# Patient Record
Sex: FEMALE | Race: WHITE | NOT HISPANIC OR LATINO | Employment: OTHER | URBAN - NONMETROPOLITAN AREA
[De-identification: names, ages, dates, MRNs, and addresses within clinical notes are randomized per-mention and may not be internally consistent; named-entity substitution may affect disease eponyms.]

---

## 2017-02-21 ENCOUNTER — OFFICE VISIT (OUTPATIENT)
Dept: RETAIL CLINIC | Facility: CLINIC | Age: 57
End: 2017-02-21

## 2017-02-21 VITALS
SYSTOLIC BLOOD PRESSURE: 142 MMHG | TEMPERATURE: 98.7 F | DIASTOLIC BLOOD PRESSURE: 68 MMHG | HEART RATE: 88 BPM | OXYGEN SATURATION: 96 % | HEIGHT: 65 IN | WEIGHT: 225 LBS | BODY MASS INDEX: 37.49 KG/M2

## 2017-02-21 DIAGNOSIS — S67.10XA CRUSH INJURY TO FINGER, INITIAL ENCOUNTER: ICD-10-CM

## 2017-02-21 DIAGNOSIS — N30.00 ACUTE CYSTITIS WITHOUT HEMATURIA: Primary | ICD-10-CM

## 2017-02-21 LAB
BILIRUB BLD-MCNC: NEGATIVE MG/DL
CLARITY, POC: CLEAR
COLOR UR: ABNORMAL
GLUCOSE UR STRIP-MCNC: NEGATIVE MG/DL
KETONES UR QL: NEGATIVE
LEUKOCYTE EST, POC: ABNORMAL
NITRITE UR-MCNC: NEGATIVE MG/ML
PH UR: 6 [PH] (ref 5–8)
PROT UR STRIP-MCNC: ABNORMAL MG/DL
RBC # UR STRIP: NEGATIVE /UL
SP GR UR: 1.02 (ref 1–1.03)
UROBILINOGEN UR QL: ABNORMAL

## 2017-02-21 PROCEDURE — 99214 OFFICE O/P EST MOD 30 MIN: CPT | Performed by: NURSE PRACTITIONER

## 2017-02-21 PROCEDURE — 81003 URINALYSIS AUTO W/O SCOPE: CPT | Performed by: NURSE PRACTITIONER

## 2017-02-21 RX ORDER — PHENAZOPYRIDINE HYDROCHLORIDE 100 MG/1
100 TABLET, FILM COATED ORAL 3 TIMES DAILY PRN
Qty: 6 TABLET | Refills: 0 | Status: SHIPPED | OUTPATIENT
Start: 2017-02-21 | End: 2017-02-23

## 2017-02-21 RX ORDER — SULFAMETHOXAZOLE AND TRIMETHOPRIM 800; 160 MG/1; MG/1
1 TABLET ORAL 2 TIMES DAILY
Qty: 20 TABLET | Refills: 0 | Status: SHIPPED | OUTPATIENT
Start: 2017-02-21 | End: 2017-03-03

## 2017-02-22 NOTE — PATIENT INSTRUCTIONS
Urinary Tract Infection  Urinary tract infections (UTIs) can develop anywhere along your urinary tract. Your urinary tract is your body's drainage system for removing wastes and extra water. Your urinary tract includes two kidneys, two ureters, a bladder, and a urethra. Your kidneys are a pair of bean-shaped organs. Each kidney is about the size of your fist. They are located below your ribs, one on each side of your spine.  CAUSES  Infections are caused by microbes, which are microscopic organisms, including fungi, viruses, and bacteria. These organisms are so small that they can only be seen through a microscope. Bacteria are the microbes that most commonly cause UTIs.  SYMPTOMS   Symptoms of UTIs may vary by age and gender of the patient and by the location of the infection. Symptoms in young women typically include a frequent and intense urge to urinate and a painful, burning feeling in the bladder or urethra during urination. Older women and men are more likely to be tired, shaky, and weak and have muscle aches and abdominal pain. A fever may mean the infection is in your kidneys. Other symptoms of a kidney infection include pain in your back or sides below the ribs, nausea, and vomiting.  DIAGNOSIS  To diagnose a UTI, your caregiver will ask you about your symptoms. Your caregiver will also ask you to provide a urine sample. The urine sample will be tested for bacteria and white blood cells. White blood cells are made by your body to help fight infection.  TREATMENT   Typically, UTIs can be treated with medication. Because most UTIs are caused by a bacterial infection, they usually can be treated with the use of antibiotics. The choice of antibiotic and length of treatment depend on your symptoms and the type of bacteria causing your infection.  HOME CARE INSTRUCTIONS  · If you were prescribed antibiotics, take them exactly as your caregiver instructs you. Finish the medication even if you feel better after  you have only taken some of the medication.  · Drink enough water and fluids to keep your urine clear or pale yellow.  · Avoid caffeine, tea, and carbonated beverages. They tend to irritate your bladder.  · Empty your bladder often. Avoid holding urine for long periods of time.  · Empty your bladder before and after sexual intercourse.  · After a bowel movement, women should cleanse from front to back. Use each tissue only once.  SEEK MEDICAL CARE IF:   · You have back pain.  · You develop a fever.  · Your symptoms do not begin to resolve within 3 days.  SEEK IMMEDIATE MEDICAL CARE IF:   · You have severe back pain or lower abdominal pain.  · You develop chills.  · You have nausea or vomiting.  · You have continued burning or discomfort with urination.  MAKE SURE YOU:   · Understand these instructions.  · Will watch your condition.  · Will get help right away if you are not doing well or get worse.     This information is not intended to replace advice given to you by your health care provider. Make sure you discuss any questions you have with your health care provider.     Document Released: 09/27/2006 Document Revised: 09/07/2016 Document Reviewed: 11/07/2016  [employer] Interactive Patient Education ©2016 [employer]    Crush Injury, Fingers or Toes  A crush injury to the fingers or toes means the tissues have been damaged by being squeezed (compressed). There will be bleeding into the tissues and swelling. Often, blood will collect under the skin. When this happens, the skin on the finger often dies and may slough off (shed) 1 week to 10 days later. Usually, new skin is growing underneath. If the injury has been too severe and the tissue does not survive, the damaged tissue may begin to turn black over several days.   Wounds which occur because of the crushing may be stitched (sutured) shut. However, crush injuries are more likely to become infected than other injuries. These wounds may not be closed as tightly  as other types of cuts to prevent infection. Nails involved are often lost. These usually grow back over several weeks.   DIAGNOSIS  X-rays may be taken to see if there is any injury to the bones.  TREATMENT  Broken bones (fractures) may be treated with splinting, depending on the fracture. Often, no treatment is required for fractures of the last bone in the fingers or toes.  HOME CARE INSTRUCTIONS   · The crushed part should be raised (elevated) above the heart or center of the chest as much as possible for the first several days or as directed. This helps with pain and lessens swelling. Less swelling increases the chances that the crushed part will survive.  · Put ice on the injured area.    Put ice in a plastic bag.    Place a towel between your skin and the bag.    Leave the ice on for 15-20 minutes, 03-04 times a day for the first 2 days.  · Only take over-the-counter or prescription medicines for pain, discomfort, or fever as directed by your caregiver.  · Use your injured part only as directed.  · Change your bandages (dressings) as directed.  · Keep all follow-up appointments as directed by your caregiver. Not keeping your appointment could result in a chronic or permanent injury, pain, and disability. If there is any problem keeping the appointment, you must call to reschedule.  SEEK IMMEDIATE MEDICAL CARE IF:   · There is redness, swelling, or increasing pain in the wound area.  · Pus is coming from the wound.  · You have a fever.  · You notice a bad smell coming from the wound or dressing.  · The edges of the wound do not stay together after the sutures have been removed.  · You are unable to move the injured finger or toe.  MAKE SURE YOU:   · Understand these instructions.  · Will watch your condition.  · Will get help right away if you are not doing well or get worse.     This information is not intended to replace advice given to you by your health care provider. Make sure you discuss any questions  you have with your health care provider.     Document Released: 12/18/2006 Document Revised: 03/11/2013 Document Reviewed: 05/04/2012  [employer] Interactive Patient Education ©2016 [employer]    Sulfamethoxazole; Trimethoprim, SMX-TMP tablets  What is this medicine?  SULFAMETHOXAZOLE; TRIMETHOPRIM or SMX-TMP (suhl fuh meth OK saji zohl; trye METH oh prim) is a combination of a sulfonamide antibiotic and a second antibiotic, trimethoprim. It is used to treat or prevent certain kinds of bacterial infections. It will not work for colds, flu, or other viral infections.  This medicine may be used for other purposes; ask your health care provider or pharmacist if you have questions.  COMMON BRAND NAME(S): Bacter-Aid DS, Bactrim, Bactrim DS, Septra, Septra DS  What should I tell my health care provider before I take this medicine?  They need to know if you have any of these conditions:  -anemia  -asthma  -being treated with anticonvulsants  -if you frequently drink alcohol containing drinks  -kidney disease  -liver disease  -low level of folic acid or glucose-6-phosphate dehydrogenase  -poor nutrition or malabsorption  -porphyria  -severe allergies  -thyroid disorder  -an unusual or allergic reaction to sulfamethoxazole, trimethoprim, sulfa drugs, other medicines, foods, dyes, or preservatives  -pregnant or trying to get pregnant  -breast-feeding  How should I use this medicine?  Take this medicine by mouth with a full glass of water. Follow the directions on the prescription label. Take your medicine at regular intervals. Do not take it more often than directed. Do not skip doses or stop your medicine early.  Talk to your pediatrician regarding the use of this medicine in children. Special care may be needed. This medicine has been used in children as young as 2 months of age.  Overdosage: If you think you have taken too much of this medicine contact a poison control center or emergency room at once.  NOTE: This medicine  is only for you. Do not share this medicine with others.  What if I miss a dose?  If you miss a dose, take it as soon as you can. If it is almost time for your next dose, take only that dose. Do not take double or extra doses.  What may interact with this medicine?  Do not take this medicine with any of the following medications:  -aminobenzoate potassium  -dofetilide  -metronidazole  This medicine may also interact with the following medications:  -ACE inhibitors like benazepril, enalapril, lisinopril, and ramipril  -birth control pills  -cyclosporine  -digoxin  -diuretics  -indomethacin  -medicines for diabetes  -methenamine  -methotrexate  -phenytoin  -potassium supplements  -pyrimethamine  -sulfinpyrazone  -tricyclic antidepressants  -warfarin  This list may not describe all possible interactions. Give your health care provider a list of all the medicines, herbs, non-prescription drugs, or dietary supplements you use. Also tell them if you smoke, drink alcohol, or use illegal drugs. Some items may interact with your medicine.  What should I watch for while using this medicine?  Tell your doctor or health care professional if your symptoms do not improve. Drink several glasses of water a day to reduce the risk of kidney problems.  Do not treat diarrhea with over the counter products. Contact your doctor if you have diarrhea that lasts more than 2 days or if it is severe and watery.  This medicine can make you more sensitive to the sun. Keep out of the sun. If you cannot avoid being in the sun, wear protective clothing and use a sunscreen. Do not use sun lamps or tanning beds/booths.  What side effects may I notice from receiving this medicine?  Side effects that you should report to your doctor or health care professional as soon as possible:  -allergic reactions like skin rash or hives, swelling of the face, lips, or tongue  -breathing problems  -fever or chills, sore throat  -irregular heartbeat, chest  pain  -joint or muscle pain  -pain or difficulty passing urine  -red pinpoint spots on skin  -redness, blistering, peeling or loosening of the skin, including inside the mouth  -unusual bleeding or bruising  -unusually weak or tired  -yellowing of the eyes or skin  Side effects that usually do not require medical attention (report to your doctor or health care professional if they continue or are bothersome):  -diarrhea  -dizziness  -headache  -loss of appetite  -nausea, vomiting  -nervousness  This list may not describe all possible side effects. Call your doctor for medical advice about side effects. You may report side effects to FDA at 3-352-LPY-5908.  Where should I keep my medicine?  Keep out of the reach of children.  Store at room temperature between 20 to 25 degrees C (68 to 77 degrees F). Protect from light. Throw away any unused medicine after the expiration date.  NOTE: This sheet is a summary. It may not cover all possible information. If you have questions about this medicine, talk to your doctor, pharmacist, or health care provider.     © 2016, [employer]. (2014-07-25 14:38:26)    An antibiotic has been prescribed for you that needs to be taken as directed, for full length of treatment. It is recommended that you take a probiotic when taking an antibiotic and for 2-4 weeks after completion of antibiotic to prevent antibiotic induced diarrhea. Probiotics are found over the counter in pill form, mixable powders, and in regular yogurt or buttermilk, both are recommended.  Try to take these daily around the same time. Do not take within 2 hours of the antibiotic.    I recommendbetadine soaks to finger BID for 5-7 days . If pain persists, xray would be needed. Continue with ice. Use finger as regularly as possible. Tylenol for pain control.

## 2017-02-22 NOTE — PROGRESS NOTES
Chief Complaint   Patient presents with   • Finger Injury   • Urinary Frequency     Subjective   Radha Wade is a 56 y.o. female who presents to the clinic today with complaints of acute onset of pain, swelling, with decreased ability, pain 10/10 after shutting right distal middle finger in the door yesterday. Pt noted a open abrasion that was bleeding slightly. Pt used ice, peroxide and iodine and noted some symptom improvement. Pt states area is more red today and concerned about infection because of history mitral valve prolapse.  Pt also states that she noted that she was having some urinary frequency with mild burning pain during end of voiding stream. Pt states she has had UTI's last one over a year ago. Pt has been drinking cranberry juice without improvement.        The following portions of the patient's history were reviewed and updated as appropriate: allergies, past family history, past medical history, past social history, past surgical history and problem list.    No current outpatient prescriptions on file.  Allergies:  Review of patient's allergies indicates no known allergies.  Past Medical History   Diagnosis Date   • Asthma    • Heart murmur    • Mitral valve prolapse      Past Surgical History   Procedure Laterality Date   •  section       Family History   Problem Relation Age of Onset   • Heart disease Father    • Diabetes Father    • Diabetes Brother    • Heart disease Brother    • Cancer Maternal Grandmother    • Cancer Maternal Grandfather    • Heart disease Paternal Grandfather      Social History   Substance Use Topics   • Smoking status: Not on file   • Smokeless tobacco: Not on file   • Alcohol use Not on file       Review of Systems  Review of Systems   Constitutional: Negative for chills and fever.   HENT: Negative for congestion, postnasal drip and sinus pressure.    Respiratory: Negative for cough and wheezing.    Gastrointestinal: Negative for abdominal pain and blood  "in stool.   Genitourinary: Positive for dysuria, frequency and pelvic pain.   Musculoskeletal: Positive for arthralgias and joint swelling.   Skin: Positive for wound.   Neurological: Positive for headaches.       Objective   Visit Vitals   • /68   • Pulse 88   • Temp 98.7 °F (37.1 °C)   • Ht 65\" (165.1 cm)   • Wt 225 lb (102 kg)   • LMP Comment: menopausal   • SpO2 96%   • BMI 37.44 kg/m2     Last 2 weights    02/21/17  1836   Weight: 225 lb (102 kg)       Physical Exam   Constitutional: She appears well-developed and well-nourished.   HENT:   Head: Normocephalic and atraumatic.   Eyes: EOM are normal. Pupils are equal, round, and reactive to light.   Neck: Normal range of motion. Neck supple.   Cardiovascular: Normal rate and regular rhythm.    Pulmonary/Chest: Effort normal and breath sounds normal.   Abdominal: Soft. Normal appearance and bowel sounds are normal. There is tenderness in the suprapubic area and left lower quadrant.   Musculoskeletal:   Exam of the right middle finger reveals intact skin with bruising and swelling noted over distal phalyanx. Full ROM noted at DIPJ and PIPJ without limitation. No open wound noted, some redness noted to nail fold with small subungal hematoma noted nail base. Pulses intact, capillary refill intact. Gross sensation intact.   Skin: Skin is warm and dry.       Assessment/Plan     Radha was seen today for finger injury and urinary frequency.    Diagnoses and all orders for this visit:    Acute cystitis without hematuria  -     POCT urinalysis dipstick, automated    Crush injury to finger, initial encounter   -- Diff, DX, proximal phalanx FX, right middle finger    Other orders  -     sulfamethoxazole-trimethoprim (BACTRIM DS) 800-160 MG per tablet; Take 1 tablet by mouth 2 (Two) Times a Day for 10 days.  -     phenazopyridine (PYRIDIUM) 100 MG tablet; Take 1 tablet by mouth 3 (Three) Times a Day As Needed for bladder spasms for up to 2 days.                 " -betadine soaks to finger BID for 5-7 days

## 2017-09-08 ENCOUNTER — OFFICE VISIT (OUTPATIENT)
Dept: RETAIL CLINIC | Facility: CLINIC | Age: 57
End: 2017-09-08

## 2017-09-08 VITALS
TEMPERATURE: 98.7 F | HEART RATE: 88 BPM | SYSTOLIC BLOOD PRESSURE: 148 MMHG | OXYGEN SATURATION: 98 % | DIASTOLIC BLOOD PRESSURE: 78 MMHG | RESPIRATION RATE: 18 BRPM

## 2017-09-08 DIAGNOSIS — T14.8XXA PUNCTURE WOUND: Primary | ICD-10-CM

## 2017-09-08 PROCEDURE — 99213 OFFICE O/P EST LOW 20 MIN: CPT | Performed by: NURSE PRACTITIONER

## 2017-09-08 RX ORDER — SULFAMETHOXAZOLE AND TRIMETHOPRIM 800; 160 MG/1; MG/1
1 TABLET ORAL 2 TIMES DAILY
Qty: 14 TABLET | Refills: 0 | Status: SHIPPED | OUTPATIENT
Start: 2017-09-08 | End: 2017-09-15

## 2017-09-08 NOTE — PROGRESS NOTES
Chief Complaint   Patient presents with   • Puncture Wound     Subjective   Radha Wade is a 57 y.o. female who presents to the clinic today with complaints puncture wound in the heel of her right foot. She reports happened a couple of hours ago. She is concerned that she has mitral valve prolapse. I discussed recent recommendations do not include prophylactic antibiotics. She does however have a deep puncture wound which she reports the nail went in about one inch. She is unsure about weather the nail was clean or dirty and is not sure how long it has been since her last Tetanus shot. She knows it has been at least 5 years in not 10 years. She has no primary care provider so I think likely a very long while since she has had vaccines. She cannot afford to have this done here due to no insurance coverage. [facility] is covered to give this to her and they are willing to do this today. She did clean site with peroxide and applied neosporin ointment and band aide.   Puncture Wound   This is a new problem. The current episode started today. Pertinent negatives include no abdominal pain, anorexia, change in bowel habit, chest pain, chills, congestion, coughing, diaphoresis, fatigue, fever, headaches, joint swelling, myalgias, nausea, neck pain, numbness, rash, sore throat, swollen glands, urinary symptoms, vertigo, visual change, vomiting or weakness. The symptoms are aggravated by walking.         Current Outpatient Prescriptions:   •  sulfamethoxazole-trimethoprim (BACTRIM DS) 800-160 MG per tablet, Take 1 tablet by mouth 2 (Two) Times a Day for 7 days., Disp: 14 tablet, Rfl: 0    Allergies:  Review of patient's allergies indicates no known allergies.    Past Medical History:   Diagnosis Date   • Asthma    • Heart murmur    • Mitral valve prolapse      Past Surgical History:   Procedure Laterality Date   •  SECTION       Family History   Problem Relation Age of Onset   • Heart disease Father     • Diabetes Father    • Diabetes Brother    • Heart disease Brother    • Cancer Maternal Grandmother    • Cancer Maternal Grandfather    • Heart disease Paternal Grandfather      Social History   Substance Use Topics   • Smoking status: Passive Smoke Exposure - Never Smoker   • Smokeless tobacco: Never Used   • Alcohol use None       Review of Systems  Review of Systems   Constitutional: Negative for chills, diaphoresis, fatigue and fever.   HENT: Negative for congestion and sore throat.    Respiratory: Negative for cough.    Cardiovascular: Negative for chest pain.   Gastrointestinal: Negative for abdominal pain, anorexia, change in bowel habit, nausea and vomiting.   Musculoskeletal: Negative for joint swelling, myalgias and neck pain.   Skin: Negative for rash.   Neurological: Negative for vertigo, weakness, numbness and headaches.       Objective   /78 (BP Location: Left arm, Patient Position: Sitting, Cuff Size: Adult)  Pulse 88  Temp 98.7 °F (37.1 °C) (Temporal Artery )   Resp 18  SpO2 98%      Physical Exam   Constitutional: She is oriented to person, place, and time. She appears well-developed and well-nourished.   HENT:   Head: Normocephalic and atraumatic.   Eyes: Pupils are equal, round, and reactive to light.   Cardiovascular: Normal rate, regular rhythm and normal heart sounds.    Pulmonary/Chest: Effort normal and breath sounds normal. No respiratory distress.   Neurological: She is alert and oriented to person, place, and time.   Skin: Skin is warm and dry.   Puncture wound noted on right heel. No bleeding or erythemia. She does have tenderness and mild swelling.    Psychiatric: She has a normal mood and affect. Her behavior is normal.   Vitals reviewed.      Assessment/Plan     Radha was seen today for puncture wound.    Diagnoses and all orders for this visit:    Puncture wound    Other orders  -     sulfamethoxazole-trimethoprim (BACTRIM DS) 800-160 MG per tablet; Take 1 tablet by mouth  2 (Two) Times a Day for 7 days.    Wound cleaned with wound  and band aide  applied.   Go now to [facility] and get Tetanus vaccine.     I have given her a list of primary care providers that are listed on her card (she was assigned [facility]). I have advised her to make an appointment with one of the to establish care. She has agreed.     If signs or symptoms of infection develop go to higher level of care, either urgent care of emergency room. Wound care instructions given.

## 2017-09-08 NOTE — PATIENT INSTRUCTIONS
Puncture Wound  A puncture wound is an injury that is caused by a sharp, thin object that goes through (penetrates) your skin. Usually, a puncture wound does not leave a large opening in your skin, so it may not bleed a lot. However, when you get a puncture wound, dirt or other materials (foreign bodies) can be forced into your wound and break off inside. This increases the chance of infection, such as tetanus.  CAUSES  Puncture wounds are caused by any sharp, thin object that goes through your skin, such as:  · Animal teeth, as with an animal bite.  · Sharp, pointed objects, such as nails, splinters of glass, fishhooks, and needles.  SYMPTOMS  Symptoms of a puncture wound include:  · Pain.  · Bleeding.  · Swelling.  · Bruising.  · Fluid leaking from the wound.  · Numbness, tingling, or loss of function.  DIAGNOSIS  This condition is diagnosed with a medical history and physical exam. Your wound will be checked to see if it contains any foreign bodies. You may also have X-rays or other imaging tests.  TREATMENT  Treatment for a puncture wound depends on how serious the wound is. It also depends on whether the wound contains any foreign bodies. Treatment for all types of puncture wounds usually starts with:  · Controlling the bleeding.  · Washing out the wound with a germ-free (sterile) salt-water solution.  · Checking the wound for foreign bodies.  Treatment may also include:  · Having the wound opened surgically to remove a foreign object.  · Closing the wound with stitches (sutures) if it continues to bleed.  · Covering the wound with antibiotic ointments and a bandage (dressing).  · Receiving a tetanus shot.  · Receiving a rabies vaccine.  HOME CARE INSTRUCTIONS  Medicines  · Take or apply over-the-counter and prescription medicines only as told by your health care provider.  · If you were prescribed an antibiotic, take or apply it as told by your health care provider. Do not stop using the antibiotic even if  your condition improves.  Wound Care  · There are many ways to close and cover a wound. For example, a wound can be covered with sutures, skin glue, or adhesive strips.  Follow instructions from your health care provider about:    How to take care of your wound.    When and how you should change your dressing.    When you should remove your dressing.    Removing whatever was used to close your wound.  · Keep the dressing dry as told by your health care provider. Do not take baths, swim, use a hot tub, or do anything that would put your wound underwater until your health care provider approves.  · Clean the wound as told by your health care provider.  · Do not scratch or pick at the wound.  · Check your wound every day for signs of infection. Watch for:    Redness, swelling, or pain.    Fluid, blood, or pus.  General Instructions  · Raise (elevate) the injured area above the level of your heart while you are sitting or lying down.  · If your puncture wound is in your foot, ask your health care provider if you need to avoid putting weight on your foot and for how long.  · Keep all follow-up visits as told by your health care provider. This is important.  SEEK MEDICAL CARE IF:  · You received a tetanus shot and you have swelling, severe pain, redness, or bleeding at the injection site.  · You have a fever.  · Your sutures come out.  · You notice a bad smell coming from your wound or your dressing.  · You notice something coming out of your wound, such as wood or glass.  · Your pain is not controlled with medicine.  · You have increased redness, swelling, or pain at the site of your wound.  · You have fluid, blood, or pus coming from your wound.  · You notice a change in the color of your skin near your wound.  · You need to change the dressing frequently due to fluid, blood, or pus draining from your wound.  · You develop a new rash.  · You develop numbness around your wound.  SEEK IMMEDIATE MEDICAL CARE IF:  · You  develop severe swelling around your wound.  · Your pain suddenly increases and is severe.  · You develop painful skin lumps.  · You have a red streak going away from your wound.  · The wound is on your hand or foot and you cannot properly move a finger or toe.  · The wound is on your hand or foot and you notice that your fingers or toes look pale or bluish.     This information is not intended to replace advice given to you by your health care provider. Make sure you discuss any questions you have with your health care provider.     Document Released: 09/27/2006 Document Revised: 09/07/2016 Document Reviewed: 02/10/2016  [employer] Interactive Patient Education ©2017 [employer]

## 2017-12-15 ENCOUNTER — OFFICE VISIT (OUTPATIENT)
Dept: RETAIL CLINIC | Facility: CLINIC | Age: 57
End: 2017-12-15

## 2017-12-15 VITALS
WEIGHT: 212.4 LBS | HEART RATE: 88 BPM | BODY MASS INDEX: 34.13 KG/M2 | OXYGEN SATURATION: 97 % | DIASTOLIC BLOOD PRESSURE: 72 MMHG | SYSTOLIC BLOOD PRESSURE: 128 MMHG | RESPIRATION RATE: 18 BRPM | TEMPERATURE: 98.4 F | HEIGHT: 66 IN

## 2017-12-15 DIAGNOSIS — H10.33 ACUTE BACTERIAL CONJUNCTIVITIS OF BOTH EYES: ICD-10-CM

## 2017-12-15 DIAGNOSIS — J01.40 ACUTE PANSINUSITIS, RECURRENCE NOT SPECIFIED: ICD-10-CM

## 2017-12-15 DIAGNOSIS — J20.9 ACUTE BRONCHITIS, UNSPECIFIED ORGANISM: Primary | ICD-10-CM

## 2017-12-15 PROCEDURE — 99213 OFFICE O/P EST LOW 20 MIN: CPT | Performed by: NURSE PRACTITIONER

## 2017-12-15 RX ORDER — POLYMYXIN B SULFATE AND TRIMETHOPRIM 1; 10000 MG/ML; [USP'U]/ML
1 SOLUTION OPHTHALMIC EVERY 4 HOURS
Qty: 10 ML | Refills: 0 | Status: SHIPPED | OUTPATIENT
Start: 2017-12-15 | End: 2019-02-18

## 2017-12-15 RX ORDER — SULFAMETHOXAZOLE AND TRIMETHOPRIM 800; 160 MG/1; MG/1
1 TABLET ORAL 2 TIMES DAILY
Qty: 20 TABLET | Refills: 0 | Status: SHIPPED | OUTPATIENT
Start: 2017-12-15 | End: 2017-12-25

## 2017-12-15 RX ORDER — BENZONATATE 200 MG/1
200 CAPSULE ORAL 3 TIMES DAILY PRN
Qty: 20 CAPSULE | Refills: 0 | Status: SHIPPED | OUTPATIENT
Start: 2017-12-15 | End: 2019-02-18

## 2017-12-15 RX ORDER — METHYLPREDNISOLONE 4 MG/1
TABLET ORAL
Qty: 21 TABLET | Refills: 0 | Status: SHIPPED | OUTPATIENT
Start: 2017-12-15 | End: 2019-02-18

## 2017-12-15 NOTE — PATIENT INSTRUCTIONS
Bacterial Conjunctivitis  Bacterial conjunctivitis is an infection of the clear membrane that covers the white part of your eye and the inner surface of your eyelid (conjunctiva). When the blood vessels in your conjunctiva become inflamed, your eye becomes red or pink, and it will probably feel itchy. Bacterial conjunctivitis spreads very easily from person to person (is contagious). It also spreads easily from one eye to the other eye.  CAUSES  This condition is caused by several common bacteria. You may get the infection if you come into close contact with another person who is infected. You may also come into contact with items that are contaminated with the bacteria, such as a face towel, contact lens solution, or eye makeup.  RISK FACTORS  This condition is more likely to develop in people who:  · Are exposed to other people who have the infection.  · Wear contact lenses.  · Have a sinus infection.  · Have had a recent eye injury or surgery.  · Have a weak body defense system (immune system).  · Have a medical condition that causes dry eyes.  SYMPTOMS  Symptoms of this condition include:  · Eye redness.  · Tearing or watery eyes.  · Itchy eyes.  · Burning feeling in your eyes.  · Thick, yellowish discharge from an eye. This may turn into a crust on the eyelid overnight and cause your eyelids to stick together.  · Swollen eyelids.  · Blurred vision.  DIAGNOSIS  Your health care provider can diagnose this condition based on your symptoms and medical history. Your health care provider may also take a sample of discharge from your eye to find the cause of your infection. This is rarely done.  TREATMENT  Treatment for this condition includes:  · Antibiotic eye drops or ointment to clear the infection more quickly and prevent the spread of infection to others.  · Oral antibiotic medicines to treat infections that do not respond to drops or ointments, or last longer than 10 days.  · Cool, wet cloths (cool compresses)  placed on the eyes.  · Artificial tears applied 2-6 times a day.  HOME CARE INSTRUCTIONS  Medicines  · Take or apply your antibiotic medicine as told by your health care provider. Do not stop taking or applying the antibiotic even if you start to feel better.  · Take or apply over-the-counter and prescription medicines only as told by your health care provider.  · Be very careful to avoid touching the edge of your eyelid with the eye drop bottle or the ointment tube when you apply medicines to the affected eye. This will keep you from spreading the infection to your other eye or to other people.  Managing Discomfort  · Gently wipe away any drainage from your eye with a warm, wet washcloth or a cotton ball.  · Apply a cool, clean washcloth to your eye for 10-20 minutes, 3-4 times a day.  General Instructions  · Do not wear contact lenses until the inflammation is gone and your health care provider says it is safe to wear them again. Ask your health care provider how to sterilize or replace your contact lenses before you use them again. Wear glasses until you can resume wearing contacts.  · Avoid wearing eye makeup until the inflammation is gone. Throw away any old eye cosmetics that may be contaminated.  · Change or wash your pillowcase every day.  · Do not share towels or washcloths. This may spread the infection.  · Wash your hands often with soap and water. Use paper towels to dry your hands.  · Avoid touching or rubbing your eyes.  · Do not drive or use heavy machinery if your vision is blurred.  SEEK MEDICAL CARE IF:  · You have a fever.  · Your symptoms do not get better after 10 days.  SEEK IMMEDIATE MEDICAL CARE IF:  · You have a fever and your symptoms suddenly get worse.  · You have severe pain when you move your eye.  · You have facial pain, redness, or swelling.  · You have sudden loss of vision.     This information is not intended to replace advice given to you by your health care provider. Make sure  you discuss any questions you have with your health care provider.     Document Released: 12/18/2006 Document Revised: 04/10/2017 Document Reviewed: 09/29/2016  [employer] Interactive Patient Education ©2017 [employer]  Sinusitis, Adult  Sinusitis is soreness and inflammation of your sinuses. Sinuses are hollow spaces in the bones around your face. Your sinuses are located:  · Around your eyes.  · In the middle of your forehead.  · Behind your nose.  · In your cheekbones.  Your sinuses and nasal passages are lined with a stringy fluid (mucus). Mucus normally drains out of your sinuses. When your nasal tissues become inflamed or swollen, the mucus can become trapped or blocked so air cannot flow through your sinuses. This allows bacteria, viruses, and funguses to grow, which leads to infection.  Sinusitis can develop quickly and last for 7-10 days (acute) or for more than 12 weeks (chronic). Sinusitis often develops after a cold.  CAUSES  This condition is caused by anything that creates swelling in the sinuses or stops mucus from draining, including:  · Allergies.  · Asthma.  · Bacterial or viral infection.  · Abnormally shaped bones between the nasal passages.  · Nasal growths that contain mucus (nasal polyps).  · Narrow sinus openings.  · Pollutants, such as chemicals or irritants in the air.  · A foreign object stuck in the nose.  · A fungal infection. This is rare.  RISK FACTORS  The following factors may make you more likely to develop this condition:  · Having allergies or asthma.  · Having had a recent cold or respiratory tract infection.  · Having structural deformities or blockages in your nose or sinuses.  · Having a weak immune system.  · Doing a lot of swimming or diving.  · Overusing nasal sprays.  · Smoking.  SYMPTOMS  The main symptoms of this condition are pain and a feeling of pressure around the affected sinuses. Other symptoms include:  · Upper toothache.  · Earache.  · Headache.  · Bad  breath.  · Decreased sense of smell and taste.  · A cough that may get worse at night.  · Fatigue.  · Fever.  · Thick drainage from your nose. The drainage is often green and it may contain pus (purulent).  · Stuffy nose or congestion.  · Postnasal drip. This is when extra mucus collects in the throat or back of the nose.  · Swelling and warmth over the affected sinuses.  · Sore throat.  · Sensitivity to light.  DIAGNOSIS  This condition is diagnosed based on symptoms, a medical history, and a physical exam. To find out if your condition is acute or chronic, your health care provider may:  · Look in your nose for signs of nasal polyps.  · Tap over the affected sinus to check for signs of infection.  · View the inside of your sinuses using an imaging device that has a light attached (endoscope).  If your health care provider suspects that you have chronic sinusitis, you may also:  · Be tested for allergies.  · Have a sample of mucus taken from your nose (nasal culture) and checked for bacteria.  · Have a mucus sample examined to see if your sinusitis is related to an allergy.  If your sinusitis does not respond to treatment and it lasts longer than 8 weeks, you may have an MRI or CT scan to check your sinuses. These scans also help to determine how severe your infection is.  In rare cases, a bone biopsy may be done to rule out more serious types of fungal sinus disease.  TREATMENT  Treatment for sinusitis depends on the cause and whether your condition is chronic or acute. If a virus is causing your sinusitis, your symptoms will go away on their own within 10 days. You may be given medicines to relieve your symptoms, including:  · Topical nasal decongestants. They shrink swollen nasal passages and let mucus drain from your sinuses.  · Antihistamines. These drugs block inflammation that is triggered by allergies. This can help to ease swelling in your nose and sinuses.  · Topical nasal corticosteroids. These are nasal  sprays that ease inflammation and swelling in your nose and sinuses.  · Nasal saline washes. These rinses can help to get rid of thick mucus in your nose.  If your condition is caused by bacteria, you will be given an antibiotic medicine. If your condition is caused by a fungus, you will be given an antifungal medicine.  Surgery may be needed to correct underlying conditions, such as narrow nasal passages. Surgery may also be needed to remove polyps.  HOME CARE INSTRUCTIONS  Medicines  · Take, use, or apply over-the-counter and prescription medicines only as told by your health care provider. These may include nasal sprays.  · If you were prescribed an antibiotic medicine, take it as told by your health care provider. Do not stop taking the antibiotic even if you start to feel better.  Hydrate and Humidify  · Drink enough water to keep your urine clear or pale yellow. Staying hydrated will help to thin your mucus.  · Use a cool mist humidifier to keep the humidity level in your home above 50%.  · Inhale steam for 10-15 minutes, 3-4 times a day or as told by your health care provider. You can do this in the bathroom while a hot shower is running.  · Limit your exposure to cool or dry air.  Rest  · Rest as much as possible.  · Sleep with your head raised (elevated).  · Make sure to get enough sleep each night.  General Instructions  · Apply a warm, moist washcloth to your face 3-4 times a day or as told by your health care provider. This will help with discomfort.  · Wash your hands often with soap and water to reduce your exposure to viruses and other germs. If soap and water are not available, use hand .  · Do not smoke. Avoid being around people who are smoking (secondhand smoke).  · Keep all follow-up visits as told by your health care provider. This is important.  SEEK MEDICAL CARE IF:  · You have a fever.  · Your symptoms get worse.  · Your symptoms do not improve within 10 days.  SEEK IMMEDIATE MEDICAL  CARE IF:  · You have a severe headache.  · You have persistent vomiting.  · You have pain or swelling around your face or eyes.  · You have vision problems.  · You develop confusion.  · Your neck is stiff.  · You have trouble breathing.     This information is not intended to replace advice given to you by your health care provider. Make sure you discuss any questions you have with your health care provider.     Document Released: 12/18/2006 Document Revised: 04/10/2017 Document Reviewed: 10/12/2016  [employer] Interactive Patient Education ©2017 [employer]  Acute Bronchitis  Bronchitis is inflammation of the airways that extend from the windpipe into the lungs (bronchi). The inflammation often causes mucus to develop. This leads to a cough, which is the most common symptom of bronchitis.   In acute bronchitis, the condition usually develops suddenly and goes away over time, usually in a couple weeks. Smoking, allergies, and asthma can make bronchitis worse. Repeated episodes of bronchitis may cause further lung problems.   CAUSES  Acute bronchitis is most often caused by the same virus that causes a cold. The virus can spread from person to person (contagious) through coughing, sneezing, and touching contaminated objects.  SIGNS AND SYMPTOMS   · Cough.    · Fever.    · Coughing up mucus.    · Body aches.    · Chest congestion.    · Chills.    · Shortness of breath.    · Sore throat.    DIAGNOSIS   Acute bronchitis is usually diagnosed through a physical exam. Your health care provider will also ask you questions about your medical history. Tests, such as chest X-rays, are sometimes done to rule out other conditions.   TREATMENT   Acute bronchitis usually goes away in a couple weeks. Oftentimes, no medical treatment is necessary. Medicines are sometimes given for relief of fever or cough. Antibiotic medicines are usually not needed but may be prescribed in certain situations. In some cases, an inhaler may be  recommended to help reduce shortness of breath and control the cough. A cool mist vaporizer may also be used to help thin bronchial secretions and make it easier to clear the chest.   HOME CARE INSTRUCTIONS  · Get plenty of rest.    · Drink enough fluids to keep your urine clear or pale yellow (unless you have a medical condition that requires fluid restriction). Increasing fluids may help thin your respiratory secretions (sputum) and reduce chest congestion, and it will prevent dehydration.    · Take medicines only as directed by your health care provider.  · If you were prescribed an antibiotic medicine, finish it all even if you start to feel better.  · Avoid smoking and secondhand smoke. Exposure to cigarette smoke or irritating chemicals will make bronchitis worse. If you are a smoker, consider using nicotine gum or skin patches to help control withdrawal symptoms. Quitting smoking will help your lungs heal faster.    · Reduce the chances of another bout of acute bronchitis by washing your hands frequently, avoiding people with cold symptoms, and trying not to touch your hands to your mouth, nose, or eyes.    · Keep all follow-up visits as directed by your health care provider.    SEEK MEDICAL CARE IF:  Your symptoms do not improve after 1 week of treatment.   SEEK IMMEDIATE MEDICAL CARE IF:  · You develop an increased fever or chills.    · You have chest pain.    · You have severe shortness of breath.  · You have bloody sputum.    · You develop dehydration.  · You faint or repeatedly feel like you are going to pass out.  · You develop repeated vomiting.  · You develop a severe headache.  MAKE SURE YOU:   · Understand these instructions.  · Will watch your condition.  · Will get help right away if you are not doing well or get worse.     This information is not intended to replace advice given to you by your health care provider. Make sure you discuss any questions you have with your health care provider.      Document Released: 01/25/2006 Document Revised: 01/08/2016 Document Reviewed: 06/10/2014  [employer] Interactive Patient Education ©2017 [employer]    Follow up if symptoms worsen or persist

## 2017-12-15 NOTE — PROGRESS NOTES
Chief Complaint   Patient presents with   • Cough   • Conjunctivitis     Subjective   Radha ILDEFONSO Wade is a 57 y.o. female who presents to the clinic today with complaints cough and conjunctivitis. She was around a child during a dance class. She started in left eye and now right eye. She has had green drainage.   She has had cold symptoms x 2 weeks with worsening cough and now some sinus pressure or drainage. She reports she has to take Bactrim as this is the only thing that helps her sinus and respiratory infections.   Cough   This is a new problem. The current episode started 1 to 4 weeks ago. The problem has been gradually worsening. The problem occurs every few minutes. The cough is non-productive. Associated symptoms include chills, eye redness, headaches, postnasal drip and shortness of breath. Pertinent negatives include no chest pain, ear congestion, ear pain, fever, heartburn, hemoptysis, myalgias, nasal congestion, rash, rhinorrhea, sore throat, sweats, weight loss or wheezing. The symptoms are aggravated by lying down and exercise. She has tried nothing for the symptoms. Her past medical history is significant for asthma and bronchitis. There is no history of bronchiectasis, COPD, emphysema, environmental allergies or pneumonia.   Conjunctivitis    The current episode started yesterday. The problem occurs continuously. The problem has been gradually worsening. The problem is moderate. Nothing relieves the symptoms. Associated symptoms include eye itching, headaches, cough, URI, eye discharge, eye pain and eye redness. Pertinent negatives include no orthopnea, no fever, no decreased vision, no double vision, no photophobia, no abdominal pain, no constipation, no diarrhea, no nausea, no vomiting, no congestion, no ear discharge, no ear pain, no hearing loss, no mouth sores, no rhinorrhea, no sore throat, no stridor, no swollen glands, no muscle aches, no neck pain, no neck stiffness, no wheezing, no  rash and no diaper rash. The eye pain is mild. Both eyes are affected.The eye pain is not associated with movement. The eyelid exhibits redness.         Current Outpatient Prescriptions:   •  benzonatate (TESSALON) 200 MG capsule, Take 1 capsule by mouth 3 (Three) Times a Day As Needed for Cough., Disp: 20 capsule, Rfl: 0  •  MethylPREDNISolone (MEDROL, SARAHI,) 4 MG tablet, Take as directed on package instructions., Disp: 21 tablet, Rfl: 0  •  sulfamethoxazole-trimethoprim (BACTRIM DS) 800-160 MG per tablet, Take 1 tablet by mouth 2 (Two) Times a Day for 10 days., Disp: 20 tablet, Rfl: 0  •  trimethoprim-polymyxin b (POLYTRIM) 32388-8.1 UNIT/ML-% ophthalmic solution, Administer 1 drop to both eyes Every 4 (Four) Hours., Disp: 10 mL, Rfl: 0    Allergies:  Review of patient's allergies indicates no known allergies.    Past Medical History:   Diagnosis Date   • Asthma    • Heart murmur    • Mitral valve prolapse      Past Surgical History:   Procedure Laterality Date   •  SECTION       Family History   Problem Relation Age of Onset   • Heart disease Father    • Diabetes Father    • Diabetes Brother    • Heart disease Brother    • Cancer Maternal Grandmother    • Cancer Maternal Grandfather    • Heart disease Paternal Grandfather      Social History   Substance Use Topics   • Smoking status: Passive Smoke Exposure - Never Smoker   • Smokeless tobacco: Never Used   • Alcohol use None       Review of Systems  Review of Systems   Constitutional: Positive for chills. Negative for fever and weight loss.   HENT: Positive for postnasal drip. Negative for congestion, ear discharge, ear pain, hearing loss, mouth sores, rhinorrhea and sore throat.    Eyes: Positive for pain, discharge, redness and itching. Negative for double vision and photophobia.   Respiratory: Positive for cough and shortness of breath. Negative for hemoptysis, wheezing and stridor.    Cardiovascular: Negative for chest pain and orthopnea.  "  Gastrointestinal: Negative for abdominal pain, constipation, diarrhea, heartburn, nausea and vomiting.   Musculoskeletal: Negative for myalgias and neck pain.   Skin: Negative for rash.   Allergic/Immunologic: Negative for environmental allergies.   Neurological: Positive for headaches.       Objective   /72 (BP Location: Left arm, Patient Position: Sitting, Cuff Size: Adult)  Pulse 88  Temp 98.4 °F (36.9 °C) (Tympanic)   Resp 18  Ht 167.6 cm (66\")  Wt 96.3 kg (212 lb 6.4 oz)  SpO2 97%  BMI 34.28 kg/m2      Physical Exam   Constitutional: She is oriented to person, place, and time. She appears well-developed and well-nourished.   HENT:   Head: Normocephalic and atraumatic.   Right Ear: Hearing, external ear and ear canal normal. Tympanic membrane is bulging.   Left Ear: Hearing, external ear and ear canal normal. Tympanic membrane is bulging.   Nose: Mucosal edema present. Right sinus exhibits maxillary sinus tenderness and frontal sinus tenderness. Left sinus exhibits maxillary sinus tenderness and frontal sinus tenderness.   Mouth/Throat: Uvula is midline and mucous membranes are normal. Posterior oropharyngeal erythema present. No oropharyngeal exudate or posterior oropharyngeal edema. Tonsils are 1+ on the right. Tonsils are 1+ on the left. No tonsillar exudate.   Eyes: EOM are normal. Pupils are equal, round, and reactive to light. Right eye exhibits no chemosis, no discharge, no exudate and no hordeolum. No foreign body present in the right eye. Left eye exhibits discharge and exudate. Left eye exhibits no chemosis and no hordeolum. No foreign body present in the left eye. Right conjunctiva is injected. Right conjunctiva has no hemorrhage. Left conjunctiva is injected. Left conjunctiva has no hemorrhage. No scleral icterus.   Neck: Normal range of motion. Neck supple.   Cardiovascular: Normal rate, regular rhythm and normal heart sounds.    Pulmonary/Chest: Effort normal and breath sounds " normal. No stridor. No respiratory distress. She has no wheezes. She has no rales. She exhibits no tenderness.   Lymphadenopathy:     She has no cervical adenopathy.   Neurological: She is alert and oriented to person, place, and time.   Skin: Skin is warm and dry.   Psychiatric: She has a normal mood and affect.   Vitals reviewed.      Assessment/Plan     Radha was seen today for cough and conjunctivitis.    Diagnoses and all orders for this visit:    Acute bronchitis, unspecified organism    Acute pansinusitis, recurrence not specified    Acute bacterial conjunctivitis of both eyes    Other orders  -     trimethoprim-polymyxin b (POLYTRIM) 87827-4.1 UNIT/ML-% ophthalmic solution; Administer 1 drop to both eyes Every 4 (Four) Hours.  -     sulfamethoxazole-trimethoprim (BACTRIM DS) 800-160 MG per tablet; Take 1 tablet by mouth 2 (Two) Times a Day for 10 days.  -     MethylPREDNISolone (MEDROL, SARAHI,) 4 MG tablet; Take as directed on package instructions.  -     benzonatate (TESSALON) 200 MG capsule; Take 1 capsule by mouth 3 (Three) Times a Day As Needed for Cough.      Follow up if symptoms worsen or persist

## 2019-02-18 ENCOUNTER — OFFICE VISIT (OUTPATIENT)
Dept: RETAIL CLINIC | Facility: CLINIC | Age: 59
End: 2019-02-18

## 2019-02-18 VITALS
HEART RATE: 82 BPM | WEIGHT: 209 LBS | TEMPERATURE: 97.9 F | OXYGEN SATURATION: 98 % | SYSTOLIC BLOOD PRESSURE: 138 MMHG | DIASTOLIC BLOOD PRESSURE: 80 MMHG | RESPIRATION RATE: 18 BRPM | BODY MASS INDEX: 34.82 KG/M2 | HEIGHT: 65 IN

## 2019-02-18 DIAGNOSIS — J10.1 INFLUENZA A: Primary | ICD-10-CM

## 2019-02-18 LAB
EXPIRATION DATE: ABNORMAL
FLUAV AG NPH QL: POSITIVE
FLUBV AG NPH QL: NEGATIVE
INTERNAL CONTROL: ABNORMAL
Lab: ABNORMAL

## 2019-02-18 PROCEDURE — 99213 OFFICE O/P EST LOW 20 MIN: CPT | Performed by: NURSE PRACTITIONER

## 2019-02-18 PROCEDURE — 87804 INFLUENZA ASSAY W/OPTIC: CPT | Performed by: NURSE PRACTITIONER

## 2019-02-18 RX ORDER — AMOXICILLIN AND CLAVULANATE POTASSIUM 500; 125 MG/1; MG/1
1 TABLET, FILM COATED ORAL 2 TIMES DAILY
Qty: 14 TABLET | Refills: 0 | Status: SHIPPED | OUTPATIENT
Start: 2019-02-18 | End: 2019-02-25

## 2019-02-18 RX ORDER — ALBUTEROL SULFATE 90 UG/1
2 AEROSOL, METERED RESPIRATORY (INHALATION) EVERY 4 HOURS PRN
Qty: 1 INHALER | Refills: 0 | Status: SHIPPED | OUTPATIENT
Start: 2019-02-18 | End: 2020-01-26 | Stop reason: SDUPTHER

## 2019-02-18 RX ORDER — OSELTAMIVIR PHOSPHATE 75 MG/1
75 CAPSULE ORAL 2 TIMES DAILY
Qty: 10 CAPSULE | Refills: 0 | Status: SHIPPED | OUTPATIENT
Start: 2019-02-18 | End: 2019-02-23

## 2019-02-19 NOTE — PATIENT INSTRUCTIONS
"Influenza, Adult  Influenza (“the flu\") is an infection in the lungs, nose, and throat (respiratory tract). It is caused by a virus. The flu causes many common cold symptoms, as well as a high fever and body aches. It can make you feel very sick.  The flu spreads easily from person to person (is contagious). Getting a flu shot (influenza vaccination) every year is the best way to prevent the flu.  Follow these instructions at home:  · Take over-the-counter and prescription medicines only as told by your doctor.  · Use a cool mist humidifier to add moisture (humidity) to the air in your home. This can make it easier to breathe.  · Rest as needed.  · Drink enough fluid to keep your pee (urine) clear or pale yellow.  · Cover your mouth and nose when you cough or sneeze.  · Wash your hands with soap and water often, especially after you cough or sneeze. If you cannot use soap and water, use hand .  · Stay home from work or school as told by your doctor. Unless you are visiting your doctor, try to avoid leaving home until your fever has been gone for 24 hours without the use of medicine.  · Keep all follow-up visits as told by your doctor. This is important.  How is this prevented?  · Getting a yearly (annual) flu shot is the best way to avoid getting the flu. You may get the flu shot in late summer, fall, or winter. Ask your doctor when you should get your flu shot.  · Wash your hands often or use hand  often.  · Avoid contact with people who are sick during cold and flu season.  · Eat healthy foods.  · Drink plenty of fluids.  · Get enough sleep.  · Exercise regularly.  Contact a doctor if:  · You get new symptoms.  · You have:  ? Chest pain.  ? Watery poop (diarrhea).  ? A fever.  · Your cough gets worse.  · You start to have more mucus.  · You feel sick to your stomach (nauseous).  · You throw up (vomit).  Get help right away if:  · You start to be short of breath or have trouble breathing.  · Your " "skin or nails turn a bluish color.  · You have very bad pain or stiffness in your neck.  · You get a sudden headache.  · You get sudden pain in your face or ear.  · You cannot stop throwing up.  This information is not intended to replace advice given to you by your health care provider. Make sure you discuss any questions you have with your health care provider.  Document Released: 09/26/2009 Document Revised: 05/25/2017 Document Reviewed: 10/11/2016  "[employer] Interactive Patient Education © 2017 [employer]    You have tested positive today for influenza or \"the flu.\" Because your symptoms began less than 48 hours ago, you are being treated with Tamiflu. This medication will not cure the flu, but it may help with reducing the severity and duration of the symptoms. The flu is a viral illness, and symptoms can last as long as 14 days before it resolves. Severe symptoms should begin to subside in 2-3 days.  Symptomatic treatment is recommended - antibiotics will not help. Take over the counter fever reducers as needed for fever/aches per bottle instructions. Over the counter cough and cold medications may help with cough, congestion and runny nose symptoms. Increase your intake of decaffeinated fluids and get plenty of rest. For fever that persists beyond 5 days or worsening symptoms, follow up with your primary care provider or nearest Urgent Care/ER.         "

## 2019-02-19 NOTE — PROGRESS NOTES
Subjective   Flu Symptoms    Radha Wade is a 58 y.o. female with a history of asthma and frequent bronchitis, who presents with flu symptoms.     Flu Symptoms   This is a new problem. The current episode started yesterday. The problem occurs constantly. The problem has been unchanged. Associated symptoms include chest pain (with cough), chills, congestion, coughing, diaphoresis, fatigue, a fever, headaches, myalgias, nausea, a sore throat and weakness (generalized). Pertinent negatives include no abdominal pain, joint swelling, neck pain, numbness, rash, swollen glands, vertigo or vomiting. Nothing aggravates the symptoms. She has tried acetaminophen, drinking and rest for the symptoms.        History Obtained from: Patient    Past Medical History:   Diagnosis Date   • Asthma    • Bronchitis    • Heart murmur    • Mitral valve prolapse      Past Surgical History:   Procedure Laterality Date   •  SECTION       Social History     Tobacco Use   • Smoking status: Never Smoker   • Smokeless tobacco: Never Used   Substance Use Topics   • Alcohol use: No     Frequency: Never   • Drug use: No     Family History   Problem Relation Age of Onset   • Heart disease Father    • Diabetes Father    • Diabetes Brother    • Heart disease Brother    • Cancer Maternal Grandmother    • Cancer Maternal Grandfather    • Heart disease Paternal Grandfather      No Known Allergies  Current Outpatient Medications   Medication Sig Dispense Refill   • albuterol sulfate  (90 Base) MCG/ACT inhaler Inhale 2 puffs Every 4 (Four) Hours As Needed for Wheezing. 1 inhaler 0   • amoxicillin-clavulanate (AUGMENTIN) 500-125 MG per tablet Take 1 tablet by mouth 2 (Two) Times a Day for 7 days. 14 tablet 0   • oseltamivir (TAMIFLU) 75 MG capsule Take 1 capsule by mouth 2 (Two) Times a Day for 5 days. 10 capsule 0     No current facility-administered medications for this visit.         The following portions of the patient's history were  "reviewed and updated as appropriate: allergies, current medications, past family history, past medical history, past social history and past surgical history.    Review of Systems   Constitutional: Positive for appetite change, chills, diaphoresis, fatigue and fever.   HENT: Positive for congestion, postnasal drip, rhinorrhea, sinus pressure and sore throat. Negative for ear discharge, ear pain, trouble swallowing and voice change.    Eyes: Negative.    Respiratory: Positive for cough, chest tightness and wheezing. Negative for shortness of breath.    Cardiovascular: Positive for chest pain (with cough). Negative for palpitations.   Gastrointestinal: Positive for nausea. Negative for abdominal distention, abdominal pain, diarrhea and vomiting.   Musculoskeletal: Positive for myalgias. Negative for joint swelling, neck pain and neck stiffness.   Skin: Negative for rash.   Allergic/Immunologic: Negative for immunocompromised state.   Neurological: Positive for weakness (generalized), light-headedness and headaches. Negative for dizziness, vertigo and numbness.   Hematological: Negative for adenopathy. Does not bruise/bleed easily.   Psychiatric/Behavioral: Positive for sleep disturbance. Negative for agitation, confusion and hallucinations. The patient is nervous/anxious.        Objective     VITAL SIGNS:   Vitals:    02/18/19 1802   BP: 138/80   BP Location: Left arm   Pulse: 82   Resp: 18   Temp: 97.9 °F (36.6 °C)   SpO2: 98%   Weight: 94.8 kg (209 lb)   Height: 165.1 cm (65\")   PainSc:   5   PainLoc: Head   Body mass index is 34.78 kg/m².    Physical Exam   Constitutional: She is cooperative.  Non-toxic appearance. She appears ill. No distress.   HENT:   Head: Atraumatic.   Right Ear: External ear and ear canal normal. Tympanic membrane is erythematous. Tympanic membrane is not perforated.   Left Ear: External ear and ear canal normal. Tympanic membrane is erythematous. Tympanic membrane is not perforated.   Nose: " Mucosal edema and rhinorrhea present. No nasal deformity. Right sinus exhibits no maxillary sinus tenderness and no frontal sinus tenderness. Left sinus exhibits no maxillary sinus tenderness and no frontal sinus tenderness.   Mouth/Throat: Uvula is midline and mucous membranes are normal. Uvula swelling present. Posterior oropharyngeal erythema present.   Eyes: EOM and lids are normal. Pupils are equal, round, and reactive to light. Right conjunctiva is not injected. Left conjunctiva is not injected. No scleral icterus.   Neck: Normal range of motion and phonation normal. Neck supple. No tracheal deviation present.   Cardiovascular: Normal rate and regular rhythm.   No murmur heard.  Pulmonary/Chest: Breath sounds normal. No accessory muscle usage. No tachypnea. No respiratory distress.   Dry coughing   Lymphadenopathy:     She has no cervical adenopathy.        Right: No supraclavicular adenopathy present.        Left: No supraclavicular adenopathy present.   Neurological: She is alert. Coordination and gait normal.   Skin: Skin is warm and dry. Capillary refill takes less than 2 seconds. No cyanosis.   Psychiatric: Her speech is normal. Her mood appears anxious (mildly). She is attentive.       LABS:   Results for orders placed or performed in visit on 02/18/19   POCT Influenza A/B   Result Value Ref Range    Rapid Influenza A Ag Positive (A) Negative    Rapid Influenza B Ag Negative Negative    Internal Control Passed Passed    Lot Number [id number]     Expiration Date 12/31/20            Assessment/Plan     Radha was seen today for flu symptoms.    Diagnoses and all orders for this visit:    Influenza A  -     POCT Influenza A/B  -     oseltamivir (TAMIFLU) 75 MG capsule; Take 1 capsule by mouth 2 (Two) Times a Day for 5 days.  -     albuterol sulfate  (90 Base) MCG/ACT inhaler; Inhale 2 puffs Every 4 (Four) Hours As Needed for Wheezing.    Other orders  -     amoxicillin-clavulanate (AUGMENTIN) 500-125 MG  per tablet; Take 1 tablet by mouth 2 (Two) Times a Day for 7 days.        PLAN: Watch and wait antibiotics with rationale and instructions given d/t patient history of asthma and strong concerns. Patient should follow up with primary care provider if symptoms fail to improve, worsen or for the development of new symptoms that need attention.    The patient voiced understanding and agreement to the patient treatment plan and instructions       GREYSON Arroyo

## 2019-07-21 ENCOUNTER — NURSE TRIAGE (OUTPATIENT)
Dept: CALL CENTER | Facility: HOSPITAL | Age: 59
End: 2019-07-21

## 2019-07-21 ENCOUNTER — HOSPITAL ENCOUNTER (EMERGENCY)
Age: 59
Discharge: HOME | End: 2019-07-21
Payer: COMMERCIAL

## 2019-07-21 VITALS
DIASTOLIC BLOOD PRESSURE: 94 MMHG | RESPIRATION RATE: 18 BRPM | HEART RATE: 99 BPM | OXYGEN SATURATION: 93 % | SYSTOLIC BLOOD PRESSURE: 159 MMHG | WEIGHT: 210 LBS | TEMPERATURE: 98.1 F | BODY MASS INDEX: 34.99 KG/M2 | HEIGHT: 65 IN

## 2019-07-21 DIAGNOSIS — T63.481A INSECT STINGS, ACCIDENTAL OR UNINTENTIONAL, INITIAL ENCOUNTER: Primary | ICD-10-CM

## 2019-07-21 DIAGNOSIS — R30.0 DYSURIA: ICD-10-CM

## 2019-07-21 DIAGNOSIS — N39.0 FREQUENT UTI: ICD-10-CM

## 2019-07-21 PROCEDURE — 6360000002 HC RX W HCPCS: Performed by: NURSE PRACTITIONER

## 2019-07-21 PROCEDURE — 99283 EMERGENCY DEPT VISIT LOW MDM: CPT | Performed by: NURSE PRACTITIONER

## 2019-07-21 PROCEDURE — 96372 THER/PROPH/DIAG INJ SC/IM: CPT

## 2019-07-21 PROCEDURE — 99282 EMERGENCY DEPT VISIT SF MDM: CPT

## 2019-07-21 RX ORDER — SULFAMETHOXAZOLE AND TRIMETHOPRIM 800; 160 MG/1; MG/1
1 TABLET ORAL 2 TIMES DAILY
Qty: 20 TABLET | Refills: 0 | Status: SHIPPED | OUTPATIENT
Start: 2019-07-21 | End: 2019-07-31

## 2019-07-21 RX ORDER — METHYLPREDNISOLONE 4 MG/1
TABLET ORAL
Qty: 1 KIT | Refills: 0 | Status: SHIPPED | OUTPATIENT
Start: 2019-07-21

## 2019-07-21 RX ORDER — DEXAMETHASONE SODIUM PHOSPHATE 10 MG/ML
10 INJECTION, SOLUTION INTRAMUSCULAR; INTRAVENOUS ONCE
Status: COMPLETED | OUTPATIENT
Start: 2019-07-21 | End: 2019-07-21

## 2019-07-21 RX ORDER — DIPHENHYDRAMINE HCL 25 MG
25 CAPSULE ORAL EVERY 6 HOURS PRN
Qty: 30 CAPSULE | Refills: 0 | Status: SHIPPED | OUTPATIENT
Start: 2019-07-21 | End: 2019-07-31

## 2019-07-21 RX ADMIN — DEXAMETHASONE SODIUM PHOSPHATE 10 MG: 10 INJECTION, SOLUTION INTRAMUSCULAR; INTRAVENOUS at 20:42

## 2019-07-21 SDOH — HEALTH STABILITY: MENTAL HEALTH: HOW OFTEN DO YOU HAVE A DRINK CONTAINING ALCOHOL?: NEVER

## 2019-07-21 ASSESSMENT — PAIN SCALES - GENERAL: PAINLEVEL_OUTOF10: 6

## 2019-12-03 ENCOUNTER — TRANSCRIBE ORDERS (OUTPATIENT)
Dept: ADMINISTRATIVE | Facility: HOSPITAL | Age: 59
End: 2019-12-03

## 2019-12-03 ENCOUNTER — LAB (OUTPATIENT)
Dept: LAB | Facility: HOSPITAL | Age: 59
End: 2019-12-03

## 2019-12-03 DIAGNOSIS — Z78.0 MENOPAUSE: ICD-10-CM

## 2019-12-03 DIAGNOSIS — R53.83 FATIGUE, UNSPECIFIED TYPE: Primary | ICD-10-CM

## 2019-12-03 DIAGNOSIS — D64.9 ANEMIA, UNSPECIFIED TYPE: ICD-10-CM

## 2019-12-03 DIAGNOSIS — E03.9 HYPOTHYROIDISM, UNSPECIFIED TYPE: ICD-10-CM

## 2019-12-03 DIAGNOSIS — E13.9 DIABETES 1.5, MANAGED AS TYPE 1 (HCC): ICD-10-CM

## 2019-12-03 LAB
ALBUMIN SERPL-MCNC: 4.3 G/DL (ref 3.5–5.2)
ALBUMIN/GLOB SERPL: 1.2 G/DL
ALP SERPL-CCNC: 92 U/L (ref 39–117)
ALT SERPL W P-5'-P-CCNC: 25 U/L (ref 1–33)
ANION GAP SERPL CALCULATED.3IONS-SCNC: 12 MMOL/L (ref 5–15)
AST SERPL-CCNC: 28 U/L (ref 1–32)
BASOPHILS # BLD AUTO: 0.03 10*3/MM3 (ref 0–0.2)
BASOPHILS NFR BLD AUTO: 0.4 % (ref 0–1.5)
BILIRUB SERPL-MCNC: 0.5 MG/DL (ref 0.2–1.2)
BUN BLD-MCNC: 15 MG/DL (ref 6–20)
BUN/CREAT SERPL: 20.3 (ref 7–25)
CALCIUM SPEC-SCNC: 9.2 MG/DL (ref 8.6–10.5)
CHLORIDE SERPL-SCNC: 101 MMOL/L (ref 98–107)
CO2 SERPL-SCNC: 27 MMOL/L (ref 22–29)
CREAT BLD-MCNC: 0.74 MG/DL (ref 0.57–1)
DEPRECATED RDW RBC AUTO: 39.7 FL (ref 37–54)
EOSINOPHIL # BLD AUTO: 0.26 10*3/MM3 (ref 0–0.4)
EOSINOPHIL NFR BLD AUTO: 3.6 % (ref 0.3–6.2)
ERYTHROCYTE [DISTWIDTH] IN BLOOD BY AUTOMATED COUNT: 12.8 % (ref 12.3–15.4)
GFR SERPL CREATININE-BSD FRML MDRD: 80 ML/MIN/1.73
GLOBULIN UR ELPH-MCNC: 3.6 GM/DL
GLUCOSE BLD-MCNC: 106 MG/DL (ref 65–99)
HBA1C MFR BLD: 6.5 % (ref 4.8–5.6)
HCT VFR BLD AUTO: 47.6 % (ref 34–46.6)
HGB BLD-MCNC: 15.9 G/DL (ref 12–15.9)
IMM GRANULOCYTES # BLD AUTO: 0.03 10*3/MM3 (ref 0–0.05)
IMM GRANULOCYTES NFR BLD AUTO: 0.4 % (ref 0–0.5)
LYMPHOCYTES # BLD AUTO: 2.06 10*3/MM3 (ref 0.7–3.1)
LYMPHOCYTES NFR BLD AUTO: 28.6 % (ref 19.6–45.3)
MCH RBC QN AUTO: 28.5 PG (ref 26.6–33)
MCHC RBC AUTO-ENTMCNC: 33.4 G/DL (ref 31.5–35.7)
MCV RBC AUTO: 85.5 FL (ref 79–97)
MONOCYTES # BLD AUTO: 0.39 10*3/MM3 (ref 0.1–0.9)
MONOCYTES NFR BLD AUTO: 5.4 % (ref 5–12)
NEUTROPHILS # BLD AUTO: 4.44 10*3/MM3 (ref 1.7–7)
NEUTROPHILS NFR BLD AUTO: 61.6 % (ref 42.7–76)
NRBC BLD AUTO-RTO: 0 /100 WBC (ref 0–0.2)
PLATELET # BLD AUTO: 301 10*3/MM3 (ref 140–450)
PMV BLD AUTO: 9.8 FL (ref 6–12)
POTASSIUM BLD-SCNC: 4.2 MMOL/L (ref 3.5–5.2)
PROT SERPL-MCNC: 7.9 G/DL (ref 6–8.5)
RBC # BLD AUTO: 5.57 10*6/MM3 (ref 3.77–5.28)
SODIUM BLD-SCNC: 140 MMOL/L (ref 136–145)
T4 FREE SERPL-MCNC: 1.12 NG/DL (ref 0.93–1.7)
TSH SERPL DL<=0.05 MIU/L-ACNC: 1.93 UIU/ML (ref 0.27–4.2)
WBC NRBC COR # BLD: 7.21 10*3/MM3 (ref 3.4–10.8)

## 2019-12-03 PROCEDURE — 84443 ASSAY THYROID STIM HORMONE: CPT | Performed by: OBSTETRICS & GYNECOLOGY

## 2019-12-03 PROCEDURE — 85025 COMPLETE CBC W/AUTO DIFF WBC: CPT | Performed by: OBSTETRICS & GYNECOLOGY

## 2019-12-03 PROCEDURE — 83002 ASSAY OF GONADOTROPIN (LH): CPT | Performed by: OBSTETRICS & GYNECOLOGY

## 2019-12-03 PROCEDURE — 87624 HPV HI-RISK TYP POOLED RSLT: CPT | Performed by: OBSTETRICS & GYNECOLOGY

## 2019-12-03 PROCEDURE — 80053 COMPREHEN METABOLIC PANEL: CPT | Performed by: OBSTETRICS & GYNECOLOGY

## 2019-12-03 PROCEDURE — 83036 HEMOGLOBIN GLYCOSYLATED A1C: CPT | Performed by: OBSTETRICS & GYNECOLOGY

## 2019-12-03 PROCEDURE — 83001 ASSAY OF GONADOTROPIN (FSH): CPT | Performed by: OBSTETRICS & GYNECOLOGY

## 2019-12-03 PROCEDURE — 84439 ASSAY OF FREE THYROXINE: CPT | Performed by: OBSTETRICS & GYNECOLOGY

## 2019-12-03 PROCEDURE — G0123 SCREEN CERV/VAG THIN LAYER: HCPCS | Performed by: OBSTETRICS & GYNECOLOGY

## 2019-12-03 PROCEDURE — 82670 ASSAY OF TOTAL ESTRADIOL: CPT | Performed by: OBSTETRICS & GYNECOLOGY

## 2019-12-03 PROCEDURE — 36415 COLL VENOUS BLD VENIPUNCTURE: CPT

## 2019-12-04 LAB
ESTRADIOL SERPL HS-MCNC: 31.2 PG/ML
FSH SERPL-ACNC: 40 MIU/ML
LH SERPL-ACNC: 19.7 MIU/ML

## 2019-12-05 ENCOUNTER — LAB REQUISITION (OUTPATIENT)
Dept: LAB | Facility: HOSPITAL | Age: 59
End: 2019-12-05

## 2019-12-05 DIAGNOSIS — Z12.72 ENCOUNTER FOR SCREENING FOR MALIGNANT NEOPLASM OF VAGINA: ICD-10-CM

## 2019-12-05 LAB
GEN CATEG CVX/VAG CYTO-IMP: ABNORMAL
LAB AP CASE REPORT: ABNORMAL
LAB AP GYN ADDITIONAL INFORMATION: ABNORMAL
PATH INTERP SPEC-IMP: ABNORMAL
STAT OF ADQ CVX/VAG CYTO-IMP: ABNORMAL

## 2019-12-09 LAB — HPV I/H RISK 4 DNA CVX QL PROBE+SIG AMP: NOT DETECTED

## 2020-01-26 ENCOUNTER — OFFICE VISIT (OUTPATIENT)
Dept: RETAIL CLINIC | Facility: CLINIC | Age: 60
End: 2020-01-26

## 2020-01-26 VITALS
OXYGEN SATURATION: 98 % | RESPIRATION RATE: 18 BRPM | HEART RATE: 87 BPM | TEMPERATURE: 98.7 F | DIASTOLIC BLOOD PRESSURE: 79 MMHG | SYSTOLIC BLOOD PRESSURE: 134 MMHG

## 2020-01-26 DIAGNOSIS — J20.9 ACUTE BRONCHITIS, UNSPECIFIED ORGANISM: ICD-10-CM

## 2020-01-26 DIAGNOSIS — J01.00 ACUTE MAXILLARY SINUSITIS, RECURRENCE NOT SPECIFIED: Primary | ICD-10-CM

## 2020-01-26 DIAGNOSIS — S39.012A STRAIN OF LUMBAR REGION, INITIAL ENCOUNTER: ICD-10-CM

## 2020-01-26 PROCEDURE — 99214 OFFICE O/P EST MOD 30 MIN: CPT | Performed by: NURSE PRACTITIONER

## 2020-01-26 RX ORDER — ALBUTEROL SULFATE 90 UG/1
2 AEROSOL, METERED RESPIRATORY (INHALATION) EVERY 4 HOURS PRN
Qty: 1 INHALER | Refills: 0 | Status: SHIPPED | OUTPATIENT
Start: 2020-01-26

## 2020-01-26 RX ORDER — AMOXICILLIN AND CLAVULANATE POTASSIUM 875; 125 MG/1; MG/1
1 TABLET, FILM COATED ORAL 2 TIMES DAILY
Qty: 20 TABLET | Refills: 0 | Status: SHIPPED | OUTPATIENT
Start: 2020-01-26 | End: 2020-02-05

## 2020-01-26 RX ORDER — METHYLPREDNISOLONE 4 MG/1
TABLET ORAL
Qty: 21 TABLET | Refills: 0 | Status: SHIPPED | OUTPATIENT
Start: 2020-01-26 | End: 2023-03-28

## 2020-01-26 NOTE — PROGRESS NOTES
"  Chief Complaint   Patient presents with   • Sinusitis   • Back Pain     Subjective   Radha Wade is a 59 y.o. female who presents to the clinic today with complaints of:sinus pressure and pain and cough. She is not sure if she has bronchitis too or it is her asthma. She was sick with a cold a couple of weeks ago and now she is having \"my usual\" cough and sinus symptoms. She is also having low back pain and is not sure if she injured it bending and lifting or if she hurt it coughing Hurt her low back 2-3 days ago.   She is very busy taking care of her mother, children and grandchildren. She has recently seen her gynecologist for vaginal bleeding. She has been spotting, but hen started having monthly periods. He had done some lab on her and she is \"borderline diabetic.\"  Back Pain   This is a new problem. The current episode started in the past 7 days (2-3 days ago). The problem occurs intermittently. The problem is unchanged. The pain is present in the lumbar spine. The pain is moderate. The pain is worse during the day. The symptoms are aggravated by bending, standing, twisting and coughing. Stiffness is present in the morning (when she sits for very long and then tries to get back up. ). Associated symptoms include headaches. Pertinent negatives include no abdominal pain, bladder incontinence, bowel incontinence, chest pain, dysuria, fever, leg pain, numbness, paresis, pelvic pain, perianal numbness, tingling, weakness or weight loss. Risk factors include lack of exercise, poor posture, sedentary lifestyle and obesity. She has tried NSAIDs for the symptoms. The treatment provided mild relief.   Sinusitis   This is a new problem. The current episode started 1 to 4 weeks ago. The problem has been gradually worsening since onset. There has been no fever. The pain is moderate. Associated symptoms include congestion, coughing, headaches and sinus pressure. Pertinent negatives include no chills, diaphoresis, ear " pain, hoarse voice, neck pain, shortness of breath, sneezing, sore throat or swollen glands. Treatments tried: NSAIDS  The treatment provided no relief.         Current Outpatient Medications:   •  albuterol sulfate  (90 Base) MCG/ACT inhaler, Inhale 2 puffs Every 4 (Four) Hours As Needed for Wheezing or Shortness of Air., Disp: 1 inhaler, Rfl: 0  •  amoxicillin-clavulanate (AUGMENTIN) 875-125 MG per tablet, Take 1 tablet by mouth 2 (Two) Times a Day for 10 days., Disp: 20 tablet, Rfl: 0  •  methylPREDNISolone (MEDROL, SARAHI,) 4 MG tablet, Take as directed on package instructions., Disp: 21 tablet, Rfl: 0    Allergies:  Patient has no known allergies.    Past Medical History:   Diagnosis Date   • Asthma    • Bronchitis    • Heart murmur    • Mitral valve prolapse      Past Surgical History:   Procedure Laterality Date   •  SECTION       Family History   Problem Relation Age of Onset   • Heart disease Father    • Diabetes Father    • Diabetes Brother    • Heart disease Brother    • Cancer Maternal Grandmother    • Cancer Maternal Grandfather    • Heart disease Paternal Grandfather      Social History     Tobacco Use   • Smoking status: Never Smoker   • Smokeless tobacco: Never Used   Substance Use Topics   • Alcohol use: No     Frequency: Never   • Drug use: No       Review of Systems  Review of Systems   Constitutional: Positive for fatigue. Negative for chills, diaphoresis, fever and weight loss.   HENT: Positive for congestion, postnasal drip, sinus pressure and sinus pain. Negative for ear pain, hoarse voice, rhinorrhea, sneezing and sore throat.    Respiratory: Positive for cough. Negative for shortness of breath.    Cardiovascular: Negative for chest pain.   Gastrointestinal: Negative for abdominal pain and bowel incontinence.   Genitourinary: Positive for menstrual problem. Negative for bladder incontinence, difficulty urinating, dysuria, enuresis, flank pain, frequency, pelvic pain and urgency.    Musculoskeletal: Positive for back pain. Negative for gait problem, joint swelling, myalgias, neck pain and neck stiffness.   Neurological: Positive for headaches. Negative for tingling, weakness and numbness.   Hematological: Negative for adenopathy.       Objective   /79 (BP Location: Right arm, Patient Position: Sitting, Cuff Size: Adult)   Pulse 87   Temp 98.7 °F (37.1 °C) (Tympanic)   Resp 18   LMP 12/15/2019   SpO2 98%       Physical Exam   Constitutional: She is oriented to person, place, and time. She appears well-developed and well-nourished.   HENT:   Head: Normocephalic and atraumatic.   Right Ear: Hearing, tympanic membrane, external ear and ear canal normal.   Left Ear: Hearing, tympanic membrane, external ear and ear canal normal.   Nose: No mucosal edema or rhinorrhea. Right sinus exhibits maxillary sinus tenderness. Right sinus exhibits no frontal sinus tenderness. Left sinus exhibits maxillary sinus tenderness. Left sinus exhibits no frontal sinus tenderness.   Mouth/Throat: Uvula is midline and mucous membranes are normal. Posterior oropharyngeal erythema present. No oropharyngeal exudate, posterior oropharyngeal edema or tonsillar abscesses. Tonsils are 1+ on the right. Tonsils are 1+ on the left. No tonsillar exudate.   Neck: Normal range of motion. Neck supple.   Cardiovascular: Normal rate, regular rhythm and normal heart sounds.   Pulmonary/Chest: Effort normal and breath sounds normal. No stridor. No respiratory distress. She has no wheezes. She has no rales. She exhibits no tenderness.   Musculoskeletal: Normal range of motion.        Lumbar back: She exhibits tenderness. She exhibits normal range of motion, no bony tenderness, no swelling, no edema, no deformity, no laceration, no pain, no spasm and normal pulse.   Lymphadenopathy:     She has no cervical adenopathy.   Neurological: She is alert and oriented to person, place, and time. She has normal strength. No sensory  deficit. She displays a negative Romberg sign.   Reflex Scores:       Patellar reflexes are 2+ on the right side and 2+ on the left side.       Achilles reflexes are 2+ on the right side and 2+ on the left side.  Skin: Skin is warm and dry.   Psychiatric: She has a normal mood and affect.   Vitals reviewed.      Assessment/Plan     Radha was seen today for sinusitis and back pain.    Diagnoses and all orders for this visit:    Acute maxillary sinusitis, recurrence not specified    Acute bronchitis, unspecified organism    Strain of lumbar region, initial encounter    Other orders  -     amoxicillin-clavulanate (AUGMENTIN) 875-125 MG per tablet; Take 1 tablet by mouth 2 (Two) Times a Day for 10 days.  -     methylPREDNISolone (MEDROL, SARAHI,) 4 MG tablet; Take as directed on package instructions.  -     albuterol sulfate  (90 Base) MCG/ACT inhaler; Inhale 2 puffs Every 4 (Four) Hours As Needed for Wheezing or Shortness of Air.      Drink plenty of fluids   Acetaminophen (Tylenol) or ibuprofen for pain   Recommend mammogram and colonoscopy   If symptoms do not improve follow up  Recommend getting a primary care provider

## 2020-01-26 NOTE — PATIENT INSTRUCTIONS
Drink plenty of fluids   Acetaminophen (Tylenol) or ibuprofen for pain   Recommend mammogram and colonoscopy   If symptoms do not improve follow up  Recommend getting a primary care provider    Sinusitis, Adult  Sinusitis is inflammation of your sinuses. Sinuses are hollow spaces in the bones around your face. Your sinuses are located:  · Around your eyes.  · In the middle of your forehead.  · Behind your nose.  · In your cheekbones.  Mucus normally drains out of your sinuses. When your nasal tissues become inflamed or swollen, mucus can become trapped or blocked. This allows bacteria, viruses, and fungi to grow, which leads to infection. Most infections of the sinuses are caused by a virus.  Sinusitis can develop quickly. It can last for up to 4 weeks (acute) or for more than 12 weeks (chronic). Sinusitis often develops after a cold.  What are the causes?  This condition is caused by anything that creates swelling in the sinuses or stops mucus from draining. This includes:  · Allergies.  · Asthma.  · Infection from bacteria or viruses.  · Deformities or blockages in your nose or sinuses.  · Abnormal growths in the nose (nasal polyps).  · Pollutants, such as chemicals or irritants in the air.  · Infection from fungi (rare).  What increases the risk?  You are more likely to develop this condition if you:  · Have a weak body defense system (immune system).  · Do a lot of swimming or diving.  · Overuse nasal sprays.  · Smoke.  What are the signs or symptoms?  The main symptoms of this condition are pain and a feeling of pressure around the affected sinuses. Other symptoms include:  · Stuffy nose or congestion.  · Thick drainage from your nose.  · Swelling and warmth over the affected sinuses.  · Headache.  · Upper toothache.  · A cough that may get worse at night.  · Extra mucus that collects in the throat or the back of the nose (postnasal drip).  · Decreased sense of smell and taste.  · Fatigue.  · A fever.  · Sore  throat.  · Bad breath.  How is this diagnosed?  This condition is diagnosed based on:  · Your symptoms.  · Your medical history.  · A physical exam.  · Tests to find out if your condition is acute or chronic. This may include:  ? Checking your nose for nasal polyps.  ? Viewing your sinuses using a device that has a light (endoscope).  ? Testing for allergies or bacteria.  ? Imaging tests, such as an MRI or CT scan.  In rare cases, a bone biopsy may be done to rule out more serious types of fungal sinus disease.  How is this treated?  Treatment for sinusitis depends on the cause and whether your condition is chronic or acute.  · If caused by a virus, your symptoms should go away on their own within 10 days. You may be given medicines to relieve symptoms. They include:  ? Medicines that shrink swollen nasal passages (topical intranasal decongestants).  ? Medicines that treat allergies (antihistamines).  ? A spray that eases inflammation of the nostrils (topical intranasal corticosteroids).  ? Rinses that help get rid of thick mucus in your nose (nasal saline washes).  · If caused by bacteria, your health care provider may recommend waiting to see if your symptoms improve. Most bacterial infections will get better without antibiotic medicine. You may be given antibiotics if you have:  ? A severe infection.  ? A weak immune system.  · If caused by narrow nasal passages or nasal polyps, you may need to have surgery.  Follow these instructions at home:  Medicines  · Take, use, or apply over-the-counter and prescription medicines only as told by your health care provider. These may include nasal sprays.  · If you were prescribed an antibiotic medicine, take it as told by your health care provider. Do not stop taking the antibiotic even if you start to feel better.  Hydrate and humidify    · Drink enough fluid to keep your urine pale yellow. Staying hydrated will help to thin your mucus.  · Use a cool mist humidifier to  keep the humidity level in your home above 50%.  · Inhale steam for 10-15 minutes, 3-4 times a day, or as told by your health care provider. You can do this in the bathroom while a hot shower is running.  · Limit your exposure to cool or dry air.  Rest  · Rest as much as possible.  · Sleep with your head raised (elevated).  · Make sure you get enough sleep each night.  General instructions    · Apply a warm, moist washcloth to your face 3-4 times a day or as told by your health care provider. This will help with discomfort.  · Wash your hands often with soap and water to reduce your exposure to germs. If soap and water are not available, use hand .  · Do not smoke. Avoid being around people who are smoking (secondhand smoke).  · Keep all follow-up visits as told by your health care provider. This is important.  Contact a health care provider if:  · You have a fever.  · Your symptoms get worse.  · Your symptoms do not improve within 10 days.  Get help right away if:  · You have a severe headache.  · You have persistent vomiting.  · You have severe pain or swelling around your face or eyes.  · You have vision problems.  · You develop confusion.  · Your neck is stiff.  · You have trouble breathing.  Summary  · Sinusitis is soreness and inflammation of your sinuses. Sinuses are hollow spaces in the bones around your face.  · This condition is caused by nasal tissues that become inflamed or swollen. The swelling traps or blocks the flow of mucus. This allows bacteria, viruses, and fungi to grow, which leads to infection.  · If you were prescribed an antibiotic medicine, take it as told by your health care provider. Do not stop taking the antibiotic even if you start to feel better.  · Keep all follow-up visits as told by your health care provider. This is important.  This information is not intended to replace advice given to you by your health care provider. Make sure you discuss any questions you have with  your health care provider.  Document Released: 12/18/2006 Document Revised: 05/20/2019 Document Reviewed: 05/20/2019  [employer] Interactive Patient Education © 2019 [employer]  Acute Bronchitis, Adult  Acute bronchitis is when air tubes (bronchi) in the lungs suddenly get swollen. The condition can make it hard to breathe. It can also cause these symptoms:  · A cough.  · Coughing up clear, yellow, or green mucus.  · Wheezing.  · Chest congestion.  · Shortness of breath.  · A fever.  · Body aches.  · Chills.  · A sore throat.  Follow these instructions at home:    Medicines  · Take over-the-counter and prescription medicines only as told by your doctor.  · If you were prescribed an antibiotic medicine, take it as told by your doctor. Do not stop taking the antibiotic even if you start to feel better.  General instructions  · Rest.  · Drink enough fluids to keep your pee (urine) pale yellow.  · Avoid smoking and secondhand smoke. If you smoke and you need help quitting, ask your doctor. Quitting will help your lungs heal faster.  · Use an inhaler, cool mist vaporizer, or humidifier as told by your doctor.  · Keep all follow-up visits as told by your doctor. This is important.  How is this prevented?  To lower your risk of getting this condition again:  · Wash your hands often with soap and water. If you cannot use soap and water, use hand .  · Avoid contact with people who have cold symptoms.  · Try not to touch your hands to your mouth, nose, or eyes.  · Make sure to get the flu shot every year.  Contact a doctor if:  · Your symptoms do not get better in 2 weeks.  Get help right away if:  · You cough up blood.  · You have chest pain.  · You have very bad shortness of breath.  · You become dehydrated.  · You faint (pass out) or keep feeling like you are going to pass out.  · You keep throwing up (vomiting).  · You have a very bad headache.  · Your fever or chills gets worse.  This information is not  intended to replace advice given to you by your health care provider. Make sure you discuss any questions you have with your health care provider.  Document Released: 06/05/2009 Document Revised: 08/01/2018 Document Reviewed: 06/07/2017  [employer] Interactive Patient Education © 2019 [employer]    Low Back Strain    A strain is a stretch or tear in a muscle or the strong cords of tissue that attach muscle to bone (tendons). Strains of the lower back (lumbar spine) are a common cause of low back pain. A strain occurs when muscles or tendons are torn or are stretched beyond their limits. The muscles may become inflamed, resulting in pain and sudden muscle tightening (spasms). A strain can happen suddenly due to an injury (trauma), or it can develop gradually due to overuse.  There are three types of strains:  · Grade 1 is a mild strain involving a minor tear of the muscle fibers or tendons. This may cause some pain but no loss of muscle strength.  · Grade 2 is a moderate strain involving a partial tear of the muscle fibers or tendons. This causes more severe pain and some loss of muscle strength.  · Grade 3 is a severe strain involving a complete tear of the muscle or tendon. This causes severe pain and complete or nearly complete loss of muscle strength.  What are the causes?  This condition may be caused by:  · Trauma, such as a fall or a hit to the body.  · Twisting or overstretching the back. This may result from doing activities that require a lot of energy, such as lifting heavy objects.  What increases the risk?  The following factors may increase your risk of getting this condition:  · Playing contact sports.  · Participating in sports or activities that put excessive stress on the back and require a lot of bending and twisting, including:  ? Lifting weights or heavy objects.  ? Gymnastics.  ? Soccer.  ? Figure skating.  ? Snowboarding.  · Being overweight or obese.  · Having poor strength and  flexibility.  What are the signs or symptoms?  Symptoms of this condition may include:  · Sharp or dull pain in the lower back that does not go away. Pain may extend to the buttocks.  · Stiffness.  · Limited range of motion.  · Inability to stand up straight due to stiffness or pain.  · Muscle spasms.  How is this diagnosed?  This condition may be diagnosed based on:  · Your symptoms.  · Your medical history.  · A physical exam.  ? Your health care provider may push on certain areas of your back to determine the source of your pain.  ? You may be asked to bend forward, backward, and side to side to assess the severity of your pain and your range of motion.  · Imaging tests, such as:  ? X-rays.  ? MRI.  How is this treated?  Treatment for this condition may include:  · Applying heat and cold to the affected area.  · Medicines to help relieve pain and to relax your muscles (muscle relaxants).  · NSAIDs to help reduce swelling and discomfort.  · Physical therapy.  When your symptoms improve, it is important to gradually return to your normal routine as soon as possible to reduce pain, avoid stiffness, and avoid loss of muscle strength. Generally, symptoms should improve within 6 weeks of treatment. However, recovery time varies.  Follow these instructions at home:  Managing pain, stiffness, and swelling  · If directed, apply ice to the injured area during the first 24 hours after your injury.  ? Put ice in a plastic bag.  ? Place a towel between your skin and the bag.  ? Leave the ice on for 20 minutes, 2-3 times a day.  · If directed, apply heat to the affected area as often as told by your health care provider. Use the heat source that your health care provider recommends, such as a moist heat pack or a heating pad.  ? Place a towel between your skin and the heat source.  ? Leave the heat on for 20-30 minutes.  ? Remove the heat if your skin turns bright red. This is especially important if you are unable to feel  pain, heat, or cold. You may have a greater risk of getting burned.  Activity  · Rest and return to your normal activities as told by your health care provider. Ask your health care provider what activities are safe for you.  · Avoid activities that take a lot of effort (are strenuous) for as long as told by your health care provider.  · Do exercises as told by your health care provider.  General instructions    · Take over-the-counter and prescription medicines only as told by your health care provider.  · If you have questions or concerns about safety while taking pain medicine, talk with your health care provider.  · Do not drive or operate heavy machinery until you know how your pain medicine affects you.  · Do not use any tobacco products, such as cigarettes, chewing tobacco, and e-cigarettes. Tobacco can delay bone healing. If you need help quitting, ask your health care provider.  · Keep all follow-up visits as told by your health care provider. This is important.  How is this prevented?                        · Warm up and stretch before being active.  · Cool down and stretch after being active.  · Give your body time to rest between periods of activity.  · Avoid:  ? Being physically inactive for long periods at a time.  ? Exercising or playing sports when you are tired or in pain.  · Use correct form when playing sports and lifting heavy objects.  · Use good posture when sitting and standing.  · Maintain a healthy weight.  · Sleep on a mattress with medium firmness to support your back.  · Make sure to use equipment that fits you, including shoes that fit well.  · Be safe and responsible while being active to avoid falls.  · Do at least 150 minutes of moderate-intensity exercise each week, such as brisk walking or water aerobics. Try a form of exercise that takes stress off your back, such as swimming or stationary cycling.  · Maintain physical fitness,  including:  ? Strength.  ? Flexibility.  ? Cardiovascular fitness.  ? Endurance.  Contact a health care provider if:  · Your back pain does not improve after 6 weeks of treatment.  · Your symptoms get worse.  Get help right away if:  · Your back pain is severe.  · You are unable to stand or walk.  · You develop pain in your legs.  · You develop weakness in your buttocks or legs.  · You have difficulty controlling when you urinate or when you have a bowel movement.  This information is not intended to replace advice given to you by your health care provider. Make sure you discuss any questions you have with your health care provider.  Document Released: 12/18/2006 Document Revised: 02/12/2018 Document Reviewed: 09/28/2016  [employer] Interactive Patient Education © 2019 [employer]

## 2023-03-28 PROCEDURE — 86666 EHRLICHIA ANTIBODY: CPT | Performed by: FAMILY MEDICINE

## 2023-03-28 PROCEDURE — 86618 LYME DISEASE ANTIBODY: CPT | Performed by: FAMILY MEDICINE

## 2023-09-14 ENCOUNTER — APPOINTMENT (OUTPATIENT)
Dept: CT IMAGING | Facility: HOSPITAL | Age: 63
End: 2023-09-14

## 2023-09-14 ENCOUNTER — APPOINTMENT (OUTPATIENT)
Dept: GENERAL RADIOLOGY | Facility: HOSPITAL | Age: 63
End: 2023-09-14

## 2023-09-14 ENCOUNTER — HOSPITAL ENCOUNTER (OUTPATIENT)
Facility: HOSPITAL | Age: 63
Setting detail: OBSERVATION
Discharge: HOME | End: 2023-09-16
Attending: INTERNAL MEDICINE | Admitting: FAMILY MEDICINE

## 2023-09-14 ENCOUNTER — APPOINTMENT (OUTPATIENT)
Dept: ULTRASOUND IMAGING | Facility: HOSPITAL | Age: 63
End: 2023-09-14

## 2023-09-14 DIAGNOSIS — R93.5 ABNORMAL CT OF THE ABDOMEN: ICD-10-CM

## 2023-09-14 DIAGNOSIS — I82.4Y3 DEEP VEIN THROMBOSIS (DVT) OF PROXIMAL VEIN OF BOTH LOWER EXTREMITIES, UNSPECIFIED CHRONICITY: Primary | ICD-10-CM

## 2023-09-14 PROBLEM — I82.409 DVT (DEEP VENOUS THROMBOSIS): Status: ACTIVE | Noted: 2023-09-14

## 2023-09-14 LAB
ALBUMIN SERPL-MCNC: 3.6 G/DL (ref 3.5–5.2)
ALBUMIN/GLOB SERPL: 0.8 G/DL
ALP SERPL-CCNC: 142 U/L (ref 39–117)
ALT SERPL W P-5'-P-CCNC: 15 U/L (ref 1–33)
ANION GAP SERPL CALCULATED.3IONS-SCNC: 13 MMOL/L (ref 5–15)
AST SERPL-CCNC: 20 U/L (ref 1–32)
BACTERIA UR QL AUTO: ABNORMAL /HPF
BASOPHILS # BLD AUTO: 0.04 10*3/MM3 (ref 0–0.2)
BASOPHILS NFR BLD AUTO: 0.4 % (ref 0–1.5)
BILIRUB SERPL-MCNC: 0.5 MG/DL (ref 0–1.2)
BILIRUB UR QL STRIP: NEGATIVE
BUN SERPL-MCNC: 13 MG/DL (ref 8–23)
BUN/CREAT SERPL: 21.7 (ref 7–25)
CALCIUM SPEC-SCNC: 9.4 MG/DL (ref 8.6–10.5)
CHLORIDE SERPL-SCNC: 96 MMOL/L (ref 98–107)
CLARITY UR: CLEAR
CO2 SERPL-SCNC: 26 MMOL/L (ref 22–29)
COLOR UR: ABNORMAL
CREAT SERPL-MCNC: 0.6 MG/DL (ref 0.57–1)
D-LACTATE SERPL-SCNC: 1.4 MMOL/L (ref 0.5–2)
DEPRECATED RDW RBC AUTO: 39.9 FL (ref 37–54)
EGFRCR SERPLBLD CKD-EPI 2021: 101 ML/MIN/1.73
EOSINOPHIL # BLD AUTO: 0.1 10*3/MM3 (ref 0–0.4)
EOSINOPHIL NFR BLD AUTO: 0.9 % (ref 0.3–6.2)
ERYTHROCYTE [DISTWIDTH] IN BLOOD BY AUTOMATED COUNT: 13.5 % (ref 12.3–15.4)
GLOBULIN UR ELPH-MCNC: 4.7 GM/DL
GLUCOSE SERPL-MCNC: 152 MG/DL (ref 65–99)
GLUCOSE UR STRIP-MCNC: NEGATIVE MG/DL
HCT VFR BLD AUTO: 42.7 % (ref 34–46.6)
HGB BLD-MCNC: 12.7 G/DL (ref 12–15.9)
HGB UR QL STRIP.AUTO: NEGATIVE
HYALINE CASTS UR QL AUTO: ABNORMAL /LPF
IMM GRANULOCYTES # BLD AUTO: 0.08 10*3/MM3 (ref 0–0.05)
IMM GRANULOCYTES NFR BLD AUTO: 0.7 % (ref 0–0.5)
KETONES UR QL STRIP: ABNORMAL
LEUKOCYTE ESTERASE UR QL STRIP.AUTO: ABNORMAL
LIPASE SERPL-CCNC: 31 U/L (ref 13–60)
LYMPHOCYTES # BLD AUTO: 1.47 10*3/MM3 (ref 0.7–3.1)
LYMPHOCYTES NFR BLD AUTO: 13.6 % (ref 19.6–45.3)
MAGNESIUM SERPL-MCNC: 2.3 MG/DL (ref 1.6–2.4)
MCH RBC QN AUTO: 24.2 PG (ref 26.6–33)
MCHC RBC AUTO-ENTMCNC: 29.7 G/DL (ref 31.5–35.7)
MCV RBC AUTO: 81.5 FL (ref 79–97)
MONOCYTES # BLD AUTO: 0.61 10*3/MM3 (ref 0.1–0.9)
MONOCYTES NFR BLD AUTO: 5.6 % (ref 5–12)
MUCOUS THREADS URNS QL MICRO: ABNORMAL /HPF
NEUTROPHILS NFR BLD AUTO: 78.8 % (ref 42.7–76)
NEUTROPHILS NFR BLD AUTO: 8.53 10*3/MM3 (ref 1.7–7)
NITRITE UR QL STRIP: NEGATIVE
NRBC BLD AUTO-RTO: 0 /100 WBC (ref 0–0.2)
PH UR STRIP.AUTO: 5.5 [PH] (ref 5–8)
PLATELET # BLD AUTO: 541 10*3/MM3 (ref 140–450)
PMV BLD AUTO: 9 FL (ref 6–12)
POTASSIUM SERPL-SCNC: 3.9 MMOL/L (ref 3.5–5.2)
PROT SERPL-MCNC: 8.3 G/DL (ref 6–8.5)
PROT UR QL STRIP: ABNORMAL
RBC # BLD AUTO: 5.24 10*6/MM3 (ref 3.77–5.28)
RBC # UR STRIP: ABNORMAL /HPF
REF LAB TEST METHOD: ABNORMAL
SODIUM SERPL-SCNC: 135 MMOL/L (ref 136–145)
SP GR UR STRIP: 1.03 (ref 1–1.03)
SQUAMOUS #/AREA URNS HPF: ABNORMAL /HPF
TROPONIN T SERPL HS-MCNC: 10 NG/L
TROPONIN T SERPL HS-MCNC: 12 NG/L
UROBILINOGEN UR QL STRIP: ABNORMAL
WBC # UR STRIP: ABNORMAL /HPF
WBC NRBC COR # BLD: 10.83 10*3/MM3 (ref 3.4–10.8)

## 2023-09-14 PROCEDURE — 83690 ASSAY OF LIPASE: CPT

## 2023-09-14 PROCEDURE — 99284 EMERGENCY DEPT VISIT MOD MDM: CPT

## 2023-09-14 PROCEDURE — 36415 COLL VENOUS BLD VENIPUNCTURE: CPT

## 2023-09-14 PROCEDURE — 81001 URINALYSIS AUTO W/SCOPE: CPT

## 2023-09-14 PROCEDURE — 93005 ELECTROCARDIOGRAM TRACING: CPT

## 2023-09-14 PROCEDURE — 74176 CT ABD & PELVIS W/O CONTRAST: CPT

## 2023-09-14 PROCEDURE — 83880 ASSAY OF NATRIURETIC PEPTIDE: CPT | Performed by: INTERNAL MEDICINE

## 2023-09-14 PROCEDURE — 83735 ASSAY OF MAGNESIUM: CPT

## 2023-09-14 PROCEDURE — 93970 EXTREMITY STUDY: CPT | Performed by: SURGERY

## 2023-09-14 PROCEDURE — 84484 ASSAY OF TROPONIN QUANT: CPT

## 2023-09-14 PROCEDURE — 93970 EXTREMITY STUDY: CPT

## 2023-09-14 PROCEDURE — 85025 COMPLETE CBC W/AUTO DIFF WBC: CPT

## 2023-09-14 PROCEDURE — 80053 COMPREHEN METABOLIC PANEL: CPT

## 2023-09-14 PROCEDURE — 83036 HEMOGLOBIN GLYCOSYLATED A1C: CPT | Performed by: INTERNAL MEDICINE

## 2023-09-14 PROCEDURE — 96372 THER/PROPH/DIAG INJ SC/IM: CPT

## 2023-09-14 PROCEDURE — 71045 X-RAY EXAM CHEST 1 VIEW: CPT

## 2023-09-14 PROCEDURE — 87086 URINE CULTURE/COLONY COUNT: CPT

## 2023-09-14 PROCEDURE — 25010000002 ENOXAPARIN PER 10 MG: Performed by: STUDENT IN AN ORGANIZED HEALTH CARE EDUCATION/TRAINING PROGRAM

## 2023-09-14 PROCEDURE — 83605 ASSAY OF LACTIC ACID: CPT

## 2023-09-14 PROCEDURE — 93010 ELECTROCARDIOGRAM REPORT: CPT | Performed by: INTERNAL MEDICINE

## 2023-09-14 RX ORDER — ENOXAPARIN SODIUM 100 MG/ML
1 INJECTION SUBCUTANEOUS ONCE
Status: COMPLETED | OUTPATIENT
Start: 2023-09-14 | End: 2023-09-14

## 2023-09-14 RX ORDER — KETOROLAC TROMETHAMINE 15 MG/ML
15 INJECTION, SOLUTION INTRAMUSCULAR; INTRAVENOUS ONCE
Status: DISCONTINUED | OUTPATIENT
Start: 2023-09-14 | End: 2023-09-14

## 2023-09-14 RX ORDER — DIPHENHYDRAMINE HYDROCHLORIDE 50 MG/ML
12.5 INJECTION INTRAMUSCULAR; INTRAVENOUS ONCE
Status: DISCONTINUED | OUTPATIENT
Start: 2023-09-14 | End: 2023-09-14

## 2023-09-14 RX ORDER — SODIUM CHLORIDE 0.9 % (FLUSH) 0.9 %
10 SYRINGE (ML) INJECTION AS NEEDED
Status: DISCONTINUED | OUTPATIENT
Start: 2023-09-14 | End: 2023-09-16 | Stop reason: HOSPADM

## 2023-09-14 RX ORDER — ACETAMINOPHEN 500 MG
1000 TABLET ORAL ONCE
Status: COMPLETED | OUTPATIENT
Start: 2023-09-14 | End: 2023-09-14

## 2023-09-14 RX ORDER — ONDANSETRON 2 MG/ML
4 INJECTION INTRAMUSCULAR; INTRAVENOUS ONCE
Status: DISCONTINUED | OUTPATIENT
Start: 2023-09-14 | End: 2023-09-14

## 2023-09-14 RX ORDER — PROCHLORPERAZINE EDISYLATE 5 MG/ML
2.5 INJECTION INTRAMUSCULAR; INTRAVENOUS ONCE
Status: DISCONTINUED | OUTPATIENT
Start: 2023-09-14 | End: 2023-09-14

## 2023-09-14 RX ADMIN — SODIUM CHLORIDE, POTASSIUM CHLORIDE, SODIUM LACTATE AND CALCIUM CHLORIDE 1000 ML: 600; 310; 30; 20 INJECTION, SOLUTION INTRAVENOUS at 19:11

## 2023-09-14 RX ADMIN — ACETAMINOPHEN 1000 MG: 500 TABLET, FILM COATED ORAL at 22:31

## 2023-09-14 RX ADMIN — ENOXAPARIN SODIUM 90 MG: 100 INJECTION SUBCUTANEOUS at 23:33

## 2023-09-14 NOTE — ED PROVIDER NOTES
Subjective   History of Present Illness  Patient is a 63-year-old female who presents emergency department with complaints of abdominal pain and leg swelling.  States that her left leg has been more swollen than her right for about a week.  She describes it as a tight sensation.  She is also complaining of right flank pain and generalized abdominal pain.  She is also stating that she has some pressure when she is urinating.  States that she has been following Dr. De Los Santos for vaginal bleeding.  She reports that she fell coming out of the shower 2 months ago and then noticed clots.  These have subsided since.  She also states that she went on a boat ride 2 weeks ago and felt lightheaded and felt like things went dark.  Denied a syncopal episode.  She follows with Dr. De Los Santos today and had a pelvic ultrasound and Pap smear.  She was told that she had an enlarged uterus and has plans for D&C with Dr. De Los Santos.  She is also complaining of some diarrhea as well.  Denies nausea or vomiting.  Denies any chest pain or shortness of breath.  Denies any further symptoms.      Review of Systems   Cardiovascular:  Positive for leg swelling.   Gastrointestinal:  Positive for abdominal pain and diarrhea.   Genitourinary:  Positive for dysuria and flank pain.   Neurological:  Positive for light-headedness.   All other systems reviewed and are negative.    Past Medical History:   Diagnosis Date    Asthma     Bronchitis     Heart murmur     Mitral valve prolapse        No Known Allergies    Past Surgical History:   Procedure Laterality Date     SECTION         Family History   Problem Relation Age of Onset    Heart disease Father     Diabetes Father     Diabetes Brother     Heart disease Brother     Cancer Maternal Grandmother     Cancer Maternal Grandfather     Heart disease Paternal Grandfather        Social History     Socioeconomic History    Marital status:    Tobacco Use    Smoking status: Never    Smokeless  "tobacco: Never   Vaping Use    Vaping Use: Never used   Substance and Sexual Activity    Alcohol use: No    Drug use: No    Sexual activity: Defer     Partners: Male           Objective   Physical Exam  Vitals and nursing note reviewed.   Constitutional:       General: She is not in acute distress.     Appearance: Normal appearance. She is normal weight. She is not ill-appearing or toxic-appearing.   HENT:      Head: Normocephalic.   Cardiovascular:      Rate and Rhythm: Normal rate and regular rhythm.      Pulses: Normal pulses.      Heart sounds: Normal heart sounds.   Pulmonary:      Effort: Pulmonary effort is normal.      Breath sounds: Normal breath sounds.   Abdominal:      General: Abdomen is flat. Bowel sounds are normal. There is no distension.      Palpations: Abdomen is soft.      Tenderness: There is abdominal tenderness (Generalized). There is right CVA tenderness. There is no left CVA tenderness.   Musculoskeletal:         General: Swelling (L>R leg) present. No tenderness. Normal range of motion.      Cervical back: Normal range of motion and neck supple.   Skin:     General: Skin is warm and dry.      Findings: No bruising or erythema.   Neurological:      General: No focal deficit present.      Mental Status: She is alert and oriented to person, place, and time. Mental status is at baseline.   Psychiatric:         Mood and Affect: Mood normal.         Behavior: Behavior normal.         Thought Content: Thought content normal.         Judgment: Judgment normal.       Procedures           ED Course  ED Course as of 09/15/23 0051   u Sep 14, 2023   2978 Patient is refusing CT angiogram chest.  Patient states that she had had a reaction in the past to die and does not want to be premedicated.  I did inform her that we were trying to rule out blood clots in her lungs. She informed me that she did notice she felt somewhat \"short winded\" today, but did not initially tell me this on initial exam. She " understands this and still does not wish to proceed with CTA. Dr. Cosby spoke with GREYSON Leigh with vascular surgery. Plan to admit to hospitalist and consult them for DVTs. [KR]   6926 Spoke with Dr. De Los Santos who stated he will see patient in the hospital as a consult. [KR]      ED Course User Index  [KR] Cora Taylor APRN                                           Medical Decision Making  Patient is a 63-year-old female who presents emergency department with complaints of abdominal pain and leg swelling.  States that her left leg has been more swollen than her right for about a week.  She describes it as a tight sensation.  She is also complaining of right flank pain and generalized abdominal pain.  She is also stating that she has some pressure when she is urinating.  States that she has been following Dr. De Los Santos for vaginal bleeding.  She reports that she fell coming out of the shower 2 months ago and then noticed clots.  These have subsided since.  She also states that she went on a boat ride 2 weeks ago and felt lightheaded and felt like things went dark.  Denied a syncopal episode.  She follows with Dr. De Los Santos today and had a pelvic ultrasound and Pap smear.  She was told that she had an enlarged uterus and has plans for D&C with Dr. De Los Santos.  She is also complaining of some diarrhea as well.  Denies nausea or vomiting.  Denies any chest pain or shortness of breath.  Denies any further symptoms.    Patient was non-toxic and not-ill appearing on arrival. Vital signs stable, mildly tachycardic.     Patient's presentation raises suspicion for differentials including, but not limited to, DVT, urinary tract infection, pyelonephritis, kidney stone, infectious process, mass.     External (non-ED) record review: None    Given this, Radha was placed on the monitor. Laboratory studies and imaging studies were ordered including CBC, CMP, lipase, lactic acid, urinalysis, troponin, magnesium, EKG, chest x-ray,  ultrasound venous Doppler bilateral lower extremities, CT abdomen pelvis without contrast.     Radha was given IV fluids and Tylenol for symptomatic relief.    Imaging was reviewed by radiologist.  Chest x-ray revealed no acute findings.  CT abdomen pelvis without contrast revealed Somewhat bulky retroperitoneal lymphadenopathy and pelvic lymphadenopathy worrisome for metastatic lymphadenopathy or lymphoma. There is also some stranding and nodularity in the greater omental region anteriorly suggesting possible peritoneal metastatic disease. The uterus is prominent in size but a focal mass is not seen. Follow-up nonemergent ultrasound is recommended to evaluate the uterus for a potential mass. There is no other obvious area of primary neoplasm on the noncontrast CT images. A vague 1.6 cm sclerotic bone lesion in the S1 vertebral body is indeterminate. Other nonacute findings, as discussed.  Preliminary results of ultrasound venous Doppler revealed RLE appears to have partial thrombus in the popliteal vein and occlusive thrombus in the PTV; LLE appears to have occlusive thrombus in the entire SFV, popliteal, PTV, and peroneal veins.     Labs were reviewed. On re-evaluation, patient remained hemodynamically stable and appeared to be comfortable and in no acute distress.    Case was discussed with GREYSON Leigh with vascular surgery.  Plan to admit the patient to hospitalist service with a consult to them.  Case was also discussed with Dr. De Los Santos who stated that he would also see the patient in consult.  Case discussed with Dr. Frankel who has agreed to accept this patient for further management.  Patient is appreciative and agreeable to this plan.    Signed by:   GREYSON Jeter 9/15/2023 00:44 CDT     Dragon disclaimer:  Part of this note may be an electronic transcription/translation of spoken language to printed text using the Dragon Dictation System.    Problems Addressed:  Deep vein thrombosis (DVT) of proximal  vein of both lower extremities, unspecified chronicity: complicated acute illness or injury    Amount and/or Complexity of Data Reviewed  Labs: ordered.  Radiology: ordered.  ECG/medicine tests: ordered.    Risk  OTC drugs.  Prescription drug management.  Decision regarding hospitalization.        Final diagnoses:   Deep vein thrombosis (DVT) of proximal vein of both lower extremities, unspecified chronicity       ED Disposition  ED Disposition       ED Disposition   Decision to Admit    Condition   --    Comment   Level of Care: Remote Telemetry [26]   Diagnosis: DVT (deep venous thrombosis) [819861]   Admitting Physician: LIZBETH LOPEZ [[id number]]                 No follow-up provider specified.       Medication List      No changes were made to your prescriptions during this visit.            Cora Taylor, GREYSON  09/15/23 0051

## 2023-09-15 ENCOUNTER — APPOINTMENT (OUTPATIENT)
Dept: CARDIOLOGY | Facility: HOSPITAL | Age: 63
End: 2023-09-15

## 2023-09-15 PROBLEM — N93.9 VAGINAL BLEEDING: Status: ACTIVE | Noted: 2023-09-15

## 2023-09-15 PROBLEM — R73.9 HYPERGLYCEMIA: Status: ACTIVE | Noted: 2023-09-15

## 2023-09-15 PROBLEM — R93.5 ABNORMAL CT OF THE ABDOMEN: Status: ACTIVE | Noted: 2023-09-15

## 2023-09-15 PROBLEM — Z87.09 HISTORY OF ASTHMA: Status: ACTIVE | Noted: 2023-09-15

## 2023-09-15 LAB
ANION GAP SERPL CALCULATED.3IONS-SCNC: 10 MMOL/L (ref 5–15)
BH CV ECHO MEAS - AO MAX PG: 19.2 MMHG
BH CV ECHO MEAS - AO MEAN PG: 7 MMHG
BH CV ECHO MEAS - AO ROOT DIAM: 2.4 CM
BH CV ECHO MEAS - AO V2 MAX: 219 CM/SEC
BH CV ECHO MEAS - AO V2 VTI: 31.6 CM
BH CV ECHO MEAS - AVA(I,D): 2.7 CM2
BH CV ECHO MEAS - EDV(CUBED): 97.3 ML
BH CV ECHO MEAS - EDV(MOD-SP2): 77.6 ML
BH CV ECHO MEAS - EDV(MOD-SP4): 47.9 ML
BH CV ECHO MEAS - EF(MOD-SP2): 51.3 %
BH CV ECHO MEAS - EF(MOD-SP4): 56.8 %
BH CV ECHO MEAS - ESV(CUBED): 27 ML
BH CV ECHO MEAS - ESV(MOD-SP2): 37.8 ML
BH CV ECHO MEAS - ESV(MOD-SP4): 20.7 ML
BH CV ECHO MEAS - FS: 34.8 %
BH CV ECHO MEAS - IVS/LVPW: 0.95 CM
BH CV ECHO MEAS - IVSD: 0.95 CM
BH CV ECHO MEAS - LA DIMENSION: 3.2 CM
BH CV ECHO MEAS - LAT PEAK E' VEL: 12.4 CM/SEC
BH CV ECHO MEAS - LV DIASTOLIC VOL/BSA (35-75): 28 CM2
BH CV ECHO MEAS - LV MASS(C)D: 153.4 GRAMS
BH CV ECHO MEAS - LV MAX PG: 7 MMHG
BH CV ECHO MEAS - LV MEAN PG: 5 MMHG
BH CV ECHO MEAS - LV SYSTOLIC VOL/BSA (12-30): 12.1 CM2
BH CV ECHO MEAS - LV V1 MAX: 131 CM/SEC
BH CV ECHO MEAS - LV V1 VTI: 28.6 CM
BH CV ECHO MEAS - LVIDD: 4.6 CM
BH CV ECHO MEAS - LVIDS: 3 CM
BH CV ECHO MEAS - LVOT AREA: 3 CM2
BH CV ECHO MEAS - LVOT DIAM: 1.95 CM
BH CV ECHO MEAS - LVPWD: 1 CM
BH CV ECHO MEAS - MED PEAK E' VEL: 9.5 CM/SEC
BH CV ECHO MEAS - MR MAX PG: 60.5 MMHG
BH CV ECHO MEAS - MR MAX VEL: 389 CM/SEC
BH CV ECHO MEAS - MV A MAX VEL: 118 CM/SEC
BH CV ECHO MEAS - MV DEC SLOPE: 598 CM/SEC2
BH CV ECHO MEAS - MV DEC TIME: 0.16 MSEC
BH CV ECHO MEAS - MV E MAX VEL: 95.2 CM/SEC
BH CV ECHO MEAS - MV E/A: 0.81
BH CV ECHO MEAS - MV P1/2T: 62.7 MSEC
BH CV ECHO MEAS - MVA(P1/2T): 3.5 CM2
BH CV ECHO MEAS - PA V2 MAX: 71.9 CM/SEC
BH CV ECHO MEAS - PI END-D VEL: 157 CM/SEC
BH CV ECHO MEAS - RV MAX PG: 1.59 MMHG
BH CV ECHO MEAS - RV V1 MAX: 63 CM/SEC
BH CV ECHO MEAS - RVDD: 2.5 CM
BH CV ECHO MEAS - SI(MOD-SP2): 23.3 ML/M2
BH CV ECHO MEAS - SI(MOD-SP4): 15.9 ML/M2
BH CV ECHO MEAS - SV(LVOT): 85.4 ML
BH CV ECHO MEAS - SV(MOD-SP2): 39.8 ML
BH CV ECHO MEAS - SV(MOD-SP4): 27.2 ML
BH CV ECHO MEAS - TAPSE (>1.6): 2.04 CM
BH CV ECHO MEAS - TR MAX PG: 23.8 MMHG
BH CV ECHO MEAS - TR MAX VEL: 244 CM/SEC
BH CV ECHO MEASUREMENTS AVERAGE E/E' RATIO: 8.69
BH CV XLRA - RV BASE: 2.7 CM
BUN SERPL-MCNC: 11 MG/DL (ref 8–23)
BUN/CREAT SERPL: 22 (ref 7–25)
CALCIUM SPEC-SCNC: 8.5 MG/DL (ref 8.6–10.5)
CHLORIDE SERPL-SCNC: 101 MMOL/L (ref 98–107)
CO2 SERPL-SCNC: 26 MMOL/L (ref 22–29)
CREAT SERPL-MCNC: 0.5 MG/DL (ref 0.57–1)
DEPRECATED RDW RBC AUTO: 40.8 FL (ref 37–54)
EGFRCR SERPLBLD CKD-EPI 2021: 105.5 ML/MIN/1.73
ERYTHROCYTE [DISTWIDTH] IN BLOOD BY AUTOMATED COUNT: 13.7 % (ref 12.3–15.4)
GEN 5 2HR TROPONIN T REFLEX: 8 NG/L
GLUCOSE BLDC GLUCOMTR-MCNC: 125 MG/DL (ref 70–130)
GLUCOSE SERPL-MCNC: 135 MG/DL (ref 65–99)
HBA1C MFR BLD: 8.1 % (ref 4.8–5.6)
HCT VFR BLD AUTO: 37.2 % (ref 34–46.6)
HGB BLD-MCNC: 11.1 G/DL (ref 12–15.9)
LEFT ATRIUM VOLUME INDEX: 20.6 ML/M2
LEFT ATRIUM VOLUME: 33.6 ML
MCH RBC QN AUTO: 24.5 PG (ref 26.6–33)
MCHC RBC AUTO-ENTMCNC: 29.8 G/DL (ref 31.5–35.7)
MCV RBC AUTO: 82.1 FL (ref 79–97)
NT-PROBNP SERPL-MCNC: 409.6 PG/ML (ref 0–900)
PLATELET # BLD AUTO: 449 10*3/MM3 (ref 140–450)
PMV BLD AUTO: 9.1 FL (ref 6–12)
POTASSIUM SERPL-SCNC: 4.3 MMOL/L (ref 3.5–5.2)
RBC # BLD AUTO: 4.53 10*6/MM3 (ref 3.77–5.28)
SODIUM SERPL-SCNC: 137 MMOL/L (ref 136–145)
TROPONIN T DELTA: -1 NG/L
TROPONIN T SERPL HS-MCNC: 9 NG/L
WBC NRBC COR # BLD: 8.18 10*3/MM3 (ref 3.4–10.8)

## 2023-09-15 PROCEDURE — 25010000002 ENOXAPARIN PER 10 MG: Performed by: INTERNAL MEDICINE

## 2023-09-15 PROCEDURE — 80048 BASIC METABOLIC PNL TOTAL CA: CPT | Performed by: INTERNAL MEDICINE

## 2023-09-15 PROCEDURE — 96372 THER/PROPH/DIAG INJ SC/IM: CPT

## 2023-09-15 PROCEDURE — 96360 HYDRATION IV INFUSION INIT: CPT

## 2023-09-15 PROCEDURE — 93005 ELECTROCARDIOGRAM TRACING: CPT | Performed by: INTERNAL MEDICINE

## 2023-09-15 PROCEDURE — 93306 TTE W/DOPPLER COMPLETE: CPT | Performed by: INTERNAL MEDICINE

## 2023-09-15 PROCEDURE — 93306 TTE W/DOPPLER COMPLETE: CPT

## 2023-09-15 PROCEDURE — G0378 HOSPITAL OBSERVATION PER HR: HCPCS

## 2023-09-15 PROCEDURE — 85027 COMPLETE CBC AUTOMATED: CPT | Performed by: INTERNAL MEDICINE

## 2023-09-15 PROCEDURE — 82948 REAGENT STRIP/BLOOD GLUCOSE: CPT

## 2023-09-15 PROCEDURE — 84484 ASSAY OF TROPONIN QUANT: CPT | Performed by: INTERNAL MEDICINE

## 2023-09-15 PROCEDURE — 93010 ELECTROCARDIOGRAM REPORT: CPT | Performed by: INTERNAL MEDICINE

## 2023-09-15 PROCEDURE — 96361 HYDRATE IV INFUSION ADD-ON: CPT

## 2023-09-15 RX ORDER — ENOXAPARIN SODIUM 100 MG/ML
1 INJECTION SUBCUTANEOUS EVERY 12 HOURS
Status: DISCONTINUED | OUTPATIENT
Start: 2023-09-15 | End: 2023-09-16 | Stop reason: HOSPADM

## 2023-09-15 RX ORDER — AMOXICILLIN 250 MG
2 CAPSULE ORAL 2 TIMES DAILY
Status: DISCONTINUED | OUTPATIENT
Start: 2023-09-15 | End: 2023-09-16 | Stop reason: HOSPADM

## 2023-09-15 RX ORDER — ONDANSETRON 2 MG/ML
4 INJECTION INTRAMUSCULAR; INTRAVENOUS EVERY 6 HOURS PRN
Status: DISCONTINUED | OUTPATIENT
Start: 2023-09-15 | End: 2023-09-16 | Stop reason: HOSPADM

## 2023-09-15 RX ORDER — SODIUM CHLORIDE 9 MG/ML
40 INJECTION, SOLUTION INTRAVENOUS AS NEEDED
Status: DISCONTINUED | OUTPATIENT
Start: 2023-09-15 | End: 2023-09-16 | Stop reason: HOSPADM

## 2023-09-15 RX ORDER — SODIUM CHLORIDE 0.9 % (FLUSH) 0.9 %
10 SYRINGE (ML) INJECTION AS NEEDED
Status: DISCONTINUED | OUTPATIENT
Start: 2023-09-15 | End: 2023-09-16 | Stop reason: HOSPADM

## 2023-09-15 RX ORDER — SODIUM CHLORIDE 9 MG/ML
75 INJECTION, SOLUTION INTRAVENOUS CONTINUOUS
Status: DISCONTINUED | OUTPATIENT
Start: 2023-09-15 | End: 2023-09-16 | Stop reason: HOSPADM

## 2023-09-15 RX ORDER — ENOXAPARIN SODIUM 100 MG/ML
1 INJECTION SUBCUTANEOUS EVERY 12 HOURS
Status: DISCONTINUED | OUTPATIENT
Start: 2023-09-15 | End: 2023-09-15

## 2023-09-15 RX ORDER — BISACODYL 10 MG
10 SUPPOSITORY, RECTAL RECTAL DAILY PRN
Status: DISCONTINUED | OUTPATIENT
Start: 2023-09-15 | End: 2023-09-16 | Stop reason: HOSPADM

## 2023-09-15 RX ORDER — POLYETHYLENE GLYCOL 3350 17 G/17G
17 POWDER, FOR SOLUTION ORAL DAILY PRN
Status: DISCONTINUED | OUTPATIENT
Start: 2023-09-15 | End: 2023-09-16 | Stop reason: HOSPADM

## 2023-09-15 RX ORDER — BISACODYL 5 MG/1
5 TABLET, DELAYED RELEASE ORAL DAILY PRN
Status: DISCONTINUED | OUTPATIENT
Start: 2023-09-15 | End: 2023-09-16 | Stop reason: HOSPADM

## 2023-09-15 RX ORDER — ALBUTEROL SULFATE 2.5 MG/3ML
2.5 SOLUTION RESPIRATORY (INHALATION) EVERY 6 HOURS PRN
Status: DISCONTINUED | OUTPATIENT
Start: 2023-09-15 | End: 2023-09-16 | Stop reason: HOSPADM

## 2023-09-15 RX ORDER — ACETAMINOPHEN 325 MG/1
650 TABLET ORAL EVERY 6 HOURS PRN
Status: DISCONTINUED | OUTPATIENT
Start: 2023-09-15 | End: 2023-09-16 | Stop reason: HOSPADM

## 2023-09-15 RX ORDER — SODIUM CHLORIDE 0.9 % (FLUSH) 0.9 %
10 SYRINGE (ML) INJECTION EVERY 12 HOURS SCHEDULED
Status: DISCONTINUED | OUTPATIENT
Start: 2023-09-15 | End: 2023-09-16 | Stop reason: HOSPADM

## 2023-09-15 RX ADMIN — ACETAMINOPHEN 650 MG: 325 TABLET, FILM COATED ORAL at 12:19

## 2023-09-15 RX ADMIN — Medication 10 ML: at 01:05

## 2023-09-15 RX ADMIN — ENOXAPARIN SODIUM 90 MG: 100 INJECTION SUBCUTANEOUS at 21:10

## 2023-09-15 RX ADMIN — ENOXAPARIN SODIUM 90 MG: 100 INJECTION SUBCUTANEOUS at 10:33

## 2023-09-15 RX ADMIN — SODIUM CHLORIDE 75 ML/HR: 9 INJECTION, SOLUTION INTRAVENOUS at 12:19

## 2023-09-15 RX ADMIN — SODIUM CHLORIDE 75 ML/HR: 9 INJECTION, SOLUTION INTRAVENOUS at 01:05

## 2023-09-15 RX ADMIN — ACETAMINOPHEN 650 MG: 325 TABLET, FILM COATED ORAL at 20:02

## 2023-09-15 NOTE — H&P
[facility] Medicine Services  HISTORY AND PHYSICAL    Date of Admission: 9/14/2023  Primary Care Physician: Provider, No Known    Subjective   Primary Historian: patient    Chief Complaint: Left leg swelling/pain, abd pain    History of Present Illness  Patient is a 63-year-old female with a history of asthma and mitral valve prolapse.  She does not follow regularly with any physician.  She states has been some years since she has had any kind of formal check up or blood work.  States she has not required chronic medications for any illnesses prior outside of her asthma.  She does have a familial history of breast cancer and ovarian cancer.  She presented to the ER today due to swelling in her left leg.  She has also been having some abdominal discomfort and pain.  In the ER she was found by ultrasound to have bilateral lower extremity DVTs.  CT of abdomen pelvis was concerning for enlarged uterus, retroperitoneal lymphadenopathy, possible omental nodularity and a vague S1 sclerotic bone lesion.  Patient states she initially had vaginal bleeding which slowed and stopped around the age of 55 but then restarted and by the time she was 60 she was having regular bleeding again.  She apparently was seen by gynecology at time and work-up did not show anything concerning for malignancy.  This was back in 2020.  She continued to have bleeding at that time.  She stated she had some more heavy bleeding recently.  She was seen by Dr. Filiberto laird in the office today per report who did an ultrasound and stated she would eventually need a D&C for further work-up.  Ultrasound report not directly available.  Both gynecology and vascular surgery were called by ER provider prior to admission.  Both stated they were okay with admission and will see the patient in morning.  She is seen in treatment room 20.  She looks very comfortable.  She denies any recent fevers, night sweats, weight loss.  She  "is not having any chest pain or shortness of breath.  No focal numbness or weakness.  Heart rate was around 90 when I saw her with a sat of 98% on room air.  Blood pressure in the 130s.      Review of Systems   Otherwise complete ROS reviewed and negative except as mentioned in the HPI.    Past Medical History:   Past Medical History:   Diagnosis Date    Asthma     Bronchitis     Heart murmur     Mitral valve prolapse      Past Surgical History:  Past Surgical History:   Procedure Laterality Date     SECTION       Social History:  reports that she has never smoked. She has never used smokeless tobacco. She reports that she does not drink alcohol and does not use drugs.    Family History: family history includes Cancer in her maternal grandfather and maternal grandmother; Diabetes in her brother and father; Heart disease in her brother, father, and paternal grandfather.       Allergies:  No Known Allergies    Medications:  Prior to Admission medications    Medication Sig Start Date End Date Taking? Authorizing Provider   albuterol sulfate  (90 Base) MCG/ACT inhaler Inhale 2 puffs Every 4 (Four) Hours As Needed for Wheezing or Shortness of Air. 20   Shandra Dietrich APRN     I have utilized all available immediate resources to obtain, update, or review the patient's current medications (including all prescriptions, over-the-counter products, herbals, cannabis/cannabidiol products, and vitamin/mineral/dietary (nutritional) supplements).    Objective     Vital Signs: /58   Pulse 96   Temp 98.4 °F (36.9 °C)   Resp 18   Ht 165.1 cm (65\")   Wt 86.2 kg (190 lb)   SpO2 97%   BMI 31.62 kg/m²   Physical Exam   GEN: Awake, alert, interactive, in NAD  HEENT: PERRLA, EOMI, Anicteric, Trachea midline  Lungs: no wheezing/rales/rhonchi  Heart: RRR, +S1/s2, no rub  ABD: obese, soft, nt, +BS, no guarding/rebound  Extremities: atraumatic, no cyanosis, L calf >R calf in size  Skin: no rashes or " petechiae  Neuro: AAOx3, no focal deficits  Psych: normal mood & affect        Results Reviewed:  Lab Results (last 24 hours)       Procedure Component Value Units Date/Time    Single High Sensitivity Troponin T [[id number]]  (Abnormal) Collected: 09/14/23 2234    Specimen: Blood Updated: 09/14/23 2310     HS Troponin T 12 ng/L     Narrative:      High Sensitive Troponin T Reference Range:  <10.0 ng/L- Negative Female for AMI  <15.0 ng/L- Negative Male for AMI  >=10 - Abnormal Female indicating possible myocardial injury.  >=15 - Abnormal Male indicating possible myocardial injury.   Clinicians would have to utilize clinical acumen, EKG, Troponin, and serial changes to determine if it is an Acute Myocardial Infarction or myocardial injury due to an underlying chronic condition.         Comprehensive Metabolic Panel [[id number]]  (Abnormal) Collected: 09/14/23 1909    Specimen: Blood Updated: 09/14/23 1947     Glucose 152 mg/dL      BUN 13 mg/dL      Creatinine 0.60 mg/dL      Sodium 135 mmol/L      Potassium 3.9 mmol/L      Chloride 96 mmol/L      CO2 26.0 mmol/L      Calcium 9.4 mg/dL      Total Protein 8.3 g/dL      Albumin 3.6 g/dL      ALT (SGPT) 15 U/L      AST (SGOT) 20 U/L      Alkaline Phosphatase 142 U/L      Total Bilirubin 0.5 mg/dL      Globulin 4.7 gm/dL      A/G Ratio 0.8 g/dL      BUN/Creatinine Ratio 21.7     Anion Gap 13.0 mmol/L      eGFR 101.0 mL/min/1.73     Narrative:      GFR Normal >60  Chronic Kidney Disease <60  Kidney Failure <15      Lactic Acid, Plasma [[id number]]  (Normal) Collected: 09/14/23 1909    Specimen: Blood Updated: 09/14/23 1947     Lactate 1.4 mmol/L     Single High Sensitivity Troponin T [[id number]]  (Abnormal) Collected: 09/14/23 1909    Specimen: Blood Updated: 09/14/23 1945     HS Troponin T 10 ng/L     Narrative:      High Sensitive Troponin T Reference Range:  <10.0 ng/L- Negative Female for AMI  <15.0 ng/L- Negative Male for AMI  >=10 - Abnormal Female indicating possible  myocardial injury.  >=15 - Abnormal Male indicating possible myocardial injury.   Clinicians would have to utilize clinical acumen, EKG, Troponin, and serial changes to determine if it is an Acute Myocardial Infarction or myocardial injury due to an underlying chronic condition.         Urinalysis With Culture If Indicated - Urine, Clean Catch [[id number]]  (Abnormal) Collected: 09/14/23 1903    Specimen: Urine, Clean Catch Updated: 09/14/23 1944     Color, UA Dark Yellow     Appearance, UA Clear     pH, UA 5.5     Specific Gravity, UA 1.029     Glucose, UA Negative     Ketones, UA 40 mg/dL (2+)     Bilirubin, UA Negative     Blood, UA Negative     Protein, UA 30 mg/dL (1+)     Leuk Esterase, UA Trace     Nitrite, UA Negative     Urobilinogen, UA 0.2 E.U./dL    Narrative:      In absence of clinical symptoms, the presence of pyuria, bacteria, and/or nitrites on the urinalysis result does not correlate with infection.    Urinalysis, Microscopic Only - Urine, Clean Catch [[id number]]  (Abnormal) Collected: 09/14/23 1903    Specimen: Urine, Clean Catch Updated: 09/14/23 1944     RBC, UA 3-5 /HPF      WBC, UA 6-12 /HPF      Bacteria, UA Trace /HPF      Squamous Epithelial Cells, UA 3-6 /HPF      Hyaline Casts, UA 3-6 /LPF      Mucus, UA Small/1+ /HPF      Methodology Manual Light Microscopy    Urine Culture - Urine, Urine, Clean Catch [[id number]] Collected: 09/14/23 1903    Specimen: Urine, Clean Catch Updated: 09/14/23 1944    Lipase [[id number]]  (Normal) Collected: 09/14/23 1909    Specimen: Blood Updated: 09/14/23 1942     Lipase 31 U/L     Magnesium [[id number]]  (Normal) Collected: 09/14/23 1909    Specimen: Blood Updated: 09/14/23 1942     Magnesium 2.3 mg/dL     CBC & Differential [[id number]]  (Abnormal) Collected: 09/14/23 1909    Specimen: Blood Updated: 09/14/23 1922    Narrative:      The following orders were created for panel order CBC & Differential.  Procedure                               Abnormality          Status                     ---------                               -----------         ------                     CBC Auto Differential[[id number]]        Abnormal            Final result                 Please view results for these tests on the individual orders.    CBC Auto Differential [[id number]]  (Abnormal) Collected: 09/14/23 1909    Specimen: Blood Updated: 09/14/23 1922     WBC 10.83 10*3/mm3      RBC 5.24 10*6/mm3      Hemoglobin 12.7 g/dL      Hematocrit 42.7 %      MCV 81.5 fL      MCH 24.2 pg      MCHC 29.7 g/dL      RDW 13.5 %      RDW-SD 39.9 fl      MPV 9.0 fL      Platelets 541 10*3/mm3      Neutrophil % 78.8 %      Lymphocyte % 13.6 %      Monocyte % 5.6 %      Eosinophil % 0.9 %      Basophil % 0.4 %      Immature Grans % 0.7 %      Neutrophils, Absolute 8.53 10*3/mm3      Lymphocytes, Absolute 1.47 10*3/mm3      Monocytes, Absolute 0.61 10*3/mm3      Eosinophils, Absolute 0.10 10*3/mm3      Basophils, Absolute 0.04 10*3/mm3      Immature Grans, Absolute 0.08 10*3/mm3      nRBC 0.0 /100 WBC           Imaging Results (Last 24 Hours)       Procedure Component Value Units Date/Time    CT Angiogram Chest [[id number]] Resulted: 09/14/23 2139     Updated: 09/14/23 2252    CT Abdomen Pelvis Without Contrast [[id number]] Collected: 09/14/23 2037     Updated: 09/14/23 2049    Narrative:      EXAMINATION:  CT ABDOMEN PELVIS WO CONTRAST-  9/14/2023 8:07 PM CDT     HISTORY: Right flank pain.     TECHNIQUE: Spiral CT was performed of the abdomen and pelvis without  contrast. Multiplanar images were reconstructed.     DLP: 612 mGy-cm. Automated dosage reduction technique was utilized to  reduce patient dosage.     COMPARISON: No comparison study.     LUNG BASES: Lung bases are clear.     LIVER AND SPLEEN: There is a 1.3 cm probable cyst in the dome of the  liver. No other liver lesions. There are no dense gallstones. The spleen  is unremarkable.     PANCREAS: Normal unenhanced appearance of the pancreas.      KIDNEYS AND ADRENALS: The adrenal glands are normal. The kidneys  demonstrate a normal unenhanced appearance. The ureters are somewhat  difficult to follow but appear to be normal in caliber. The urinary  bladder is unremarkable.     BOWEL: Gastric wall thickening probably due to underdistention. Small  bowel loops are nondilated. The appendix is normal. Diverticulosis of  the colon. Underdistention of the colon.     OTHER: There are multiple enlarged retroperitoneal lymph nodes starting  at about the level of the renal vessels and extending inferiorly. There  are enlarged iliac chain lymph nodes as well. There are no enlarged  groin lymph nodes. The uterus is prominent in size measuring 11.7 x 6.9  x 8.7 cm. A focal uterine mass is not discretely visualized on this  noncontrast CT. There is some soft tissue nodularity in the greater  omentum anteriorly. There are mild degenerative changes of the  visualized spine. There is a vague 1.6 cm sclerotic bone lesion in the  central S1 vertebral body.          Impression:      1. Somewhat bulky retroperitoneal lymphadenopathy and pelvic  lymphadenopathy worrisome for metastatic lymphadenopathy or lymphoma.  2. There is also some stranding and nodularity in the greater omental  region anteriorly suggesting possible peritoneal metastatic disease.  3. The uterus is prominent in size but a focal mass is not seen.  Follow-up nonemergent ultrasound is recommended to evaluate the uterus  for a potential mass. There is no other obvious area of primary neoplasm  on the noncontrast CT images.  4. A vague 1.6 cm sclerotic bone lesion in the S1 vertebral body is  indeterminate.  5. Other nonacute findings, as discussed.        The full report of this exam was immediately signed and available to the  emergency room. The patient is currently in the emergency room.  This report was finalized on 09/14/2023 20:46 by Dr. Keegan Sousa MD.    US Venous Doppler Lower Extremity Bilateral  (duplex) [[id number]] Resulted: 09/14/23 1803     Updated: 09/14/23 1830    XR Chest 1 View [[id number]] Collected: 09/14/23 1734     Updated: 09/14/23 1738    Narrative:      EXAMINATION:  XR CHEST 1 VW-  9/14/2023 5:12 PM CDT     HISTORY: Lightheaded. Heart murmur. Asthma. Bronchitis.     COMPARISON: No comparison study.     TECHNIQUE: Single view AP image.     FINDINGS:  There is no infiltrate or effusion. Heart size is upper  limits of normal. No acute bony abnormality is seen.          Impression:      No active disease is seen.        This report was finalized on 09/14/2023 17:35 by Dr. Keegan Sousa MD.          I have personally reviewed and interpreted the radiology studies and ECG obtained at time of admission.     Assessment / Plan   Assessment:   Active Hospital Problems    Diagnosis     **DVT (deep venous thrombosis)     Vaginal bleeding     History of asthma     Hyperglycemia     Abnormal CT of the abdomen        Treatment Plan  The patient will be admitted to my service here at [facility].     #1 bilateral lower extremity dvts -with concern for malignancy based on history of vaginal bleeding and CT findings.  She is not having active vaginal bleeding in ER currently.  Hemoglobin was 12.7 on arrival.  She was given Lovenox in the ER.  Monitor for any vaginal bleeding.  Could potentially require IVC filter if unable to anticoagulate.  Vascular surgery was called by ER provider prior to admit.  We will keep n.p.o. tonight until evaluation in the a.m. incase any interventions needed.  Of note ER provider had ordered a CTA but patient declined stating she did not want contrast.  Patient will be on Lovenox either way at this time to treat for blood clots.  Given her current hemodynamic stability with lack of hypoxia or chest pain feel we can honor patient's wishes and proceed at this time.  We will add a BNP onto arrival labs since troponin was borderline.  Check a 2D echo.    #2 elevated  troponin -Trope was 10 on arrival and then 12 on a repeat.  She is not having any active chest pain.  Her EKG does have fairly diffuse T wave inversions in almost all leads except 1 and aVL.  We will check a another troponin and get a follow-up EKG with troponin draw.  2D echo as above.    #3 vaginal bleeding -in a 63-year-old female who should be postmenopausal with concerning imaging for malignancy.  Have been following with gynecology in the outpatient setting.  Gynecology was called prior to admit as we typically do not do Gyn onc at this facility and they were okay with admission and will see in the AM    #4 abnormal CT abdomen -as above patient has concerns for bulky retroperitoneal lymphadenopathy, enlarged uterus, possible mental lesions and possible S1 bone lesion.  We will follow-up on [place] recommendations in the morning.    #5 hyperglycemia -possibly reactive.  Check an A1c.    #6 history of asthma -not currently in exacerbation.  No respiratory issues.  As needed albuterol neb.    Medical Decision Making  Number and Complexity of problems: 2-3 acute problems with risk for bodily harm/death, one chronic stable problem  Differential Diagnosis: as above    Conditions and Status        New to me, appears clinically stable     MDM Data  External documents reviewed: none  Cardiac tracing (EKG, telemetry) interpretation: diffuse t wave inversions  Radiology interpretation: reports reviewed  Labs reviewed: as above  Any tests that were considered but not ordered: none     Decision rules/scores evaluated (example [id number], [place], etc): none     Discussed with: patient, ER provider     Care Planning  Shared decision making: Patient apprised of current labs, vitals, imaging and treatment plan.  They are agreeable with proceeding with plans as discussed.      Code status and discussions: full code    Disposition  Social Determinants of Health that impact treatment or disposition: none known  Estimated length of  stay is 2+ days.     I confirmed that the patient's advanced care plan is present, code status is documented, and a surrogate decision maker is listed in the patient's medical record.     The patient's surrogate decision maker is her  and daughter.     The patient was seen and examined by me on 9/14/23 at 11:45 PM.    Electronically signed by Frank Frankel DO, 09/15/23, 01:16 CDT.

## 2023-09-15 NOTE — PLAN OF CARE
Problem: Adult Inpatient Plan of Care  Goal: Plan of Care Review  Outcome: Ongoing, Progressing  Flowsheets (Taken 9/15/2023 0446)  Progress: no change  Plan of Care Reviewed With: patient  Outcome Evaluation: Pt A&Ox4, VSS. Pt c/o lower back pain as well as abdominal cramping. Pt continues to have some spotting with small clots, will continue to monitor for heavy bleeding. Up with stand by assist to bathroom, pt voiding. Pt had episode of nausea this shift, denied PRN meds. Room air, telemetry. IVF infusing. NPO per order. Safety maintained, will continue to monitor.

## 2023-09-15 NOTE — PROGRESS NOTES
[facility] Medicine Services  INPATIENT PROGRESS NOTE    Patient Name: Radha Wade  Date of Admission: 9/14/2023  Today's Date: 09/15/23  Length of Stay: 0  Primary Care Physician: Provider, No Known    Subjective   Chief Complaint: Leg swelling and abdominal pain  HPI  Ms. Wade is a 63-year-old female that presented to [facility] on 9/14 for left leg swelling and abdominal pain.  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  She has a family history of breast cancer and ovarian cancer.  Patient states she initially had vaginal bleeding which slowed down and stopped around the age of 55 but then restarted by the time she was 60 she was having regular bleeding again.  States she had GYN work-up back in 2020 that did not reveal anything concerning for malignancy.  Patient indicates that she has had more heavy bleeding recently.  She was seen by Dr. De Los Santos on 9/14, he did a transvaginal ultrasound and Pap smear.  He told her that she would eventually need a D&C for further work-up. She has had approximate 25 pound weight loss over the past few months due to intermittent nausea and abdominal discomfort/pain.  In the ED, imaging confirmed bilateral lower extremity DVTs. CT of abdomen pelvis interpreted by radiology concerning for enlarged uterus, retroperitoneal lymphadenopathy, possible omental nodularity and a vague S1 sclerotic bone lesion.  ED provider ordered a CTA but patient declined stating she did not want contrast.  Given her current hemodynamic stability with lack of hypoxia or chest pain feel we can honor patient's wishes and proceed at this time. Both vascular surgery and gynecology were called by the ER provider.     Sitting up in bed.  On room air.  Vital signs stable.  No chest pain or pressure.  Patient states she does feel short of breath at times.  She  "continues on therapeutic Lovenox.  Hemoglobin 11.1 today.  Patient states she does have light vaginal bleeding at this time \" just enough for a pad\".    Review of Systems   All pertinent negatives and positives are as above. All other systems have been reviewed and are negative unless otherwise stated.     Objective    Temp:  [98.1 °F (36.7 °C)-98.5 °F (36.9 °C)] 98.5 °F (36.9 °C)  Heart Rate:  [] 88  Resp:  [18-20] 18  BP: (129-145)/(50-86) 141/61  Physical Exam  Vitals reviewed.   Constitutional:       General: She is not in acute distress.     Appearance: She is obese. She is not toxic-appearing.      Comments: Sitting up in bed.  No acute distress.  On room air.  No family at bedside.  Discussed with her nurse akua.   HENT:      Head: Normocephalic and atraumatic.      Mouth/Throat:      Mouth: Mucous membranes are moist.      Pharynx: Oropharynx is clear.   Eyes:      Extraocular Movements: Extraocular movements intact.      Conjunctiva/sclera: Conjunctivae normal.      Pupils: Pupils are equal, round, and reactive to light.   Cardiovascular:      Rate and Rhythm: Normal rate and regular rhythm.      Pulses: Normal pulses.   Pulmonary:      Effort: Pulmonary effort is normal. No respiratory distress.      Breath sounds: Normal breath sounds.   Abdominal:      General: Bowel sounds are normal. There is no distension.      Palpations: Abdomen is soft.      Tenderness: There is no abdominal tenderness.   Musculoskeletal:         General: No swelling or tenderness. Normal range of motion.      Cervical back: Normal range of motion and neck supple. No muscular tenderness.      Left lower leg: Edema present.   Skin:     General: Skin is warm and dry.      Findings: No erythema or rash.   Neurological:      General: No focal deficit present.      Mental Status: She is alert and oriented to person, place, and time.      Cranial Nerves: No cranial nerve deficit.      Motor: No weakness.   Psychiatric:         " Mood and Affect: Mood normal.         Behavior: Behavior normal.     Results Review:  I have reviewed the labs, radiology results, and diagnostic studies.    Laboratory Data:   Results from last 7 days   Lab Units 09/15/23  0634 09/14/23  1909   WBC 10*3/mm3 8.18 10.83*   HEMOGLOBIN g/dL 11.1* 12.7   HEMATOCRIT % 37.2 42.7   PLATELETS 10*3/mm3 449 541*        Results from last 7 days   Lab Units 09/15/23  0634 09/14/23  1909   SODIUM mmol/L 137 135*   POTASSIUM mmol/L 4.3 3.9   CHLORIDE mmol/L 101 96*   CO2 mmol/L 26.0 26.0   BUN mg/dL 11 13   CREATININE mg/dL 0.50* 0.60   CALCIUM mg/dL 8.5* 9.4   BILIRUBIN mg/dL  --  0.5   ALK PHOS U/L  --  142*   ALT (SGPT) U/L  --  15   AST (SGOT) U/L  --  20   GLUCOSE mg/dL 135* 152*       Culture Data:   No results found for: ACANTHNAEG, AFBCX, BPERTUSSISCX, BLOODCX  No results found for: BCIDPCR, CXREFLEX, CSFCX, CULTURETIS  No results found for: CULTURES, HSVCX, URCX  No results found for: EYECULTURE, GCCX, HSVCULTURE, LABHSV  No results found for: LEGIONELLA, MRSACX, MUMPSCX, MYCOPLASCX  No results found for: NOCARDIACX, STOOLCX  No results found for: THROATCX, UNSTIMCULT, URINECX, CULTURE, VZVCULTUR  No results found for: VIRALCULTU, WOUNDCX    Radiology Data:   Imaging Results (Last 24 Hours)       Procedure Component Value Units Date/Time    CT Abdomen Pelvis Without Contrast [[id number]] Collected: 09/14/23 2037     Updated: 09/14/23 2049    Narrative:      EXAMINATION:  CT ABDOMEN PELVIS WO CONTRAST-  9/14/2023 8:07 PM CDT     HISTORY: Right flank pain.     TECHNIQUE: Spiral CT was performed of the abdomen and pelvis without  contrast. Multiplanar images were reconstructed.     DLP: 612 mGy-cm. Automated dosage reduction technique was utilized to  reduce patient dosage.     COMPARISON: No comparison study.     LUNG BASES: Lung bases are clear.     LIVER AND SPLEEN: There is a 1.3 cm probable cyst in the dome of the  liver. No other liver lesions. There are no dense  gallstones. The spleen  is unremarkable.     PANCREAS: Normal unenhanced appearance of the pancreas.     KIDNEYS AND ADRENALS: The adrenal glands are normal. The kidneys  demonstrate a normal unenhanced appearance. The ureters are somewhat  difficult to follow but appear to be normal in caliber. The urinary  bladder is unremarkable.     BOWEL: Gastric wall thickening probably due to underdistention. Small  bowel loops are nondilated. The appendix is normal. Diverticulosis of  the colon. Underdistention of the colon.     OTHER: There are multiple enlarged retroperitoneal lymph nodes starting  at about the level of the renal vessels and extending inferiorly. There  are enlarged iliac chain lymph nodes as well. There are no enlarged  groin lymph nodes. The uterus is prominent in size measuring 11.7 x 6.9  x 8.7 cm. A focal uterine mass is not discretely visualized on this  noncontrast CT. There is some soft tissue nodularity in the greater  omentum anteriorly. There are mild degenerative changes of the  visualized spine. There is a vague 1.6 cm sclerotic bone lesion in the  central S1 vertebral body.          Impression:      1. Somewhat bulky retroperitoneal lymphadenopathy and pelvic  lymphadenopathy worrisome for metastatic lymphadenopathy or lymphoma.  2. There is also some stranding and nodularity in the greater omental  region anteriorly suggesting possible peritoneal metastatic disease.  3. The uterus is prominent in size but a focal mass is not seen.  Follow-up nonemergent ultrasound is recommended to evaluate the uterus  for a potential mass. There is no other obvious area of primary neoplasm  on the noncontrast CT images.  4. A vague 1.6 cm sclerotic bone lesion in the S1 vertebral body is  indeterminate.  5. Other nonacute findings, as discussed.        The full report of this exam was immediately signed and available to the  emergency room. The patient is currently in the emergency room.  This report was  finalized on 09/14/2023 20:46 by Dr. Keegan Sousa MD.    US Venous Doppler Lower Extremity Bilateral (duplex) [[id number]] Resulted: 09/14/23 1803     Updated: 09/14/23 1830    XR Chest 1 View [[id number]] Collected: 09/14/23 1734     Updated: 09/14/23 1738    Narrative:      EXAMINATION:  XR CHEST 1 VW-  9/14/2023 5:12 PM CDT     HISTORY: Lightheaded. Heart murmur. Asthma. Bronchitis.     COMPARISON: No comparison study.     TECHNIQUE: Single view AP image.     FINDINGS:  There is no infiltrate or effusion. Heart size is upper  limits of normal. No acute bony abnormality is seen.          Impression:      No active disease is seen.        This report was finalized on 09/14/2023 17:35 by Dr. Keegan Sousa MD.            I have reviewed the patient's current medications.     Assessment/Plan   Assessment  Active Hospital Problems    Diagnosis     **DVT (deep venous thrombosis)     Vaginal bleeding     History of asthma     Hyperglycemia     Abnormal CT of the abdomen        Treatment Plan  Bilateral lower extremity DVTs with concern for malignancy based on history of vaginal bleeding and CT abdomen pelvis findings.  Continue Lovenox.  Vascular surgery consulted.    Patient refused CTA. Given her current hemodynamic stability with lack of hypoxia or chest pain feel we can honor patient's wishes. Transthoracic echocardiogram.    Elevated troponin -Troponin was 10 on arrival and then 12 on a repeat.  Repeat 9, 8, troponin T delta -1.  BNP normal.  No active chest pain.  2D echo as above.    CT of abdomen pelvis interpreted by radiology concerning for enlarged uterus, retroperitoneal lymphadenopathy, possible omental nodularity and a vague S1 sclerotic bone lesion.  She has also had vaginal bleeding.  Hemoglobin stable.  GYN consulted.    Newly diagnosed diabetic with A1c 8.1.  She has not seen a primary care provider in several years.  Blood glucose 152 on admission.  Diabetes educator.    Medical Decision  Making  Number and Complexity of problems: 3 acute problems in the form of bilateral lower extremity DVTs with concern for malignancy based on history of vaginal bleeding and abnormal CT abdomen pelvis, new diagnosis of diabetes  Differential Diagnosis: None considered at present    Conditions and Status        Condition is unchanged.     [facility] Data  External documents reviewed: Prior [facility] records  Cardiac tracing (EKG, telemetry) interpretation: Sinus rhythm  Radiology interpretation: Interpreted by radiology  Labs reviewed: As above  Any tests that were considered but not ordered: None considered at present     Decision rules/scores evaluated (example [id number], Wells, etc): None considered at present     Discussed with: Patient and Dr. Galvan     Care Planning  Shared decision making: Patient is agreeable to ongoing work-up and treatment  Code status and discussions: Full code with full interventions    Disposition  Social Determinants of Health that impact treatment or disposition: None identified at present  I expect the patient to be discharged to home in 2-3 days.     Electronically signed by GREYSON Alves, 09/15/23, 12:28 CDT.

## 2023-09-15 NOTE — CONSULTS
Adult Nutrition  Assessment/PES    Patient Name:  Radha Wade  YOB: 1960  MRN: [id number]  Admit Date:  9/14/2023    Assessment Date:  9/15/2023    NTN consult. Screen unintentional weight loss. Visited pt at beside with  present during visit. Pt stated she has lost ~25 lbs. (-11.5% weight change) the past few months due to intermittent nausea and abd discomfort/pain. Weight changes significant. Indicators of malnutrition present. View details below. Pt has severe malnutrition in the context of chronic illness. Pt stated has only been able to take a few bites of food and has decreased feelings of hunger. Pt is currently NPO, awaiting treatment plan. When diet advanced, adding Boost Glucose Control BID and Magic Cup daily to increase energy intake and prevent significant weight loss. HbA1C 8.1%. Provided brief HH/DM education during session due to pt feeling unwell. Will continue education when appropriate. Will continue to follow per protocol.     Reason for Assessment       Row Name 09/15/23 0865          Reason for Assessment    Reason For Assessment identified at risk by screening criteria;physician consult     Diagnosis cardiac disease;pulmonary disease;diabetes diagnosis/complications     Identified At Risk by Screening Criteria unintentional loss of 10 lbs or more in the past 2 mos                    Nutrition/Diet History       Row Name 09/15/23 0836          Nutrition/Diet History    Typical Intake (Food/Fluid/EN/PN) ER due to left leg swelling, abd discomfort and pain. ER found bilateral lower extremity DVT via ultrasound. PMHx asthma, bronchitis, heart murmur, mitral valve prolaspe. Dx DVT, vaginal bleeding, hx of asthma, hyperglycemia. Screen unintentional weight loss. Visited pt at beside with  present during visit. Pt stated she has lost ~25 lbs. (-11.5% weight change) the past few months due to intermittent nausea and abd discomfort/pain. Weight changes significant.  Indicators of malnutrition present. View details below. Pt has moderate malnutrition in the context of chronic illness. Pt stated has only been able to take a few bites of food and has decreased feelings of hunger. Pt is currently NPO, awaiting treatment plan. When diet advanced, adding Boost Glucose Control BID and Magic Cup daily to increase energy intake and prevent significant weight loss. HbA1C 8.1%. Provided brief HH/DM education during session due to pt feeling unwell. Will continue education when appropriate.Pt stated daughter does dancing and tries to help pt to follow healthier diet. Pt and  have family hx of heart disease and T2DM.  currently has T2DM and takes insulin and metformin, per pt.     Factors Affecting Nutritional Intake appetite;pain;nausea                    Labs/Tests/Procedures/Meds       Row Name 09/15/23 0837          Labs/Procedures/Meds    Lab Results Reviewed reviewed     Lab Results Comments Cr 0.5, Ca 8.5, A1C 8.1%        Diagnostic Tests/Procedures    Diagnostic Test/Procedure Reviewed reviewed        Medications    Pertinent Medications Reviewed reviewed     Pertinent Medications Comments pericolace                    Physical Findings       Row Name 09/15/23 0839          Physical Findings    Overall Physical Appearance Teeth missing. Room air. Edema. Last BM 9/15. BR 19. PIV.                      Nutrition Prescription Ordered       Row Name 09/15/23 0840          Nutrition Prescription PO    Current PO Diet NPO                      Malnutrition Severity Assessment       Row Name 09/15/23 0922          Malnutrition Severity Assessment    Malnutrition Type Chronic Disease - Related Malnutrition        Insufficient Energy Intake     Insufficient Energy Intake Findings Severe     Insufficient Energy Intake  <50% of est. energy requirement for >or equal to 1 month        Unintentional Weight Loss     Unintentional Weight Loss Findings Severe     Unintentional Weight  Loss  Weight loss greater than 7.5% in three months                     Problem/Interventions:   Problem 1       Row Name 09/15/23 0924          Nutrition Diagnoses Problem 1    Problem 1 Malnutrition     Etiology (related to) Factors Affecting Nutrition     Appetite Poor;Early Satiety     Condition Pain     Reported GI Symptoms Abdominal Pain;Abdominal Distention     Signs/Symptoms (evidenced by) Report of Mnimal PO Intake;Unintended Weight Change;Other (comment);Report/Observation     Unintended Weight Change Loss     Number of Pounds Lost ~25 lbs. (-11.5% weight change)     Weight loss time period few months     Other Comment < or equal to 50% of estimated energy requirement for > or equal to 1 month                          Intervention Goal       Row Name 09/15/23 0926          Intervention Goal    General Disease management/therapy;Meet nutritional needs for age/condition;Reduce/improve symptoms;Provide information regarding MNT for treatment/condition     PO Advance diet;Tolerate PO;Establish PO     Weight No significant weight loss                    Nutrition Intervention       Row Name 09/15/23 0926          Nutrition Intervention    RD/Tech Action Care plan reviewd;Encourage intake;Recommend/ordered     Recommended/Ordered Supplement                    Nutrition Prescription       Row Name 09/15/23 0926          Nutrition Prescription PO    PO Prescription Begin/change supplement     Supplement Boost Glucose Control;Magic Cup     Supplement Frequency 2 times a day;Daily     New PO Prescription Ordered? No, recommended                    Education/Evaluation       Row Name 09/15/23 0926          Education    Education Advised regarding habits/behavior;Provided education regarding;Education topics;Will Instruct as appropriate     Provided education regarding Avoidance of associated complications;Avoidance/improvement of symptoms     Education Topics Cardiac diabetic     Advised Regarding Habits/Behavior Use  supplement        Monitor/Evaluation    Monitor Per protocol;Symptoms;PO intake;Supplement intake                     Electronically signed by:  Moo Simpson RD  09/15/23 09:27 CDT

## 2023-09-15 NOTE — PLAN OF CARE
Goal Outcome Evaluation:   Pt alert and oriented x4. VSS.  c/o of abd pain. DALE. PPP. SCDS for VTE. . Tolerating regular diet. Skin intact. Voiding v. Last BM 9/15/23. D/c plans. To home when cleared. Plan for a d & c in the future. Unknown at this time. . Echo pending.  Call light within reach. Safety maintained.

## 2023-09-16 ENCOUNTER — NURSE TRIAGE (OUTPATIENT)
Dept: CALL CENTER | Facility: HOSPITAL | Age: 63
End: 2023-09-16
Payer: COMMERCIAL

## 2023-09-16 VITALS
HEIGHT: 65 IN | RESPIRATION RATE: 18 BRPM | BODY MASS INDEX: 32.21 KG/M2 | TEMPERATURE: 97 F | DIASTOLIC BLOOD PRESSURE: 53 MMHG | WEIGHT: 193.3 LBS | SYSTOLIC BLOOD PRESSURE: 126 MMHG | HEART RATE: 78 BPM | OXYGEN SATURATION: 97 %

## 2023-09-16 LAB
BACTERIA SPEC AEROBE CULT: NORMAL
QT INTERVAL: 402 MS
QT INTERVAL: 406 MS
QTC INTERVAL: 475 MS
QTC INTERVAL: 518 MS

## 2023-09-16 PROCEDURE — G0378 HOSPITAL OBSERVATION PER HR: HCPCS

## 2023-09-16 PROCEDURE — 96372 THER/PROPH/DIAG INJ SC/IM: CPT

## 2023-09-16 PROCEDURE — 25010000002 ENOXAPARIN PER 10 MG: Performed by: INTERNAL MEDICINE

## 2023-09-16 RX ORDER — ACETAMINOPHEN 325 MG/1
650 TABLET ORAL EVERY 6 HOURS PRN
Start: 2023-09-16 | End: 2023-09-16 | Stop reason: HOSPADM

## 2023-09-16 RX ORDER — APIXABAN 5 MG (74)
5 KIT ORAL TAKE AS DIRECTED
Qty: 74 TABLET | Refills: 0 | Status: SHIPPED | OUTPATIENT
Start: 2023-09-16

## 2023-09-16 RX ORDER — NITROFURANTOIN 25; 75 MG/1; MG/1
100 CAPSULE ORAL 2 TIMES DAILY
Qty: 10 CAPSULE | Refills: 0 | Status: SHIPPED | OUTPATIENT
Start: 2023-09-16 | End: 2023-09-21

## 2023-09-16 RX ORDER — BLOOD-GLUCOSE METER
1 KIT MISCELLANEOUS 2 TIMES DAILY
Qty: 60 EACH | Refills: 0 | Status: SHIPPED | OUTPATIENT
Start: 2023-09-16 | End: 2023-10-16

## 2023-09-16 RX ORDER — TRAMADOL HYDROCHLORIDE 50 MG/1
25 TABLET ORAL EVERY 8 HOURS PRN
Qty: 9 TABLET | Refills: 0 | Status: SHIPPED | OUTPATIENT
Start: 2023-09-16

## 2023-09-16 RX ORDER — LANCETS 28 GAUGE
1 EACH MISCELLANEOUS 2 TIMES DAILY
Qty: 100 EACH | Refills: 0 | Status: SHIPPED | OUTPATIENT
Start: 2023-09-16

## 2023-09-16 RX ORDER — BLOOD-GLUCOSE METER
1 KIT MISCELLANEOUS ONCE
Qty: 1 EACH | Refills: 0 | Status: SHIPPED | OUTPATIENT
Start: 2023-09-16 | End: 2023-09-16

## 2023-09-16 RX ORDER — POLYETHYLENE GLYCOL 3350 17 G/17G
17 POWDER, FOR SOLUTION ORAL DAILY PRN
Start: 2023-09-16

## 2023-09-16 RX ADMIN — ACETAMINOPHEN 650 MG: 325 TABLET, FILM COATED ORAL at 02:07

## 2023-09-16 RX ADMIN — SODIUM CHLORIDE 75 ML/HR: 9 INJECTION, SOLUTION INTRAVENOUS at 05:21

## 2023-09-16 RX ADMIN — ACETAMINOPHEN 650 MG: 325 TABLET, FILM COATED ORAL at 11:02

## 2023-09-16 RX ADMIN — ENOXAPARIN SODIUM 90 MG: 100 INJECTION SUBCUTANEOUS at 08:33

## 2023-09-16 RX ADMIN — Medication 10 ML: at 08:33

## 2023-09-16 NOTE — CONSULTS
[facility]   Radha Wade  : 1960  MRN: [id number]  [facility]: [id number]    History and Physical/ ob/gyn consultation    Subjective   Radha Wade is a 63 y.o. No obstetric history on file. who presented to my office in the morning of  for aub, and noted to have swelling left leg; she was sent to er after visit. Pt is aware of need for d&c.   Pmh noted  Psh noted  Allergies noted  Sonogram done uterus 12 week size with abnormal endometrium.    Past Medical History:   Diagnosis Date    Asthma     Bronchitis     Heart murmur     Mitral valve prolapse      Past Surgical History:   Procedure Laterality Date     SECTION       Social History    Tobacco Use      Smoking status: Never      Smokeless tobacco: Never      Current Facility-Administered Medications:     acetaminophen (TYLENOL) tablet 650 mg, 650 mg, Oral, Q6H PRN, Frank Frankel DO, 650 mg at 23 0207    albuterol (PROVENTIL) nebulizer solution 0.083% 2.5 mg/3mL, 2.5 mg, Nebulization, Q6H PRN, Frank Frankel DO    sennosides-docusate (PERICOLACE) 8.6-50 MG per tablet 2 tablet, 2 tablet, Oral, BID **AND** polyethylene glycol (MIRALAX) packet 17 g, 17 g, Oral, Daily PRN **AND** bisacodyl (DULCOLAX) EC tablet 5 mg, 5 mg, Oral, Daily PRN **AND** bisacodyl (DULCOLAX) suppository 10 mg, 10 mg, Rectal, Daily PRN, Frank Frankel DO    Enoxaparin Sodium (LOVENOX) syringe 90 mg, 1 mg/kg, Subcutaneous, Q12H, Frank Frankel DO, 90 mg at 09/15/23 2110    ondansetron (ZOFRAN) injection 4 mg, 4 mg, Intravenous, Q6H PRN, Frank Frankel DO    [COMPLETED] Insert Peripheral IV, , , Once **AND** sodium chloride 0.9 % flush 10 mL, 10 mL, Intravenous, PRN, Cora Taylor, APRN    sodium chloride 0.9 % flush 10 mL, 10 mL, Intravenous, Q12H, Frank Frankel DO, 10 mL at 09/15/23 0105    sodium chloride 0.9 % flush 10 mL, 10 mL, Intravenous, PRN, Frank Frankel,     sodium chloride 0.9 % infusion 40 mL, 40 mL, Intravenous, PRN,  "Frank Frankel DO    sodium chloride 0.9 % infusion, 75 mL/hr, Intravenous, Continuous, Frank Frankel DO, Last Rate: 75 mL/hr at 09/16/23 0521, 75 mL/hr at 09/16/23 0521    No Known Allergies    Review of Systems      Objective   /53 (BP Location: Left arm, Patient Position: Lying)   Pulse 85   Temp 97.9 °F (36.6 °C) (Oral)   Resp 18   Ht 165.1 cm (65\")   Wt 87.7 kg (193 lb 4.8 oz)   SpO2 97%   BMI 32.17 kg/m²   General: well developed; well nourished  no acute distress   Heart: regular rate and rhythm, S1, S2 normal, no murmur, click, rub or gallop   Lungs: breathing is unlabored   Abdomen: soft, non-tender; no masses  no umbilical or inguinal hernias are present  no hepato-splenomegaly   Pelvis:: Clinical staff was present for exam  From office visit, external genitalia normal, vaginal canal normal, cervix appears nl, pap done, bimanual reveals uterus enlarged and some tenderness.  Sonogram, shows enlarged uterus and abnormal endometrial cavity   Labs  Lab Results   Component Value Date     09/15/2023    HGB 11.1 (L) 09/15/2023    HCT 37.2 09/15/2023    WBC 8.18 09/15/2023     09/15/2023    K 4.3 09/15/2023     09/15/2023    CO2 26.0 09/15/2023    BUN 11 09/15/2023    CREATININE 0.50 (L) 09/15/2023    GLUCOSE 135 (H) 09/15/2023    ALBUMIN 3.6 09/14/2023    CALCIUM 8.5 (L) 09/15/2023    AST 20 09/14/2023    ALT 15 09/14/2023    BILITOT 0.5 09/14/2023        Assessment   AUB  dvt    The risks, benefits, and alternatives of the procedure; along with the risks of anesthesia was discussed in full with the patient and all questions were answered.       Plan   Pt is going to be treated  as outpt for dvt, no surgical intervention planned; case discussed with dr Tess HARDEN, plan to discharge pt and she will follow up in my office next week to plan d&C and ablation. Questions answered, instructions given    Radu De Los Santos, MD  9/16/2023  07:52 CDT          "

## 2023-09-16 NOTE — DISCHARGE SUMMARY
[facility]  DISCHARGE SUMMARY       Date of Admission: 9/14/2023  Date of Discharge:  9/16/2023  Primary Care Physician: Provider, No Known    Presenting Problem/History of Present Illness:  Left leg swelling and abdominal pain    Final Discharge Diagnoses:  Active Hospital Problems    Diagnosis     **DVT (deep venous thrombosis)     Vaginal bleeding     History of asthma     Hyperglycemia     Abnormal CT of the abdomen        Consults: Dr. De Los Santos with GYN.    Procedures Performed: none.    Pertinent Test Results:   Results for orders placed during the hospital encounter of 09/14/23    Adult Transthoracic Echo Complete W/ Cont if Necessary Per Protocol    Interpretation Summary    Left ventricular systolic function is normal. Left ventricular ejection fraction appears to be 56 - 60%.    Left ventricular diastolic function was normal.    Estimated right ventricular systolic pressure from tricuspid regurgitation is normal (<35 mmHg).    Normal size and function of the right ventricle.    No significant valvular pathology.    No prior exams available for comparison.      Imaging Results (All)       Procedure Component Value Units Date/Time    US Venous Doppler Lower Extremity Bilateral (duplex) [[id number]] Collected: 09/15/23 1229     Updated: 09/15/23 1233    Narrative:      History: Swelling       Impression:      Impression: There is evidence of deep venous thrombosis in both lower  extremities.     Comments: Bilateral lower extremity venous duplex exam was performed  using color Doppler flow, Doppler waveform analysis, and grayscale  imaging, with and without compression. There is evidence of deep venous  thrombosis in the popliteal and posterior tibial veins in the right  lower extremity. There is also evidence of deep venous thrombosis in the  superficial femoral, popliteal, posterior tibial, and peroneal veins of  the left lower extremity. No thrombus is  identified in the  saphenofemoral junctions and greater saphenous veins bilaterally.         This report was finalized on 09/15/2023 12:29 by Dr. Joseph Mari MD.    CT Abdomen Pelvis Without Contrast [[id number]] Collected: 09/14/23 2037     Updated: 09/14/23 2049    Narrative:      EXAMINATION:  CT ABDOMEN PELVIS WO CONTRAST-  9/14/2023 8:07 PM CDT     HISTORY: Right flank pain.     TECHNIQUE: Spiral CT was performed of the abdomen and pelvis without  contrast. Multiplanar images were reconstructed.     DLP: 612 mGy-cm. Automated dosage reduction technique was utilized to  reduce patient dosage.     COMPARISON: No comparison study.     LUNG BASES: Lung bases are clear.     LIVER AND SPLEEN: There is a 1.3 cm probable cyst in the dome of the  liver. No other liver lesions. There are no dense gallstones. The spleen  is unremarkable.     PANCREAS: Normal unenhanced appearance of the pancreas.     KIDNEYS AND ADRENALS: The adrenal glands are normal. The kidneys  demonstrate a normal unenhanced appearance. The ureters are somewhat  difficult to follow but appear to be normal in caliber. The urinary  bladder is unremarkable.     BOWEL: Gastric wall thickening probably due to underdistention. Small  bowel loops are nondilated. The appendix is normal. Diverticulosis of  the colon. Underdistention of the colon.     OTHER: There are multiple enlarged retroperitoneal lymph nodes starting  at about the level of the renal vessels and extending inferiorly. There  are enlarged iliac chain lymph nodes as well. There are no enlarged  groin lymph nodes. The uterus is prominent in size measuring 11.7 x 6.9  x 8.7 cm. A focal uterine mass is not discretely visualized on this  noncontrast CT. There is some soft tissue nodularity in the greater  omentum anteriorly. There are mild degenerative changes of the  visualized spine. There is a vague 1.6 cm sclerotic bone lesion in the  central S1 vertebral body.          Impression:       1. Somewhat bulky retroperitoneal lymphadenopathy and pelvic  lymphadenopathy worrisome for metastatic lymphadenopathy or lymphoma.  2. There is also some stranding and nodularity in the greater omental  region anteriorly suggesting possible peritoneal metastatic disease.  3. The uterus is prominent in size but a focal mass is not seen.  Follow-up nonemergent ultrasound is recommended to evaluate the uterus  for a potential mass. There is no other obvious area of primary neoplasm  on the noncontrast CT images.  4. A vague 1.6 cm sclerotic bone lesion in the S1 vertebral body is  indeterminate.  5. Other nonacute findings, as discussed.        The full report of this exam was immediately signed and available to the  emergency room. The patient is currently in the emergency room.  This report was finalized on 09/14/2023 20:46 by Dr. Keegan Sousa MD.    XR Chest 1 View [[id number]] Collected: 09/14/23 1734     Updated: 09/14/23 1738    Narrative:      EXAMINATION:  XR CHEST 1 VW-  9/14/2023 5:12 PM CDT     HISTORY: Lightheaded. Heart murmur. Asthma. Bronchitis.     COMPARISON: No comparison study.     TECHNIQUE: Single view AP image.     FINDINGS:  There is no infiltrate or effusion. Heart size is upper  limits of normal. No acute bony abnormality is seen.          Impression:      No active disease is seen.        This report was finalized on 09/14/2023 17:35 by Dr. Keegan Sousa MD.          LAB RESULTS:      Lab 09/15/23  0634 09/14/23 1909   WBC 8.18 10.83*   HEMOGLOBIN 11.1* 12.7   HEMATOCRIT 37.2 42.7   PLATELETS 449 541*   NEUTROS ABS  --  8.53*   IMMATURE GRANS (ABS)  --  0.08*   LYMPHS ABS  --  1.47   MONOS ABS  --  0.61   EOS ABS  --  0.10   MCV 82.1 81.5   LACTATE  --  1.4         Lab 09/15/23  0634 09/14/23 1909   SODIUM 137 135*   POTASSIUM 4.3 3.9   CHLORIDE 101 96*   CO2 26.0 26.0   ANION GAP 10.0 13.0   BUN 11 13   CREATININE 0.50* 0.60   EGFR 105.5 101.0   GLUCOSE 135* 152*   CALCIUM 8.5* 9.4    MAGNESIUM  --  2.3   HEMOGLOBIN A1C  --  8.10*         Lab 09/14/23  1909   TOTAL PROTEIN 8.3   ALBUMIN 3.6   GLOBULIN 4.7   ALT (SGPT) 15   AST (SGOT) 20   BILIRUBIN 0.5   ALK PHOS 142*   LIPASE 31         Lab 09/15/23  0851 09/15/23  0634 09/14/23  2234 09/14/23  1909   PROBNP  --   --  409.6  --    HSTROP T 8 9 12* 10*                 Brief Urine Lab Results  (Last result in the past 365 days)        Color   Clarity   Blood   Leuk Est   Nitrite   Protein   CREAT   Urine HCG        09/14/23 1903 Dark Yellow   Clear   Negative   Trace   Negative   30 mg/dL (1+)                 Microbiology Results (last 10 days)       ** No results found for the last 240 hours. **            Hospital Course:   Ms. Wade is a 63-year-old female that presented to [facility] on 9/14 for left leg swelling and abdominal pain.  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  She has a family history of breast cancer and ovarian cancer.  Patient states she initially had vaginal bleeding which slowed down and stopped around the age of 55 but then restarted by the time she was 60 she was having regular bleeding again.  States she had GYN work-up back in 2020 that did not reveal anything concerning for malignancy.  Patient indicates that she has had more heavy bleeding recently.  She was seen by Dr. De Los Santos on 9/14, he did a transvaginal ultrasound and Pap smear.  Sonogram shows enlarged uterus and abnormal endometrial cavity.  He told her that she would eventually need a D&C for further work-up. She has had approximate 25 pound weight loss over the past few months due to intermittent nausea and abdominal discomfort/pain.  In the ED, imaging confirmed bilateral lower extremity DVTs. CT of abdomen pelvis interpreted by radiology concerning for enlarged uterus, retroperitoneal lymphadenopathy, possible omental nodularity and a vague S1  sclerotic bone lesion.  ED provider ordered a CTA but patient declined stating she did not want contrast.  Given her current hemodynamic stability with lack of hypoxia or chest pain feel we can honor patient's wishes and proceed at this time. Both vascular surgery and gynecology were called by the ER provider.     Bilateral lower extremity DVTs. She was started on Lovenox.  Will transition to Eliquis starter pack. Patient refused CTA. Given her current hemodynamic stability with lack of hypoxia or chest pain feel we can honor patient's wishes.  On room air.  Vital signs stable.  BNP normal.  Results for orders placed during the hospital encounter of 09/14/23    Adult Transthoracic Echo Complete W/ Cont if Necessary Per Protocol    Interpretation Summary    Left ventricular systolic function is normal. Left ventricular ejection fraction appears to be 56 - 60%.    Left ventricular diastolic function was normal.    Estimated right ventricular systolic pressure from tricuspid regurgitation is normal (<35 mmHg).    Normal size and function of the right ventricle.    No significant valvular pathology.    No prior exams available for comparison.     Elevated troponin -Troponin was 10 on arrival and then 12, 9, 8, troponin T delta -1.  No active chest pain.      CT of abdomen pelvis interpreted by radiology concerning for enlarged uterus, retroperitoneal lymphadenopathy, possible omental nodularity and a vague S1 sclerotic bone lesion.  Patient states she initially had vaginal bleeding which slowed down and stopped around the age of 55 but then restarted by the time she was 60. She now has regular bleeding again.  Hemoglobin stable. Dr. De Los Santos with GYN consulted.  No surgical intervention planned.  She will need follow-up in office next week to plan D&C and ablation.     Newly diagnosed diabetic with A1c 8.1.  She has not seen a primary care provider in several years.  Patient states in the past she was told that she was  "\"prediabetic.\"  Blood glucose 152 on admission.  Diabetes educator.  Patient states she would like to focus on diet and exercise and discuss further with primary care provider.  Will order blood glucose monitoring device, glucose test strips, lancets.    She has reached maximum benefit of hospitalization.  She is medically stable and appropriate for discharge today.    Physical Exam on Discharge:  /53 (BP Location: Left arm, Patient Position: Sitting)   Pulse 78   Temp 97 °F (36.1 °C) (Temporal)   Resp 18   Ht 165.1 cm (65\")   Wt 87.7 kg (193 lb 4.8 oz)   SpO2 97%   BMI 32.17 kg/m²   Physical Exam  Vitals reviewed.   Constitutional:       General: She is not in acute distress.     Appearance: She is obese. She is not toxic-appearing.      Comments: Sitting up in bed.  No acute distress.  On room air.  No family at bedside.  HENT:      Head: Normocephalic and atraumatic.      Mouth/Throat:      Mouth: Mucous membranes are moist.      Pharynx: Oropharynx is clear.   Eyes:      Extraocular Movements: Extraocular movements intact.      Conjunctiva/sclera: Conjunctivae normal.      Pupils: Pupils are equal, round, and reactive to light.   Cardiovascular:      Rate and Rhythm: Normal rate and regular rhythm.      Pulses: Normal pulses.   Pulmonary:      Effort: Pulmonary effort is normal. No respiratory distress.      Breath sounds: Normal breath sounds.   Abdominal:      General: Bowel sounds are normal. There is no distension.      Palpations: Abdomen is soft.      Tenderness: There is no abdominal tenderness.   Musculoskeletal:         General: No swelling or tenderness. Normal range of motion.      Cervical back: Normal range of motion and neck supple. No muscular tenderness.      Left lower leg: Edema present.   Skin:     General: Skin is warm and dry.      Findings: No erythema or rash.   Neurological:      General: No focal deficit present.      Mental Status: She is alert and oriented to person, " place, and time.      Cranial Nerves: No cranial nerve deficit.      Motor: No weakness.   Psychiatric:         Mood and Affect: Mood normal.         Behavior: Behavior normal.    Condition on Discharge: medically stable.    Discharge Disposition:  Home or Self Care    Discharge Medications:     Discharge Medications        New Medications        Instructions Start Date   Eliquis DVT/PE Starter Pack tablet therapy pack  Generic drug: Apixaban Starter Pack   5 mg, Oral, Take As Directed      freestyle lancets   1 each, Other, 2 Times Daily, Use as instructed      FreeStyle Lite device   1 Device, Does not apply, Once, Please order meter covered by insurance      FREESTYLE LITE test strip  Generic drug: glucose blood   1 each, Other, 2 Times Daily, Use as instructed      nitrofurantoin (macrocrystal-monohydrate) 100 MG capsule  Commonly known as: Macrobid   100 mg, Oral, 2 Times Daily      polyethylene glycol 17 g packet  Commonly known as: MIRALAX   17 g, Oral, Daily PRN, Obtain OTC      traMADol 50 MG tablet  Commonly known as: Ultram   25 mg, Oral, Every 8 Hours PRN             Continue These Medications        Instructions Start Date   albuterol sulfate  (90 Base) MCG/ACT inhaler  Commonly known as: PROVENTIL HFA;VENTOLIN HFA;PROAIR HFA   2 puffs, Inhalation, Every 4 Hours PRN             Discharge Diet:   Diet Instructions       Diet: Regular/House Diet; Regular Texture (IDDSI 7); Thin (IDDSI 0)      Discharge Diet: Regular/House Diet    Texture: Regular Texture (IDDSI 7)    Fluid Consistency: Thin (IDDSI 0)            Activity at Discharge:   Activity Instructions       Activity as Tolerated              Follow-up Appointments:   1.  Establish care with a primary care provider in 1 week.  2.  Follow-up with Dr. De Los Santos in 1 week.    Test Results Pending at Discharge: none.    Electronically signed by GREYSON Alves, 09/16/23, 10:46 CDT.    Time: 35 minutes.

## 2023-09-16 NOTE — PLAN OF CARE
Goal Outcome Evaluation:      Pt Aox4. C/o pain to lower back managed with positioning and prn meds. IV flushed and IID, removed for DC. Pt up standby to brm. States there is a small amt of vaginal bleeding. DC education provided. Savings card sent with pt for eloquis at discharge, insulin pump provided. DC education completed. Pt verbalized understanding. DC home via wheelchair with .

## 2023-09-16 NOTE — PLAN OF CARE
Goal Outcome Evaluation:              Outcome Evaluation: Pt is ao x 4, c/o to lower back, tylenol administered, warm compress (warm towel) provided. Verbalized pain relief, continues with IV infusion, No new c/o verbalized at this time.

## 2023-09-17 NOTE — TELEPHONE ENCOUNTER
"The patient is concerned about how to take her starter pack of Eliquis.   I opened up her AVS and instructed her to turn to page 4, where it explains how to taker her medications. We reviewed it and made sure she understood. She repeated back the instructions to me. Then she asked if she have something stronger for pain. I instructed her to turn back to that page again and towards the bottom there is a medication call tramadol that is for pain. I advised her to pick that medication up at the pharmacy tomorrow and take it as instructed. She said she would follow this advice.   Reason for Disposition   General information question, no triage required and triager able to answer question    Additional Information   Negative: [1] Caller is not with the adult (patient) AND [2] reporting urgent symptoms   Negative: Lab result questions   Negative: Medication questions   Negative: Caller can't be reached by phone   Negative: Caller has already spoken to PCP or another triager   Negative: RN needs further essential information from caller in order to complete triage   Negative: Requesting regular office appointment   Negative: [1] Caller requesting NON-URGENT health information AND [2] PCP's office is the best resource   Negative: Health Information question, no triage required and triager able to answer question    Answer Assessment - Initial Assessment Questions  1. REASON FOR CALL or QUESTION: \"What is your reason for calling today?\" or \"How can I best help you?\" or \"What question do you have that I can help answer?\"      The patient is concerned about how to take her starter pack of Eliquis.    Protocols used: Information Only Call - No Triage-ADULT-    "

## 2023-10-25 ENCOUNTER — OFFICE VISIT (OUTPATIENT)
Dept: PRIMARY CARE CLINIC | Age: 63
End: 2023-10-25
Payer: COMMERCIAL

## 2023-10-25 VITALS
RESPIRATION RATE: 18 BRPM | HEIGHT: 65 IN | HEART RATE: 121 BPM | BODY MASS INDEX: 29.19 KG/M2 | OXYGEN SATURATION: 98 % | TEMPERATURE: 97.4 F | SYSTOLIC BLOOD PRESSURE: 122 MMHG | WEIGHT: 175.2 LBS | DIASTOLIC BLOOD PRESSURE: 78 MMHG

## 2023-10-25 DIAGNOSIS — R68.89 SUSPECTED MALIGNANT NEOPLASM: ICD-10-CM

## 2023-10-25 DIAGNOSIS — K62.5 RECTAL BLEED: ICD-10-CM

## 2023-10-25 DIAGNOSIS — I82.4Y2 DEEP VEIN THROMBOSIS (DVT) OF PROXIMAL VEIN OF LEFT LOWER EXTREMITY, UNSPECIFIED CHRONICITY (HCC): ICD-10-CM

## 2023-10-25 DIAGNOSIS — R21 RASH: ICD-10-CM

## 2023-10-25 DIAGNOSIS — Z13.9 SCREENING DUE: ICD-10-CM

## 2023-10-25 DIAGNOSIS — R20.2 NUMBNESS AND TINGLING: ICD-10-CM

## 2023-10-25 DIAGNOSIS — E11.40 TYPE 2 DIABETES MELLITUS WITH DIABETIC NEUROPATHY, WITHOUT LONG-TERM CURRENT USE OF INSULIN (HCC): ICD-10-CM

## 2023-10-25 DIAGNOSIS — R93.89 THICKENED ENDOMETRIUM: ICD-10-CM

## 2023-10-25 DIAGNOSIS — R20.0 NUMBNESS AND TINGLING: ICD-10-CM

## 2023-10-25 DIAGNOSIS — E11.40 TYPE 2 DIABETES MELLITUS WITH DIABETIC NEUROPATHY, WITHOUT LONG-TERM CURRENT USE OF INSULIN (HCC): Primary | ICD-10-CM

## 2023-10-25 LAB
ALBUMIN SERPL-MCNC: 3.2 G/DL (ref 3.5–5.2)
ALP SERPL-CCNC: 210 U/L (ref 35–104)
ALT SERPL-CCNC: 7 U/L (ref 5–33)
ANION GAP SERPL CALCULATED.3IONS-SCNC: 10 MMOL/L (ref 7–19)
AST SERPL-CCNC: 13 U/L (ref 5–32)
BASOPHILS # BLD: 0 K/UL (ref 0–0.2)
BASOPHILS NFR BLD: 0.5 % (ref 0–1)
BILIRUB SERPL-MCNC: 0.4 MG/DL (ref 0.2–1.2)
BUN SERPL-MCNC: 12 MG/DL (ref 8–23)
CALCIUM SERPL-MCNC: 9.2 MG/DL (ref 8.8–10.2)
CHLORIDE SERPL-SCNC: 97 MMOL/L (ref 98–111)
CO2 SERPL-SCNC: 31 MMOL/L (ref 22–29)
CREAT SERPL-MCNC: 0.5 MG/DL (ref 0.5–0.9)
CREAT UR-MCNC: 530.3 MG/DL (ref 28–217)
EOSINOPHIL # BLD: 0.1 K/UL (ref 0–0.6)
EOSINOPHIL NFR BLD: 1.3 % (ref 0–5)
ERYTHROCYTE [DISTWIDTH] IN BLOOD BY AUTOMATED COUNT: 15.6 % (ref 11.5–14.5)
GLUCOSE SERPL-MCNC: 178 MG/DL (ref 74–109)
HCT VFR BLD AUTO: 36.3 % (ref 37–47)
HGB BLD-MCNC: 10.6 G/DL (ref 12–16)
IMM GRANULOCYTES # BLD: 0.1 K/UL
LYMPHOCYTES # BLD: 2 K/UL (ref 1.1–4.5)
LYMPHOCYTES NFR BLD: 26.7 % (ref 20–40)
MCH RBC QN AUTO: 23.2 PG (ref 27–31)
MCHC RBC AUTO-ENTMCNC: 29.2 G/DL (ref 33–37)
MCV RBC AUTO: 79.4 FL (ref 81–99)
MICROALBUMIN UR-MCNC: 17.4 MG/DL (ref 0–19)
MICROALBUMIN/CREAT UR-RTO: 32.8 MG/G
MONOCYTES # BLD: 0.6 K/UL (ref 0–0.9)
MONOCYTES NFR BLD: 8.5 % (ref 0–10)
NEUTROPHILS # BLD: 4.6 K/UL (ref 1.5–7.5)
NEUTS SEG NFR BLD: 61.9 % (ref 50–65)
PLATELET # BLD AUTO: 630 K/UL (ref 130–400)
PMV BLD AUTO: 9.6 FL (ref 9.4–12.3)
POTASSIUM SERPL-SCNC: 4.2 MMOL/L (ref 3.5–5)
PROT SERPL-MCNC: 7.4 G/DL (ref 6.6–8.7)
RBC # BLD AUTO: 4.57 M/UL (ref 4.2–5.4)
SODIUM SERPL-SCNC: 138 MMOL/L (ref 136–145)
WBC # BLD AUTO: 7.5 K/UL (ref 4.8–10.8)

## 2023-10-25 PROCEDURE — 99205 OFFICE O/P NEW HI 60 MIN: CPT | Performed by: FAMILY MEDICINE

## 2023-10-25 RX ORDER — TRAMADOL HYDROCHLORIDE 50 MG/1
50 TABLET ORAL EVERY 8 HOURS PRN
Qty: 42 TABLET | Refills: 0 | Status: SHIPPED | OUTPATIENT
Start: 2023-10-25 | End: 2023-11-08

## 2023-10-25 ASSESSMENT — ENCOUNTER SYMPTOMS
BLOOD IN STOOL: 0
WHEEZING: 0
ANAL BLEEDING: 1
CHEST TIGHTNESS: 0
SHORTNESS OF BREATH: 0
CONSTIPATION: 1
NAUSEA: 1
DIARRHEA: 0
ABDOMINAL PAIN: 1

## 2023-10-25 NOTE — PROGRESS NOTES
pain Max Daily Amount: 150 mg, Disp-42 tablet, R-0Normal  -     Alison - Stephanie Arreguin MD, Hematology/Oncology, [facility]  7. Numbness and tingling  -     Vitamin B12 & Folate; Future      Prior CT results as noted below  1. Somewhat bulky retroperitoneal lymphadenopathy and pelvic   lymphadenopathy worrisome for metastatic lymphadenopathy or lymphoma. 2. There is also some stranding and nodularity in the greater omental   region anteriorly suggesting possible peritoneal metastatic disease. 3. The uterus is prominent in size but a focal mass is not seen. Follow-up nonemergent ultrasound is recommended to evaluate the uterus   for a potential mass. There is no other obvious area of primary neoplasm   on the noncontrast CT images. 4. A vague 1.6 cm sclerotic bone lesion in the S1 vertebral body is   indeterminate. 5. Other nonacute findings, as discussed  Given patient's overall history this does strongly raise my suspicions for potential malignant process. This is especially true in her ongoing weight loss and bleeding from both the rectum and the vagina. I discussed with patient that her ongoing fatigue and nonspecific abdominal pain are likely resulting from this and we need further testing to better isolate what might be the underlying cause. I discussed with her that the primary could be within the uterus such as endometrial cancer but may also represent a gastrointestinal cancer given she has had bleeding from her rectum and has never had a screening colonoscopy. I would say that 1 of these sites is the likely culprit especially given her lymphadenopathy and nodularity within the omentum. Given her ongoing pain I did send in a supply of tramadol for her. Normally I do not prescribe this for new patients however given her high likelihood and having a malignant process I do think this is reasonable at this time.   Given she has never had a colonoscopy or any sort of evaluation of the bowel for

## 2023-10-26 ENCOUNTER — TELEPHONE (OUTPATIENT)
Dept: HEMATOLOGY | Age: 63
End: 2023-10-26

## 2023-10-26 NOTE — TELEPHONE ENCOUNTER
Called and LVM with the following new patient appt date/time with Dr. Michael Ram:    November 9, 2023 @ 11:15am.    If pt calls back please inform her of this appt. Thanks.

## 2023-10-27 ENCOUNTER — NURSE ONLY (OUTPATIENT)
Dept: PRIMARY CARE CLINIC | Age: 63
End: 2023-10-27
Payer: COMMERCIAL

## 2023-10-27 ENCOUNTER — TELEPHONE (OUTPATIENT)
Dept: PRIMARY CARE CLINIC | Age: 63
End: 2023-10-27

## 2023-10-27 DIAGNOSIS — R30.0 DYSURIA: Primary | ICD-10-CM

## 2023-10-27 LAB
APPEARANCE FLUID: ABNORMAL
BILIRUBIN, POC: ABNORMAL
BLOOD URINE, POC: ABNORMAL
CLARITY, POC: ABNORMAL
COLOR, POC: ABNORMAL
GLUCOSE URINE, POC: ABNORMAL
KETONES, POC: ABNORMAL
LEUKOCYTE EST, POC: ABNORMAL
NITRITE, POC: ABNORMAL
PH, POC: 5.5
PROTEIN, POC: ABNORMAL
SPECIFIC GRAVITY, POC: >=1.03
UROBILINOGEN, POC: ABNORMAL

## 2023-10-27 PROCEDURE — 81002 URINALYSIS NONAUTO W/O SCOPE: CPT | Performed by: FAMILY MEDICINE

## 2023-10-27 RX ORDER — NITROFURANTOIN 25; 75 MG/1; MG/1
100 CAPSULE ORAL 2 TIMES DAILY
Qty: 10 CAPSULE | Refills: 0 | Status: CANCELLED | OUTPATIENT
Start: 2023-10-27 | End: 2023-11-01

## 2023-10-27 RX ORDER — SULFAMETHOXAZOLE AND TRIMETHOPRIM 800; 160 MG/1; MG/1
1 TABLET ORAL 2 TIMES DAILY
Qty: 20 TABLET | Refills: 0 | Status: SHIPPED | OUTPATIENT
Start: 2023-10-27 | End: 2023-11-03

## 2023-10-27 NOTE — TELEPHONE ENCOUNTER
Patient calls and states she thinks she has a UTI. She was here in office this week and was unable to urinate that day, she has since had urinary retention and dysuria when she is able to urinate very little.

## 2023-10-27 NOTE — TELEPHONE ENCOUNTER
I advised that the patient go to the ER, but she refused to do so. She says she is better now that she has drank fluids she is able to urinate a little more than before. After trying to persuade the patient to go to the ER, she continued to argue why she doesn't need to go. I mentioned she could come to the office and give a urine sample if she can make it by the time we close, or else she would need to go to the ER.

## 2023-10-29 LAB
B BURGDOR IGG SER QL IB: POSITIVE
B BURGDOR IGM SER QL IB: NEGATIVE
BACTERIA UR CULT: NORMAL

## 2023-10-30 RX ORDER — DOXYCYCLINE HYCLATE 100 MG
100 TABLET ORAL 2 TIMES DAILY
Qty: 56 TABLET | Refills: 0 | Status: SHIPPED | OUTPATIENT
Start: 2023-10-30 | End: 2023-11-27

## 2023-10-30 NOTE — TELEPHONE ENCOUNTER
This 44-year-old female called through the answering service to request her antibiotic to be sent to the pharmacy. States she is not sure if there was an issue with transmission. States that Dr. Edgar Jimenez was sending this in on Friday for urinary tract infection. Bactrim sent to pharmacy. Patient instructed that if she did not have significant symptom improvement after 2 or 3 doses that she is to go to the ER due to concern of possible occlusion. Verbalized understanding and stated that she did not feel significantly improved that she would go to the ER over the weekend.

## 2023-11-02 NOTE — PROGRESS NOTES
Encounter   Procedures    NM BONE SCAN WHOLE BODY     Standing Status:   Future     Standing Expiration Date:   11/9/2024     Order Specific Question:   Reason for exam:     Answer:   bone lesions identified on recent imaging; metastatic workup    CT CHEST WO CONTRAST     Standing Status:   Future     Standing Expiration Date:   11/9/2024     Scheduling Instructions:      At [facility]     Order Specific Question:   Reason for exam:     Answer:   enlarged lymph  nodes, metastatic workup for suspsected malignancy    CT ABDOMEN PELVIS WO CONTRAST Additional Contrast? Oral     Standing Status:   Future     Standing Expiration Date:   11/9/2024     Scheduling Instructions:      At [facility]     Order Specific Question:   Additional Contrast?     Answer:   Oral     Order Specific Question:   Reason for exam:     Answer:   uterine mass, enlarged lymph  nodes, metastatic workup for suspsected malignancy    CT SOFT TISSUE NECK WO CONTRAST     Standing Status:   Future     Standing Expiration Date:   11/9/2024     Scheduling Instructions:      At [facility]     Order Specific Question:   Reason for exam:     Answer:   , enlarged lymph  nodes, metastatic workup for suspsected malignancy    CBC with Auto Differential     Standing Status:   Future     Number of Occurrences:   1     Standing Expiration Date:   11/2/2024    Comprehensive Metabolic Panel     Standing Status:   Future     Number of Occurrences:   1     Standing Expiration Date:   11/2/2024    CEA     Standing Status:   Future     Number of Occurrences:   1     Standing Expiration Date:   11/2/2024    Lactate Dehydrogenase     Standing Status:   Future     Number of Occurrences:   1     Standing Expiration Date:   11/9/2024    Homocysteine, Plasma     Standing Status:   Future     Number of Occurrences:   1     Standing Expiration Date:   11/9/2024    Factor 5 Leiden     Standing Status:   Future     Number of Occurrences:   1     Standing Expiration Date:   11/9/2024     Order

## 2023-11-03 ENCOUNTER — OFFICE VISIT (OUTPATIENT)
Dept: PRIMARY CARE CLINIC | Age: 63
End: 2023-11-03
Payer: COMMERCIAL

## 2023-11-03 VITALS
WEIGHT: 174 LBS | BODY MASS INDEX: 28.99 KG/M2 | HEART RATE: 103 BPM | SYSTOLIC BLOOD PRESSURE: 118 MMHG | DIASTOLIC BLOOD PRESSURE: 80 MMHG | HEIGHT: 65 IN | RESPIRATION RATE: 16 BRPM | OXYGEN SATURATION: 100 % | TEMPERATURE: 97.4 F

## 2023-11-03 DIAGNOSIS — R21 RASH: ICD-10-CM

## 2023-11-03 DIAGNOSIS — R68.89 SUSPECTED MALIGNANT NEOPLASM: ICD-10-CM

## 2023-11-03 DIAGNOSIS — R30.0 DYSURIA: ICD-10-CM

## 2023-11-03 DIAGNOSIS — R14.3 FLATULENCE: ICD-10-CM

## 2023-11-03 DIAGNOSIS — I82.4Y2 DEEP VEIN THROMBOSIS (DVT) OF PROXIMAL VEIN OF LEFT LOWER EXTREMITY, UNSPECIFIED CHRONICITY (HCC): Primary | ICD-10-CM

## 2023-11-03 DIAGNOSIS — R11.0 NAUSEA: ICD-10-CM

## 2023-11-03 PROCEDURE — 99214 OFFICE O/P EST MOD 30 MIN: CPT | Performed by: FAMILY MEDICINE

## 2023-11-03 RX ORDER — SIMETHICONE 80 MG
80 TABLET,CHEWABLE ORAL 4 TIMES DAILY PRN
Qty: 180 TABLET | Refills: 3 | Status: SHIPPED | OUTPATIENT
Start: 2023-11-03

## 2023-11-03 RX ORDER — ONDANSETRON 4 MG/1
4 TABLET, ORALLY DISINTEGRATING ORAL 3 TIMES DAILY PRN
Qty: 21 TABLET | Refills: 0 | Status: SHIPPED | OUTPATIENT
Start: 2023-11-03

## 2023-11-03 ASSESSMENT — ENCOUNTER SYMPTOMS
ANAL BLEEDING: 1
CHEST TIGHTNESS: 0
VOMITING: 1
WHEEZING: 0
CONSTIPATION: 1
SHORTNESS OF BREATH: 0
NAUSEA: 1
DIARRHEA: 0
BLOOD IN STOOL: 0
ABDOMINAL PAIN: 1

## 2023-11-03 ASSESSMENT — PATIENT HEALTH QUESTIONNAIRE - PHQ9
SUM OF ALL RESPONSES TO PHQ QUESTIONS 1-9: 0
1. LITTLE INTEREST OR PLEASURE IN DOING THINGS: 0
SUM OF ALL RESPONSES TO PHQ QUESTIONS 1-9: 0
SUM OF ALL RESPONSES TO PHQ9 QUESTIONS 1 & 2: 0
SUM OF ALL RESPONSES TO PHQ QUESTIONS 1-9: 0
2. FEELING DOWN, DEPRESSED OR HOPELESS: 0
SUM OF ALL RESPONSES TO PHQ QUESTIONS 1-9: 0

## 2023-11-03 NOTE — PROGRESS NOTES
Blanca Kamara (:  1960) is a 61 y.o. female,Established patient, here for evaluation of the following chief complaint(s):  Follow-up (Having a lot of nausea and that causes her to not want to eat.)         ASSESSMENT/PLAN:  1. Deep vein thrombosis (DVT) of proximal vein of left lower extremity, unspecified chronicity ([address])  2. Rash  3. Suspected malignant neoplasm  4. Dysuria  5. Flatulence  -     simethicone (MYLICON) 80 MG chewable tablet; Take 1 tablet by mouth 4 times daily as needed for Flatulence, Disp-180 tablet, R-3Normal  6. Nausea  -     ondansetron (ZOFRAN-ODT) 4 MG disintegrating tablet; Take 1 tablet by mouth 3 times daily as needed for Nausea or Vomiting, Disp-21 tablet, R-0Normal      Given patient's ongoing nausea with her current course I have sent in some Zofran for her to use as needed. Patient also reported some gas pain and I have sent in some simethicone for this. Encourage patient to maintain her follow-up with oncology as scheduled. I had plan to proceed with a  today however it appears that oncology has already ordered this. I did encourage patient to schedule with GI as soon as possible as I do believe that this is very important with or without the ultrasound as ordered  Unfortunately due to her titers I do not believe it safe to stop the doxycycline until the course is complete  I did encourage patient to potentially try some nutritional supplements such as Glucerna as she was asking about these. I would like for patient to get as many calories as she can as she has been losing weight secondary to her underlying likely malignant process. Return in about 4 weeks (around 2023). Subjective   SUBJECTIVE/OBJECTIVE:  Blanca Kamara is a 61 y.o. female who presents for follow-up. Patient says she continues to feel very \"crappy\" and has very low energy. Patient has been trying to eat more in order to maintain her weight.   She notes that she has been

## 2023-11-07 ENCOUNTER — HOSPITAL ENCOUNTER (OUTPATIENT)
Dept: NON INVASIVE DIAGNOSTICS | Age: 63
Discharge: HOME | End: 2023-11-07
Payer: COMMERCIAL

## 2023-11-07 ENCOUNTER — TELEPHONE (OUTPATIENT)
Dept: HEMATOLOGY | Age: 63
End: 2023-11-07

## 2023-11-07 DIAGNOSIS — I82.4Y2 DEEP VEIN THROMBOSIS (DVT) OF PROXIMAL VEIN OF LEFT LOWER EXTREMITY, UNSPECIFIED CHRONICITY (HCC): ICD-10-CM

## 2023-11-07 PROCEDURE — 93970 EXTREMITY STUDY: CPT

## 2023-11-09 ENCOUNTER — HOSPITAL ENCOUNTER (OUTPATIENT)
Dept: INFUSION THERAPY | Age: 63
Discharge: HOME | End: 2023-11-09
Payer: COMMERCIAL

## 2023-11-09 ENCOUNTER — TRANSCRIBE ORDERS (OUTPATIENT)
Dept: ADMINISTRATIVE | Facility: HOSPITAL | Age: 63
End: 2023-11-09
Payer: COMMERCIAL

## 2023-11-09 ENCOUNTER — OFFICE VISIT (OUTPATIENT)
Dept: HEMATOLOGY | Age: 63
End: 2023-11-09
Payer: COMMERCIAL

## 2023-11-09 VITALS
HEIGHT: 65 IN | TEMPERATURE: 97.7 F | OXYGEN SATURATION: 99 % | BODY MASS INDEX: 28.86 KG/M2 | SYSTOLIC BLOOD PRESSURE: 102 MMHG | WEIGHT: 173.2 LBS | HEART RATE: 92 BPM | DIASTOLIC BLOOD PRESSURE: 60 MMHG

## 2023-11-09 DIAGNOSIS — R63.4 WEIGHT LOSS: ICD-10-CM

## 2023-11-09 DIAGNOSIS — K62.5 RECTAL BLEEDING: ICD-10-CM

## 2023-11-09 DIAGNOSIS — I82.403 ACUTE DEEP VEIN THROMBOSIS (DVT) OF BOTH LOWER EXTREMITIES, UNSPECIFIED VEIN (HCC): ICD-10-CM

## 2023-11-09 DIAGNOSIS — R59.0 RETROPERITONEAL LYMPHADENOPATHY: Primary | ICD-10-CM

## 2023-11-09 DIAGNOSIS — D64.9 ANEMIA, UNSPECIFIED TYPE: ICD-10-CM

## 2023-11-09 DIAGNOSIS — R93.5 ABNORMAL ABDOMINAL CT SCAN: ICD-10-CM

## 2023-11-09 DIAGNOSIS — D75.839 THROMBOCYTOSIS: ICD-10-CM

## 2023-11-09 DIAGNOSIS — R59.0 RETROPERITONEAL LYMPHADENOPATHY: ICD-10-CM

## 2023-11-09 DIAGNOSIS — R59.9 ENLARGED LYMPH NODES: ICD-10-CM

## 2023-11-09 LAB
ALBUMIN SERPL-MCNC: 3.5 G/DL (ref 3.5–5.2)
ALP SERPL-CCNC: 149 U/L (ref 35–104)
ALT SERPL-CCNC: 11 U/L (ref 9–52)
ANION GAP SERPL CALCULATED.3IONS-SCNC: 6 MMOL/L (ref 7–19)
AST SERPL-CCNC: 21 U/L (ref 14–36)
BASOPHILS # BLD: 0.06 K/UL (ref 0.01–0.08)
BASOPHILS NFR BLD: 0.7 % (ref 0.1–1.2)
BILIRUB SERPL-MCNC: 0.4 MG/DL (ref 0.2–1.3)
BUN SERPL-MCNC: 18 MG/DL (ref 7–17)
CALCIUM SERPL-MCNC: 9.5 MG/DL (ref 8.4–10.2)
CANCER AG125 SERPL-ACNC: 419 U/ML (ref 0–38)
CANCER AG125 SERPL-ACNC: 455 U/ML (ref 0–38)
CANCER AG19-9 SERPL-ACNC: 355 U/ML (ref 0–35)
CEA SERPL-MCNC: 1.7 NG/ML (ref 0–4.7)
CHLORIDE SERPL-SCNC: 98 MMOL/L (ref 98–111)
CO2 SERPL-SCNC: 32 MMOL/L (ref 22–29)
CREAT SERPL-MCNC: 0.5 MG/DL (ref 0.5–1)
EOSINOPHIL # BLD: 0.15 K/UL (ref 0.04–0.54)
EOSINOPHIL NFR BLD: 1.8 % (ref 0.7–7)
ERYTHROCYTE [DISTWIDTH] IN BLOOD BY AUTOMATED COUNT: 15.9 % (ref 11.7–14.4)
FERRITIN SERPL-MCNC: 474.6 NG/ML (ref 13–150)
FOLATE SERPL-MCNC: 2.6 NG/ML (ref 4.8–37.3)
GLOBULIN: 4.2 G/DL
GLUCOSE SERPL-MCNC: 132 MG/DL (ref 74–106)
HCT VFR BLD AUTO: 32.3 % (ref 34.1–44.9)
HGB BLD-MCNC: 9.7 G/DL (ref 11.2–15.7)
IRON SATN MFR SERPL: 11 % (ref 14–50)
IRON SERPL-MCNC: 25 UG/DL (ref 37–145)
LDH SERPL-CCNC: 223 U/L (ref 120–246)
LYMPHOCYTES # BLD: 2.19 K/UL (ref 1.18–3.74)
LYMPHOCYTES NFR BLD: 26.3 % (ref 19.3–53.1)
MCH RBC QN AUTO: 22.5 PG (ref 25.6–32.2)
MCHC RBC AUTO-ENTMCNC: 30 G/DL (ref 32.3–35.5)
MCV RBC AUTO: 74.9 FL (ref 79.4–94.8)
MONOCYTES # BLD: 0.73 K/UL (ref 0.24–0.82)
MONOCYTES NFR BLD: 8.8 % (ref 4.7–12.5)
NEUTROPHILS # BLD: 5.09 K/UL (ref 1.56–6.13)
NEUTS SEG NFR BLD: 61 % (ref 34–71.1)
PLATELET # BLD AUTO: 639 K/UL (ref 182–369)
PMV BLD AUTO: 8.8 FL (ref 7.4–10.4)
POTASSIUM SERPL-SCNC: 4.8 MMOL/L (ref 3.5–5.1)
PROT SERPL-MCNC: 7.7 G/DL (ref 6.3–8.2)
RBC # BLD AUTO: 4.31 M/UL (ref 3.93–5.22)
SODIUM SERPL-SCNC: 136 MMOL/L (ref 137–145)
TIBC SERPL-MCNC: 231 UG/DL (ref 250–400)
TSH SERPL DL<=0.005 MIU/L-ACNC: 3.41 UIU/ML (ref 0.35–5.5)
VIT B12 SERPL-MCNC: 660 PG/ML (ref 211–946)
WBC # BLD AUTO: 8.34 K/UL (ref 3.98–10.04)

## 2023-11-09 PROCEDURE — 80053 COMPREHEN METABOLIC PANEL: CPT

## 2023-11-09 PROCEDURE — 85025 COMPLETE CBC W/AUTO DIFF WBC: CPT

## 2023-11-09 PROCEDURE — 36415 COLL VENOUS BLD VENIPUNCTURE: CPT

## 2023-11-09 PROCEDURE — 99213 OFFICE O/P EST LOW 20 MIN: CPT

## 2023-11-09 PROCEDURE — 99205 OFFICE O/P NEW HI 60 MIN: CPT | Performed by: INTERNAL MEDICINE

## 2023-11-09 PROCEDURE — 83615 LACTATE (LD) (LDH) ENZYME: CPT

## 2023-11-09 RX ORDER — ACETAMINOPHEN 500 MG
500 TABLET ORAL EVERY 6 HOURS PRN
COMMUNITY

## 2023-11-11 LAB
PROT C ACT/NOR PPP: 138 % (ref 83–168)
PROT S ACT/NOR PPP: 85 % (ref 57–131)

## 2023-11-12 LAB
ALBUMIN SERPL-MCNC: 2.89 G/DL (ref 3.75–5.01)
ALPHA1 GLOB SERPL ELPH-MCNC: 0.71 G/DL (ref 0.19–0.46)
ALPHA2 GLOB SERPL ELPH-MCNC: 1.39 G/DL (ref 0.48–1.05)
B-GLOBULIN SERPL ELPH-MCNC: 1.24 G/DL (ref 0.48–1.1)
B2 MICROGLOB SERPL-MCNC: 3.8 MG/L
DEPRECATED KAPPA LC FREE/LAMBDA SER: 1.12 {RATIO} (ref 0.26–1.65)
EER MONOCLONAL PROTEIN AND FLC, SERUM: ABNORMAL
GAMMA GLOB SERPL ELPH-MCNC: 1.37 G/DL (ref 0.62–1.51)
HCYS SERPL-SCNC: 11 UMOL/L (ref 0–15)
IGA SERPL-MCNC: 572 MG/DL (ref 68–408)
IGG SERPL-MCNC: 1400 MG/DL (ref 768–1632)
IGM SERPL-MCNC: 73 MG/DL (ref 35–263)
INTERPRETATION SERPL IFE-IMP: ABNORMAL
INTERPRETATION SERPL IFE-IMP: ABNORMAL
KAPPA LC FREE SER-MCNC: 86.08 MG/L (ref 3.3–19.4)
LAMBDA LC FREE SERPL-MCNC: 77.12 MG/L (ref 5.71–26.3)
MONOCLONAL PROTEIN, SERUM: ABNORMAL G/DL
PROT SERPL-MCNC: 7.6 G/DL (ref 6.3–8.2)

## 2023-11-13 LAB
APTT IMM NP PPP: NORMAL SEC (ref 32–48)
APTT P HEP NEUT PPP: NORMAL SEC (ref 32–48)
B2 GLYCOPROT1 IGG SERPL IA-ACNC: <10 SGU
B2 GLYCOPROT1 IGM SERPL IA-ACNC: <10 SMU
CARDIOLIPIN IGG SER IA-ACNC: <10 GPL
CARDIOLIPIN IGM SER IA-ACNC: <10 MPL
CONFIRM APTT STACLOT: NORMAL
DRVVT SCREEN TO CONFIRM RATIO: NORMAL RATIO
LA 3 SCREEN W REFLEX-IMP: NORMAL
LA NT DPL PPP QL: NORMAL
MIXING DRVVT: NORMAL SEC (ref 33–44)
PROTHROMBIN TIME: 15.1 SEC (ref 12–15.5)
REPTILASE TIME: NORMAL SEC
SCREEN APTT: 46 SEC (ref 32–48)
SCREEN DRVVT: 34 SEC (ref 33–44)
THROMBIN TIME: NORMAL SEC (ref 14.7–19.5)

## 2023-11-14 LAB
F5 P.R506Q BLD/T QL: NEGATIVE
SPECIMEN SOURCE: NORMAL

## 2023-11-15 DIAGNOSIS — D50.9 IRON DEFICIENCY ANEMIA, UNSPECIFIED IRON DEFICIENCY ANEMIA TYPE: Primary | ICD-10-CM

## 2023-11-15 DIAGNOSIS — D50.0 IRON DEFICIENCY ANEMIA DUE TO CHRONIC BLOOD LOSS: ICD-10-CM

## 2023-11-15 RX ORDER — FERROUS SULFATE 325(65) MG
325 TABLET ORAL 2 TIMES DAILY
Qty: 60 TABLET | Refills: 5 | Status: SHIPPED | OUTPATIENT
Start: 2023-11-15

## 2023-11-21 RX ORDER — HEPARIN 100 UNIT/ML
500 SYRINGE INTRAVENOUS PRN
OUTPATIENT
Start: 2023-11-21

## 2023-11-21 RX ORDER — ONDANSETRON 2 MG/ML
8 INJECTION INTRAMUSCULAR; INTRAVENOUS
OUTPATIENT
Start: 2023-11-21

## 2023-11-21 RX ORDER — EPINEPHRINE 1 MG/ML
0.3 INJECTION, SOLUTION, CONCENTRATE INTRAVENOUS PRN
OUTPATIENT
Start: 2023-11-21

## 2023-11-21 RX ORDER — SODIUM CHLORIDE 9 MG/ML
5-250 INJECTION, SOLUTION INTRAVENOUS PRN
OUTPATIENT
Start: 2023-11-21

## 2023-11-21 RX ORDER — ACETAMINOPHEN 325 MG/1
650 TABLET ORAL
OUTPATIENT
Start: 2023-11-21

## 2023-11-21 RX ORDER — SODIUM CHLORIDE 0.9 % (FLUSH) 0.9 %
5-40 SYRINGE (ML) INJECTION PRN
OUTPATIENT
Start: 2023-11-21

## 2023-11-21 RX ORDER — SODIUM CHLORIDE 9 MG/ML
INJECTION, SOLUTION INTRAVENOUS CONTINUOUS
OUTPATIENT
Start: 2023-11-21

## 2023-11-21 RX ORDER — DIPHENHYDRAMINE HYDROCHLORIDE 50 MG/ML
50 INJECTION INTRAMUSCULAR; INTRAVENOUS
OUTPATIENT
Start: 2023-11-21

## 2023-11-21 RX ORDER — ALBUTEROL SULFATE 90 UG/1
4 AEROSOL, METERED RESPIRATORY (INHALATION) PRN
OUTPATIENT
Start: 2023-11-21

## 2023-11-21 RX ORDER — FAMOTIDINE 10 MG/ML
20 INJECTION, SOLUTION INTRAVENOUS
OUTPATIENT
Start: 2023-11-21

## 2023-12-04 ENCOUNTER — TELEPHONE (OUTPATIENT)
Dept: PRIMARY CARE CLINIC | Age: 63
End: 2023-12-04

## 2023-12-04 NOTE — TELEPHONE ENCOUNTER
Pt states she has severe swelling and pain that has had DVT's. Pt advised to go to the ER.  Pt agrees

## 2023-12-05 ENCOUNTER — APPOINTMENT (OUTPATIENT)
Dept: NUCLEAR MEDICINE | Facility: HOSPITAL | Age: 63
End: 2023-12-05
Payer: COMMERCIAL

## 2023-12-05 ENCOUNTER — HOSPITAL ENCOUNTER (OUTPATIENT)
Dept: CT IMAGING | Facility: HOSPITAL | Age: 63
Discharge: HOME | End: 2023-12-05
Payer: COMMERCIAL

## 2023-12-05 DIAGNOSIS — R59.0 RETROPERITONEAL LYMPHADENOPATHY: ICD-10-CM

## 2023-12-05 PROCEDURE — 74176 CT ABD & PELVIS W/O CONTRAST: CPT

## 2023-12-05 PROCEDURE — 71250 CT THORAX DX C-: CPT

## 2023-12-05 PROCEDURE — 70490 CT SOFT TISSUE NECK W/O DYE: CPT

## 2023-12-08 ENCOUNTER — OFFICE VISIT (OUTPATIENT)
Dept: PRIMARY CARE CLINIC | Age: 63
End: 2023-12-08
Payer: COMMERCIAL

## 2023-12-08 VITALS
DIASTOLIC BLOOD PRESSURE: 70 MMHG | TEMPERATURE: 97.6 F | SYSTOLIC BLOOD PRESSURE: 116 MMHG | HEART RATE: 103 BPM | OXYGEN SATURATION: 99 % | RESPIRATION RATE: 16 BRPM

## 2023-12-08 DIAGNOSIS — G89.3 CANCER RELATED PAIN: Primary | ICD-10-CM

## 2023-12-08 DIAGNOSIS — R11.0 NAUSEA: ICD-10-CM

## 2023-12-08 DIAGNOSIS — R68.89 SUSPECTED MALIGNANT NEOPLASM: ICD-10-CM

## 2023-12-08 DIAGNOSIS — Z79.899 MEDICATION MANAGEMENT: ICD-10-CM

## 2023-12-08 DIAGNOSIS — I89.0 LYMPHEDEMA: ICD-10-CM

## 2023-12-08 PROCEDURE — 99215 OFFICE O/P EST HI 40 MIN: CPT | Performed by: FAMILY MEDICINE

## 2023-12-08 RX ORDER — PROMETHAZINE HYDROCHLORIDE 25 MG/1
25 TABLET ORAL 4 TIMES DAILY PRN
Qty: 20 TABLET | Refills: 0 | Status: SHIPPED | OUTPATIENT
Start: 2023-12-08 | End: 2023-12-15

## 2023-12-08 RX ORDER — FUROSEMIDE 20 MG/1
20 TABLET ORAL DAILY
Qty: 60 TABLET | Refills: 3 | Status: SHIPPED | OUTPATIENT
Start: 2023-12-08

## 2023-12-08 RX ORDER — ACETAMINOPHEN AND CODEINE PHOSPHATE 300; 30 MG/1; MG/1
1 TABLET ORAL EVERY 8 HOURS PRN
Qty: 90 TABLET | Refills: 0 | Status: SHIPPED | OUTPATIENT
Start: 2023-12-08 | End: 2024-01-07

## 2023-12-08 ASSESSMENT — ENCOUNTER SYMPTOMS
CHEST TIGHTNESS: 0
BLOOD IN STOOL: 0
WHEEZING: 0
SHORTNESS OF BREATH: 0
ABDOMINAL PAIN: 0

## 2023-12-08 NOTE — PROGRESS NOTES
Bettye Carmona (:  1960) is a 61 y.o. female,Established patient, here for evaluation of the following chief complaint(s):  Follow-up (Not doing well. Left leg is giving her trouble again. It is swollen, tight, and causing her pain. Tried to go to the ER yesterday, but left due to the wait.) and Rash (Took all of her antibiotic for Lyme, but her rash has come back.)         ASSESSMENT/PLAN:  1. Cancer related pain  -     acetaminophen-codeine (TYLENOL/CODEINE #3) 300-30 MG per tablet; Take 1 tablet by mouth every 8 hours as needed for Pain for up to 30 days. Intended supply: 5 days. Take lowest dose possible to manage pain Max Daily Amount: 3 tablets, Disp-90 tablet, R-0Normal  2. Medication management  -     acetaminophen-codeine (TYLENOL/CODEINE #3) 300-30 MG per tablet; Take 1 tablet by mouth every 8 hours as needed for Pain for up to 30 days. Intended supply: 5 days. Take lowest dose possible to manage pain Max Daily Amount: 3 tablets, Disp-90 tablet, R-0Normal  3. Lymphedema  -     furosemide (LASIX) 20 MG tablet; Take 1 tablet by mouth daily, Disp-60 tablet, R-3Normal  4. Nausea  -     promethazine (PHENERGAN) 25 MG tablet; Take 1 tablet by mouth 4 times daily as needed for Nausea, Disp-20 tablet, R-0Normal  5. Suspected malignant neoplasm      I did discuss some of patient's results with her including her . I discussed with her that it is very likely that she has a malignancy of uterine or ovarian tissue though could represent breast tissue as well. Patient is scheduled for follow-up with oncology later this month. Patient has already completed some CTs however the results of these have not returned and I believe that this will be very enlightening. Patient's lower extremity is quite swollen and is more so on the left side I do believe that this may be secondary to lymphatic obstruction given her other findings.   We will do a trial with Lasix 20 mg which patient can take daily though I

## 2023-12-11 ENCOUNTER — CLINICAL DOCUMENTATION (OUTPATIENT)
Facility: HOSPITAL | Age: 63
End: 2023-12-11

## 2023-12-11 ENCOUNTER — TELEPHONE (OUTPATIENT)
Dept: PRIMARY CARE CLINIC | Age: 63
End: 2023-12-11

## 2023-12-11 RX ORDER — AZITHROMYCIN 250 MG/1
250 TABLET, FILM COATED ORAL SEE ADMIN INSTRUCTIONS
Qty: 6 TABLET | Refills: 0 | Status: SHIPPED | OUTPATIENT
Start: 2023-12-11 | End: 2023-12-16

## 2023-12-11 NOTE — TELEPHONE ENCOUNTER
Pt states she was in recently but for a different reason. She states she has what her her Daughter has. Cough, scratchy throat, sneezing, low grade fever, chills. Pt states she has Asthma. X 2 days. Asking for a Z-pac and decongestant.

## 2023-12-11 NOTE — PROGRESS NOTES
Reviewed pt's therapy and she is getting blood infusion. No PAP needed as of right now. Will send out health benefits with contact information.

## 2023-12-18 ENCOUNTER — APPOINTMENT (OUTPATIENT)
Dept: ULTRASOUND IMAGING | Facility: HOSPITAL | Age: 63
End: 2023-12-18
Payer: COMMERCIAL

## 2023-12-18 ENCOUNTER — HOSPITAL ENCOUNTER (EMERGENCY)
Facility: HOSPITAL | Age: 63
Discharge: HOME | End: 2023-12-18
Attending: FAMILY MEDICINE | Admitting: FAMILY MEDICINE
Payer: COMMERCIAL

## 2023-12-18 VITALS
RESPIRATION RATE: 18 BRPM | WEIGHT: 170 LBS | SYSTOLIC BLOOD PRESSURE: 148 MMHG | HEART RATE: 104 BPM | BODY MASS INDEX: 28.32 KG/M2 | TEMPERATURE: 98.5 F | DIASTOLIC BLOOD PRESSURE: 68 MMHG | OXYGEN SATURATION: 100 % | HEIGHT: 65 IN

## 2023-12-18 DIAGNOSIS — N39.0 URINARY TRACT INFECTION WITHOUT HEMATURIA, SITE UNSPECIFIED: ICD-10-CM

## 2023-12-18 DIAGNOSIS — I82.402 DEEP VEIN THROMBOSIS (DVT) OF LEFT LOWER EXTREMITY, UNSPECIFIED CHRONICITY, UNSPECIFIED VEIN: Primary | ICD-10-CM

## 2023-12-18 LAB
ALBUMIN SERPL-MCNC: 3.3 G/DL (ref 3.5–5.2)
ALBUMIN/GLOB SERPL: 0.7 G/DL
ALP SERPL-CCNC: 170 U/L (ref 39–117)
ALT SERPL W P-5'-P-CCNC: 7 U/L (ref 1–33)
ANION GAP SERPL CALCULATED.3IONS-SCNC: 13 MMOL/L (ref 5–15)
APTT PPP: 41.4 SECONDS (ref 24.5–36)
AST SERPL-CCNC: 10 U/L (ref 1–32)
BACTERIA UR QL AUTO: ABNORMAL /HPF
BASOPHILS # BLD AUTO: 0.05 10*3/MM3 (ref 0–0.2)
BASOPHILS NFR BLD AUTO: 0.4 % (ref 0–1.5)
BILIRUB SERPL-MCNC: 0.2 MG/DL (ref 0–1.2)
BILIRUB UR QL STRIP: NEGATIVE
BUN SERPL-MCNC: 10 MG/DL (ref 8–23)
BUN/CREAT SERPL: 17.2 (ref 7–25)
CALCIUM SPEC-SCNC: 9 MG/DL (ref 8.6–10.5)
CHLORIDE SERPL-SCNC: 98 MMOL/L (ref 98–107)
CLARITY UR: CLEAR
CO2 SERPL-SCNC: 24 MMOL/L (ref 22–29)
COLOR UR: YELLOW
CREAT SERPL-MCNC: 0.58 MG/DL (ref 0.57–1)
DEPRECATED RDW RBC AUTO: 44.6 FL (ref 37–54)
EGFRCR SERPLBLD CKD-EPI 2021: 101.8 ML/MIN/1.73
EOSINOPHIL # BLD AUTO: 0.16 10*3/MM3 (ref 0–0.4)
EOSINOPHIL NFR BLD AUTO: 1.4 % (ref 0.3–6.2)
ERYTHROCYTE [DISTWIDTH] IN BLOOD BY AUTOMATED COUNT: 16.3 % (ref 12.3–15.4)
GLOBULIN UR ELPH-MCNC: 4.7 GM/DL
GLUCOSE SERPL-MCNC: 140 MG/DL (ref 65–99)
GLUCOSE UR STRIP-MCNC: NEGATIVE MG/DL
HCT VFR BLD AUTO: 32.5 % (ref 34–46.6)
HGB BLD-MCNC: 9 G/DL (ref 12–15.9)
HGB UR QL STRIP.AUTO: ABNORMAL
HOLD SPECIMEN: NORMAL
HOLD SPECIMEN: NORMAL
HYALINE CASTS UR QL AUTO: ABNORMAL /LPF
IMM GRANULOCYTES # BLD AUTO: 0.12 10*3/MM3 (ref 0–0.05)
IMM GRANULOCYTES NFR BLD AUTO: 1.1 % (ref 0–0.5)
INR PPP: 1.17 (ref 0.91–1.09)
KETONES UR QL STRIP: NEGATIVE
LEUKOCYTE ESTERASE UR QL STRIP.AUTO: ABNORMAL
LYMPHOCYTES # BLD AUTO: 2.86 10*3/MM3 (ref 0.7–3.1)
LYMPHOCYTES NFR BLD AUTO: 25.6 % (ref 19.6–45.3)
MCH RBC QN AUTO: 20.9 PG (ref 26.6–33)
MCHC RBC AUTO-ENTMCNC: 27.7 G/DL (ref 31.5–35.7)
MCV RBC AUTO: 75.4 FL (ref 79–97)
MONOCYTES # BLD AUTO: 0.67 10*3/MM3 (ref 0.1–0.9)
MONOCYTES NFR BLD AUTO: 6 % (ref 5–12)
NEUTROPHILS NFR BLD AUTO: 65.5 % (ref 42.7–76)
NEUTROPHILS NFR BLD AUTO: 7.32 10*3/MM3 (ref 1.7–7)
NITRITE UR QL STRIP: NEGATIVE
NRBC BLD AUTO-RTO: 0 /100 WBC (ref 0–0.2)
PH UR STRIP.AUTO: 8 [PH] (ref 5–8)
PLATELET # BLD AUTO: 804 10*3/MM3 (ref 140–450)
PMV BLD AUTO: 8.8 FL (ref 6–12)
POTASSIUM SERPL-SCNC: 3.7 MMOL/L (ref 3.5–5.2)
PROT SERPL-MCNC: 8 G/DL (ref 6–8.5)
PROT UR QL STRIP: ABNORMAL
PROTHROMBIN TIME: 15.1 SECONDS (ref 11.8–14.8)
RBC # BLD AUTO: 4.31 10*6/MM3 (ref 3.77–5.28)
RBC # UR STRIP: ABNORMAL /HPF
REF LAB TEST METHOD: ABNORMAL
SODIUM SERPL-SCNC: 135 MMOL/L (ref 136–145)
SP GR UR STRIP: 1.02 (ref 1–1.03)
SQUAMOUS #/AREA URNS HPF: ABNORMAL /HPF
UROBILINOGEN UR QL STRIP: ABNORMAL
WBC # UR STRIP: ABNORMAL /HPF
WBC NRBC COR # BLD AUTO: 11.18 10*3/MM3 (ref 3.4–10.8)
WHOLE BLOOD HOLD COAG: NORMAL
WHOLE BLOOD HOLD SPECIMEN: NORMAL

## 2023-12-18 PROCEDURE — 85025 COMPLETE CBC W/AUTO DIFF WBC: CPT | Performed by: FAMILY MEDICINE

## 2023-12-18 PROCEDURE — 25010000002 MORPHINE PER 10 MG: Performed by: FAMILY MEDICINE

## 2023-12-18 PROCEDURE — 96365 THER/PROPH/DIAG IV INF INIT: CPT

## 2023-12-18 PROCEDURE — 80053 COMPREHEN METABOLIC PANEL: CPT | Performed by: FAMILY MEDICINE

## 2023-12-18 PROCEDURE — 96376 TX/PRO/DX INJ SAME DRUG ADON: CPT

## 2023-12-18 PROCEDURE — 0 HYDROMORPHONE 1 MG/ML SOLUTION: Performed by: FAMILY MEDICINE

## 2023-12-18 PROCEDURE — 93970 EXTREMITY STUDY: CPT

## 2023-12-18 PROCEDURE — 99284 EMERGENCY DEPT VISIT MOD MDM: CPT

## 2023-12-18 PROCEDURE — 93925 LOWER EXTREMITY STUDY: CPT | Performed by: SURGERY

## 2023-12-18 PROCEDURE — 25010000002 ONDANSETRON PER 1 MG: Performed by: FAMILY MEDICINE

## 2023-12-18 PROCEDURE — 85730 THROMBOPLASTIN TIME PARTIAL: CPT | Performed by: FAMILY MEDICINE

## 2023-12-18 PROCEDURE — 96375 TX/PRO/DX INJ NEW DRUG ADDON: CPT

## 2023-12-18 PROCEDURE — 93970 EXTREMITY STUDY: CPT | Performed by: SURGERY

## 2023-12-18 PROCEDURE — 93925 LOWER EXTREMITY STUDY: CPT

## 2023-12-18 PROCEDURE — 25010000002 CEFTRIAXONE PER 250 MG: Performed by: FAMILY MEDICINE

## 2023-12-18 PROCEDURE — 25010000002 HYDROMORPHONE PER 4 MG: Performed by: FAMILY MEDICINE

## 2023-12-18 PROCEDURE — 85610 PROTHROMBIN TIME: CPT | Performed by: FAMILY MEDICINE

## 2023-12-18 PROCEDURE — 81001 URINALYSIS AUTO W/SCOPE: CPT | Performed by: FAMILY MEDICINE

## 2023-12-18 RX ORDER — PHENAZOPYRIDINE HYDROCHLORIDE 200 MG/1
200 TABLET, FILM COATED ORAL 3 TIMES DAILY PRN
Qty: 6 TABLET | Refills: 0 | Status: SHIPPED | OUTPATIENT
Start: 2023-12-18 | End: 2023-12-20

## 2023-12-18 RX ORDER — ONDANSETRON 2 MG/ML
4 INJECTION INTRAMUSCULAR; INTRAVENOUS ONCE
Status: COMPLETED | OUTPATIENT
Start: 2023-12-18 | End: 2023-12-18

## 2023-12-18 RX ORDER — MORPHINE SULFATE 2 MG/ML
2 INJECTION, SOLUTION INTRAMUSCULAR; INTRAVENOUS ONCE
Status: COMPLETED | OUTPATIENT
Start: 2023-12-18 | End: 2023-12-18

## 2023-12-18 RX ORDER — HYDROMORPHONE HYDROCHLORIDE 1 MG/ML
0.5 INJECTION, SOLUTION INTRAMUSCULAR; INTRAVENOUS; SUBCUTANEOUS ONCE
Status: COMPLETED | OUTPATIENT
Start: 2023-12-18 | End: 2023-12-18

## 2023-12-18 RX ORDER — CEFDINIR 300 MG/1
300 CAPSULE ORAL 2 TIMES DAILY
Qty: 14 CAPSULE | Refills: 0 | Status: SHIPPED | OUTPATIENT
Start: 2023-12-18 | End: 2023-12-25

## 2023-12-18 RX ADMIN — HYDROMORPHONE HYDROCHLORIDE 0.5 MG: 1 INJECTION, SOLUTION INTRAMUSCULAR; INTRAVENOUS; SUBCUTANEOUS at 04:04

## 2023-12-18 RX ADMIN — MORPHINE SULFATE 2 MG: 2 INJECTION, SOLUTION INTRAMUSCULAR; INTRAVENOUS at 01:37

## 2023-12-18 RX ADMIN — ONDANSETRON 4 MG: 2 INJECTION INTRAMUSCULAR; INTRAVENOUS at 01:37

## 2023-12-18 RX ADMIN — HYDROMORPHONE HYDROCHLORIDE 0.5 MG: 1 INJECTION, SOLUTION INTRAMUSCULAR; INTRAVENOUS; SUBCUTANEOUS at 03:11

## 2023-12-18 RX ADMIN — CEFTRIAXONE 1000 MG: 1 INJECTION, POWDER, FOR SOLUTION INTRAMUSCULAR; INTRAVENOUS at 03:24

## 2023-12-18 NOTE — ED PROVIDER NOTES
HPI:    Patient is a 63-year-old white female with 9 out of 10 pain in both lower legs with swelling.  Patient states she is currently seeing Dr. Grullon and Dr. Vines to determine if she is has cancer because of the swelling in her legs.  Patient states that the pain has gotten substantially worse in both her legs tonight especially the left 1 which is now more swollen.  Patient also states that she has had discoloration of her feet to a purplish color over the last 2 to 3 days which is worsening.  Patient states she just could not take the pain anymore and came to the emergency room    REVIEW OF SYSTEMS  CONSTITUTIONAL:  No complaints of fever, chills,or weakness  EYES:  No complaints of discharge   ENT: No complaints of sore throat or ear pain  CARDIOVASCULAR:  No complaints of chest pain, palpitations, or swelling  RESPIRATORY:  No complaints of cough or shortness of breath  GI:  No complaints of abdominal pain, nausea, vomiting, or diarrhea  MUSCULOSKELETAL: Severe bilateral lower leg swelling and pain left greater than right SKIN:  No complaints of rash  NEUROLOGIC:  No complaints of headache, focal weakness, or sensory changes  ENDOCRINE:  No complaints of polyuria or polydipsia  LYMPHATIC:  No complaints of swollen glands  GENITOURINARY: No complaints of urinary frequency or hematuria    Derm: Positive for cyanotic appearing bilateral feet and toes    PAST MEDICAL HISTORY  Past Medical History:   Diagnosis Date    Asthma     Bronchitis     Heart murmur     Mitral valve prolapse        FAMILY HISTORY  Family History   Problem Relation Age of Onset    Heart disease Father     Diabetes Father     Diabetes Brother     Heart disease Brother     Cancer Maternal Grandmother     Cancer Maternal Grandfather     Heart disease Paternal Grandfather        SOCIAL HISTORY  Social History     Socioeconomic History    Marital status:    Tobacco Use    Smoking status: Never    Smokeless tobacco: Never   Vaping Use     "Vaping Use: Never used   Substance and Sexual Activity    Alcohol use: No    Drug use: No    Sexual activity: Defer     Partners: Male       IMMUNIZATION HISTORY  Deferred to primary care physician.    SURGICAL HISTORY  Past Surgical History:   Procedure Laterality Date     SECTION         CURRENT MEDICATIONS  No current facility-administered medications for this encounter.    Current Outpatient Medications:     albuterol sulfate  (90 Base) MCG/ACT inhaler, Inhale 2 puffs Every 4 (Four) Hours As Needed for Wheezing or Shortness of Air., Disp: 1 inhaler, Rfl: 0    Apixaban Starter Pack (Eliquis DVT/PE Starter Pack) tablet therapy pack, Take two 5 mg tablets by mouth every 12 hours for 7 days. Followed by one 5 mg tablet every 12 hours. (Dispense starter pack if available), Disp: 74 tablet, Rfl: 0    cefdinir (OMNICEF) 300 MG capsule, Take 1 capsule by mouth 2 (Two) Times a Day for 7 days., Disp: 14 capsule, Rfl: 0    Lancets (freestyle) lancets, 1 each by Other route 2 (Two) Times a Day. Use as instructed, Disp: 100 each, Rfl: 0    phenazopyridine (PYRIDIUM) 200 MG tablet, Take 1 tablet by mouth 3 (Three) Times a Day As Needed for Bladder Spasms for up to 2 days., Disp: 6 tablet, Rfl: 0    polyethylene glycol (MIRALAX) 17 g packet, Take 17 g by mouth Daily As Needed (Use if senna-docusate is ineffective). Obtain OTC, Disp: , Rfl:     traMADol (Ultram) 50 MG tablet, Take 0.5 tablets by mouth Every 8 (Eight) Hours As Needed for Moderate Pain., Disp: 9 tablet, Rfl: 0    ALLERGIES  No Known Allergies    Musculoskeletal exam    VITAL SIGNS:   /54   Pulse 101   Temp 98 °F (36.7 °C) (Temporal)   Resp 22   Ht 165.1 cm (65\")   Wt 77.1 kg (170 lb)   LMP  (LMP Unknown)   SpO2 100%   BMI 28.29 kg/m²     Constitutional: Patient is alert and in no distress.  Patient with severe bilateral lower extremity left greater than right leg pain with swelling and discomfort.    ENT: There is a normal pharynx " with no acute erythema or exudate and oral mucosa is moist.  Nose is clear with no drainage.  Tympanic membranes intact and non-erythemic    Cardiovascular: S1-S2 regular rate and rhythm no murmurs rubs or gallops is noted.  Left greater than right    Respiratory: Patient is clear to auscultation bilaterally with no wheezing or rhonchi.  Chest wall is nontender.  There is no external lesions on the chest.  There is no crepitance    Abdomen: Soft nontender bowel sounds are normal in all 4 quadrants there is no rebound or guarding noted.  There is no abdominal distention or hepatosplenomegaly.  Pitting edema is noted in bilateral legs    Musculoskeletal: Bilateral legs: There is edema more significant in the left lower leg.  There is discoloration of the toes on both feet with a cyanotic appearance.    Integumentary: Dusky appearing feet bilaterally with cyanotic toes    Genitourinary: Patient is voiding appropriately.    Psychiatric: Normal affect and mood      RADIOLOGY/PROCEDURES        US Arterial Doppler Lower Extremity Complete    (Results Pending)   US Venous Doppler Lower Extremity Bilateral (duplex)    (Results Pending)          FUTURE APPOINTMENTS     Future Appointments   Date Time Provider Department Center   1/3/2024  1:45 PM Bertha Parrish APRN [facility]          COURSE & MEDICAL DECISION MAKING    Patient's partial differential diagnosis can include: Arterial occlusion bilateral legs, venous obstruction bilateral leg, Raynaud's, vascular insufficiency, lymphedema, cancer, lymphoma, and other       Patient was given morphine IV as well as Zofran IV for her pain.  Patient states that this did not help her pain at all.  And that she is still having a lot of leg pain.  Sat down and discussed with her the results of her laboratory as well as radiological ultrasounds.  The initial preliminary ultrasound of the arterial flow of the bilateral lower extremities does not seem to be compromised.  However  now there appears to be worsening thrombus in the left lower extremity in the iliac vein, femoral vein, femoral vein and a GSV.  Distally it was only seen in the FV distally.  This shows that it is a worsening.  There was no thrombus noted in the right lower extremity.    As for the ultrasounds of the bilateral lower extremity arterial flow there was no acute arterial abnormality noted.    As for the patient's pain we will give her 1 mg of Dilaudid and will also give Rocephin 1 g IV for UTI.  Will discharge her with a prescription for urinary tract infection.  Patient will need to follow-up with Dr. Vines for determination of other pain control or even consideration of changing Eliquis to another blood thinner.    Patient would not take the 1 mg of Dilaudid only will take half the Dilaudid and then few minutes later wants to the half of the Dilaudid.  Patient is very anxious and indecisive on her care.  Will discharge the patient home now after the second half dose of Dilaudid is given which is to complete 1 mg dose that was initially ordered.  Patient also wanting medication for bladder spasms.    Patient's level of risk: High      CRITICAL CARE    CRITICAL CARE: No    CRITICAL CARE TIME: None      Recent Results (from the past 24 hour(s))   Green Top (Gel)    Collection Time: 12/18/23  1:21 AM   Result Value Ref Range    Extra Tube Hold for add-ons.    Lavender Top    Collection Time: 12/18/23  1:21 AM   Result Value Ref Range    Extra Tube hold for add-on    Red Top    Collection Time: 12/18/23  1:21 AM   Result Value Ref Range    Extra Tube Hold for add-ons.    Light Blue Top    Collection Time: 12/18/23  1:21 AM   Result Value Ref Range    Extra Tube Hold for add-ons.    Comprehensive Metabolic Panel    Collection Time: 12/18/23  1:21 AM    Specimen: Blood   Result Value Ref Range    Glucose 140 (H) 65 - 99 mg/dL    BUN 10 8 - 23 mg/dL    Creatinine 0.58 0.57 - 1.00 mg/dL    Sodium 135 (L) 136 - 145 mmol/L     Potassium 3.7 3.5 - 5.2 mmol/L    Chloride 98 98 - 107 mmol/L    CO2 24.0 22.0 - 29.0 mmol/L    Calcium 9.0 8.6 - 10.5 mg/dL    Total Protein 8.0 6.0 - 8.5 g/dL    Albumin 3.3 (L) 3.5 - 5.2 g/dL    ALT (SGPT) 7 1 - 33 U/L    AST (SGOT) 10 1 - 32 U/L    Alkaline Phosphatase 170 (H) 39 - 117 U/L    Total Bilirubin 0.2 0.0 - 1.2 mg/dL    Globulin 4.7 gm/dL    A/G Ratio 0.7 g/dL    BUN/Creatinine Ratio 17.2 7.0 - 25.0    Anion Gap 13.0 5.0 - 15.0 mmol/L    eGFR 101.8 >60.0 mL/min/1.73   Protime-INR    Collection Time: 12/18/23  1:21 AM    Specimen: Blood   Result Value Ref Range    Protime 15.1 (H) 11.8 - 14.8 Seconds    INR 1.17 (H) 0.91 - 1.09   aPTT    Collection Time: 12/18/23  1:21 AM    Specimen: Blood   Result Value Ref Range    PTT 41.4 (H) 24.5 - 36.0 seconds   CBC Auto Differential    Collection Time: 12/18/23  1:21 AM    Specimen: Blood   Result Value Ref Range    WBC 11.18 (H) 3.40 - 10.80 10*3/mm3    RBC 4.31 3.77 - 5.28 10*6/mm3    Hemoglobin 9.0 (L) 12.0 - 15.9 g/dL    Hematocrit 32.5 (L) 34.0 - 46.6 %    MCV 75.4 (L) 79.0 - 97.0 fL    MCH 20.9 (L) 26.6 - 33.0 pg    MCHC 27.7 (L) 31.5 - 35.7 g/dL    RDW 16.3 (H) 12.3 - 15.4 %    RDW-SD 44.6 37.0 - 54.0 fl    MPV 8.8 6.0 - 12.0 fL    Platelets 804 (H) 140 - 450 10*3/mm3    Neutrophil % 65.5 42.7 - 76.0 %    Lymphocyte % 25.6 19.6 - 45.3 %    Monocyte % 6.0 5.0 - 12.0 %    Eosinophil % 1.4 0.3 - 6.2 %    Basophil % 0.4 0.0 - 1.5 %    Immature Grans % 1.1 (H) 0.0 - 0.5 %    Neutrophils, Absolute 7.32 (H) 1.70 - 7.00 10*3/mm3    Lymphocytes, Absolute 2.86 0.70 - 3.10 10*3/mm3    Monocytes, Absolute 0.67 0.10 - 0.90 10*3/mm3    Eosinophils, Absolute 0.16 0.00 - 0.40 10*3/mm3    Basophils, Absolute 0.05 0.00 - 0.20 10*3/mm3    Immature Grans, Absolute 0.12 (H) 0.00 - 0.05 10*3/mm3    nRBC 0.0 0.0 - 0.2 /100 WBC   Urinalysis With Microscopic If Indicated (No Culture) - Urine, Clean Catch    Collection Time: 12/18/23  2:12 AM    Specimen: Urine, Clean Catch    Result Value Ref Range    Color, UA Yellow Yellow, Straw    Appearance, UA Clear Clear    pH, UA 8.0 5.0 - 8.0    Specific Gravity, UA 1.019 1.005 - 1.030    Glucose, UA Negative Negative    Ketones, UA Negative Negative    Bilirubin, UA Negative Negative    Blood, UA Trace (A) Negative    Protein, UA 30 mg/dL (1+) (A) Negative    Leuk Esterase, UA Moderate (2+) (A) Negative    Nitrite, UA Negative Negative    Urobilinogen, UA 0.2 E.U./dL 0.2 - 1.0 E.U./dL   Urinalysis, Microscopic Only - Urine, Clean Catch    Collection Time: 12/18/23  2:12 AM    Specimen: Urine, Clean Catch   Result Value Ref Range    RBC, UA 3-5 (A) None Seen, 0-2 /HPF    WBC, UA 21-50 (A) None Seen, 0-2 /HPF    Bacteria, UA 2+ (A) None Seen /HPF    Squamous Epithelial Cells, UA 0-2 None Seen, 0-2 /HPF    Hyaline Casts, UA 3-6 None Seen /LPF    Methodology Manual Light Microscopy            the last primary care visit was reviewed by me in the EPIC electronic medical record.      Also  Old charts were reviewed per [facility] EMR.  Pertinent details are summarized above.  All laboratory, radiologic, and EKG studies that were performed in the Emergency Department were a necessary part of the evaluation needed to exclude unstable or  emergent medical conditions.     Patient was hemodynamically and neurologically stable in the ED.   Pertinent studies were reviewed as above.     The patient received:  Medications   Morphine sulfate (PF) injection 2 mg (2 mg Intravenous Given 12/18/23 0137)   ondansetron (ZOFRAN) injection 4 mg (4 mg Intravenous Given 12/18/23 0137)   HYDROmorphone (DILAUDID) injection 1 mg (0.5 mg Intravenous Given 12/18/23 0311)   cefTRIAXone (ROCEPHIN) 1,000 mg in sodium chloride 0.9 % 100 mL IVPB (0 mg Intravenous Stopped 12/18/23 0404)   HYDROmorphone (DILAUDID) injection 0.5 mg (0.5 mg Intravenous Given 12/18/23 0404)            ED Disposition       ED Disposition   Discharge    Condition   Stable    Comment   --                  Dragon disclaimer:  Part of this note may be an electronic transcription/translation of spoken language to printed text using the Dragon Dictation System.     I have reviewed the patient’s prescription history via a prescription monitoring program.  This information is consistent with my knowledge of the patient’s controlled substance use history.    Patient evaluate during Coronavirus Pandemic. Isolation practices followed according to [facility] policy.    FINAL IMPRESSION   Diagnosis Plan   1. Deep vein thrombosis (DVT) of left lower extremity, unspecified chronicity, unspecified vein        2. Urinary tract infection without hematuria, site unspecified              MD Curt Maloney Jr, Thomas Mark Jr., MD  12/18/23 9318

## 2023-12-21 ENCOUNTER — HOSPITAL ENCOUNTER (OUTPATIENT)
Dept: INFUSION THERAPY | Age: 63
Discharge: HOME | End: 2023-12-21
Payer: COMMERCIAL

## 2023-12-21 ENCOUNTER — TRANSCRIBE ORDERS (OUTPATIENT)
Dept: ADMINISTRATIVE | Facility: HOSPITAL | Age: 63
End: 2023-12-21
Payer: COMMERCIAL

## 2023-12-21 DIAGNOSIS — N85.8 UTERINE MASS: ICD-10-CM

## 2023-12-21 DIAGNOSIS — R59.0 RETROPERITONEAL LYMPHADENOPATHY: Primary | ICD-10-CM

## 2023-12-21 DIAGNOSIS — D50.9 IRON DEFICIENCY ANEMIA, UNSPECIFIED IRON DEFICIENCY ANEMIA TYPE: ICD-10-CM

## 2023-12-21 DIAGNOSIS — R59.0 RETROPERITONEAL LYMPHADENOPATHY: ICD-10-CM

## 2023-12-21 DIAGNOSIS — R97.8 ABNORMAL TUMOR MARKERS: ICD-10-CM

## 2023-12-21 DIAGNOSIS — D75.839 THROMBOCYTOSIS: ICD-10-CM

## 2023-12-21 DIAGNOSIS — M89.9 BONE LESION: ICD-10-CM

## 2023-12-21 LAB
ALBUMIN SERPL-MCNC: 3.6 G/DL (ref 3.5–5.2)
ALP SERPL-CCNC: 149 U/L (ref 35–104)
ALT SERPL-CCNC: 11 U/L (ref 9–52)
ANION GAP SERPL CALCULATED.3IONS-SCNC: 11 MMOL/L (ref 7–19)
AST SERPL-CCNC: 19 U/L (ref 14–36)
BASOPHILS # BLD: 0.04 K/UL (ref 0.01–0.08)
BASOPHILS NFR BLD: 0.5 % (ref 0.1–1.2)
BILIRUB SERPL-MCNC: <0.2 MG/DL (ref 0.2–1.3)
BUN SERPL-MCNC: 14 MG/DL (ref 7–17)
CALCIUM SERPL-MCNC: 8.8 MG/DL (ref 8.4–10.2)
CANCER AG125 SERPL-ACNC: 343 U/ML (ref 0–38)
CANCER AG19-9 SERPL-ACNC: 240 U/ML (ref 0–35)
CEA SERPL-MCNC: 1.7 NG/ML (ref 0–4.7)
CHLORIDE SERPL-SCNC: 103 MMOL/L (ref 98–111)
CO2 SERPL-SCNC: 26 MMOL/L (ref 22–29)
CREAT SERPL-MCNC: 0.6 MG/DL (ref 0.5–1)
EOSINOPHIL # BLD: 0.22 K/UL (ref 0.04–0.54)
EOSINOPHIL NFR BLD: 2.8 % (ref 0.7–7)
ERYTHROCYTE [DISTWIDTH] IN BLOOD BY AUTOMATED COUNT: 16.2 % (ref 11.7–14.4)
FERRITIN SERPL-MCNC: 443.6 NG/ML (ref 13–150)
GLOBULIN: 4.4 G/DL
GLUCOSE SERPL-MCNC: 177 MG/DL (ref 74–106)
HCT VFR BLD AUTO: 30.3 % (ref 34.1–44.9)
HGB BLD-MCNC: 8.8 G/DL (ref 11.2–15.7)
IRON SATN MFR SERPL: 9 % (ref 14–50)
IRON SERPL-MCNC: 19 UG/DL (ref 37–145)
LYMPHOCYTES # BLD: 1.59 K/UL (ref 1.18–3.74)
LYMPHOCYTES NFR BLD: 20 % (ref 19.3–53.1)
MCH RBC QN AUTO: 21.6 PG (ref 25.6–32.2)
MCHC RBC AUTO-ENTMCNC: 29 G/DL (ref 32.3–35.5)
MCV RBC AUTO: 74.3 FL (ref 79.4–94.8)
MONOCYTES # BLD: 0.46 K/UL (ref 0.24–0.82)
MONOCYTES NFR BLD: 5.8 % (ref 4.7–12.5)
NEUTROPHILS # BLD: 5.57 K/UL (ref 1.56–6.13)
NEUTS SEG NFR BLD: 69.9 % (ref 34–71.1)
PLATELET # BLD AUTO: 659 K/UL (ref 182–369)
PMV BLD AUTO: 8.4 FL (ref 7.4–10.4)
POTASSIUM SERPL-SCNC: 3.9 MMOL/L (ref 3.5–5.1)
PROT SERPL-MCNC: 8 G/DL (ref 6.3–8.2)
RBC # BLD AUTO: 4.08 M/UL (ref 3.93–5.22)
SODIUM SERPL-SCNC: 140 MMOL/L (ref 137–145)
TIBC SERPL-MCNC: 206 UG/DL (ref 250–400)
WBC # BLD AUTO: 7.96 K/UL (ref 3.98–10.04)

## 2023-12-21 PROCEDURE — 99213 OFFICE O/P EST LOW 20 MIN: CPT

## 2023-12-21 PROCEDURE — 80053 COMPREHEN METABOLIC PANEL: CPT

## 2023-12-21 PROCEDURE — 36415 COLL VENOUS BLD VENIPUNCTURE: CPT

## 2023-12-21 PROCEDURE — 85025 COMPLETE CBC W/AUTO DIFF WBC: CPT

## 2023-12-23 LAB — B2 MICROGLOB SERPL-MCNC: 3.9 MG/L

## 2023-12-24 LAB
ALBUMIN SERPL-MCNC: 2.59 G/DL (ref 3.75–5.01)
ALPHA1 GLOB SERPL ELPH-MCNC: 0.63 G/DL (ref 0.19–0.46)
ALPHA2 GLOB SERPL ELPH-MCNC: 1.17 G/DL (ref 0.48–1.05)
B-GLOBULIN SERPL ELPH-MCNC: 1.07 G/DL (ref 0.48–1.1)
DEPRECATED KAPPA LC FREE/LAMBDA SER: 1.24 {RATIO} (ref 0.26–1.65)
EER MONOCLONAL PROTEIN AND FLC, SERUM: ABNORMAL
GAMMA GLOB SERPL ELPH-MCNC: 1.34 G/DL (ref 0.62–1.51)
IGA SERPL-MCNC: 542 MG/DL (ref 68–408)
IGG SERPL-MCNC: 1398 MG/DL (ref 768–1632)
IGM SERPL-MCNC: 62 MG/DL (ref 35–263)
INTERPRETATION SERPL IFE-IMP: ABNORMAL
INTERPRETATION SERPL IFE-IMP: ABNORMAL
KAPPA LC FREE SER-MCNC: 92.43 MG/L (ref 3.3–19.4)
LAMBDA LC FREE SERPL-MCNC: 74.51 MG/L (ref 5.71–26.3)
MONOCLONAL PROTEIN, SERUM: ABNORMAL G/DL
PROT SERPL-MCNC: 6.8 G/DL (ref 6.3–8.2)

## 2023-12-25 LAB
APTT IMM NP PPP: ABNORMAL SEC (ref 32–48)
APTT P HEP NEUT PPP: ABNORMAL SEC (ref 32–48)
B2 GLYCOPROT1 IGG SERPL IA-ACNC: <10 SGU
B2 GLYCOPROT1 IGM SERPL IA-ACNC: <10 SMU
CARDIOLIPIN IGG SER IA-ACNC: <10 GPL
CONFIRM APTT STACLOT: ABNORMAL
DRVVT SCREEN TO CONFIRM RATIO: ABNORMAL RATIO
LA 3 SCREEN W REFLEX-IMP: ABNORMAL
LA NT DPL PPP QL: ABNORMAL
MIXING DRVVT: ABNORMAL SEC (ref 33–44)
PROTHROMBIN TIME: 19.2 SEC (ref 12–15.5)
REPTILASE TIME: ABNORMAL SEC
SCREEN APTT: 46 SEC (ref 32–48)
SCREEN DRVVT: 41 SEC (ref 33–44)
THROMBIN TIME: ABNORMAL SEC (ref 14.7–19.5)

## 2023-12-26 ENCOUNTER — HOSPITAL ENCOUNTER (OUTPATIENT)
Dept: CT IMAGING | Facility: HOSPITAL | Age: 63
Discharge: HOME | End: 2023-12-26
Payer: COMMERCIAL

## 2023-12-26 ENCOUNTER — HOSPITAL ENCOUNTER (OUTPATIENT)
Dept: ULTRASOUND IMAGING | Facility: HOSPITAL | Age: 63
Discharge: HOME | End: 2023-12-26
Payer: COMMERCIAL

## 2023-12-26 DIAGNOSIS — R59.0 RETROPERITONEAL LYMPHADENOPATHY: ICD-10-CM

## 2023-12-26 LAB
MTHFR C.1298A>C GENO BLD/T: NORMAL
MTHFR C.677C>T GENO BLD/T: NEGATIVE
MTHFR GENE MUT ANL BLD/T: NORMAL
SPECIMEN SOURCE: NORMAL

## 2023-12-26 PROCEDURE — 78815 PET IMAGE W/CT SKULL-THIGH: CPT

## 2023-12-26 PROCEDURE — 76856 US EXAM PELVIC COMPLETE: CPT

## 2023-12-26 PROCEDURE — 0 FLUDEOXYGLUCOSE F18 SOLUTION: Performed by: INTERNAL MEDICINE

## 2023-12-26 PROCEDURE — A9552 F18 FDG: HCPCS | Performed by: INTERNAL MEDICINE

## 2023-12-26 RX ADMIN — FLUDEOXYGLUCOSE F 18 1 DOSE: 200 INJECTION, SOLUTION INTRAVENOUS at 09:10

## 2023-12-27 LAB
F2 C.20210G>A GENO BLD/T: NEGATIVE
SPECIMEN SOURCE: NORMAL

## 2023-12-29 ENCOUNTER — CLINICAL DOCUMENTATION (OUTPATIENT)
Dept: HEMATOLOGY | Age: 63
End: 2023-12-29

## 2023-12-29 DIAGNOSIS — G89.3 CANCER RELATED PAIN: ICD-10-CM

## 2023-12-29 DIAGNOSIS — Z79.899 MEDICATION MANAGEMENT: ICD-10-CM

## 2023-12-29 RX ORDER — ACETAMINOPHEN AND CODEINE PHOSPHATE 300; 30 MG/1; MG/1
1 TABLET ORAL EVERY 8 HOURS PRN
Qty: 90 TABLET | Refills: 0 | Status: SHIPPED | OUTPATIENT
Start: 2023-12-29 | End: 2024-01-28

## 2023-12-29 NOTE — PROGRESS NOTES
Spoke with Jamie Garvin who reports that she received a message from the office of Dr Heidi Rodas in [place], but has not returned call yet ot make appointment. Gave instructions to promptly return call to get appointment for evaluation as soon as possible. Explained results of imaging and Dr Rudolph Cross recommendation to follow through with ordered referral (order placed 12/21/23) for GYN ONC evaluation. Jamie Garvin states that she will call office Tuesday morning and will communicate with this clinic with date of appointment with Dr Saritha Calvo so that f/u with Dr Raffi Reyes can be scheduled accordingly (following Dr Lopez's evaluation). Jamie Garvin verbalized understanding. Request to \[facility]\"" radiology Dept for images to be forwarded to Dr Saritha Calvo at [facility] (CT Neck,Chest, Abd& Pelvis on 12/5/23 and PET, US pelvis on 12/26/23)          NM PET/CT SKULL BASE TO MID THIGH on 12/26/23 at \[facility]\"", compared to 12/5/23  13 mm hypermetabolic left supraclavicular lymph node, max SUV 3.3 .  9 mm hypermetabolic right upper lobe pulmonary nodule with max SUV 2.8.   6 mm right lower lobe pulmonary nodule with mild hypermetabolic activity   Few other smaller pulmonary nodules are also present with have increased metabolic activity for small nodule size   No hypermetabolic axillary, mediastinal, or hilar lymph nodes   7 mm hypermetabolic lymph node in the epicardial fat. Multiple hypermetabolic liver lesions identified. For reference, 11 mm hypermetabolic right posterior liver lesion with max SUV 5.2   2.7 x 1.9 cm omental/peritoneal hypermetabolic mass in the anterior abdomen with max SUV 8.3   Diffuse omental and peritoneal nodularity and fat stranding which is likely related to carcinomatosis. Multiple enlarged hypermetabolic retroperitoneal lymph nodes throughout the abdomen and pelvis surrounding the abdominal aorta and pelvic arterial vasculature.  For reference, 1.6 cm left para-aortic lymph node with max SUV 7.5   3 cm

## 2023-12-29 NOTE — TELEPHONE ENCOUNTER
Patient's  states patient has been out of her pain medication for about 2-weeks. States [facility] did not have the 12/8/23 Rx as medication is back ordered with them.  is changing pharmacy to [facility]    Provider needs to review PDMP    PDMP Monitoring:    Last PDMP Brijesh Olsen as Reviewed [facility]):  Review User Review Instant Review Result          Urine Drug Screenings (1 yr)    No resulted procedures found.        Medication Contract and Consent for Opioid Use Documents Filed       Patient Documents       Type of Document Status Date Received Received By Description    Medication Contract Received 12/8/2023  4:07 PM Blas Johnson Medication Contract

## 2024-01-01 ENCOUNTER — HOSPITAL ENCOUNTER (EMERGENCY)
Facility: HOSPITAL | Age: 64
End: 2024-03-16
Attending: STUDENT IN AN ORGANIZED HEALTH CARE EDUCATION/TRAINING PROGRAM
Payer: COMMERCIAL

## 2024-01-01 ENCOUNTER — READMISSION MANAGEMENT (OUTPATIENT)
Dept: CALL CENTER | Facility: HOSPITAL | Age: 64
End: 2024-01-01
Payer: COMMERCIAL

## 2024-01-01 DIAGNOSIS — I46.9 CARDIAC ARREST: Primary | ICD-10-CM

## 2024-01-01 PROCEDURE — 25010000002 EPINEPHRINE 1 MG/10ML SOLUTION PREFILLED SYRINGE: Performed by: STUDENT IN AN ORGANIZED HEALTH CARE EDUCATION/TRAINING PROGRAM

## 2024-01-01 PROCEDURE — 92950 HEART/LUNG RESUSCITATION CPR: CPT

## 2024-01-01 PROCEDURE — 25010000002 AMIODARONE PER 30 MG: Performed by: STUDENT IN AN ORGANIZED HEALTH CARE EDUCATION/TRAINING PROGRAM

## 2024-01-01 PROCEDURE — 99285 EMERGENCY DEPT VISIT HI MDM: CPT

## 2024-01-01 PROCEDURE — 31500 INSERT EMERGENCY AIRWAY: CPT

## 2024-01-01 PROCEDURE — 94799 UNLISTED PULMONARY SVC/PX: CPT

## 2024-01-01 RX ORDER — APIXABAN 5 MG/1
5 TABLET, FILM COATED ORAL 2 TIMES DAILY
Qty: 60 TABLET | Refills: 0 | Status: CANCELLED | OUTPATIENT
Start: 2024-01-01

## 2024-01-01 RX ORDER — ATORVASTATIN CALCIUM 80 MG/1
80 TABLET, FILM COATED ORAL
Qty: 30 TABLET | Refills: 0 | Status: CANCELLED | OUTPATIENT
Start: 2024-01-01

## 2024-01-01 RX ORDER — PANTOPRAZOLE SODIUM 40 MG/1
40 TABLET, DELAYED RELEASE ORAL
Qty: 30 TABLET | Refills: 0 | Status: CANCELLED | OUTPATIENT
Start: 2024-01-01

## 2024-01-01 RX ORDER — AMIODARONE HYDROCHLORIDE 50 MG/ML
INJECTION, SOLUTION INTRAVENOUS
Status: COMPLETED | OUTPATIENT
Start: 2024-01-01 | End: 2024-01-01

## 2024-01-01 RX ADMIN — AMIODARONE HYDROCHLORIDE 300 MG: 50 INJECTION, SOLUTION INTRAVENOUS at 04:37

## 2024-01-01 RX ADMIN — EPINEPHRINE 1 MG: 0.1 INJECTION INTRAVENOUS at 04:41

## 2024-01-01 RX ADMIN — EPINEPHRINE 1 MG: 0.1 INJECTION INTRAVENOUS at 04:35

## 2024-01-01 RX ADMIN — EPINEPHRINE 1 MG: 0.1 INJECTION INTRAVENOUS at 04:31

## 2024-01-01 RX ADMIN — EPINEPHRINE 1 MG: 0.1 INJECTION INTRAVENOUS at 04:37

## 2024-01-02 ENCOUNTER — CLINICAL DOCUMENTATION (OUTPATIENT)
Dept: HEMATOLOGY | Age: 64
End: 2024-01-02

## 2024-01-02 NOTE — PROGRESS NOTES
Pat phoned to report that she has consult appt scheduled with Dr Lopez at [facility] in [place] on 1/9/24 at 12:20 PM.

## 2024-01-08 ENCOUNTER — TELEPHONE (OUTPATIENT)
Dept: PRIMARY CARE CLINIC | Age: 64
End: 2024-01-08

## 2024-01-08 RX ORDER — PROMETHAZINE HYDROCHLORIDE 25 MG/1
25 TABLET ORAL 4 TIMES DAILY PRN
Qty: 20 TABLET | Refills: 0 | Status: SHIPPED | OUTPATIENT
Start: 2024-01-08 | End: 2024-01-15

## 2024-01-12 ENCOUNTER — APPOINTMENT (OUTPATIENT)
Dept: GENERAL RADIOLOGY | Facility: HOSPITAL | Age: 64
End: 2024-01-12
Payer: COMMERCIAL

## 2024-01-12 ENCOUNTER — APPOINTMENT (OUTPATIENT)
Dept: CT IMAGING | Facility: HOSPITAL | Age: 64
End: 2024-01-12
Payer: COMMERCIAL

## 2024-01-12 ENCOUNTER — HOSPITAL ENCOUNTER (INPATIENT)
Facility: HOSPITAL | Age: 64
LOS: 5 days | Discharge: NURSING, REHAB OR LONG-TERM CARE | End: 2024-01-18
Attending: FAMILY MEDICINE | Admitting: INTERNAL MEDICINE
Payer: COMMERCIAL

## 2024-01-12 DIAGNOSIS — I82.619 CEPHALIC VEIN THROMBOSIS: Primary | ICD-10-CM

## 2024-01-12 DIAGNOSIS — N30.90 CYSTITIS: ICD-10-CM

## 2024-01-12 DIAGNOSIS — Z78.9 DECREASED ACTIVITIES OF DAILY LIVING (ADL): ICD-10-CM

## 2024-01-12 DIAGNOSIS — C78.6 MALIGNANT NEOPLASM METASTATIC TO PERITONEUM: ICD-10-CM
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
## 2024-01-12 DIAGNOSIS — R53.1 WEAKNESS: ICD-10-CM

## 2024-01-12 DIAGNOSIS — C80.1 CANCER: ICD-10-CM

## 2024-01-12 DIAGNOSIS — Z74.09 IMPAIRED MOBILITY: ICD-10-CM

## 2024-01-12 LAB
ALBUMIN SERPL-MCNC: 3.2 G/DL (ref 3.5–5.2)
ALBUMIN/GLOB SERPL: 0.7 G/DL
ALP SERPL-CCNC: 227 U/L (ref 39–117)
ALT SERPL W P-5'-P-CCNC: 13 U/L (ref 1–33)
ANION GAP SERPL CALCULATED.3IONS-SCNC: 11 MMOL/L (ref 5–15)
APTT PPP: 39.8 SECONDS (ref 24.5–36)
AST SERPL-CCNC: 22 U/L (ref 1–32)
BASOPHILS # BLD AUTO: 0.04 10*3/MM3 (ref 0–0.2)
BASOPHILS NFR BLD AUTO: 0.4 % (ref 0–1.5)
BILIRUB SERPL-MCNC: 0.2 MG/DL (ref 0–1.2)
BUN SERPL-MCNC: 12 MG/DL (ref 8–23)
BUN/CREAT SERPL: 21.8 (ref 7–25)
CALCIUM SPEC-SCNC: 9 MG/DL (ref 8.6–10.5)
CHLORIDE SERPL-SCNC: 99 MMOL/L (ref 98–107)
CLUMPED PLATELETS: PRESENT
CO2 SERPL-SCNC: 27 MMOL/L (ref 22–29)
CREAT SERPL-MCNC: 0.55 MG/DL (ref 0.57–1)
DEPRECATED RDW RBC AUTO: 41.9 FL (ref 37–54)
EGFRCR SERPLBLD CKD-EPI 2021: 103.1 ML/MIN/1.73
EOSINOPHIL # BLD AUTO: 0.21 10*3/MM3 (ref 0–0.4)
EOSINOPHIL NFR BLD AUTO: 2.3 % (ref 0.3–6.2)
ERYTHROCYTE [DISTWIDTH] IN BLOOD BY AUTOMATED COUNT: 16.1 % (ref 12.3–15.4)
ESTIMATED AVERAGE GLUCOSE: NORMAL
GLOBULIN UR ELPH-MCNC: 4.6 GM/DL
GLUCOSE SERPL-MCNC: 160 MG/DL (ref 65–99)
HBA1C MFR BLD: 6.5 %
HCT VFR BLD AUTO: 31.8 % (ref 34–46.6)
HGB BLD-MCNC: 9 G/DL (ref 12–15.9)
HYPOCHROMIA BLD QL: NORMAL
IMM GRANULOCYTES # BLD AUTO: 0.06 10*3/MM3 (ref 0–0.05)
IMM GRANULOCYTES NFR BLD AUTO: 0.7 % (ref 0–0.5)
INR PPP: 1.27 (ref 0.91–1.09)
LYMPHOCYTES # BLD AUTO: 1.55 10*3/MM3 (ref 0.7–3.1)
LYMPHOCYTES NFR BLD AUTO: 16.8 % (ref 19.6–45.3)
MAGNESIUM SERPL-MCNC: 2.2 MG/DL (ref 1.6–2.4)
MCH RBC QN AUTO: 20.5 PG (ref 26.6–33)
MCHC RBC AUTO-ENTMCNC: 28.3 G/DL (ref 31.5–35.7)
MCV RBC AUTO: 72.3 FL (ref 79–97)
MICROCYTES BLD QL: NORMAL
MONOCYTES # BLD AUTO: 0.81 10*3/MM3 (ref 0.1–0.9)
MONOCYTES NFR BLD AUTO: 8.8 % (ref 5–12)
NEUTROPHILS NFR BLD AUTO: 6.56 10*3/MM3 (ref 1.7–7)
NEUTROPHILS NFR BLD AUTO: 71 % (ref 42.7–76)
NRBC BLD AUTO-RTO: 0 /100 WBC (ref 0–0.2)
PLATELET # BLD AUTO: 592 10*3/MM3 (ref 140–450)
PMV BLD AUTO: 8.6 FL (ref 6–12)
POLYCHROMASIA BLD QL SMEAR: NORMAL
POTASSIUM SERPL-SCNC: 4.4 MMOL/L (ref 3.5–5.2)
PROT SERPL-MCNC: 7.8 G/DL (ref 6–8.5)
PROTHROMBIN TIME: 16.1 SECONDS (ref 11.8–14.8)
RBC # BLD AUTO: 4.4 10*6/MM3 (ref 3.77–5.28)
SMALL PLATELETS BLD QL SMEAR: NORMAL
SODIUM SERPL-SCNC: 137 MMOL/L (ref 136–145)
WBC MORPH BLD: NORMAL
WBC NRBC COR # BLD AUTO: 9.23 10*3/MM3 (ref 3.4–10.8)

## 2024-01-12 PROCEDURE — 99285 EMERGENCY DEPT VISIT HI MDM: CPT

## 2024-01-12 PROCEDURE — 85025 COMPLETE CBC W/AUTO DIFF WBC: CPT

## 2024-01-12 PROCEDURE — 71045 X-RAY EXAM CHEST 1 VIEW: CPT

## 2024-01-12 PROCEDURE — 85730 THROMBOPLASTIN TIME PARTIAL: CPT

## 2024-01-12 PROCEDURE — 93010 ELECTROCARDIOGRAM REPORT: CPT | Performed by: INTERNAL MEDICINE

## 2024-01-12 PROCEDURE — 83036 HEMOGLOBIN GLYCOSYLATED A1C: CPT | Performed by: FAMILY MEDICINE

## 2024-01-12 PROCEDURE — 80053 COMPREHEN METABOLIC PANEL: CPT

## 2024-01-12 PROCEDURE — 85610 PROTHROMBIN TIME: CPT

## 2024-01-12 PROCEDURE — 85007 BL SMEAR W/DIFF WBC COUNT: CPT

## 2024-01-12 PROCEDURE — 93005 ELECTROCARDIOGRAM TRACING: CPT

## 2024-01-12 PROCEDURE — 83735 ASSAY OF MAGNESIUM: CPT

## 2024-01-12 RX ORDER — SODIUM CHLORIDE 0.9 % (FLUSH) 0.9 %
10 SYRINGE (ML) INJECTION AS NEEDED
Status: DISCONTINUED | OUTPATIENT
Start: 2024-01-12 | End: 2024-01-18 | Stop reason: HOSPADM

## 2024-01-13 ENCOUNTER — APPOINTMENT (OUTPATIENT)
Dept: CT IMAGING | Facility: HOSPITAL | Age: 64
End: 2024-01-13
Payer: COMMERCIAL

## 2024-01-13 ENCOUNTER — APPOINTMENT (OUTPATIENT)
Dept: CARDIOLOGY | Facility: HOSPITAL | Age: 64
End: 2024-01-13
Payer: COMMERCIAL

## 2024-01-13 ENCOUNTER — APPOINTMENT (OUTPATIENT)
Dept: ULTRASOUND IMAGING | Facility: HOSPITAL | Age: 64
End: 2024-01-13
Payer: COMMERCIAL

## 2024-01-13 PROBLEM — R29.818 ACUTE FOCAL NEUROLOGICAL DEFICIT: Status: ACTIVE | Noted: 2024-01-13

## 2024-01-13 PROBLEM — C79.9 METASTATIC DISEASE: Status: ACTIVE | Noted: 2024-01-13

## 2024-01-13 PROBLEM — E11.9 TYPE 2 DIABETES MELLITUS, WITHOUT LONG-TERM CURRENT USE OF INSULIN: Status: ACTIVE | Noted: 2024-01-13

## 2024-01-13 PROBLEM — D62 ACUTE BLOOD LOSS ANEMIA: Status: ACTIVE | Noted: 2024-01-13

## 2024-01-13 PROBLEM — R53.1 WEAKNESS: Status: ACTIVE | Noted: 2024-01-13

## 2024-01-13 LAB
ALBUMIN SERPL-MCNC: 2.8 G/DL (ref 3.5–5.2)
ALBUMIN/GLOB SERPL: 0.6 G/DL
ALP SERPL-CCNC: 208 U/L (ref 39–117)
ALT SERPL W P-5'-P-CCNC: 11 U/L (ref 1–33)
ANION GAP SERPL CALCULATED.3IONS-SCNC: 13 MMOL/L (ref 5–15)
APTT PPP: 46.1 SECONDS (ref 24.5–36)
AST SERPL-CCNC: 17 U/L (ref 1–32)
BACTERIA UR QL AUTO: ABNORMAL /HPF
BILIRUB SERPL-MCNC: 0.2 MG/DL (ref 0–1.2)
BILIRUB UR QL STRIP: NEGATIVE
BUN SERPL-MCNC: 11 MG/DL (ref 8–23)
BUN/CREAT SERPL: 22 (ref 7–25)
CALCIUM SPEC-SCNC: 8.7 MG/DL (ref 8.6–10.5)
CHLORIDE SERPL-SCNC: 100 MMOL/L (ref 98–107)
CHOLEST SERPL-MCNC: 149 MG/DL (ref 0–200)
CLARITY UR: ABNORMAL
CO2 SERPL-SCNC: 23 MMOL/L (ref 22–29)
COLOR UR: YELLOW
CREAT SERPL-MCNC: 0.5 MG/DL (ref 0.57–1)
DEPRECATED RDW RBC AUTO: 47 FL (ref 37–54)
EGFRCR SERPLBLD CKD-EPI 2021: 105.5 ML/MIN/1.73
ERYTHROCYTE [DISTWIDTH] IN BLOOD BY AUTOMATED COUNT: 16.6 % (ref 12.3–15.4)
FERRITIN SERPL-MCNC: 440.9 NG/ML (ref 13–150)
GLOBULIN UR ELPH-MCNC: 4.5 GM/DL
GLUCOSE BLDC GLUCOMTR-MCNC: 112 MG/DL (ref 70–130)
GLUCOSE BLDC GLUCOMTR-MCNC: 133 MG/DL (ref 70–130)
GLUCOSE BLDC GLUCOMTR-MCNC: 140 MG/DL (ref 70–130)
GLUCOSE BLDC GLUCOMTR-MCNC: 155 MG/DL (ref 70–130)
GLUCOSE BLDC GLUCOMTR-MCNC: 159 MG/DL (ref 70–130)
GLUCOSE SERPL-MCNC: 126 MG/DL (ref 65–99)
GLUCOSE UR STRIP-MCNC: NEGATIVE MG/DL
HBA1C MFR BLD: 6.5 % (ref 4.8–5.6)
HCT VFR BLD AUTO: 34.4 % (ref 34–46.6)
HDLC SERPL-MCNC: 30 MG/DL (ref 40–60)
HGB BLD-MCNC: 8.9 G/DL (ref 12–15.9)
HGB UR QL STRIP.AUTO: ABNORMAL
HYALINE CASTS UR QL AUTO: ABNORMAL /LPF
INR PPP: 1.35 (ref 0.91–1.09)
IRON 24H UR-MRATE: 17 MCG/DL (ref 37–145)
IRON SATN MFR SERPL: 8 % (ref 20–50)
KETONES UR QL STRIP: NEGATIVE
LDLC SERPL CALC-MCNC: 89 MG/DL (ref 0–100)
LDLC/HDLC SERPL: 2.84 {RATIO}
LEUKOCYTE ESTERASE UR QL STRIP.AUTO: ABNORMAL
MCH RBC QN AUTO: 20.1 PG (ref 26.6–33)
MCHC RBC AUTO-ENTMCNC: 25.9 G/DL (ref 31.5–35.7)
MCV RBC AUTO: 77.8 FL (ref 79–97)
NITRITE UR QL STRIP: NEGATIVE
PH UR STRIP.AUTO: 7 [PH] (ref 5–8)
PLATELET # BLD AUTO: 524 10*3/MM3 (ref 140–450)
PMV BLD AUTO: 8.9 FL (ref 6–12)
POTASSIUM SERPL-SCNC: 4.2 MMOL/L (ref 3.5–5.2)
PROT SERPL-MCNC: 7.3 G/DL (ref 6–8.5)
PROT UR QL STRIP: ABNORMAL
PROTHROMBIN TIME: 16.8 SECONDS (ref 11.8–14.8)
QT INTERVAL: 336 MS
QTC INTERVAL: 446 MS
RBC # BLD AUTO: 4.42 10*6/MM3 (ref 3.77–5.28)
RBC # UR STRIP: ABNORMAL /HPF
REF LAB TEST METHOD: ABNORMAL
SODIUM SERPL-SCNC: 136 MMOL/L (ref 136–145)
SP GR UR STRIP: 1.02 (ref 1–1.03)
SQUAMOUS #/AREA URNS HPF: ABNORMAL /HPF
TIBC SERPL-MCNC: 216 MCG/DL (ref 298–536)
TRANSFERRIN SERPL-MCNC: 145 MG/DL (ref 200–360)
TRIGL SERPL-MCNC: 169 MG/DL (ref 0–150)
UROBILINOGEN UR QL STRIP: ABNORMAL
VLDLC SERPL-MCNC: 30 MG/DL (ref 5–40)
WBC # UR STRIP: ABNORMAL /HPF
WBC NRBC COR # BLD AUTO: 7.46 10*3/MM3 (ref 3.4–10.8)
YEAST URNS QL MICRO: ABNORMAL /HPF

## 2024-01-13 PROCEDURE — 82728 ASSAY OF FERRITIN: CPT | Performed by: FAMILY MEDICINE

## 2024-01-13 PROCEDURE — 93971 EXTREMITY STUDY: CPT

## 2024-01-13 PROCEDURE — 84466 ASSAY OF TRANSFERRIN: CPT | Performed by: FAMILY MEDICINE

## 2024-01-13 PROCEDURE — 87086 URINE CULTURE/COLONY COUNT: CPT

## 2024-01-13 PROCEDURE — G0378 HOSPITAL OBSERVATION PER HR: HCPCS

## 2024-01-13 PROCEDURE — 63710000001 INSULIN LISPRO (HUMAN) PER 5 UNITS: Performed by: FAMILY MEDICINE

## 2024-01-13 PROCEDURE — 80053 COMPREHEN METABOLIC PANEL: CPT | Performed by: FAMILY MEDICINE

## 2024-01-13 PROCEDURE — 82948 REAGENT STRIP/BLOOD GLUCOSE: CPT

## 2024-01-13 PROCEDURE — 85730 THROMBOPLASTIN TIME PARTIAL: CPT | Performed by: STUDENT IN AN ORGANIZED HEALTH CARE EDUCATION/TRAINING PROGRAM

## 2024-01-13 PROCEDURE — 70450 CT HEAD/BRAIN W/O DYE: CPT

## 2024-01-13 PROCEDURE — 25010000002 HEPARIN (PORCINE) 25000-0.45 UT/250ML-% SOLUTION: Performed by: STUDENT IN AN ORGANIZED HEALTH CARE EDUCATION/TRAINING PROGRAM

## 2024-01-13 PROCEDURE — 25010000002 CEFTRIAXONE PER 250 MG

## 2024-01-13 PROCEDURE — 81001 URINALYSIS AUTO W/SCOPE: CPT

## 2024-01-13 PROCEDURE — 85610 PROTHROMBIN TIME: CPT | Performed by: STUDENT IN AN ORGANIZED HEALTH CARE EDUCATION/TRAINING PROGRAM

## 2024-01-13 PROCEDURE — 93971 EXTREMITY STUDY: CPT | Performed by: SURGERY

## 2024-01-13 PROCEDURE — 97166 OT EVAL MOD COMPLEX 45 MIN: CPT | Performed by: OCCUPATIONAL THERAPIST

## 2024-01-13 PROCEDURE — 83540 ASSAY OF IRON: CPT | Performed by: FAMILY MEDICINE

## 2024-01-13 PROCEDURE — 99222 1ST HOSP IP/OBS MODERATE 55: CPT | Performed by: STUDENT IN AN ORGANIZED HEALTH CARE EDUCATION/TRAINING PROGRAM

## 2024-01-13 PROCEDURE — 80061 LIPID PANEL: CPT | Performed by: FAMILY MEDICINE

## 2024-01-13 PROCEDURE — 85027 COMPLETE CBC AUTOMATED: CPT | Performed by: FAMILY MEDICINE

## 2024-01-13 PROCEDURE — 97162 PT EVAL MOD COMPLEX 30 MIN: CPT

## 2024-01-13 RX ORDER — SULFAMETHOXAZOLE AND TRIMETHOPRIM 800; 160 MG/1; MG/1
1 TABLET ORAL EVERY 12 HOURS SCHEDULED
Status: ON HOLD | COMMUNITY
Start: 2023-10-27 | End: 2024-01-13

## 2024-01-13 RX ORDER — SODIUM CHLORIDE 0.9 % (FLUSH) 0.9 %
10 SYRINGE (ML) INJECTION AS NEEDED
Status: DISCONTINUED | OUTPATIENT
Start: 2024-01-13 | End: 2024-01-18 | Stop reason: HOSPADM

## 2024-01-13 RX ORDER — ACETAMINOPHEN 500 MG
1000 TABLET ORAL ONCE
Status: COMPLETED | OUTPATIENT
Start: 2024-01-13 | End: 2024-01-13

## 2024-01-13 RX ORDER — DIPHENHYDRAMINE HCL 50 MG
50 CAPSULE ORAL
Qty: 1 CAPSULE | Refills: 0 | Status: COMPLETED | OUTPATIENT
Start: 2024-01-14 | End: 2024-01-14

## 2024-01-13 RX ORDER — DEXTROSE MONOHYDRATE 25 G/50ML
25 INJECTION, SOLUTION INTRAVENOUS
Status: DISCONTINUED | OUTPATIENT
Start: 2024-01-13 | End: 2024-01-18 | Stop reason: HOSPADM

## 2024-01-13 RX ORDER — FUROSEMIDE 20 MG/1
1 TABLET ORAL DAILY
COMMUNITY
Start: 2023-12-08 | End: 2024-01-18 | Stop reason: HOSPADM

## 2024-01-13 RX ORDER — IBUPROFEN 600 MG/1
1 TABLET ORAL
Status: DISCONTINUED | OUTPATIENT
Start: 2024-01-13 | End: 2024-01-17

## 2024-01-13 RX ORDER — HEPARIN SODIUM 10000 [USP'U]/100ML
12 INJECTION, SOLUTION INTRAVENOUS
Status: DISCONTINUED | OUTPATIENT
Start: 2024-01-13 | End: 2024-01-17

## 2024-01-13 RX ORDER — ASPIRIN 325 MG
325 TABLET ORAL DAILY
Status: DISCONTINUED | OUTPATIENT
Start: 2024-01-13 | End: 2024-01-13

## 2024-01-13 RX ORDER — ASPIRIN 300 MG/1
300 SUPPOSITORY RECTAL DAILY
Status: DISCONTINUED | OUTPATIENT
Start: 2024-01-13 | End: 2024-01-13

## 2024-01-13 RX ORDER — AZITHROMYCIN 250 MG/1
TABLET, FILM COATED ORAL
Status: ON HOLD | COMMUNITY
Start: 2023-12-11 | End: 2024-01-13

## 2024-01-13 RX ORDER — LABETALOL HYDROCHLORIDE 5 MG/ML
10 INJECTION, SOLUTION INTRAVENOUS
Status: DISCONTINUED | OUTPATIENT
Start: 2024-01-13 | End: 2024-01-17

## 2024-01-13 RX ORDER — INSULIN LISPRO 100 [IU]/ML
2-7 INJECTION, SOLUTION INTRAVENOUS; SUBCUTANEOUS
Status: DISCONTINUED | OUTPATIENT
Start: 2024-01-13 | End: 2024-01-17 | Stop reason: SDUPTHER

## 2024-01-13 RX ORDER — ACETAMINOPHEN 325 MG/1
650 TABLET ORAL EVERY 4 HOURS PRN
Status: DISCONTINUED | OUTPATIENT
Start: 2024-01-13 | End: 2024-01-17 | Stop reason: SDUPTHER

## 2024-01-13 RX ORDER — DIPHENHYDRAMINE HYDROCHLORIDE 50 MG/ML
50 INJECTION INTRAMUSCULAR; INTRAVENOUS
Qty: 1 ML | Refills: 0 | Status: COMPLETED | OUTPATIENT
Start: 2024-01-14 | End: 2024-01-14

## 2024-01-13 RX ORDER — PANTOPRAZOLE SODIUM 40 MG/1
40 TABLET, DELAYED RELEASE ORAL
Status: DISCONTINUED | OUTPATIENT
Start: 2024-01-13 | End: 2024-01-17

## 2024-01-13 RX ORDER — ACETAMINOPHEN 650 MG/1
650 SUPPOSITORY RECTAL EVERY 4 HOURS PRN
Status: DISCONTINUED | OUTPATIENT
Start: 2024-01-13 | End: 2024-01-17 | Stop reason: SDUPTHER

## 2024-01-13 RX ORDER — SODIUM CHLORIDE 0.9 % (FLUSH) 0.9 %
10 SYRINGE (ML) INJECTION EVERY 12 HOURS SCHEDULED
Status: DISCONTINUED | OUTPATIENT
Start: 2024-01-13 | End: 2024-01-18 | Stop reason: HOSPADM

## 2024-01-13 RX ORDER — ACETAMINOPHEN AND CODEINE PHOSPHATE 300; 30 MG/1; MG/1
1 TABLET ORAL EVERY 8 HOURS PRN
COMMUNITY
Start: 2023-12-29 | End: 2024-01-18 | Stop reason: HOSPADM

## 2024-01-13 RX ORDER — NICOTINE POLACRILEX 4 MG
15 LOZENGE BUCCAL
Status: DISCONTINUED | OUTPATIENT
Start: 2024-01-13 | End: 2024-01-18 | Stop reason: HOSPADM

## 2024-01-13 RX ORDER — FERROUS SULFATE 325(65) MG
1 TABLET ORAL 2 TIMES DAILY
Status: ON HOLD | COMMUNITY
Start: 2023-11-15 | End: 2024-01-13

## 2024-01-13 RX ORDER — DOCUSATE SODIUM 100 MG/1
100 CAPSULE, LIQUID FILLED ORAL 2 TIMES DAILY
Status: DISCONTINUED | OUTPATIENT
Start: 2024-01-13 | End: 2024-01-17 | Stop reason: SDUPTHER

## 2024-01-13 RX ORDER — ATORVASTATIN CALCIUM 40 MG/1
80 TABLET, FILM COATED ORAL NIGHTLY
Status: DISCONTINUED | OUTPATIENT
Start: 2024-01-13 | End: 2024-01-17

## 2024-01-13 RX ORDER — ONDANSETRON 2 MG/ML
4 INJECTION INTRAMUSCULAR; INTRAVENOUS EVERY 6 HOURS PRN
Status: DISCONTINUED | OUTPATIENT
Start: 2024-01-13 | End: 2024-01-17 | Stop reason: SDUPTHER

## 2024-01-13 RX ORDER — PROMETHAZINE HYDROCHLORIDE 25 MG/1
25 TABLET ORAL EVERY 6 HOURS PRN
COMMUNITY

## 2024-01-13 RX ORDER — SODIUM CHLORIDE 9 MG/ML
40 INJECTION, SOLUTION INTRAVENOUS AS NEEDED
Status: DISCONTINUED | OUTPATIENT
Start: 2024-01-13 | End: 2024-01-18 | Stop reason: HOSPADM

## 2024-01-13 RX ORDER — HYDROCODONE BITARTRATE AND ACETAMINOPHEN 5; 325 MG/1; MG/1
1 TABLET ORAL EVERY 4 HOURS PRN
Status: DISCONTINUED | OUTPATIENT
Start: 2024-01-13 | End: 2024-01-18 | Stop reason: HOSPADM

## 2024-01-13 RX ORDER — TRAMADOL HYDROCHLORIDE 50 MG/1
25 TABLET ORAL EVERY 8 HOURS PRN
Status: DISCONTINUED | OUTPATIENT
Start: 2024-01-13 | End: 2024-01-18 | Stop reason: HOSPADM

## 2024-01-13 RX ADMIN — Medication 10 ML: at 08:28

## 2024-01-13 RX ADMIN — CEFTRIAXONE 1000 MG: 1 INJECTION, POWDER, FOR SOLUTION INTRAMUSCULAR; INTRAVENOUS at 02:13

## 2024-01-13 RX ADMIN — HEPARIN SODIUM 12 UNITS/KG/HR: 10000 INJECTION, SOLUTION INTRAVENOUS at 21:32

## 2024-01-13 RX ADMIN — ACETAMINOPHEN 650 MG: 325 TABLET, FILM COATED ORAL at 22:51

## 2024-01-13 RX ADMIN — APIXABAN 5 MG: 5 TABLET, FILM COATED ORAL at 08:28

## 2024-01-13 RX ADMIN — ACETAMINOPHEN 325 MG: 325 TABLET, FILM COATED ORAL at 18:27

## 2024-01-13 RX ADMIN — ACETAMINOPHEN 325 MG: 325 TABLET, FILM COATED ORAL at 13:22

## 2024-01-13 RX ADMIN — Medication 10 ML: at 21:13

## 2024-01-13 RX ADMIN — ATORVASTATIN CALCIUM 80 MG: 40 TABLET, FILM COATED ORAL at 21:13

## 2024-01-13 RX ADMIN — INSULIN LISPRO 2 UNITS: 100 INJECTION, SOLUTION INTRAVENOUS; SUBCUTANEOUS at 16:47

## 2024-01-13 RX ADMIN — ASPIRIN 325 MG: 325 TABLET, FILM COATED ORAL at 08:28

## 2024-01-13 RX ADMIN — INSULIN LISPRO 2 UNITS: 100 INJECTION, SOLUTION INTRAVENOUS; SUBCUTANEOUS at 12:18

## 2024-01-13 RX ADMIN — ACETAMINOPHEN 1000 MG: 500 TABLET, FILM COATED ORAL at 02:10

## 2024-01-13 NOTE — THERAPY EVALUATION
Patient Name: Radha Wade  : 1960    MRN: [id number]                              Today's Date: 2024       Admit Date: 2024    Visit Dx:     ICD-10-CM ICD-9-CM   1. 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 Cystitis  N30.90 595.9   3. Fall, initial encounter  W19.XXXA E888.9   4. Weakness  R53.1 780.79     Patient Active Problem List   Diagnosis    DVT (deep venous thrombosis)    Vaginal bleeding    History of asthma    Hyperglycemia    Abnormal CT of the abdomen    Acute focal neurological deficit    Type 2 diabetes mellitus, without long-term current use of insulin    Metastatic disease    Acute blood loss anemia     Past Medical History:   Diagnosis Date    Asthma     Bronchitis     Depression     Diabetes mellitus     DVT (deep venous thrombosis)     Heart murmur     Lyme disease     Mitral valve prolapse      Past Surgical History:   Procedure Laterality Date     SECTION        General Information       Row Name 24 1330          OT Time and Intention    Document Type evaluation  Pt presented with R sided weakness, s/p fall. Hx of L L E DVT and now has a R U DVT on anti-coagulant. Dx: CVA  -JJ     Mode of Treatment occupational therapy  -JJ       Row Name 24 1330          General Information    Patient Profile Reviewed yes  -JJ     Prior Level of Function independent:;all household mobility;transfer;bed mobility;ADL's  -JJ     Existing Precautions/Restrictions fall  -JJ     Barriers to Rehab medically complex;physical barrier  -JJ       Row Name 24 1330          Living Environment    People in Home spouse  -JJ       Row Name 24 1330          Home Main Entrance    Number of Stairs, Main Entrance ten  -JJ       Row Name 24 1330          Stairs Within Home, Primary    Number of Stairs, Within Home, Primary two  -JJ       Row Name 24 1330          Cognition    Orientation Status (Cognition) oriented x 4  -JJ       Row Name 24 1330           Safety Issues, Functional Mobility    Impairments Affecting Function (Mobility) balance;endurance/activity tolerance;strength;pain;range of motion (ROM);sensation/sensory awareness;coordination;motor control  -               User Key  (r) = Recorded By, (t) = Taken By, (c) = Cosigned By      Initials Name Provider Type    Columba Hernandez OTR/L, CSRS Occupational Therapist                     Mobility/ADL's       Row Name 01/13/24 1330          Bed Mobility    Bed Mobility supine-sit  -     Supine-Sit [place] (Bed Mobility) contact guard  -     Bed Mobility, Safety Issues decreased use of arms for pushing/pulling;decreased use of legs for bridging/pushing  -     Assistive Device (Bed Mobility) head of bed elevated;bed rails  -       Row Name 01/13/24 1330          Transfers    Transfers sit-stand transfer;stand-sit transfer  -       Row Name 01/13/24 1330          Sit-Stand Transfer    Sit-Stand [place] (Transfers) contact guard  -       Row Name 01/13/24 1330          Stand-Sit Transfer    Stand-Sit [place] (Transfers) contact guard  -       Row Name 01/13/24 1330          Functional Mobility    Functional Mobility- Ind. Level minimum assist (75% patient effort);2 person assist required  -       Row Name 01/13/24 1330          Activities of Daily Living    BADL Assessment/Intervention lower body dressing  -       Row Name 01/13/24 1330          Lower Body Dressing Assessment/Training    [place] Level (Lower Body Dressing) don;socks;maximum assist (25% patient effort)  -     Position (Lower Body Dressing) edge of bed sitting  -               User Key  (r) = Recorded By, (t) = Taken By, (c) = Cosigned By      Initials Name Provider Type    Columba Hernandez OTR/L, CSRS Occupational Therapist                   Obj/Interventions       Row Name 01/13/24 1330          Sensory Assessment (Somatosensory)    Sensory Assessment impaired sensation in R UE  -[facility]  Name 01/13/24 1330          Vision Assessment/Intervention    Visual Impairment/Limitations WFL  -J       Row Name 01/13/24 1330          Range of Motion Comprehensive    Comment, General Range of Motion L UE AROM WFL, R shoulder flexion impaired approx 50%, R elbow flex/ext impaired approx 25%.  -       Row Name 01/13/24 1330          Strength Comprehensive (MMT)    Comment, General Manual Muscle Testing (MMT) Assessment L UE strength 5/5, R UE strength 4/5  -       Row Name 01/13/24 1330          Motor Skills    Motor Skills coordination  -     Coordination right;upper extremity;moderate impairment;gross motor deficit;finger to nose  -       Row Name 01/13/24 1330          Balance    Balance Assessment sitting static balance;sitting dynamic balance;standing dynamic balance;standing static balance  -     Static Sitting Balance standby assist  -     Dynamic Sitting Balance supervision;contact guard  -J     Position, Sitting Balance unsupported;sitting edge of bed  -J     Static Standing Balance contact guard;minimal assist  -J     Dynamic Standing Balance minimal assist;2-person assist  -     Position/Device Used, Standing Balance supported  -               User Key  (r) = Recorded By, (t) = Taken By, (c) = Cosigned By      Initials Name Provider Type    Columba Hernandez, OTR/L, CSRS Occupational Therapist                   Goals/Plan       Row Name 01/13/24 1330          Dressing Goal 1 (OT)    Activity/Device (Dressing Goal 1, OT) dressing skills, all  -JJ     [place]/Cues Needed (Dressing Goal 1, OT) minimum assist (75% or more patient effort)  -JJ     Time Frame (Dressing Goal 1, OT) long term goal (LTG);by discharge  -JJ     Progress/Outcome (Dressing Goal 1, OT) new goal  -       Row Name 01/13/24 1330          Toileting Goal 1 (OT)    Activity/Device (Toileting Goal 1, OT) toileting skills, all  -JJ     [place] Level/Cues Needed (Toileting Goal 1, OT) minimum assist (75%  or more patient effort)  -JJ     Time Frame (Toileting Goal 1, OT) long term goal (LTG);by discharge  -J     Progress/Outcome (Toileting Goal 1, OT) new goal  -       Row Name 01/13/24 1330          Self-Feeding Goal 1 (OT)    Activity/Device (Self-Feeding Goal 1, OT) self-feeding skills, all  -JJ     [place] Level/Cues Needed (Self-Feeding Goal 1, OT) standby assist  -JJ     Time Frame (Self-Feeding Goal 1, OT) long term goal (LTG);by discharge  -J     Strategies/Barriers (Self-Feeding Goal 1, OT) With min spillage or less utilizing R UE to complete task.  -J     Progress/Outcomes (Self-Feeding Goal 1, OT) new goal  -       Row Name 01/13/24 1330          Problem Specific Goal 1 (OT)    Problem Specific Goal 1 (OT) Pt will independently complete [facility] HEP to improve independence with adls.  -JJ     Time Frame (Problem Specific Goal 1, OT) long term goal (LTG);by discharge  -J     Progress/Outcome (Problem Specific Goal 1, OT) new goal  -       Row Name 01/13/24 5580          Therapy Assessment/Plan (OT)    Planned Therapy Interventions (OT) activity tolerance training;adaptive equipment training;BADL retraining;functional balance retraining;transfer/mobility retraining;occupation/activity based interventions;strengthening exercise;neuromuscular control/coordination retraining;patient/caregiver education/training  -               User Key  (r) = Recorded By, (t) = Taken By, (c) = Cosigned By      Initials Name Provider Type    Columba Hernandez OTR/L, CSRS Occupational Therapist                   Clinical Impression       Row Name 01/13/24 1330          Pain Assessment    Pretreatment Pain Rating 0/10 - no pain  -JJ     Posttreatment Pain Rating 0/10 - no pain  -JJ     Pain Intervention(s) Medication (See MAR);Repositioned;Ambulation/increased activity  -       Row Name 01/13/24 1330          Plan of Care Review    Plan of Care Reviewed With patient  -     Outcome Evaluation OT eval  completed. Pt presents alert and oriented x4, sitting up in bed with spouse at bedside. She reports at baseline being independent with all adls and mobility. States she has had a decline in mobility over the last several weeks due to dx of L LE DVT. She now has a R UE DVT which is she already on medication for. Today pt demonstrates minimally decreased R UE weakness, impaired motor control/coordination, deminished sensation in R UE and LE. She was able to bring self to sitting at EOB with CGA. Required Max A to don socks due to coordination deficits. Min A for sit <> stand t/f from EOB and Min Ax2 with HHAx2 to amb in room. She has a narrow TED with decreased step length and foot clearance on the R. She required vcs to  R foot when taking steps. She is at a high risk for falls at this time due to these deficits. She was left sitting up in chair at end of session. Pt would benefit from skilled OT services to address these deficits. Recommend d/c to acute IP rehab.  -       Row Name 01/13/24 1330          Therapy Assessment/Plan (OT)    Rehab Potential (OT) good, to achieve stated therapy goals  -     Criteria for Skilled Therapeutic Interventions Met (OT) yes;skilled treatment is necessary  -     Therapy Frequency (OT) 5 times/wk  -       Row Name 01/13/24 1330          Therapy Plan Review/Discharge Plan (OT)    Anticipated Discharge Disposition (OT) inpatient rehabilitation facility  -       Row Name 01/13/24 1330          Positioning and Restraints    Pre-Treatment Position in bed  -     Post Treatment Position chair  -JJ     In Chair notified nsg;reclined;call light within reach;encouraged to call for assist;with family/caregiver  -               User Key  (r) = Recorded By, (t) = Taken By, (c) = Cosigned By      Initials Name Provider Type    Columba Hernandez, REUBEN/L, CSRS Occupational Therapist                   Outcome Measures       Row Name 01/13/24 1330          How much help from  another is currently needed...    Putting on and taking off regular lower body clothing? 2  -JJ     Bathing (including washing, rinsing, and drying) 2  -JJ     Toileting (which includes using toilet bed pan or urinal) 2  -JJ     Putting on and taking off regular upper body clothing 2  -JJ     Taking care of personal grooming (such as brushing teeth) 3  -JJ     Eating meals 3  -JJ     AM-PAC 6 Clicks Score (OT) 14  -JJ       Row Name 01/13/24 0710 01/13/24 0514       How much help from another person do you currently need...    Turning from your back to your side while in flat bed without using bedrails? 3  -TM 3  -AB    Moving from lying on back to sitting on the side of a flat bed without bedrails? 3  -TM 3  -AB    Moving to and from a bed to a chair (including a wheelchair)? 2  -TM 2  -AB    Standing up from a chair using your arms (e.g., wheelchair, bedside chair)? 2  -TM 2  -AB    Climbing 3-5 steps with a railing? 2  -TM 2  -AB    To walk in hospital room? 2  -TM 2  -AB    AM-PAC 6 Clicks Score (PT) 14  -TM 14  -AB    Highest Level of Mobility Goal 4 --> Transfer to chair/commode  -TM 4 --> Transfer to chair/commode  -AB      Row Name 01/13/24 0406          How much help from another person do you currently need...    Turning from your back to your side while in flat bed without using bedrails? 3  -AB     Moving from lying on back to sitting on the side of a flat bed without bedrails? 3  -AB     Moving to and from a bed to a chair (including a wheelchair)? 2  -AB     Standing up from a chair using your arms (e.g., wheelchair, bedside chair)? 2  -AB     Climbing 3-5 steps with a railing? 2  -AB     To walk in hospital room? 2  -AB     AM-PAC 6 Clicks Score (PT) 14  -AB     Highest Level of Mobility Goal 4 --> Transfer to chair/commode  -AB       Row Name 01/13/24 1330          Modified Rai Scale    Pre-Stroke Modified [place] Scale 0 - No Symptoms at all.  -JJ     Modified Rai Scale 4 - Moderately severe  disability.  Unable to walk without assistance, and unable to attend to own bodily needs without assistance.  -LAURIE       Row Name 01/13/24 1330          Functional Assessment    Outcome Measure Options AM-PAC 6 Clicks Daily Activity (OT);Modified [place]  -LAURIE               User Key  (r) = Recorded By, (t) = Taken By, (c) = Cosigned By      Initials Name Provider Type    Claribel Landon, RN Registered Nurse    Columba Hernandez, OTR/L, CSRS Occupational Therapist    Yuly Scanlon, RN Registered Nurse                    Occupational Therapy Education       Title: PT OT SLP Therapies (In Progress)       Topic: Occupational Therapy (In Progress)       Point: ADL training (Done)       Description:   Instruct learner(s) on proper safety adaptation and remediation techniques during self care or transfers.   Instruct in proper use of assistive devices.                  Learning Progress Summary             Patient Acceptance, E, VU by LAURIE at 1/13/2024 1529   Family Acceptance, E, VU by LAURIE at 1/13/2024 1529                         Point: Home exercise program (Not Started)       Description:   Instruct learner(s) on appropriate technique for monitoring, assisting and/or progressing therapeutic exercises/activities.                  Learner Progress:  Not documented in this visit.              Point: Precautions (Done)       Description:   Instruct learner(s) on prescribed precautions during self-care and functional transfers.                  Learning Progress Summary             Patient Acceptance, E, VU by LAURIE at 1/13/2024 1529   Family Acceptance, E, VU by LAURIE at 1/13/2024 1529                         Point: Body mechanics (Not Started)       Description:   Instruct learner(s) on proper positioning and spine alignment during self-care, functional mobility activities and/or exercises.                  Learner Progress:  Not documented in this visit.                              User Key       Initials Effective Dates  Name Provider Type Discipline     07/11/23 -  Columba Patterson, OTR/L, CSRS Occupational Therapist OT                  OT Recommendation and Plan  Planned Therapy Interventions (OT): activity tolerance training, adaptive equipment training, BADL retraining, functional balance retraining, transfer/mobility retraining, occupation/activity based interventions, strengthening exercise, neuromuscular control/coordination retraining, patient/caregiver education/training  Therapy Frequency (OT): 5 times/wk  Plan of Care Review  Plan of Care Reviewed With: patient  Outcome Evaluation: OT eval completed. Pt presents alert and oriented x4, sitting up in bed with spouse at bedside. She reports at baseline being independent with all adls and mobility. States she has had a decline in mobility over the last several weeks due to dx of L LE DVT. She now has a R UE DVT which is she already on medication for. Today pt demonstrates minimally decreased R UE weakness, impaired motor control/coordination, deminished sensation in R UE and LE. She was able to bring self to sitting at EOB with CGA. Required Max A to don socks due to coordination deficits. Min A for sit <> stand t/f from EOB and Min Ax2 with HHAx2 to amb in room. She has a narrow TED with decreased step length and foot clearance on the R. She required vcs to  R foot when taking steps. She is at a high risk for falls at this time due to these deficits. She was left sitting up in chair at end of session. Pt would benefit from skilled OT services to address these deficits. Recommend d/c to acute IP rehab.     Time Calculation:         Time Calculation- OT       Row Name 01/13/24 1330             Time Calculation- OT    OT Start Time 1330  -JJ      OT Stop Time 1411  -JJ      OT Time Calculation (min) 41 min  -JJ      OT Received On 01/13/24  -JJ      OT Goal Re-Cert Due Date 01/23/24  -JJ         Untimed Charges    OT Eval/Re-eval Minutes 41  -JJ         Total Minutes     Untimed Charges Total Minutes 41  -JJ       Total Minutes 41  -JJ                User Key  (r) = Recorded By, (t) = Taken By, (c) = Cosigned By      Initials Name Provider Type    Columba Hernandez OTR/L, EMMA Occupational Therapist                  Therapy Charges for Today       Code Description Service Date Service Provider Modifiers Qty    [id number] HC OT EVAL MOD COMPLEXITY 3 1/13/2024 Columba Patterson OTR/L, SORINS GO 1                 Columba Patterson, OTR/L, EMMA  1/13/2024

## 2024-01-13 NOTE — PLAN OF CARE
Goal Outcome Evaluation:  Patient is alert and oriented x 4, DALE, right side is weak. Up x 1 and gait belt to BSC, voiding.  and Tele in place. On room air, VSS. Heparin drip to start tonight (12 hours after last dose of Eliquis). C/o headache and was medicated with Tylenol x 1 per pt request. Fall risk protocol, bed alarm is set, call light is with in reach. MRI tomorrow around 10am after premeds for allergy to contrast. Echo also ordered.

## 2024-01-13 NOTE — CONSULTS
Neurology Consult Note    Consult Date: 2024  Referring MD: Daniel Fleming*  Reason for Consult: Concern for Stroke    Patient: Radha Wade (63 y.o. female)  MRN: [id number]  : 1960    History of Present Illness:   Radha Wade is a 63 y.o. woman with past medical history significant for type 2 diabetes, DVT in the bilateral lower extremities on Eliquis, suspected gynecological cancer with lymph node invasion who presented to the emergency department on 2024 evening with new onset of right upper and right lower extremity weakness.     Ms. Wade is interviewed today with  at bedside.  Patient reports that last night around 9:30 PM she had sudden onset of right lower and right upper extremities weakness, with inability to bear her own weight that led to a fall.  There was no head strike and no loss of consciousness after the fall.  She reports that she felt a little bit dizzy and feeling off since last night.  She also reported the right face was feeling a little bit tingly.  Of note, she reports that about 3 days prior to presentation she started to have progressive swelling of her right upper extremity with pain and erythema.    On arrival to the emergency department, she was noted to have some weakness in the right upper extremity as well in the right lower extremity without involvement of the face.  Given swelling and erythema of the right upper extremity she underwent a Doppler that showed cephalic vein thrombosis.  Patient was evaluated by this provider, for potential stroke.  We had recommended admission for further workup but she was not a candidate for thrombolysis given being outside the window at the time of evaluation, use of anticoagulation, potential metastatic malignancy.  There were no clinical signs of large vessel occlusion.  As she did not have any language issues, no facial involvement, no neglect, no visual field cuts.  And given history of  potential angioedema/anaphylaxis from iodinated IV contrast we had determined not to pursue CTA head and neck at that moment, as he was unlikely to change any of the management then.    Significantly, she has a history of left lower extremity DVT that was discovered 4 months ago and was started on Eliquis this past November.  She reports that the left lower extremity has been weak and this has been attributed to atrophy due to deconditioning secondary to severe edema from the DVT.  She reports that she has occasional missed a few doses of Eliquis, but she has been taking it every day for at least the past month.  She also reports weight loss of about 55 pounds in the last 2 months, and low iron levels.  She suspects that she has a gynecological cancer as she has been told that she has some masses and swelling in her uterus.  She reports that she has had menstrual periods regularly without any period of menopause.  The workup for a potential cancer had started back in summer 2023.  Has been followed by Dr. Grullon who is actively working up for a malignancy. She endorses that recently as of 5 months ago she started treatment for Lyme's disease she reports she did not have any of the target rashes, but as she was tested serologically she was given 1 month of treatment which helped a generalized rash that she has had in her legs trunk and upper extremities.  She has been diagnosed with diabetes this past year.    Review Of Systems: As above    Medical History:   Past Medical/Surgical Hx:  Past Medical History:   Diagnosis Date    Asthma     Bronchitis     Depression     Diabetes mellitus     DVT (deep venous thrombosis)     Heart murmur     Lyme disease     Mitral valve prolapse      Past Surgical History:   Procedure Laterality Date     SECTION         Medications On Admission:  Medications Prior to Admission   Medication Sig Dispense Refill Last Dose    acetaminophen-codeine (TYLENOL with CODEINE #3) 300-30  MG per tablet Take 1 tablet by mouth.   Past Week    Apixaban Starter Pack (Eliquis DVT/PE Starter Pack) tablet therapy pack Take two 5 mg tablets by mouth every 12 hours for 7 days. Followed by one 5 mg tablet every 12 hours. (Dispense starter pack if available) 74 tablet 0 1/12/2024    ferrous sulfate 325 (65 FE) MG tablet Take 1 tablet by mouth 2 (Two) Times a Day.   Past Week    furosemide (LASIX) 20 MG tablet Take 1 tablet by mouth Daily.   Past Week    sulfamethoxazole-trimethoprim (BACTRIM DS,SEPTRA DS) 800-160 MG per tablet Take 1 tablet by mouth Every 12 (Twelve) Hours.   1/12/2024    albuterol sulfate  (90 Base) MCG/ACT inhaler Inhale 2 puffs Every 4 (Four) Hours As Needed for Wheezing or Shortness of Air. 1 inhaler 0 More than a month    azithromycin (ZITHROMAX) 250 MG tablet TAKE 2 TABLETS BY MOUTH TODAY, THEN TAKE 1 TABLET DAILY FOR 4 DAYS AS DIRECTED (Patient not taking: Reported on 1/13/2024)   Not Taking    Lancets (freestyle) lancets 1 each by Other route 2 (Two) Times a Day. Use as instructed 100 each 0     polyethylene glycol (MIRALAX) 17 g packet Take 17 g by mouth Daily As Needed (Use if senna-docusate is ineffective). Obtain OTC       traMADol (Ultram) 50 MG tablet Take 0.5 tablets by mouth Every 8 (Eight) Hours As Needed for Moderate Pain. (Patient not taking: Reported on 1/13/2024) 9 tablet 0 Not Taking       Current Medications:    Current Facility-Administered Medications:     acetaminophen (TYLENOL) tablet 650 mg, 650 mg, Oral, Q4H PRN **OR** acetaminophen (TYLENOL) suppository 650 mg, 650 mg, Rectal, Q4H PRN, Daniel Fleming MD    apixaban (ELIQUIS) tablet 5 mg, 5 mg, Oral, Q12H, Daniel Fleming MD, 5 mg at 01/13/24 0828    aspirin tablet 325 mg, 325 mg, Oral, Daily, 325 mg at 01/13/24 0828 **OR** aspirin suppository 300 mg, 300 mg, Rectal, Daily, Daniel Fleming MD    atorvastatin (LIPITOR) tablet 80 mg, 80 mg, Oral, Nightly, Daniel Fleming,  MD    dextrose (D50W) (25 g/50 mL) IV injection 25 g, 25 g, Intravenous, Q15 Min PRN, Daniel Fleming MD    dextrose (GLUTOSE) oral gel 15 g, 15 g, Oral, Q15 Min PRN, Daniel Fleming MD    docusate sodium (COLACE) capsule 100 mg, 100 mg, Oral, BID, Daniel Fleming MD    glucagon (GLUCAGEN) injection 1 mg, 1 mg, Intramuscular, Q15 Min PRN, Daniel Fleming MD    HYDROcodone-acetaminophen (NORCO) 5-325 MG per tablet 1 tablet, 1 tablet, Oral, Q4H PRN, Daniel Fleming MD    Insulin Lispro (humaLOG) injection 2-7 Units, 2-7 Units, Subcutaneous, 4x Daily AC & at Bedtime, Daniel Fleming MD    labetalol (NORMODYNE,TRANDATE) injection 10 mg, 10 mg, Intravenous, Q10 Min PRN, Daniel Fleming MD    ondansetron (ZOFRAN) injection 4 mg, 4 mg, Intravenous, Q6H PRN, Daniel Fleming MD    pantoprazole (PROTONIX) EC tablet 40 mg, 40 mg, Oral, Q AM, Daniel Fleming MD    [COMPLETED] Insert Peripheral IV, , , Once **AND** sodium chloride 0.9 % flush 10 mL, 10 mL, Intravenous, PRN, Daniel Fleming MD    sodium chloride 0.9 % flush 10 mL, 10 mL, Intravenous, Q12H, Daniel Fleming MD, 10 mL at 01/13/24 0828    sodium chloride 0.9 % flush 10 mL, 10 mL, Intravenous, PRN, Daniel Fleming MD    sodium chloride 0.9 % infusion 40 mL, 40 mL, Intravenous, PRN, Daniel Fleming MD    traMADol (ULTRAM) tablet 25 mg, 25 mg, Oral, Q8H PRN, Daniel Fleming MD     Allergies:  No Known Allergies    Social Hx:  Social History     Socioeconomic History    Marital status:    Tobacco Use    Smoking status: Never    Smokeless tobacco: Never   Vaping Use    Vaping Use: Never used   Substance and Sexual Activity    Alcohol use: No    Drug use: No    Sexual activity: Defer     Partners: Male       Family Hx:  Family History   Problem Relation Age of Onset    Heart disease Father     Diabetes Father     Diabetes Brother     Heart  "disease Brother     Cancer Maternal Grandmother     Cancer Maternal Grandfather     Heart disease Paternal Grandfather      Physical Examination:   Vital Signs:  Vitals:    01/13/24 0159 01/13/24 0259 01/13/24 0406 01/13/24 0700   BP: 124/59 105/61 124/49 118/53   BP Location:   Left arm Left arm   Patient Position:   Lying Lying   Pulse: 106 106 105 89   Resp:   16 16   Temp:   97.8 °F (36.6 °C) 97.8 °F (36.6 °C)   TempSrc:   Oral Oral   SpO2: 100% 99% 100% 100%   Weight:   77.6 kg (171 lb)    Height:   165.1 cm (65\")        General Exam:  Head:  Normocephalic, atraumatic  HEENT:  Neck supple  CVS:  Regular rate and rhythm on monitor  Lungs: Breathing comfortably on room air  Abdomen: Distended and soft  Extremities: There is bilateral lower extremity nonpitting edema up to the knees.  There is mild redness and edema of the upper right upper extremity.  Skin: There is some macular with small papular rash mostly over the ankles and the anterior surface of the left lower extremity lesser degree the right lower extremity    Neurologic Exam:    Mental Status:    -Awake, Alert, Oriented X 3  -No word finding difficulties  -No aphasia  -No dysarthria  -Follows simple and complex commands    CN II:  Visual fields full.  Pupils equally reactive to light  CN III, IV, VI:  Extraocular Muscles full with no signs of nystagmus  CN V:  Facial sensory is symmetric with no asymmetries.  CN VII:  Facial motor symmetric  CN VIII:  Gross hearing intact bilaterally  CN IX:  Palate elevates symmetrically  CN X:  Palate elevates symmetrically  CN XI:  Shoulder shrug symmetric  CN XII:  Tongue is midline on protrusion    Motor:   Tone is normal bilaterally.  (strength out of 5:  1= minimal movement, 2 = movement in plane of gravity, 3 = movement against gravity, 4 = movement against some resistance, 5 = full strength)  Motor function on the left upper and left lower extremity are 5 out of 5, except for some limitation in knee extension " and knee flexion due to antalgic guarding.    Right upper extremity is remarkable for shoulder abduction flexion and extension along with internal and external rotation of 4 -.    Distally, finger extension and wrist extension 4, the flexors throughout the right upper extremity are 4+.    Right lower extremity is remarkable for hip flexion, knee flexion of 4+.  Rest of the muscle groups are 5 out of 5.    DTR:  There is no clonus, no Babinski, no Vera.  There is no obvious hyperreflexia    Sensory:  There is decreased sensation to light touch on the right upper and right lower extremities.    Coordination:  There is no overt ataxia in the upper or lower extremities that emerges beyond the weakness.    Gait  Not tested    Recent Diagnostics:   Laboratory Results:   - Reviewed in EMR  Lab Results   Component Value Date    GLUCOSE 126 (H) 01/13/2024    CALCIUM 8.7 01/13/2024     01/13/2024    K 4.2 01/13/2024    CO2 23.0 01/13/2024     01/13/2024    BUN 11 01/13/2024    CREATININE 0.50 (L) 01/13/2024    EGFRIFNONA 80 12/03/2019    BCR 22.0 01/13/2024    ANIONGAP 13.0 01/13/2024     Lab Results   Component Value Date    WBC 7.46 01/13/2024    HGB 8.9 (L) 01/13/2024    HCT 34.4 01/13/2024    MCV 77.8 (L) 01/13/2024     (H) 01/13/2024     Lab Results   Component Value Date    PTT 39.8 (H) 01/12/2024    INR 1.27 (H) 01/12/2024     Lab Results   Component Value Date    TRIG 169 (H) 01/13/2024    HDL 30 (L) 01/13/2024    LDL 89 01/13/2024     Lab Results   Component Value Date    HGBA1C 6.50 (H) 01/12/2024       Imaging Results:  Imaging Results (Last 24 Hours)       Procedure Component Value Units Date/Time    CT Head Without Contrast [[id number]] Collected: 01/13/24 0746     Updated: 01/13/24 0750    Narrative:      CT BRAIN without contrast dated 1/12/2024 10:49 PM     HISTORY: Weakness. Brain fog.     COMPARISON: None      DOSE LENGTH PRODUCT: 686.84 mGy.cm . All CT scans are performed using  dose  optimization techniques as appropriate to the performed exam and  including at least one of the following: Automated exposure control,  adjustment of the mA and/or kV according to size, and the use of the  iterative reconstruction technique.     In order to have a CT radiation dose as low as reasonably achievable,  Automated Exposure Control was utilized for adjustment of the mA and/or  KV according to patient size.     TECHNIQUE: Serial axial tomographic images of the brain were obtained  without the use of intravenous contrast.     FINDINGS:  The midline structures are nondisplaced. The ventricles and basilar  cisterns are normal in size and configuration. There is no evidence of  intracranial hemorrhage or mass-effect. The gray-white matter  differentiation is maintained. The sulci have a normal configuration,  and there are no abnormal extra-axial fluid collections. The structures  of the posterior fossa are unremarkable.     The included orbits and their contents are unremarkable. The visualized  paranasal sinuses, mastoid air cells and middle ear cavities are clear.  The visualized osseous structures and overlying soft tissues of the  skull and face are unremarkable.       Impression:      1. No acute intracranial process.           This report was signed and finalized on 1/13/2024 7:47 AM by Dr. Romeo Arizmendi MD.       XR Chest 1 View [[id number]] Collected: 01/13/24 0708     Updated: 01/13/24 0712    Narrative:      EXAMINATION: XR CHEST 1 VW-  1/13/2024 7:08 AM     HISTORY: Generalized weakness     FINDINGS: Upright frontal projection of the chest demonstrates a nodule  within the right upper lobe warranting follow-up with CT imaging of the  chest. Lungs are otherwise clear with no consolidative pneumonia. No  effusion or free air is present.       Impression:      1.. Right upper lobe nodule. Follow-up with CT imaging of the chest  recommended.  2. Otherwise normal exam.     This report was signed and  finalized on 1/13/2024 7:09 AM by Dr. Romeo Arizmendi MD.       US Venous Doppler Upper Extremity Right (duplex) [[id number]] Resulted: 01/12/24 2359     Updated: 01/13/24 0033             Other labs this admission:  Reviewed, remarkable for:  LDL: 89, triglycerides: 169  A1c: 6.5  Moderate pyuria, with bacteriuria, mild hematuria, urine culture pending  Hemoglobin: 8.9, MCV: 77.8, iron:17 (low), ferritin:441 (high), TIBC:216 (low), transferrin: 145 (low), TIBC: 216 (low).      Other RAD:  CT head from January 13, 2024 has been reviewed and we agree with the impression that there is no acute intracranial pathology that is obvious.      Assessment & Plan:   Patient who has a significant history of potential gynecological malignancy of unclear staging with DVTs in the left lower extremity and recently in the right upper extremity, that has occurred despite use of Eliquis.  And is presented with acute right upper extremity and right lower extremity weakness.    The presentation is concerning for an acute ischemic stroke, she was not a candidate for TNK given active use of anticoagulation plus out of the window plus potential metastatic malignancy.  Given lack of large vessel occlusion clinical signs, and her severe reaction to iodinated contrast we have postponed the CTA and will obtain instead an MRA head and neck.    Regarding anticoagulation, we think it is safer for her to stop aspirin, stop Eliquis, start her on a low-dose no bolus heparin drip.  And given her lack of response to Eliquis, potentially do to DVTs associated with malignancy, we think she will benefit from eventually transitioning to Lovenox.  Now she reports that the subcutaneous injections of Lovenox in the past have given her severe bruising and pain in her abdominal area, have talked to her about this and she has agreed that if it is needed then she will give it a try.    Of note, patient has asked if this is possible lupus or Lyme disease that  is untreated, we have mentioned that this possibility is a significantly lower in the differential and the most likely process is a potential malignancy leading to secondary hypercoagulability has led to DVTs and acute ischemic stroke.  However the latter needs to be confirmed with further imaging. We also still recommend the MRI brain with and without contrast to evaluate for potential malignancy.  She will benefit from obtaining a TTE with agitated saline to make sure she does not have a right to left shunt that could have left to a paradoxical embolism in her case.    Impression:  Clinical suspicion of acute ischemic stroke      Plan:   Stop aspirin  Stop Eliquis  Continue atorvastatin 80 mg daily  Start heparin low-dose no bolus no re-bolus.  Obtain MRI brain with and without contrast as well as MR angiogram of head and neck with and without contrast.  Per radiology she will require premedication protocol.  Obtain TTE with agitated saline  Will follow-up with results.  Evaluation by PT/OT/SLP    Naldo Telles MD  01/13/24  09:17 CST    Medical Decision Making    Number/Complexity of Problems  Moderate  1 undiagnosed new problem with uncertain prognosis -   1 acute illness with systemic symptoms -   High  1 acute or chronic illness that poses a threat to life/body function -   High     MDM Data  Moderate - 1/3 categories  Extensive - 2/3 categories    Category 1: 3 of the following  Review of external notes  Review of results  Ordering of each unique test  Independent historian  Category 2:  Independent interpretation of test (ex: imaging)  Category 3:  Discussion of management with another provider    Category 3     Treatment Plan  Moderate - Prescription Drug management  High  Drug therapy requiring intensive monitoring for toxicity  Decision regarding hospitalization or escalation of care  De-escalate care/DNR decisions  High

## 2024-01-13 NOTE — THERAPY EVALUATION
Patient Name: Radha Wade  : 1960    MRN: [id number]                              Today's Date: 2024       Admit Date: 2024    Visit Dx:     ICD-10-CM ICD-9-CM   1. 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 Cystitis  N30.90 595.9   3. Fall, initial encounter  W19.XXXA E888.9   4. Weakness  R53.1 780.79   5. Impaired mobility [Z74.09]  Z74.09 799.89     Patient Active Problem List   Diagnosis    DVT (deep venous thrombosis)    Vaginal bleeding    History of asthma    Hyperglycemia    Abnormal CT of the abdomen    Acute focal neurological deficit    Type 2 diabetes mellitus, without long-term current use of insulin    Metastatic disease    Acute blood loss anemia     Past Medical History:   Diagnosis Date    Asthma     Bronchitis     Depression     Diabetes mellitus     DVT (deep venous thrombosis)     Heart murmur     Lyme disease     Mitral valve prolapse      Past Surgical History:   Procedure Laterality Date     SECTION        General Information       Row Name 24 1320          Physical Therapy Time and Intention    Document Type evaluation  Pt presented with R sided weakness, s/p fall. Hx of L L E DVT and now has a R U DVT on anti-coagulant. Dx: CVA  -EAGLE     Mode of Treatment physical therapy  -EAGLE       Row Name 24 1320          General Information    Patient Profile Reviewed yes  -EAGLE     Prior Level of Function independent:;all household mobility;ADL's  -EAGLE     Existing Precautions/Restrictions fall  -EAGLE     Barriers to Rehab medically complex;physical barrier  -EAGLE       Row Name 24 1320          Living Environment    People in Home spouse  -EAGLE       Row Name 24 1320          Home Main Entrance    Number of Stairs, Main Entrance ten  -EAGLE       Row Name 24 1320          Stairs Within Home, Primary    Number of Stairs, Within Home, Primary two  -EAGLE       Row Name 24 1320          Cognition    Orientation Status (Cognition) oriented x 4  -EAGLE        Row Name 01/13/24 1320          Safety Issues, Functional Mobility    Impairments Affecting Function (Mobility) balance;endurance/activity tolerance;strength;pain;range of motion (ROM);sensation/sensory awareness;coordination;motor control  -EAGLE               User Key  (r) = Recorded By, (t) = Taken By, (c) = Cosigned By      Initials Name Provider Type    Darrell Reeder PT DPT Physical Therapist                   Mobility       Row Name 01/13/24 1320          Bed Mobility    Bed Mobility supine-sit  -EAGLE     Supine-Sit [facility] (Bed Mobility) contact guard  -EAGLE     Assistive Device (Bed Mobility) head of bed elevated;bed rails  -EAGLE       Row Name 01/13/24 1320          Sit-Stand Transfer    Sit-Stand [facility] (Transfers) contact guard;minimum assist (75% patient effort);2 person assist  -EAGLE       Row Name 01/13/24 1320          Gait/Stairs (Locomotion)    [facility] Level (Gait) minimum assist (75% patient effort);2 person assist  -EAGLE     Distance in Feet (Gait) 25 ft  -EAGLE     Deviations/Abnormal Patterns (Gait) gait speed decreased;pietro decreased  -EAGLE     Comment, (Gait/Stairs) R knee buckling  -EAGLE               User Key  (r) = Recorded By, (t) = Taken By, (c) = Cosigned By      Initials Name Provider Type    Darrell Reeder PT DPT Physical Therapist                   Obj/Interventions       Row Name 01/13/24 1320          Range of Motion Comprehensive    Comment, General Range of Motion L LE AROM WFL, R ankle, knee, hip AROM impaired 25% (Weakness)  -EAGLE       Row Name 01/13/24 1320          Strength Comprehensive (MMT)    Comment, General Manual Muscle Testing (MMT) Assessment L LE grossly 4+/5, R LE grossly 3+/5  -EAGLE       Row Name 01/13/24 1320          Balance    Balance Assessment sitting dynamic balance;standing dynamic balance  -EAGLE     Dynamic Sitting Balance standby assist;contact guard  -EAGLE     Position, Sitting Balance unsupported;supported;sitting edge of bed  -EAGLE      Dynamic Standing Balance minimal assist;2-person assist  -EAGLE     Position/Device Used, Standing Balance supported  -EAGLE       Row Name 01/13/24 1320          Sensory Assessment (Somatosensory)    Sensory Assessment (Somatosensory) LE sensation intact  -EAGLE               User Key  (r) = Recorded By, (t) = Taken By, (c) = Cosigned By      Initials Name Provider Type    Darrell Reeder, PT DPT Physical Therapist                   Goals/Plan       Row Name 01/13/24 1320          Bed Mobility Goal 1 (PT)    Activity/Assistive Device (Bed Mobility Goal 1, PT) sit to supine/supine to sit  -EAGLE     [place] Level/Cues Needed (Bed Mobility Goal 1, PT) standby assist  -EAGLE     Time Frame (Bed Mobility Goal 1, PT) long term goal (LTG);10 days  -EAGLE     Progress/Outcomes (Bed Mobility Goal 1, PT) new goal  -EAGLE       Row Name 01/13/24 1320          Transfer Goal 1 (PT)    Activity/Assistive Device (Transfer Goal 1, PT) sit-to-stand/stand-to-sit;bed-to-chair/chair-to-bed  -EAGLE     [place] Level/Cues Needed (Transfer Goal 1, PT) contact guard required  -EAGLE     Time Frame (Transfer Goal 1, PT) long term goal (LTG);10 days  -EAGLE     Progress/Outcome (Transfer Goal 1, PT) new goal  -EAGLE       Row Name 01/13/24 1320          Gait Training Goal 1 (PT)    Activity/Assistive Device (Gait Training Goal 1, PT) gait (walking locomotion);assistive device use;decrease fall risk;improve balance and speed;increase endurance/gait distance  -EAGLE     [place] Level (Gait Training Goal 1, PT) contact guard required  -EAGLE     Distance (Gait Training Goal 1, PT) 50 ft  -EAGLE     Time Frame (Gait Training Goal 1, PT) long term goal (LTG);10 days  -EAGLE     Progress/Outcome (Gait Training Goal 1, PT) new goal  -EAGLE       Row Name 01/13/24 1320          Therapy Assessment/Plan (PT)    Planned Therapy Interventions (PT) bed mobility training;transfer training;gait training;balance training;home exercise program;patient/family education;postural  re-education;strengthening;motor coordination training;neuromuscular re-education  -EAGLE               User Key  (r) = Recorded By, (t) = Taken By, (c) = Cosigned By      Initials Name Provider Type    Darrell Reeder PT DPT Physical Therapist                   Clinical Impression       Row Name 01/13/24 1320          Pain    Pretreatment Pain Rating 0/10 - no pain  -EAGLE       [facility] Name 01/13/24 1320          Plan of Care Review    Plan of Care Reviewed With patient;spouse  -EAGLE     Outcome Evaluation PT eval complete. She is alert and oriented x 4, some speech difficulty. She was independent at home in her mobility before this event. She is weak in her R LE and demos 3+/5 strength in R LE. Some mildly impaired coordination in R LE to testing and with gait. She is a fall risk and her R LE will buckle at times in stance and with movement. She needs min assist x 2 to ambulate ~ 25 ft within her room and to the chair. PT will cont with mobiltiy, balance, strengthening and coordination. REcommend d.c to acute rehab.  -EAGLE       Row Name 01/13/24 1320          Therapy Assessment/Plan (PT)    Patient/Family Therapy Goals Statement (PT) get stronger  -EAGLE     Rehab Potential (PT) good, to achieve stated therapy goals  -EAGLE     Criteria for Skilled Interventions Met (PT) yes;meets criteria;skilled treatment is necessary  -EAGLE     Therapy Frequency (PT) 2 times/day  -EAGLE     Predicted Duration of Therapy Intervention (PT) unti ld.c  -EAGLE       Row Name 01/13/24 1320          Positioning and Restraints    Pre-Treatment Position in bed  -EAGLE     Post Treatment Position chair  -EAGLE     In Chair reclined;call light within reach;encouraged to call for assist;with family/caregiver;notified nsg  -EAGLE               User Key  (r) = Recorded By, (t) = Taken By, (c) = Cosigned By      Initials Name Provider Type    Darrell Reeder PT DPT Physical Therapist                   Outcome Measures       Row Name 01/13/24 1320 01/13/24 6927        How much help from another person do you currently need...    Turning from your back to your side while in flat bed without using bedrails? 3  -EAGLE 3  -TM    Moving from lying on back to sitting on the side of a flat bed without bedrails? 3  -EAGLE 3  -TM    Moving to and from a bed to a chair (including a wheelchair)? 2  -EAGLE 2  -TM    Standing up from a chair using your arms (e.g., wheelchair, bedside chair)? 2  -EAGLE 2  -TM    Climbing 3-5 steps with a railing? 1  -EAGLE 2  -TM    To walk in hospital room? 2  -EAGLE 2  -TM    AM-PAC 6 Clicks Score (PT) 13  -EAGLE 14  -TM    Highest Level of Mobility Goal 4 --> Transfer to chair/commode  -EAGLE 4 --> Transfer to chair/commode  -TM      Row Name 01/13/24 0514 01/13/24 0406       How much help from another person do you currently need...    Turning from your back to your side while in flat bed without using bedrails? 3  -AB 3  -AB    Moving from lying on back to sitting on the side of a flat bed without bedrails? 3  -AB 3  -AB    Moving to and from a bed to a chair (including a wheelchair)? 2  -AB 2  -AB    Standing up from a chair using your arms (e.g., wheelchair, bedside chair)? 2  -AB 2  -AB    Climbing 3-5 steps with a railing? 2  -AB 2  -AB    To walk in hospital room? 2  -AB 2  -AB    AM-PAC 6 Clicks Score (PT) 14  -AB 14  -AB    Highest Level of Mobility Goal 4 --> Transfer to chair/commode  -AB 4 --> Transfer to chair/commode  -AB      Row Name 01/13/24 1330          Modified Rai Scale    Pre-Stroke Modified Tomball Scale 0 - No Symptoms at all.  -J     Modified Tomball Scale 4 - Moderately severe disability.  Unable to walk without assistance, and unable to attend to own bodily needs without assistance.  -       Row Name 01/13/24 1330 01/13/24 1320       Functional Assessment    Outcome Measure Options AM-PAC 6 Clicks Daily Activity (OT);Modified Rai  -J AM-PAC 6 Clicks Basic Mobility (PT)  -EAGLE              User Key  (r) = Recorded By, (t) = Taken By, (c) =  Cosigned By      Initials Name Provider Type    Darrell Reeder, PT DPT Physical Therapist    Claribel Landon, RN Registered Nurse    Columba Hernandez, OTR/L, CSRS Occupational Therapist    Yuly Scanlon, RN Registered Nurse                                 Physical Therapy Education       Title: PT OT SLP Therapies (In Progress)       Topic: Physical Therapy (In Progress)       Point: Mobility training (Done)       Learning Progress Summary             Patient Acceptance, E, VU,DU by EAGLE at 1/13/2024 1320    Comment: benefits of activity, progression of PT                         Point: Home exercise program (Not Started)       Learner Progress:  Not documented in this visit.              Point: Body mechanics (Not Started)       Learner Progress:  Not documented in this visit.              Point: Precautions (Not Started)       Learner Progress:  Not documented in this visit.                              User Key       Initials Effective Dates Name Provider Type Discipline    EAGLE 02/03/23 -  Darrell Rosario, PT DPT Physical Therapist PT                  PT Recommendation and Plan  Planned Therapy Interventions (PT): bed mobility training, transfer training, gait training, balance training, home exercise program, patient/family education, postural re-education, strengthening, motor coordination training, neuromuscular re-education  Plan of Care Reviewed With: patient, spouse  Outcome Evaluation: PT eval complete. She is alert and oriented x 4, some speech difficulty. She was independent at home in her mobility before this event. She is weak in her R LE and demos 3+/5 strength in R LE. Some mildly impaired coordination in R LE to testing and with gait. She is a fall risk and her R LE will buckle at times in stance and with movement. She needs min assist x 2 to ambulate ~ 25 ft within her room and to the chair. PT will cont with mobiltiy, balance, strengthening and coordination. REcommend d.c to acute  rehab.     Time Calculation:         PT Charges       Row Name 01/13/24 1542             Time Calculation    Start Time 1320  -EAGLE      Stop Time 1405  -EAGLE      Time Calculation (min) 45 min  -EAGLE      PT Received On 01/13/24  -EAGLE      PT Goal Re-Cert Due Date 01/23/24  -EAGLE                User Key  (r) = Recorded By, (t) = Taken By, (c) = Cosigned By      Initials Name Provider Type    Darrell Reeder, PT DPT Physical Therapist                  Therapy Charges for Today       Code Description Service Date Service Provider Modifiers Qty    [id number] HC PT EVAL MOD COMPLEXITY 3 1/13/2024 Darrell Rosario, PT DPT GP 1            PT G-Codes  Outcome Measure Options: AM-PAC 6 Clicks Daily Activity (OT), Modified Rai  AM-PAC 6 Clicks Score (PT): 13  AM-PAC 6 Clicks Score (OT): 14  Modified Emanuel Scale: 4 - Moderately severe disability.  Unable to walk without assistance, and unable to attend to own bodily needs without assistance.  PT Discharge Summary  Anticipated Discharge Disposition (PT): inpatient rehabilitation facility    Darrell Rosario, PT DPT  1/13/2024

## 2024-01-13 NOTE — PLAN OF CARE
Goal Outcome Evaluation:  Plan of Care Reviewed With: patient           Outcome Evaluation: OT eval completed. Pt presents alert and oriented x4, sitting up in bed with spouse at bedside. She reports at baseline being independent with all adls and mobility. States she has had a decline in mobility over the last several weeks due to dx of L LE DVT. She now has a R UE DVT which is she already on medication for. Today pt demonstrates minimally decreased R UE weakness, impaired motor control/coordination, deminished sensation in R UE and LE. She was able to bring self to sitting at EOB with CGA. Required Max A to don socks due to coordination deficits. Min A for sit <> stand t/f from EOB and Min Ax2 with HHAx2 to amb in room. She has a narrow TED with decreased step length and foot clearance on the R. She required vcs to  R foot when taking steps. She is at a high risk for falls at this time due to these deficits. She was left sitting up in chair at end of session. Pt would benefit from skilled OT services to address these deficits. Recommend d/c to acute IP rehab.      Anticipated Discharge Disposition (OT): inpatient rehabilitation facility

## 2024-01-13 NOTE — PLAN OF CARE
Goal Outcome Evaluation:  Plan of Care Reviewed With: patient        Progress: no change  Outcome Evaluation: Pt recieved from ER around 0400. A&Ox4. VSS. NIHSS 6. RA. Tele normal sinus. Voiding via purewick. Up x2 assist. IV L AC, flushed and saline locked. SCD for VTE. q6h accucheck. Call light in reach. Safety maintained.

## 2024-01-13 NOTE — PLAN OF CARE
Goal Outcome Evaluation:  Plan of Care Reviewed With: patient, spouse           Outcome Evaluation: PT eval complete. She is alert and oriented x 4, some speech difficulty. She was independent at home in her mobility before this event. She is weak in her R LE and demos 3+/5 strength in R LE. Some mildly impaired coordination in R LE to testing and with gait. She is a fall risk and her R LE will buckle at times in stance and with movement. She needs min assist x 2 to ambulate ~ 25 ft within her room and to the chair. PT will cont with mobiltiy, balance, strengthening and coordination. REcommend d.c to acute rehab.      Anticipated Discharge Disposition (PT): inpatient rehabilitation facility

## 2024-01-13 NOTE — ED PROVIDER NOTES
"Subjective   History of Present Illness  Patient is a 63-year-old female that presents to the emergency department with multiple complaints.  Patient reports she was diagnosed with a DVT to the left lower extremity and is supposed to be taking Eliquis twice daily due to this.  Patient reports that she has missed 2 doses and personally changed her dose to once daily because she felt as though the Eliquis was making her nauseous.  Patient states that over the last 2 to 3 days she has been experiencing some right upper arm discomfort.  She denies any known injury to the right upper extremity.  She states it just feels very tight and \"crampy and weak \".  She denies any obvious swelling or erythema to the right upper extremity.  Patient states that around 930 on this date patient felt as though she began having weakness to the right arm and right leg causing her to have a ground-level fall on the carpet.  She states the fall was related to the upper and lower extremity weakness.  Patient denies any chest pain, shortness of breath, abdominal pain, vomiting, constipation, or diarrhea.  She denies any fevers, body aches, or chills.  Patient states she feels like her mind is not all there and that she is confused but is alert and oriented and able to answer all questions appropriately.      Review of Systems   Constitutional:  Positive for activity change.   Gastrointestinal:  Positive for nausea.   Musculoskeletal:  Positive for arthralgias, gait problem and myalgias.   Neurological:  Positive for weakness.   All other systems reviewed and are negative.      Past Medical History:   Diagnosis Date    Asthma     Bronchitis     Depression     Diabetes mellitus     DVT (deep venous thrombosis)     Heart murmur     Lyme disease     Mitral valve prolapse        No Known Allergies    Past Surgical History:   Procedure Laterality Date     SECTION         Family History   Problem Relation Age of Onset    Heart disease Father "     Diabetes Father     Diabetes Brother     Heart disease Brother     Cancer Maternal Grandmother     Cancer Maternal Grandfather     Heart disease Paternal Grandfather        Social History     Socioeconomic History    Marital status:    Tobacco Use    Smoking status: Never    Smokeless tobacco: Never   Vaping Use    Vaping Use: Never used   Substance and Sexual Activity    Alcohol use: No    Drug use: No    Sexual activity: Defer     Partners: Male           Objective   Physical Exam  Vitals and nursing note reviewed.   Constitutional:       Appearance: Normal appearance.      Comments: Nontoxic appearing. In no acute distress.    HENT:      Head: Normocephalic and atraumatic.      Right Ear: External ear normal.      Left Ear: External ear normal.      Nose: Nose normal.      Mouth/Throat:      Mouth: Mucous membranes are moist.      Pharynx: Oropharynx is clear.   Eyes:      Extraocular Movements: Extraocular movements intact.      Conjunctiva/sclera: Conjunctivae normal.   Cardiovascular:      Rate and Rhythm: Tachycardia present.      Pulses: Normal pulses.      Heart sounds: Normal heart sounds.   Pulmonary:      Effort: Pulmonary effort is normal. No respiratory distress.      Breath sounds: Normal breath sounds. No wheezing.   Chest:      Chest wall: No tenderness.   Abdominal:      General: Bowel sounds are normal. There is no distension.      Palpations: Abdomen is soft.      Tenderness: There is no abdominal tenderness. There is no right CVA tenderness, left CVA tenderness, guarding or rebound.   Musculoskeletal:         General: Normal range of motion.      Cervical back: Normal range of motion and neck supple. No rigidity or tenderness.      Right lower leg: No edema.      Left lower leg: Edema present.      Comments: Patient reports chronic left lower extremity swelling due to known DVT.  Patient does report some tenderness noted upon exam to the right upper extremity.  No erythema or swelling  noted.  Radial pulses are palpable, strong and equal bilaterally.  Dorsalis pedis pulses are palpable, strong and equal bilaterally.   Skin:     General: Skin is warm and dry.      Capillary Refill: Capillary refill takes less than 2 seconds.   Neurological:      Mental Status: She is alert and oriented to person, place, and time.      Coordination: Heel to Shin Test abnormal.      Comments: NIH score: 5 points due to mild sensory changes to the right upper and lower extremities, inability to perform heel-to-shin with the right lower extremity, her right lower extremity does have some effort against gravity but is unable to raise the leg off the bed.  Patient is alert and oriented.  Her speech is clear.  No facial asymmetry or visual deficits noted.  Noted.   Psychiatric:         Mood and Affect: Mood normal.         Behavior: Behavior normal.         Thought Content: Thought content normal.         Judgment: Judgment normal.       Labs Reviewed   COMPREHENSIVE METABOLIC PANEL - Abnormal; Notable for the following components:       Result Value    Glucose 160 (*)     Creatinine 0.55 (*)     Albumin 3.2 (*)     Alkaline Phosphatase 227 (*)     All other components within normal limits    Narrative:     GFR Normal >60  Chronic Kidney Disease <60  Kidney Failure <15     PROTIME-INR - Abnormal; Notable for the following components:    Protime 16.1 (*)     INR 1.27 (*)     All other components within normal limits   APTT - Abnormal; Notable for the following components:    PTT 39.8 (*)     All other components within normal limits   URINALYSIS W/ CULTURE IF INDICATED - Abnormal; Notable for the following components:    Appearance, UA Cloudy (*)     Blood, UA Small (1+) (*)     Protein, UA 30 mg/dL (1+) (*)     Leuk Esterase, UA Moderate (2+) (*)     All other components within normal limits    Narrative:     In absence of clinical symptoms, the presence of pyuria, bacteria, and/or nitrites on the urinalysis result does  not correlate with infection.   CBC WITH AUTO DIFFERENTIAL - Abnormal; Notable for the following components:    Hemoglobin 9.0 (*)     Hematocrit 31.8 (*)     MCV 72.3 (*)     MCH 20.5 (*)     MCHC 28.3 (*)     RDW 16.1 (*)     Platelets 592 (*)     Lymphocyte % 16.8 (*)     Immature Grans % 0.7 (*)     Immature Grans, Absolute 0.06 (*)     All other components within normal limits   URINALYSIS, MICROSCOPIC ONLY - Abnormal; Notable for the following components:    RBC, UA 3-5 (*)     WBC, UA Too Numerous to Count (*)     Bacteria, UA 2+ (*)     All other components within normal limits   MAGNESIUM - Normal   URINE CULTURE   SCAN SLIDE   CBC AND DIFFERENTIAL    Narrative:     The following orders were created for panel order CBC & Differential.  Procedure                               Abnormality         Status                     ---------                               -----------         ------                     CBC Auto Differential[[id number]]        Abnormal            Final result               Scan Slide[[id number]]                                       Final result                 Please view results for these tests on the individual orders.        Procedures           ED Course  ED Course as of 01/13/24 0316   Sat Jan 13, 2024   0002 CT angiogram of the chest was ordered due to patient's extensive DVT history as well as missed Eliquis doses and tachycardic upon arrival.  Patient has refused the CT of her chest due to not wanting contrast dye. [KF]   0130 CT head without contrast was reviewed and interpreted by stat rad radiology.  Impression reveals no hemorrhage, hydrocephalus, mass effect, or herniation.  Bones unremarkable [KF]   0200 Neurology was paged this time. [KF]   0240 Spoke with Dr. Singleton, neurologist on-call.  He states that patient would benefit from further evaluation and treatment of stroke such as inpatient MRI.  He states that patient does not qualify for any emergent intervention at this  "time. Hospitalist paged at this time. [KF]      ED Course User Index  [KF] Yaima Hugginslinda PENNINGTON, APRN                          Total (NIH Stroke Scale): 7                  Medical Decision Making  Radha Wade is a 63 y.o. female who presents to the ED with multiple complaints.  Patient reports she was diagnosed with a DVT to the left lower extremity and is supposed to be taking Eliquis twice daily due to this.  Patient reports that she has missed 2 doses and personally changed her dose to once daily because she felt as though the Eliquis was making her nauseous.  Patient states that over the last 2 to 3 days she has been experiencing some right upper arm discomfort.  She denies any known injury to the right upper extremity.  She states it just feels very tight and \"crampy and weak \".  She denies any obvious swelling or erythema to the right upper extremity.  Patient states that around 930 on this date patient felt as though she began having weakness to the right arm and right leg causing her to have a ground-level fall on the carpet.  She states the fall was related to the upper and lower extremity weakness.  Patient denies any chest pain, shortness of breath, abdominal pain, vomiting, constipation, or diarrhea.  She denies any fevers, body aches, or chills.  Patient states she feels like her mind is not all there and that she is confused but is alert and oriented and able to answer all questions appropriately.    Patient was non-toxic appearing on arrival. No acute distress was noted.  Vital signs stable.  Tachycardic upon arrival.  Afebrile.    Past medical history, surgical history, and medication regimen reviewed.     Previous notes, labs, imaging, and more reviewed.    Patient's presentation raises suspicion for differentials including, but not limited to, DVT, PE, TIA, ICH.    Please refer to above section of note for lab and imaging results that were reviewed and interpreted by radiology as well as attending " physician.     Medications administered,   sodium chloride 0.9 % flush 10 mL (has no administration in time range)  cefTRIAXone (ROCEPHIN) 1,000 mg in sodium chloride 0.9 % 100 mL IVPB (1,000 mg Intravenous New Bag 1/13/24 0213)  acetaminophen (TYLENOL) tablet 1,000 mg (1,000 mg Oral Given 1/13/24 0210)     NIH score: 5 points  ABCD 2 score: 6 points, high risk  Well score for PE: 6 points, high risk    Spoke with Dr. Singleton, neurologist on-call regarding patient's presentation as well as history of extensive DVTs.  He states that he feels as though it is reasonable to admit the patient for further evaluation and treatment of possible TIA or stroke due to the unilateral weakness.    Spoke with Dr. Fleming, hospitalist on-call regarding admission for further workup noted accept patient for admission.    Given findings described above, patient's presentation is likely consistent with right-sided weakness, cystitis, and cephalic thrombus to the right upper extremity.     I reassessed the patient and discussed the findings of the work up so far with patient and significant other present at bedside. I said that the next step in treatment would be admission to the hospital for further workup and care. I also said that there is always some diagnostic uncertainty in the ER, that symptoms may change, and new things may be found after being admitted. Dr. Fleming, hospitalist assumed primary care of the patient and the patient was admitted to their service in stable condition.    Dragon disclaimer:  Parts of this note may be an electronic transcription/translation of spoken language to printed text using the Dragon dictation system.       Problems Addressed:  Cephalic vein thrombosis: complicated acute illness or injury  Cystitis: complicated acute illness or injury  Fall, initial encounter: complicated acute illness or injury  Weakness: complicated acute illness or injury    Amount and/or Complexity of Data Reviewed  Labs:  ordered.  Radiology: ordered.  ECG/medicine tests: ordered.    Risk  Prescription drug management.        Final diagnoses:   Cephalic vein thrombosis   Cystitis   Fall, initial encounter   Weakness       ED Disposition  ED Disposition       ED Disposition   Decision to Admit    Condition   --    Comment   Level of Care: Remote Telemetry [26]   Diagnosis: Weakness [647161]   Admitting Physician: MAXIMILIANO GANDHI [[id number]]   Attending Physician: MAXIMILIANO GANDHI [[id number]]                 No follow-up provider specified.       Medication List      No changes were made to your prescriptions during this visit.            Bernadette Huggins, APRN  01/13/24 0316

## 2024-01-13 NOTE — ED NOTES
Nursing report ED to floor  Radha Wade  63 y.o.  female    HPI:   Chief Complaint   Patient presents with    RIGHT SIDED WEAKNESS    Fall       Admitting doctor:   Daniel Fleming MD    Consulting provider(s):  Consults       No orders found for last 30 day(s).             Admitting diagnosis:   The primary encounter diagnosis was Cephalic vein thrombosis. Diagnoses of Cystitis, Fall, initial encounter, and Weakness were also pertinent to this visit.    Code status:   Current Code Status       Date Active Code Status Order ID Comments User Context       Prior            Allergies:   Patient has no known allergies.    Intake and Output  No intake or output data in the 24 hours ending 01/13/24 0355    Weight:       01/12/24  2233   Weight: 73.1 kg (161 lb 1.6 oz)       Most recent vitals:   Vitals:    01/12/24 2348 01/13/24 0121 01/13/24 0159 01/13/24 0259   BP:  113/73 124/59 105/61   Pulse: 105 106 106 106   Resp:       Temp:       SpO2: 99% 100% 100% 99%   Weight:       Height:         Oxygen Therapy: .    Active LDAs/IV Access:   Lines, Drains & Airways       Active LDAs       Name Placement date Placement time Site Days    Peripheral IV 01/12/24 2311 Left Antecubital 01/12/24 2311  Antecubital  less than 1                    Labs (abnormal labs have a star):   Labs Reviewed   COMPREHENSIVE METABOLIC PANEL - Abnormal; Notable for the following components:       Result Value    Glucose 160 (*)     Creatinine 0.55 (*)     Albumin 3.2 (*)     Alkaline Phosphatase 227 (*)     All other components within normal limits    Narrative:     GFR Normal >60  Chronic Kidney Disease <60  Kidney Failure <15     PROTIME-INR - Abnormal; Notable for the following components:    Protime 16.1 (*)     INR 1.27 (*)     All other components within normal limits   APTT - Abnormal; Notable for the following components:    PTT 39.8 (*)     All other components within normal limits   URINALYSIS W/ CULTURE IF INDICATED -  Abnormal; Notable for the following components:    Appearance, UA Cloudy (*)     Blood, UA Small (1+) (*)     Protein, UA 30 mg/dL (1+) (*)     Leuk Esterase, UA Moderate (2+) (*)     All other components within normal limits    Narrative:     In absence of clinical symptoms, the presence of pyuria, bacteria, and/or nitrites on the urinalysis result does not correlate with infection.   CBC WITH AUTO DIFFERENTIAL - Abnormal; Notable for the following components:    Hemoglobin 9.0 (*)     Hematocrit 31.8 (*)     MCV 72.3 (*)     MCH 20.5 (*)     MCHC 28.3 (*)     RDW 16.1 (*)     Platelets 592 (*)     Lymphocyte % 16.8 (*)     Immature Grans % 0.7 (*)     Immature Grans, Absolute 0.06 (*)     All other components within normal limits   URINALYSIS, MICROSCOPIC ONLY - Abnormal; Notable for the following components:    RBC, UA 3-5 (*)     WBC, UA Too Numerous to Count (*)     Bacteria, UA 2+ (*)     All other components within normal limits   MAGNESIUM - Normal   URINE CULTURE   SCAN SLIDE   CBC AND DIFFERENTIAL    Narrative:     The following orders were created for panel order CBC & Differential.  Procedure                               Abnormality         Status                     ---------                               -----------         ------                     CBC Auto Differential[[id number]]        Abnormal            Final result               Scan Slide[[id number]]                                       Final result                 Please view results for these tests on the individual orders.       Meds given in ED:   Medications   sodium chloride 0.9 % flush 10 mL (has no administration in time range)   cefTRIAXone (ROCEPHIN) 1,000 mg in sodium chloride 0.9 % 100 mL IVPB (0 mg Intravenous Stopped 1/13/24 0252)   acetaminophen (TYLENOL) tablet 1,000 mg (1,000 mg Oral Given 1/13/24 0210)           NIH Stroke Scale:  Interval: baseline    Isolation/Infection(s):  No active isolations   No active infections      COVID Testing  Collected .  Resulted .    Nursing report ED to floor:  Mental status: . A/o x4  Ambulatory status: .  Precautions: .    ED nurse phone extentsion- ..  [phone]

## 2024-01-13 NOTE — CASE MANAGEMENT/SOCIAL WORK
Continued Stay Note   [place]     Patient Name: Radha Wade  MRN: [id number]  Today's Date: 1/13/2024    Admit Date: 1/12/2024    Plan: Pending   Discharge Plan       Row Name 01/13/24 1209       Plan    Plan Pending    Plan Comments Social service consult received for dc planning due to stroke.  Awaiting therapy evaluations/recommendations to proceed.                   Discharge Codes    No documentation.                       ROSA Garcia

## 2024-01-13 NOTE — H&P
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 She also complained of RUE pain and doppler in ER showed cephalic vein thrombosis. She was evaluated by neurology and recommended for admission but not a candidate for TPA due to vaginal bleeding and Eliquis use.    Regarding GYN malignancy she was noted to have an abnormal CT abd/pelvis last September:   \"1. Somewhat bulky retroperitoneal lymphadenopathy and pelvic  lymphadenopathy worrisome for metastatic lymphadenopathy or lymphoma.  2. There is also some stranding and nodularity in the greater omental  region anteriorly suggesting possible peritoneal metastatic disease.  3. The uterus is prominent in size but a focal mass is not seen\"   She was evaluated by Dr De Los Santos for vaginal bleeding and referred for follow up D&C 1 week after discharge. She did not have this procedure. She then was evaluated by PCP and referred to Dr Grullon and after testing and imaging has been referred to Dr Miles in [place] for consultation.     Dr Grullon writes in his last progress note from 12/21/2023:  \"Given her history with weight loss, vaginal bleeding, elevated tumor markers, imaging abnormalities with the uterus, the 2.9 x 1.9 cm nodule in the anterior abdomen suspicious for carcinomatosis, abdominal and pelvic lymphadenopathy, prothrombotic state with DVTs and subcentimeter pulmonary nodules although indeterminate, in this setting is very concerning for an active malignant process and suspicious for a GYN origin. " "  Completion of workup however will also include a bone scan that was ordered at last visit and not done, GI evaluation to rule out the GI tract as a potential source in addition to the other recommendations below.     RECOMMENDATIONS  Bone scan  C-scope - Appt with Bertha Parrish [facility] scheduled 1/3/2024, malignancy process with iron deficiency anemia  GUADALUPE 2 and reflex panels as well as the rest of the prothrombotic panel not done at last visit  PET scan  Pelvic ultrasound to evaluate uterus and adnexa  Consult Dr. Frank Miles GYNOnc to evaluate the The uterus which is prominent in size measuring 11.7 x 6.9 x 8.7 cm but without visualization a focal mass. Follow-up nonemergent ultrasound is recommended to evaluate the uterus for a potential mass on CT abdomen 2023  Follow-up in 2 weeks \"    Review of Systems   Otherwise complete ROS reviewed and negative except as mentioned in the HPI.    Past Medical History:   Past Medical History:   Diagnosis Date    Asthma     Bronchitis     Depression     Diabetes mellitus     DVT (deep venous thrombosis)     Heart murmur     Lyme disease     Mitral valve prolapse      Past Surgical History:  Past Surgical History:   Procedure Laterality Date     SECTION       Social History:  reports that she has never smoked. She has never used smokeless tobacco. She reports that she does not drink alcohol and does not use drugs.    Family History: family history includes Cancer in her maternal grandfather and maternal grandmother; Diabetes in her brother and father; Heart disease in her brother, father, and paternal grandfather.       Allergies:  No Known Allergies    Medications:  Prior to Admission medications    Medication Sig Start Date End Date Taking? Authorizing Provider   acetaminophen-codeine (TYLENOL with CODEINE #3) 300-30 MG per tablet Take 1 tablet by mouth. 23 Yes Provider, MD Andrea   Apixaban Starter Pack (Eliquis DVT/PE Starter Pack) " "tablet therapy pack Take two 5 mg tablets by mouth every 12 hours for 7 days. Followed by one 5 mg tablet every 12 hours. (Dispense starter pack if available) 9/16/23  Yes Meghan Choudhary APRN   azithromycin (ZITHROMAX) 250 MG tablet TAKE 2 TABLETS BY MOUTH TODAY, THEN TAKE 1 TABLET DAILY FOR 4 DAYS AS DIRECTED 12/11/23  Yes ProviderAndrea MD   ferrous sulfate 325 (65 FE) MG tablet Take 1 tablet by mouth 2 (Two) Times a Day. 11/15/23  Yes ProviderAndrea MD   furosemide (LASIX) 20 MG tablet Take 1 tablet by mouth Daily. 12/8/23  Yes ProviderAndrea MD   sulfamethoxazole-trimethoprim (BACTRIM DS,SEPTRA DS) 800-160 MG per tablet Take 1 tablet by mouth Every 12 (Twelve) Hours. 10/27/23  Yes ProviderAndrea MD   albuterol sulfate  (90 Base) MCG/ACT inhaler Inhale 2 puffs Every 4 (Four) Hours As Needed for Wheezing or Shortness of Air. 1/26/20   Shandra Dietrich APRN   Lancets (freestyle) lancets 1 each by Other route 2 (Two) Times a Day. Use as instructed 9/16/23   Meghan Choudhary APRN   polyethylene glycol (MIRALAX) 17 g packet Take 17 g by mouth Daily As Needed (Use if senna-docusate is ineffective). Obtain OTC 9/16/23   Meghan Choudhary APRN   traMADol (Ultram) 50 MG tablet Take 0.5 tablets by mouth Every 8 (Eight) Hours As Needed for Moderate Pain. 9/16/23   Meghan Choudhary APRN     I have utilized all available immediate resources to obtain, update, or review the patient's current medications (including all prescriptions, over-the-counter products, herbals, cannabis/cannabidiol products, and vitamin/mineral/dietary (nutritional) supplements).    Objective     Vital Signs: /73   Pulse 106   Temp 98.3 °F (36.8 °C)   Resp 16   Ht 165.1 cm (65\")   Wt 73.1 kg (161 lb 1.6 oz)   LMP  (LMP Unknown)   SpO2 100%   BMI 26.81 kg/m²   Physical Exam  Vitals reviewed.   Constitutional:       General: She is not in acute distress.     Appearance: She is well-developed. She is not " toxic-appearing.   HENT:      Head: Normocephalic and atraumatic.      Right Ear: External ear normal.      Left Ear: External ear normal.      Mouth/Throat:      Mouth: Mucous membranes are dry.      Pharynx: Oropharynx is clear.   Eyes:      General:         Right eye: No discharge.         Left eye: No discharge.      Extraocular Movements: Extraocular movements intact.      Conjunctiva/sclera: Conjunctivae normal.      Pupils: Pupils are equal, round, and reactive to light.   Neck:      Vascular: No JVD.   Cardiovascular:      Rate and Rhythm: Normal rate and regular rhythm.      Pulses: Normal pulses.      Heart sounds: Normal heart sounds. No murmur heard.     No friction rub. No gallop.   Pulmonary:      Effort: Pulmonary effort is normal. No respiratory distress.      Breath sounds: No stridor. No wheezing, rhonchi or rales.   Chest:      Chest wall: No tenderness.   Abdominal:      General: Bowel sounds are normal. There is no distension.      Palpations: Abdomen is soft.      Tenderness: There is no abdominal tenderness. There is no guarding or rebound.      Hernia: No hernia is present.   Musculoskeletal:         General: No swelling, tenderness or deformity. Normal range of motion.      Cervical back: Normal range of motion and neck supple. No rigidity or tenderness. No muscular tenderness.      Right lower leg: No edema.      Left lower leg: No edema.   Skin:     General: Skin is warm and dry.      Findings: No erythema or rash.   Neurological:      Mental Status: She is alert and oriented to person, place, and time.      Cranial Nerves: No cranial nerve deficit.      Sensory: Sensory deficit present.      Motor: Weakness present. No abnormal muscle tone.      Deep Tendon Reflexes: Reflexes normal.   Psychiatric:         Mood and Affect: Mood normal.         Behavior: Behavior normal.     Results Reviewed:  Lab Results (last 24 hours)       Procedure Component Value Units Date/Time    Urinalysis With  Culture If Indicated - Urine, Clean Catch [[id number]]  (Abnormal) Collected: 01/13/24 0120    Specimen: Urine, Clean Catch Updated: 01/13/24 0144     Color, UA Yellow     Appearance, UA Cloudy     pH, UA 7.0     Specific Gravity, UA 1.022     Glucose, UA Negative     Ketones, UA Negative     Bilirubin, UA Negative     Blood, UA Small (1+)     Protein, UA 30 mg/dL (1+)     Leuk Esterase, UA Moderate (2+)     Nitrite, UA Negative     Urobilinogen, UA 0.2 E.U./dL    Narrative:      In absence of clinical symptoms, the presence of pyuria, bacteria, and/or nitrites on the urinalysis result does not correlate with infection.    Urinalysis, Microscopic Only - Urine, Clean Catch [[id number]]  (Abnormal) Collected: 01/13/24 0120    Specimen: Urine, Clean Catch Updated: 01/13/24 0144     RBC, UA 3-5 /HPF      WBC, UA Too Numerous to Count /HPF      Bacteria, UA 2+ /HPF      Squamous Epithelial Cells, UA 0-2 /HPF      Yeast, UA Small/1+ Yeast /HPF      Hyaline Casts, UA 7-12 /LPF      Methodology Manual Light Microscopy    Urine Culture - Urine, Urine, Clean Catch [[id number]] Collected: 01/13/24 0120    Specimen: Urine, Clean Catch Updated: 01/13/24 0144    CBC & Differential [[id number]]  (Abnormal) Collected: 01/12/24 2311    Specimen: Blood Updated: 01/12/24 2355    Narrative:      The following orders were created for panel order CBC & Differential.  Procedure                               Abnormality         Status                     ---------                               -----------         ------                     CBC Auto Differential[[id number]]        Abnormal            Final result               Scan Slide[[id number]]                                       Final result                 Please view results for these tests on the individual orders.    CBC Auto Differential [[id number]]  (Abnormal) Collected: 01/12/24 2311    Specimen: Blood Updated: 01/12/24 2355     WBC 9.23 10*3/mm3      RBC 4.40 10*6/mm3       Hemoglobin 9.0 g/dL      Hematocrit 31.8 %      MCV 72.3 fL      MCH 20.5 pg      MCHC 28.3 g/dL      RDW 16.1 %      RDW-SD 41.9 fl      MPV 8.6 fL      Platelets 592 10*3/mm3      Neutrophil % 71.0 %      Lymphocyte % 16.8 %      Monocyte % 8.8 %      Eosinophil % 2.3 %      Basophil % 0.4 %      Immature Grans % 0.7 %      Neutrophils, Absolute 6.56 10*3/mm3      Lymphocytes, Absolute 1.55 10*3/mm3      Monocytes, Absolute 0.81 10*3/mm3      Eosinophils, Absolute 0.21 10*3/mm3      Basophils, Absolute 0.04 10*3/mm3      Immature Grans, Absolute 0.06 10*3/mm3      nRBC 0.0 /100 WBC     Scan Slide [[id number]] Collected: 01/12/24 2311    Specimen: Blood Updated: 01/12/24 2355     Hypochromia Slight/1+     Microcytes Slight/1+     Polychromasia Slight/1+     WBC Morphology Normal     Platelet Estimate Increased     Clumped Platelets Present    Magnesium [[id number]]  (Normal) Collected: 01/12/24 2311    Specimen: Blood Updated: 01/12/24 2338     Magnesium 2.2 mg/dL     Comprehensive Metabolic Panel [[id number]]  (Abnormal) Collected: 01/12/24 2311    Specimen: Blood Updated: 01/12/24 2338     Glucose 160 mg/dL      BUN 12 mg/dL      Creatinine 0.55 mg/dL      Sodium 137 mmol/L      Potassium 4.4 mmol/L      Chloride 99 mmol/L      CO2 27.0 mmol/L      Calcium 9.0 mg/dL      Total Protein 7.8 g/dL      Albumin 3.2 g/dL      ALT (SGPT) 13 U/L      AST (SGOT) 22 U/L      Alkaline Phosphatase 227 U/L      Total Bilirubin 0.2 mg/dL      Globulin 4.6 gm/dL      A/G Ratio 0.7 g/dL      BUN/Creatinine Ratio 21.8     Anion Gap 11.0 mmol/L      eGFR 103.1 mL/min/1.73     Narrative:      GFR Normal >60  Chronic Kidney Disease <60  Kidney Failure <15      Protime-INR [[id number]]  (Abnormal) Collected: 01/12/24 2311    Specimen: Blood Updated: 01/12/24 2329     Protime 16.1 Seconds      INR 1.27    aPTT [[id number]]  (Abnormal) Collected: 01/12/24 2311    Specimen: Blood Updated: 01/12/24 2329     PTT 39.8 seconds           Imaging  Results (Last 24 Hours)       Procedure Component Value Units Date/Time    US Venous Doppler Upper Extremity Right (duplex) [[id number]] Resulted: 01/12/24 2359     Updated: 01/13/24 0033    CT Head Without Contrast [[id number]] Resulted: 01/12/24 2349     Updated: 01/13/24 0003    XR Chest 1 View [[id number]] Resulted: 01/12/24 2253     Updated: 01/12/24 2254          I have personally reviewed and interpreted the radiology studies and ECG obtained at time of admission.     Assessment / Plan   Assessment:   Active Hospital Problems    Diagnosis     **Acute focal neurological deficit     Type 2 diabetes mellitus, without long-term current use of insulin     Metastatic disease     Acute blood loss anemia     Vaginal bleeding     DVT (deep venous thrombosis)      Treatment Plan  The patient will be admitted to my service here at [facility].   Admit to medical floor  Vitals every 4 hours with neuro checks  Cardiac consistent carbohydrate diet  IVF KVO prn  ASA  MRI brain with and without contrast for CVA and metastatic disease  Echocardiogram 2D in AM  Lipid panel and A1C  Neurology consult    DVT bilateral lower extremities and left cephalic vein > Continue Eliquis  Suspect malignancy related    Anemia due to vaginal bleeding > Iron profile    GYN malignancy with metastatic disease> Per Dr Grullon. She has follow up with Dr Velez next Tuesday in [place] for further work up    DM 2 NID recent diagnosis>   Low dose Humalog sliding scale AC and HS    Abnormal UA > Patient has received Rocephin 1 gram in ER    DVT prophylaxis > Not indicated     Medical Decision Making  Number and Complexity of problems: 4 complex medical problems  Differential Diagnosis: Ischemic stroke, brain metastatic disease    Conditions and Status        Condition is unchanged.     [facility] Data  External documents reviewed: [employer] EHR  Cardiac tracing (EKG, telemetry) interpretation: Sinus tachycardia  Radiology interpretation: See  above  Labs reviewed: See above  Any tests that were considered but not ordered: none     Decision rules/scores evaluated (example [id number], [place], etc): N/A     Discussed with: Patient     Care Planning  Shared decision making: Patient  Code status and discussions: Full code    Disposition  Social Determinants of Health that impact treatment or disposition: none  Estimated length of stay is to be determined.     I confirmed that the patient's advanced care plan is present, code status is documented, and a surrogate decision maker is listed in the patient's medical record.     The patient's surrogate decision maker is family, see records.     The patient was seen and examined by me on 1/13/2024 at 0307.    Electronically signed by Daniel Fleming MD, 01/13/24, 03:07 CST.

## 2024-01-14 ENCOUNTER — APPOINTMENT (OUTPATIENT)
Dept: MRI IMAGING | Facility: HOSPITAL | Age: 64
End: 2024-01-14
Payer: COMMERCIAL

## 2024-01-14 ENCOUNTER — APPOINTMENT (OUTPATIENT)
Dept: CARDIOLOGY | Facility: HOSPITAL | Age: 64
End: 2024-01-14
Payer: COMMERCIAL

## 2024-01-14 PROBLEM — I63.9 CVA (CEREBRAL VASCULAR ACCIDENT): Status: ACTIVE | Noted: 2024-01-14

## 2024-01-14 LAB
ALBUMIN SERPL-MCNC: 3.2 G/DL (ref 3.5–5.2)
ALBUMIN/GLOB SERPL: 0.7 G/DL
ALP SERPL-CCNC: 229 U/L (ref 39–117)
ALT SERPL W P-5'-P-CCNC: 10 U/L (ref 1–33)
ANION GAP SERPL CALCULATED.3IONS-SCNC: 11 MMOL/L (ref 5–15)
ANISOCYTOSIS BLD QL: ABNORMAL
APTT PPP: 35.2 SECONDS (ref 24.5–36)
APTT PPP: 44.5 SECONDS (ref 24.5–36)
APTT PPP: 57.7 SECONDS (ref 24.5–36)
AST SERPL-CCNC: 14 U/L (ref 1–32)
BACTERIA SPEC AEROBE CULT: NORMAL
BILIRUB SERPL-MCNC: 0.2 MG/DL (ref 0–1.2)
BUN SERPL-MCNC: 10 MG/DL (ref 8–23)
BUN/CREAT SERPL: 21.3 (ref 7–25)
CALCIUM SPEC-SCNC: 8.8 MG/DL (ref 8.6–10.5)
CHLORIDE SERPL-SCNC: 100 MMOL/L (ref 98–107)
CO2 SERPL-SCNC: 25 MMOL/L (ref 22–29)
CREAT SERPL-MCNC: 0.47 MG/DL (ref 0.57–1)
DEPRECATED RDW RBC AUTO: 42.6 FL (ref 37–54)
EGFRCR SERPLBLD CKD-EPI 2021: 107.1 ML/MIN/1.73
EOSINOPHIL # BLD MANUAL: 0.09 10*3/MM3 (ref 0–0.4)
EOSINOPHIL NFR BLD MANUAL: 1 % (ref 0.3–6.2)
ERYTHROCYTE [DISTWIDTH] IN BLOOD BY AUTOMATED COUNT: 16.4 % (ref 12.3–15.4)
GLOBULIN UR ELPH-MCNC: 4.7 GM/DL
GLUCOSE BLDC GLUCOMTR-MCNC: 247 MG/DL (ref 70–130)
GLUCOSE BLDC GLUCOMTR-MCNC: 291 MG/DL (ref 70–130)
GLUCOSE BLDC GLUCOMTR-MCNC: 304 MG/DL (ref 70–130)
GLUCOSE SERPL-MCNC: 191 MG/DL (ref 65–99)
HCT VFR BLD AUTO: 31.8 % (ref 34–46.6)
HGB BLD-MCNC: 8.9 G/DL (ref 12–15.9)
HYPOCHROMIA BLD QL: ABNORMAL
LYMPHOCYTES # BLD MANUAL: 1.26 10*3/MM3 (ref 0.7–3.1)
LYMPHOCYTES NFR BLD MANUAL: 1 % (ref 5–12)
MCH RBC QN AUTO: 20.2 PG (ref 26.6–33)
MCHC RBC AUTO-ENTMCNC: 28 G/DL (ref 31.5–35.7)
MCV RBC AUTO: 72.3 FL (ref 79–97)
MICROCYTES BLD QL: ABNORMAL
MONOCYTES # BLD: 0.09 10*3/MM3 (ref 0.1–0.9)
MYELOCYTES NFR BLD MANUAL: 1 % (ref 0–0)
NEUTROPHILS # BLD AUTO: 7.29 10*3/MM3 (ref 1.7–7)
NEUTROPHILS NFR BLD MANUAL: 82.7 % (ref 42.7–76)
PLATELET # BLD AUTO: 605 10*3/MM3 (ref 140–450)
PMV BLD AUTO: 8.9 FL (ref 6–12)
POLYCHROMASIA BLD QL SMEAR: ABNORMAL
POTASSIUM SERPL-SCNC: 4.5 MMOL/L (ref 3.5–5.2)
PROT SERPL-MCNC: 7.9 G/DL (ref 6–8.5)
RBC # BLD AUTO: 4.4 10*6/MM3 (ref 3.77–5.28)
SMALL PLATELETS BLD QL SMEAR: ABNORMAL
SODIUM SERPL-SCNC: 136 MMOL/L (ref 136–145)
VARIANT LYMPHS NFR BLD MANUAL: 1 % (ref 0–5)
VARIANT LYMPHS NFR BLD MANUAL: 13.3 % (ref 19.6–45.3)
WBC MORPH BLD: NORMAL
WBC NRBC COR # BLD AUTO: 8.82 10*3/MM3 (ref 3.4–10.8)

## 2024-01-14 PROCEDURE — 70553 MRI BRAIN STEM W/O & W/DYE: CPT

## 2024-01-14 PROCEDURE — 97110 THERAPEUTIC EXERCISES: CPT

## 2024-01-14 PROCEDURE — 25010000002 DIPHENHYDRAMINE PER 50 MG: Performed by: STUDENT IN AN ORGANIZED HEALTH CARE EDUCATION/TRAINING PROGRAM

## 2024-01-14 PROCEDURE — 70547 MR ANGIOGRAPHY NECK W/O DYE: CPT

## 2024-01-14 PROCEDURE — 70544 MR ANGIOGRAPHY HEAD W/O DYE: CPT

## 2024-01-14 PROCEDURE — 63710000001 PREDNISONE PER 5 MG: Performed by: STUDENT IN AN ORGANIZED HEALTH CARE EDUCATION/TRAINING PROGRAM

## 2024-01-14 PROCEDURE — 99232 SBSQ HOSP IP/OBS MODERATE 35: CPT | Performed by: STUDENT IN AN ORGANIZED HEALTH CARE EDUCATION/TRAINING PROGRAM

## 2024-01-14 PROCEDURE — 80053 COMPREHEN METABOLIC PANEL: CPT | Performed by: FAMILY MEDICINE

## 2024-01-14 PROCEDURE — 97116 GAIT TRAINING THERAPY: CPT

## 2024-01-14 PROCEDURE — 85025 COMPLETE CBC W/AUTO DIFF WBC: CPT | Performed by: STUDENT IN AN ORGANIZED HEALTH CARE EDUCATION/TRAINING PROGRAM

## 2024-01-14 PROCEDURE — 63710000001 PREDNISONE PER 1 MG: Performed by: STUDENT IN AN ORGANIZED HEALTH CARE EDUCATION/TRAINING PROGRAM

## 2024-01-14 PROCEDURE — A9577 INJ MULTIHANCE: HCPCS | Performed by: INTERNAL MEDICINE

## 2024-01-14 PROCEDURE — 85730 THROMBOPLASTIN TIME PARTIAL: CPT | Performed by: STUDENT IN AN ORGANIZED HEALTH CARE EDUCATION/TRAINING PROGRAM

## 2024-01-14 PROCEDURE — 85007 BL SMEAR W/DIFF WBC COUNT: CPT | Performed by: STUDENT IN AN ORGANIZED HEALTH CARE EDUCATION/TRAINING PROGRAM

## 2024-01-14 PROCEDURE — 0 GADOBENATE DIMEGLUMINE 529 MG/ML SOLUTION: Performed by: INTERNAL MEDICINE

## 2024-01-14 PROCEDURE — 63710000001 INSULIN LISPRO (HUMAN) PER 5 UNITS: Performed by: FAMILY MEDICINE

## 2024-01-14 PROCEDURE — 82948 REAGENT STRIP/BLOOD GLUCOSE: CPT

## 2024-01-14 RX ORDER — BISACODYL 10 MG
10 SUPPOSITORY, RECTAL RECTAL DAILY PRN
Status: DISCONTINUED | OUTPATIENT
Start: 2024-01-14 | End: 2024-01-17 | Stop reason: SDUPTHER

## 2024-01-14 RX ORDER — POLYETHYLENE GLYCOL 3350 17 G/17G
17 POWDER, FOR SOLUTION ORAL DAILY
Status: DISCONTINUED | OUTPATIENT
Start: 2024-01-14 | End: 2024-01-17

## 2024-01-14 RX ADMIN — PANTOPRAZOLE SODIUM 40 MG: 40 TABLET, DELAYED RELEASE ORAL at 05:41

## 2024-01-14 RX ADMIN — ACETAMINOPHEN 650 MG: 325 TABLET, FILM COATED ORAL at 03:28

## 2024-01-14 RX ADMIN — PREDNISONE 50 MG: 20 TABLET ORAL at 00:05

## 2024-01-14 RX ADMIN — ACETAMINOPHEN 650 MG: 325 TABLET, FILM COATED ORAL at 15:40

## 2024-01-14 RX ADMIN — INSULIN LISPRO 5 UNITS: 100 INJECTION, SOLUTION INTRAVENOUS; SUBCUTANEOUS at 17:42

## 2024-01-14 RX ADMIN — PREDNISONE 50 MG: 20 TABLET ORAL at 05:40

## 2024-01-14 RX ADMIN — INSULIN LISPRO 4 UNITS: 100 INJECTION, SOLUTION INTRAVENOUS; SUBCUTANEOUS at 21:15

## 2024-01-14 RX ADMIN — GADOBENATE DIMEGLUMINE 20 ML: 529 INJECTION, SOLUTION INTRAVENOUS at 13:28

## 2024-01-14 RX ADMIN — PREDNISONE 50 MG: 20 TABLET ORAL at 11:02

## 2024-01-14 RX ADMIN — INSULIN LISPRO 3 UNITS: 100 INJECTION, SOLUTION INTRAVENOUS; SUBCUTANEOUS at 08:48

## 2024-01-14 RX ADMIN — ATORVASTATIN CALCIUM 80 MG: 40 TABLET, FILM COATED ORAL at 21:16

## 2024-01-14 RX ADMIN — POLYETHYLENE GLYCOL 3350 17 G: 17 POWDER, FOR SOLUTION ORAL at 16:28

## 2024-01-14 RX ADMIN — DIPHENHYDRAMINE HYDROCHLORIDE 50 MG: 50 INJECTION, SOLUTION INTRAMUSCULAR; INTRAVENOUS at 11:04

## 2024-01-14 RX ADMIN — Medication 10 ML: at 08:52

## 2024-01-14 RX ADMIN — DOCUSATE SODIUM 100 MG: 100 CAPSULE, LIQUID FILLED ORAL at 08:48

## 2024-01-14 NOTE — PROGRESS NOTES
Neurology Progress Note      Chief Complaint:  Stroke  Length of Stay:  0     Interval     1/14: Has been hyperglycemic up to 247 at 8 AM this morning.  Continues on heparin drip protocol last PTT 44.5 at 4 AM . MRI brain w/wo and MRA head and neck completed today demonstrating left temporal lobe, left external capsule, left insula, body of the left cardiac nucleus infarcts.  There is no abnormal contrast-enhancement.  MRI brain degraded by motion artifact and in the limited evaluation there is no clear significant stenosis or large vessel occlusion. SLP evaluated, no acute needs.     1/13: Stopped Eliquis, stop aspirin, started on heparin drip low goal no bolus no rebolus.  Started premedication protocol for MRI with and without contrast tomorrow morning per radiology request.    Occupational Therapy and physical therapy evaluated recommended inpatient rehab facility,     Subjective     Feeling slightly better than yesterday however continues to be anxious about what the next steps will be regarding her potential cancer.  She is also anxious about the recovery from the stroke.      Medications:  Current Facility-Administered Medications   Medication Dose Route Frequency Provider Last Rate Last Admin    acetaminophen (TYLENOL) tablet 650 mg  650 mg Oral Q4H PRN Daniel Fleming MD   650 mg at 01/14/24 0328    Or    acetaminophen (TYLENOL) suppository 650 mg  650 mg Rectal Q4H PRN Daniel Fleming MD        atorvastatin (LIPITOR) tablet 80 mg  80 mg Oral Nightly Daniel Fleming MD   80 mg at 01/13/24 2113    dextrose (D50W) (25 g/50 mL) IV injection 25 g  25 g Intravenous Q15 Min PRN Daniel Fleming MD        dextrose (GLUTOSE) oral gel 15 g  15 g Oral Q15 Min PRN Daniel Fleming MD        diphenhydrAMINE (BENADRYL) capsule 50 mg  50 mg Oral Once When Specified Naldo Telles MD        Or    diphenhydrAMINE (BENADRYL) injection 50 mg  50 mg Intravenous Once When  Specified Naldo Telles MD        Or    diphenhydrAMINE (BENADRYL) injection 50 mg  50 mg Intramuscular Once When Specified Naldo Telles MD        docusate sodium (COLACE) capsule 100 mg  100 mg Oral BID Daniel Fleming MD   100 mg at 01/14/24 0848    glucagon (GLUCAGEN) injection 1 mg  1 mg Intramuscular Q15 Min PRN Daniel Fleming MD        heparin 79897 units/250 mL (100 units/mL) in 0.45 % NaCl infusion  12 Units/kg/hr Intravenous Titrated Naldo Telles MD 11.31 mL/hr at 01/14/24 0504 14.575 Units/kg/hr at 01/14/24 0504    HYDROcodone-acetaminophen (NORCO) 5-325 MG per tablet 1 tablet  1 tablet Oral Q4H PRN Daniel Fleming MD        Insulin Lispro (humaLOG) injection 2-7 Units  2-7 Units Subcutaneous 4x Daily AC & at Bedtime Daniel Fleming MD   3 Units at 01/14/24 0848    labetalol (NORMODYNE,TRANDATE) injection 10 mg  10 mg Intravenous Q10 Min PRN Daniel Fleming MD        ondansetron (ZOFRAN) injection 4 mg  4 mg Intravenous Q6H PRN Daniel Fleming MD        pantoprazole (PROTONIX) EC tablet 40 mg  40 mg Oral Q AM Daniel Fleming MD   40 mg at 01/14/24 0541    predniSONE (DELTASONE) tablet 50 mg  50 mg Oral Once Naldo Telles MD        sodium chloride 0.9 % flush 10 mL  10 mL Intravenous PRN Daniel Fleming MD        sodium chloride 0.9 % flush 10 mL  10 mL Intravenous Q12H Daniel Fleming MD   10 mL at 01/14/24 0852    sodium chloride 0.9 % flush 10 mL  10 mL Intravenous PRN Daniel Fleming MD        sodium chloride 0.9 % infusion 40 mL  40 mL Intravenous PRN Daniel Fleming MD        traMADol (ULTRAM) tablet 25 mg  25 mg Oral Q8H PRN Daniel Fleming MD             Objective      Vital Signs  Temp:  [97.7 °F (36.5 °C)-98.8 °F (37.1 °C)] 98.8 °F (37.1 °C)  Heart Rate:  [] 70  Resp:  [16-18] 16  BP: (117-140)/(45-76) 118/69    General Exam:  Head:  Normocephalic,  atraumatic  HEENT:  Neck supple  CVS:  Regular rate and rhythm on monitor  Lungs: Breathing comfortably on room air  Abdomen: Distended and soft  Extremities: There is bilateral lower extremity nonpitting edema up to the knees.  There is mild redness and edema of the upper right upper extremity.  Skin: There is some macular with small papular rash mostly over the ankles and the anterior surface of the left lower extremity lesser degree the right lower extremity     Neurologic Exam:     Mental Status:    -Awake, Alert, Oriented X 3  -No word finding difficulties  -No aphasia  -No dysarthria  -Follows simple and complex commands     CN II:  Visual fields full.  Pupils equally reactive to light  CN III, IV, VI:  Extraocular Muscles full with no signs of nystagmus  CN V:  Facial sensory is symmetric with no asymmetries.  CN VII:  Facial motor symmetric  CN VIII:  Gross hearing intact bilaterally  CN IX:  Palate elevates symmetrically  CN X:  Palate elevates symmetrically  CN XI:  Shoulder shrug symmetric  CN XII:  Tongue is midline on protrusion     Motor:   Tone is normal bilaterally.  (strength out of 5:  1= minimal movement, 2 = movement in plane of gravity, 3 = movement against gravity, 4 = movement against some resistance, 5 = full strength)  Motor function on the left upper and left lower extremity are 5 out of 5, except for some limitation in knee extension and knee flexion due to antalgic guarding.     Right upper extremity is remarkable for shoulder abduction flexion and extension along with internal and external rotation of 4 -.    Distally, finger extension and wrist extension 4, the flexors throughout the right upper extremity are 4+.     Right lower extremity is remarkable for hip flexion, knee flexion of 4+.  Rest of the muscle groups are 5 out of 5.     DTR:  There is no clonus, no Babinski, no Vera.  There is no obvious hyperreflexia     Sensory:  There is decreased sensation to light touch on the  right upper and right lower extremities.     Coordination:  There is no overt ataxia in the upper or lower extremities that emerges beyond the weakness.     Gait  Not tested    Last nurse assessment:  Interval: baseline  1a. Level of Consciousness: 0-->Alert, keenly responsive  1b. LOC Questions: 0-->Answers both questions correctly  1c. LOC Commands: 0-->Performs both tasks correctly  2. Best Gaze: 0-->Normal  3. Visual: 0-->No visual loss  4. Facial Palsy: 0-->Normal symmetrical movements  5a. Motor Arm, Left: 0-->No drift, limb holds 90 (or 45) degrees for full 10 secs  5b. Motor Arm, Right: 0-->No drift, limb holds 90 (or 45) degrees for full 10 secs  6a. Motor Leg, Left: 0-->No drift, leg holds 30 degree position for full 5 secs  6b. Motor Leg, Right: 0-->No drift, leg holds 30 degree position for full 5 secs  7. Limb Ataxia: 0-->Absent  8. Sensory: 1-->Mild-to-moderate sensory loss, patient feels pinprick is less sharp or is dull on the affected side, or there is a loss of superficial pain with pinprick, but patient is aware of being touched  9. Best Language: 0-->No aphasia, normal  10. Dysarthria: 0-->Normal  11. Extinction and Inattention (formerly Neglect): 0-->No abnormality    Total (NIH Stroke Scale): 1       Results Review:      Labs:  Labs as above    Imaging:  Imaging as above    Assessment/Plan   Patient who has a significant history of potential gynecological malignancy of unclear staging with DVTs in the left lower extremity and recently in the right upper extremity, that has occurred despite use of Eliquis.  And is presented with acute right upper extremity and right lower extremity weakness.     The presentation was concerning for an acute ischemic stroke, she was not a candidate for TNK given active use of anticoagulation plus out of the window plus potential metastatic malignancy.  Given lack of large vessel occlusion clinical signs, and her severe reaction to iodinated contrast we have  postponed the CTA and will obtain instead an MRA head and neck.    MRI brain with and without from 1/14/2024 demonstrates an acute ischemic stroke in the left temporal lobe, external capsule, body of the caudate that explains her symptoms.  There is no evidence of abnormal contrast-enhancement that is concerning for metastatic disease.  MRAs of the head and neck did not show any significant stenosis however there is severely degraded by motion.  We do not think a CTA is warranted at this time given the severe allergy.    Will continue the heparin drip, hoping to obtain therapeutic levels and once we get a therapeutic level head CT should be performed in order to rule out any hemorrhagic transformation of the stroke.  This is a safety measure given that we have to anticoagulate in the setting of the recent DVT.  Once we can demonstrate that while anticoagulated there is no hemorrhagic transformation of the stroke, it would be safe to transition her to Lovenox.  We do not think she is responding to Eliquis given that she reports having taken Eliquis every day without skipping a dose for at least a month.    We are awaiting a read of TTE with agitated saline, as presence of a PFO could explain paradoxical embolism.      Hospital Problem List      Acute focal neurological deficit    DVT (deep venous thrombosis)    Vaginal bleeding    Type 2 diabetes mellitus, without long-term current use of insulin    Metastatic disease    Acute blood loss anemia    Impression:  Clinical suspicion of acute ischemic stroke with RUE/RLE paresis: Etiology to be suspected embolic secondary to hypercoagulability due to suspected gynecological malignancy of unclear staging.    Plan:  Continue heparin gtt per protocol (low goal, no bolus, no re-bolus)  Continue atorvastatin 80 mg daily  F/up TTE with agitated saline  Recommend evaluation by oncology  Continue working with PT/OT  Inpatient Rehab as disposition      Medical Decision  Making    Number/Complexity of Problems  Moderate  1 undiagnosed new problem with uncertain prognosis -   1 acute illness with systemic symptoms -   High  1 acute or chronic illness that pose a threat to life/body function -   High    MDM Data  Moderate - 1/3 categories  Extensive - 2/3 categories    Category 1: 3 of the following  Review of external notes  Review of results  Ordering of each unique test  Independent historian  Category 2:  Independent interpretation of test (ex: imaging)  Category 3:  Discussion of management with another provider    Category 2     Treatment Plan  Moderate - Prescription Drug management  High  Drug therapy requiring intensive monitoring for toxicity  Decision regarding hospitalization or escalation of care  De-escalate care/DNR decisions  High      Naldo Telles MD  01/14/24  10:24 CST

## 2024-01-14 NOTE — CASE MANAGEMENT/SOCIAL WORK
Continued Stay Note   [facility]     Patient Name: Radha Wade  MRN: [id number]  Today's Date: 1/14/2024    Admit Date: 1/12/2024    Plan: Pending   Discharge Plan       Row Name 01/14/24 1109       Plan    Plan Pending    Plan Comments Went to speak with patient regarding dc plan and possible referral to [facility] per therapy recommendations but patient was on BSC and MD was seeing her as well.  However, CT is currently negative, patient will be having MRI today.  Will need MRI results to determine if patient has an acute rehab appropriate diagnosis.  Will await MRI results and follow up with patient at another time.                   Discharge Codes    No documentation.                       ROSA Garcia

## 2024-01-14 NOTE — PLAN OF CARE
Goal Outcome Evaluation:  Plan of Care Reviewed With: patient        Progress: improving  Outcome Evaluation: Pt recieved from ER around 0400. A&Ox4. VSS. NIHSS 0 at shift change. RA. Tele normal sinus. Voiding via BSC. Up x1 assist with gait belt. IV L AC, heparin drip intitiated and titrated per protocol. Call light in reach. Safety maintained.

## 2024-01-14 NOTE — THERAPY TREATMENT NOTE
Acute Care - Physical Therapy Treatment Note  [facility]     Patient Name: Radha Wade  : 1960  MRN: [id number]  Today's Date: 2024      Visit Dx:     ICD-10-CM ICD-9-CM   1. 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 Cystitis  N30.90 595.9   3. Fall, initial encounter  W19.XXXA E888.9   4. Weakness  R53.1 780.79   5. Impaired mobility [Z74.09]  Z74.09 799.89     Patient Active Problem List   Diagnosis    DVT (deep venous thrombosis)    Vaginal bleeding    History of asthma    Hyperglycemia    Abnormal CT of the abdomen    Acute focal neurological deficit    Type 2 diabetes mellitus, without long-term current use of insulin    Metastatic disease    Acute blood loss anemia     Past Medical History:   Diagnosis Date    Asthma     Bronchitis     Depression     Diabetes mellitus     DVT (deep venous thrombosis)     Heart murmur     Lyme disease     Mitral valve prolapse      Past Surgical History:   Procedure Laterality Date     SECTION       PT Assessment (last 12 hours)       PT Evaluation and Treatment       Row Name 24 1351          Physical Therapy Time and Intention    Document Type therapy note (daily note)  -AB     Mode of Treatment physical therapy  -AB       Row Name 24 1351          General Information    Existing Precautions/Restrictions fall  -AB       Row Name 24 1351          Bed Mobility    Bed Mobility supine-sit;sit-supine  -AB     Supine-Sit [place] (Bed Mobility) contact guard  -AB     Sit-Supine [place] (Bed Mobility) contact guard  -AB     Bed Mobility, Safety Issues decreased use of arms for pushing/pulling;decreased use of legs for bridging/pushing  -AB     Assistive Device (Bed Mobility) head of bed elevated;bed rails  -AB       Row Name 24 1351          Transfers    Transfers toilet transfer  -AB       Row Name 24 1351          Sit-Stand Transfer    Sit-Stand [place] (Transfers) contact guard  -AB       Row Name 24  1351          Stand-Sit Transfer    Stand-Sit [place] (Transfers) contact guard  -AB       Row Name 01/14/24 1351          Toilet Transfer    Type (Toilet Transfer) stand pivot/stand step  -AB     [place] Level (Toilet Transfer) verbal cues;minimum assist (75% patient effort)  -AB     Assistive Device (Toilet Transfer) commode, bedside without drop arms  -AB       Row Name 01/14/24 1351          Gait/Stairs (Locomotion)    [place] Level (Gait) verbal cues;contact guard;minimum assist (75% patient effort)  -AB     Assistive Device (Gait) walker, front-wheeled  -AB     Distance in Feet (Gait) 35'  -AB     Deviations/Abnormal Patterns (Gait) gait speed decreased;pietro decreased  -AB     Comment, (Gait/Stairs) decreased control of R LE   -AB       Row Name 01/14/24 1351          Motor Skills    Comments, Therapeutic Exercise AROM 10 reps x 2 sitting  -AB     Additional Documentation Comments, Therapeutic Exercise (Row)  -AB       Row Name 01/14/24 1351          Positioning and Restraints    Pre-Treatment Position in bed  -AB     Post Treatment Position bed  -AB     In Bed fowlers;call light within reach  -AB               User Key  (r) = Recorded By, (t) = Taken By, (c) = Cosigned By      Initials Name Provider Type    AB Krystal Diaz, PTA Physical Therapist Assistant                    Physical Therapy Education       Title: PT OT SLP Therapies (In Progress)       Topic: Physical Therapy (In Progress)       Point: Mobility training (Done)       Learning Progress Summary             Patient Acceptance, ADRIÁN BAKER DU by EAGLE at 1/13/2024 1320    Comment: benefits of activity, progression of PT                         Point: Home exercise program (Not Started)       Learner Progress:  Not documented in this visit.              Point: Body mechanics (Not Started)       Learner Progress:  Not documented in this visit.              Point: Precautions (Not Started)       Learner Progress:  Not documented in this  visit.                              User Key       Initials Effective Dates Name Provider Type Discipline    [facility] 02/03/23 -  Darrell Rosario, PT DPT Physical Therapist PT                  PT Recommendation and Plan             Time Calculation:    PT Charges       Row Name 01/14/24 1351             Time Calculation    Start Time 1351  -AB      Stop Time 1424  -AB      Time Calculation (min) 33 min  -AB      PT Received On 01/14/24  -AB         Time Calculation- PT    Total Timed Code Minutes- PT 33 minute(s)  -AB                User Key  (r) = Recorded By, (t) = Taken By, (c) = Cosigned By      Initials Name Provider Type    AB Krystal Diaz, PTA Physical Therapist Assistant                      PT G-Codes  Outcome Measure Options: AM-PAC 6 Clicks Daily Activity (OT), Modified Rai  AM-PAC 6 Clicks Score (PT): 13  AM-PAC 6 Clicks Score (OT): 14  Modified [place] Scale: 4 - Moderately severe disability.  Unable to walk without assistance, and unable to attend to own bodily needs without assistance.    Krystal Diaz PTA  1/14/2024

## 2024-01-14 NOTE — PLAN OF CARE
Goal Outcome Evaluation:   VSS. Alert x 4. Room air. Up x 1-2 to bedside toilet. MRI complete today. Continue heparin therapy. No complaints at this time.

## 2024-01-14 NOTE — PLAN OF CARE
Goal Outcome Evaluation:  Plan of Care Reviewed With: patient        Progress: no change  Outcome Evaluation: Ntn assessment completed. Oral inake 83% of three meals. Pt with unintentional wt loss of 22 lbs (11%) over the past four months per wt report. Boost glucose control BID. Cont to follow for plan of care.

## 2024-01-14 NOTE — PLAN OF CARE
Goal Outcome Evaluation:  Plan of Care Reviewed With: patient        Progress: no change                                  SLP received order for communication evaluation per stroke protocol. The patient reports no concerns with speech, language, or cognition. She reports she is sleepy this AM and mostly worried about rehabilitation of her arm and leg. Communication functional at the conversational level. Please re-consult with any acute changes or concerns.     Jelly Stern MS CCC-SLP 1/14/2024 07:30 CST

## 2024-01-14 NOTE — PROGRESS NOTES
[facility] Medicine Services  INPATIENT PROGRESS NOTE    Patient Name: Radha Wade  Date of Admission: 1/12/2024  Today's Date: 01/14/24  Length of Stay: 0  Primary Care Physician: Myron Vines MD    Subjective   Chief Complaint: R sided weakness, constipation    HPI   Patient seen and evaluated earlier today prior to MRI.  Was still complaining of ongoing right-sided weakness and coordination issues.  Only other complaint was constipation.  Denies chest pain or shortness of breath.  No nausea.        Review of Systems   All pertinent negatives and positives are as above. All other systems have been reviewed and are negative unless otherwise stated.     Objective    Temp:  [97.7 °F (36.5 °C)-98.8 °F (37.1 °C)] 98.3 °F (36.8 °C)  Heart Rate:  [] 108  Resp:  [16-18] 16  BP: ()/(45-76) 125/49  Physical Exam  GEN: Awake, alert, interactive, in NAD  HEENT: PERRLA, EOMI, Anicteric, Trachea midline  Lungs: no wheezing/rales/rhonchi  Heart: RRR, +S1/s2, no rub  ABD: soft, nt/nd, +BS, no guarding/rebound  Extremities: atraumatic, no cyanosis  Neuro: AAOx3, 4/5 R sided MS compared to L  Psych: appears anxious.         Results Review:  I have reviewed the labs, radiology results, and diagnostic studies.    Laboratory Data:   Results from last 7 days   Lab Units 01/14/24  0359 01/13/24  0602 01/12/24  2311   WBC 10*3/mm3 8.82 7.46 9.23   HEMOGLOBIN g/dL 8.9* 8.9* 9.0*   HEMATOCRIT % 31.8* 34.4 31.8*   PLATELETS 10*3/mm3 605* 524* 592*        Results from last 7 days   Lab Units 01/14/24  0359 01/13/24  0602 01/12/24  2311   SODIUM mmol/L 136 136 137   POTASSIUM mmol/L 4.5 4.2 4.4   CHLORIDE mmol/L 100 100 99   CO2 mmol/L 25.0 23.0 27.0   BUN mg/dL 10 11 12   CREATININE mg/dL 0.47* 0.50* 0.55*   CALCIUM mg/dL 8.8 8.7 9.0   BILIRUBIN mg/dL 0.2 0.2 0.2   ALK PHOS U/L 229* 208* 227*   ALT (SGPT) U/L 10 11 13   AST (SGOT) U/L 14 17 22   GLUCOSE mg/dL 191* 126* 160*        Culture Data:   Urine Culture   Date Value Ref Range Status   01/13/2024 >100,000 CFU/mL Mixed [place] Isolated  Final       Radiology Data:   Imaging Results (Last 24 Hours)       Procedure Component Value Units Date/Time    MRI Angiogram Neck Without Contrast [[id number]] Collected: 01/14/24 1502     Updated: 01/14/24 1514    Narrative:      EXAMINATION: MRI ANGIOGRAM NECK WO CONTRAST-  1/14/2024 3:02 PM MR  angiogram of the neck without contrast 1/14/2024     HISTORY: Carotid artery dissection suspected.     FINDINGS: MR angiography was performed of the neck utilizing 3D  time-of-flight and MIP images. Today's study is significantly degraded  by motion. This significantly lowers the sensitivity of the exam.     The visualized aortic arch is normal in caliber. The origin of the great  vessels are widely patent. The right vertebral artery origin is widely  patent. The left vertebral artery is rather diminutive in size and its  origin and proximal segment is not well-defined. Distally it is  suspected that the left vertebral artery terminates in the posterior  inferior cerebellar artery. The right vertebral artery is dominant and  appears to be widely patent from its origin to the basilar artery.     Both common carotid arteries are patent at their origin. No focal  luminal stenosis appreciated proximal to the carotid bifurcations. Both  carotid bifurcations are unremarkable with no evidence of rate limiting  stenosis. The ICAs are patent to the skull base without evidence of rate  limiting stenosis.       Impression:      1. Study is degraded by motion artifact. This is particularly noted on  the MIP sequences which are three-dimensional reconstructions based on  the raw source images.  2. The carotid arteries are unremarkable. The carotid bifurcations are  normal in appearance without evidence of rate limiting stenosis with  both ICAs patent to the skull base.  3. The right vertebral artery is widely patent  from its origin to the  skull base. The left vertebral artery is rather diminutive in size and  is not well-defined in its proximal segment. Difficult to ascertain  whether there is some loss of definition of this vessel simply because  of its small size and adjacent pulsatility from the aortic arch and  proximal great vessels or whether it is diseased. I feel that this  vessel terminates in the posterior inferior cerebellar artery at the  level of the posterior cranial fossa.  4. If further evaluation of the carotid and vertebral arteries is felt  warranted then follow-up with CT angiography would be the study of  choice and is considered the definitive exam for evaluation of the  vessels of the head and neck. Motion artifact, although potentially  degrading the study is a much lesser factor during performance of CT  angiography as the acquisition only takes a few seconds.     This report was signed and finalized on 1/14/2024 3:11 PM by Dr. Romeo Arizmendi MD.       MRI Angiogram Head Without Contrast [[id number]] Collected: 01/14/24 1455     Updated: 01/14/24 1505    Narrative:      EXAMINATION: MRI ANGIOGRAM HEAD WO CONTRAST-  1/14/2024 2:55 PM     HISTORY: Stroke. Determine embolic source.     FINDINGS: MR angiography was performed of the [place] with 3D  time-of-flight and MIP images. Raw source images are also reviewed.     Today's study is limited secondary to rather extensive motion artifact.  This is particularly noted on the MIPS which are reproduction's based on  the raw source images.     The right vertebral artery is dominant. The left vertebral artery is  rather diminutive in size. I feel the left vertebral artery terminates  in the posterior inferior cerebellar artery. The right vertebral artery  is patent to the basilar artery. The basilar artery is patent. The  superior cerebellar arteries appear to be grossly intact. The basilar  artery terminates into the posterior cerebral artery on the  left. There  is a fetal origin of the right posterior cerebral artery.     The distal ICAs are patent. The petrous, cavernous and supraclinoid  segment of the ICAs are patent to the terminus. Grossly the anterior and  middle cerebral arteries are patent. I do not see any discrete aneurysm.  No gross evidence of high-grade stenosis but the study is very limited.  The more distal trifurcation vessels are attenuated secondary to motion  artifact.       Impression:      1. Very limited exam secondary to extensive motion artifact. The left  vertebral artery is rather diminutive and I feel terminates in the  posterior inferior cerebellar artery. The right vertebral artery is  dominant. The basilar artery is widely patent. The basilar artery  terminates in the left posterior cerebral artery with the right  posterior cerebral artery emanating from the anterior circulation via  suspected persistent fetal origin.  2. Both intracranial ICAs are patent to the terminus. Potential disease  involving the cavernous and supraclinoid segment of the ICAs cannot be  fully evaluated. The anterior and middle cerebral arteries are grossly  unremarkable. I do not see any definite aneurysm.  3. If further assessment of the [place] is felt warranted  follow-up with CT angiography would be the study of choice for further  evaluation. That is a rapid acquisition and will not be as affected by  potential motion.     This report was signed and finalized on 1/14/2024 3:02 PM by Dr. Romeo Arizmendi MD.       MRI Brain With & Without Contrast [[id number]] Collected: 01/14/24 1438     Updated: 01/14/24 1454    Narrative:      MRI BRAIN W WO CONTRAST- 1/14/2024 10:55 AM     HISTORY: Stroke, follow up; I82.619-Acute embolism and thrombosis of  superficial veins of unspecified upper extremity; N30.90-Cystitis,  unspecified without hematuria; W19.XXXA-Unspecified fall, initial  encounter; R53.1-Weakness; Z74.09-Other reduced mobility      COMPARISON: None.       TECHNIQUE: Multiplanar imaging of the brain was performed in a routine  fashion before and after the intravenous injection of gadolinium  contrast. There is significant motion artifact on today's exam.     FINDINGS:  Diffusion: There is diffusion restriction within the mesial left  temporal lobe,, left external capsule, left insular cortex as well as  within the body of the caudate nucleus with associated ADC mapping  abnormalities consistent with acute ischemic infarction. These infarcts  are nonhemorrhagic. There is no associated mass effect. No additional  sites of diffusion restriction are present.     Midline structures: Nondisplaced.     Ventricles: Normal in configuration and symmetric in size.     Masses: No masses or mass effect.     Basilar cisterns: Maintained.     Extra axial space: No abnormal extra-axial fluid.     Gray-white matter signal: There are additional scattered foci of T2  abnormality involving the periventricular and higher white matter tract  suggesting small vessel ischemic disease.     Cerebellum: Normal.     Brainstem: Normal.     Enhancement: No abnormal enhancement.     Other: Proximal cervical spinal cord is normal. Bilateral globes and  orbits are normal in appearance. Normal cerebrovascular flow voids  noted. No abnormal signal in the mastoid air cells or paranasal sinuses.       Impression:      1. Diffusion restriction with associated ADC mapping abnormality  involving the mesial left temporal lobe, left external capsule, left  insula as well as the body of the caudate nucleus. These infarcts are  nonhemorrhagic.  2. Mild atrophy. Mild small vessel ischemic changes are noted involving  the periventricular and higher white matter tracts.  3. Normal flow voids within the [place].  4. Today's study is degraded by motion. No abnormal contrast enhancement  is demonstrated.           This report was signed and finalized on 1/14/2024 2:50 PM by   Romeo Arizmendi MD.       US Venous Doppler Upper Extremity Right (duplex) [[id number]] Collected: 01/14/24 1113     Updated: 01/14/24 1117    Narrative:      History: Pain and swelling       Impression:      Impression:  1. There is no evidence of deep venous thrombosis of the right upper  extremity.  2. There is evidence of superficial thrombus in the cephalic vein.     Comments: Right upper extremity venous duplex exam was performed using  color Doppler flow, Doppler wave form analysis, and grayscale imaging,  with and without compression. There is no evidence of deep venous  thrombosis of the internal jugular, subclavian, axillary, brachial,  radial, and ulnar veins. There is evidence of superficial thrombus  involving the cephalic vein.        This report was signed and finalized on 1/14/2024 11:14 AM by Dr. Joseph Mari MD.               I have reviewed the patient's current medications.     Assessment/Plan   Assessment  Active Hospital Problems    Diagnosis     **Acute focal neurological deficit     CVA (cerebral vascular accident)     Type 2 diabetes mellitus, without long-term current use of insulin     Metastatic disease     Acute blood loss anemia     Vaginal bleeding     DVT (deep venous thrombosis)        Treatment Plan  #1 acute CVA -patient presented with symptoms concerning for stroke.  Initial head CT was nonacute.  MRI with and without was obtained today to look for CVA versus possible mass given cancer history.  It was positive for multifocal diffusion restriction/CVA.  No obvious flow limitation was noted on angiographies.  Some was limited due to motion.  2D echo pending.  She has been on a heparin drip at this point by neurology, defer to them but suspect okay to resume Eliquis.  On statin.  Continue therapies.    #2 history DVT -patient with concern about right upper extremity swelling on arrival but she does not have a DVT in that extremity.  History of DVT in her lower extremities.  In  the setting of malignancy.  She has been on Eliquis at this point but transition to heparin drip here due to fear for possible failure of therapy.  Patient openly admits that she would take the medicine once a day and not regularly.  Do not suspect there is any failure of care at this time rather patient noncompliance.  Feel she is likely somewhat in the mild to current issues at hand.  Note potential recommendations for Lovenox at discharge by neurology.  Will ask her hematologist/oncologist Dr. Grullon to evaluate in the a.m. for recommendations.    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 Grullon.  It seems that a lot of the procedures and testing have been ordered prior have typically not been performed as patient did not go for them.  Suspect she is in some denial to her diagnosis.  She is also supposed to have an upcoming appointment at [place] for further evaluation.  Again we will ask hematology to evaluate in the a.m. to help with coordination of care and to ensure adequate/appropriate follow-up.    #4 history of DM2 -on sliding scale insulin and hypoglycemia protocol.    #5 anemia -stable since arrival.  Does have a history of vaginal bleeding likely in the setting of GYN malignancy.  Monitor, no obvious signs of bleeding here so far.    #6 constipation -start bowel regimen    Medical Decision Making  Number and Complexity of problems: 1 acute problem, multiple chronic  Differential Diagnosis: as above    Conditions and Status        Pt is new to me, appears stable/non-toxic     MDM Data  External documents reviewed: none  Cardiac tracing (EKG, telemetry) interpretation: sinus on monitor  Radiology interpretation: reports reviewed  Labs reviewed: as above  Any tests that were considered but not ordered: none     Decision rules/scores evaluated (example [id number], [place], etc): none     Discussed with: patient, nursing     Care Planning  Shared decision making: Patient apprised of current  labs, vitals, imaging and treatment plan.  They are agreeable with proceeding with plans as discussed.    Code status and discussions: full code    Disposition  Social Determinants of Health that impact treatment or disposition: none  I expect the patient to be discharged to possible acute inpt rehab vs snf in 1-2 days    Electronically signed by Frank Frankel DO, 01/14/24, 15:42 CST.;y

## 2024-01-15 ENCOUNTER — APPOINTMENT (OUTPATIENT)
Dept: CT IMAGING | Facility: HOSPITAL | Age: 64
End: 2024-01-15
Payer: COMMERCIAL

## 2024-01-15 ENCOUNTER — APPOINTMENT (OUTPATIENT)
Dept: CARDIOLOGY | Facility: HOSPITAL | Age: 64
End: 2024-01-15
Payer: COMMERCIAL

## 2024-01-15 LAB
ALBUMIN SERPL-MCNC: 3.1 G/DL (ref 3.5–5.2)
ALBUMIN/GLOB SERPL: 0.8 G/DL
ALP SERPL-CCNC: 163 U/L (ref 39–117)
ALT SERPL W P-5'-P-CCNC: 7 U/L (ref 1–33)
ANION GAP SERPL CALCULATED.3IONS-SCNC: 11 MMOL/L (ref 5–15)
APTT PPP: 46.7 SECONDS (ref 24.5–36)
APTT PPP: 54.3 SECONDS (ref 24.5–36)
APTT PPP: 82.4 SECONDS (ref 24.5–36)
APTT PPP: 90.6 SECONDS (ref 24.5–36)
AST SERPL-CCNC: 9 U/L (ref 1–32)
BH CV ECHO MEAS - AO MAX PG: 11.8 MMHG
BH CV ECHO MEAS - AO MEAN PG: 6 MMHG
BH CV ECHO MEAS - AO ROOT DIAM: 2.3 CM
BH CV ECHO MEAS - AO V2 MAX: 172 CM/SEC
BH CV ECHO MEAS - AO V2 VTI: 30.8 CM
BH CV ECHO MEAS - AVA(I,D): 2.08 CM2
BH CV ECHO MEAS - EDV(CUBED): 82.9 ML
BH CV ECHO MEAS - EDV(MOD-SP2): 61 ML
BH CV ECHO MEAS - EDV(MOD-SP4): 69 ML
BH CV ECHO MEAS - EF(MOD-BP): 59.1 %
BH CV ECHO MEAS - EF(MOD-SP2): 56.2 %
BH CV ECHO MEAS - EF(MOD-SP4): 62 %
BH CV ECHO MEAS - ESV(CUBED): 14 ML
BH CV ECHO MEAS - ESV(MOD-SP2): 26.7 ML
BH CV ECHO MEAS - ESV(MOD-SP4): 26.2 ML
BH CV ECHO MEAS - FS: 44.7 %
BH CV ECHO MEAS - IVS/LVPW: 0.96 CM
BH CV ECHO MEAS - IVSD: 0.83 CM
BH CV ECHO MEAS - LA DIMENSION: 3 CM
BH CV ECHO MEAS - LAT PEAK E' VEL: 10.1 CM/SEC
BH CV ECHO MEAS - LV DIASTOLIC VOL/BSA (35-75): 37.3 CM2
BH CV ECHO MEAS - LV MASS(C)D: 116.4 GRAMS
BH CV ECHO MEAS - LV MAX PG: 6.8 MMHG
BH CV ECHO MEAS - LV MEAN PG: 5 MMHG
BH CV ECHO MEAS - LV SYSTOLIC VOL/BSA (12-30): 14.2 CM2
BH CV ECHO MEAS - LV V1 MAX: 130 CM/SEC
BH CV ECHO MEAS - LV V1 VTI: 25.2 CM
BH CV ECHO MEAS - LVIDD: 4.4 CM
BH CV ECHO MEAS - LVIDS: 2.41 CM
BH CV ECHO MEAS - LVOT AREA: 2.5 CM2
BH CV ECHO MEAS - LVOT DIAM: 1.8 CM
BH CV ECHO MEAS - LVPWD: 0.87 CM
BH CV ECHO MEAS - MED PEAK E' VEL: 9.9 CM/SEC
BH CV ECHO MEAS - MV A MAX VEL: 75.2 CM/SEC
BH CV ECHO MEAS - MV DEC TIME: 0.24 SEC
BH CV ECHO MEAS - MV E MAX VEL: 60.6 CM/SEC
BH CV ECHO MEAS - MV E/A: 0.81
BH CV ECHO MEAS - PI END-D VEL: 103 CM/SEC
BH CV ECHO MEAS - RAP SYSTOLE: 5 MMHG
BH CV ECHO MEAS - RVSP: 21.2 MMHG
BH CV ECHO MEAS - SI(MOD-SP2): 18.5 ML/M2
BH CV ECHO MEAS - SI(MOD-SP4): 23.1 ML/M2
BH CV ECHO MEAS - SV(LVOT): 64.1 ML
BH CV ECHO MEAS - SV(MOD-SP2): 34.3 ML
BH CV ECHO MEAS - SV(MOD-SP4): 42.8 ML
BH CV ECHO MEAS - TR MAX PG: 16.2 MMHG
BH CV ECHO MEAS - TR MAX VEL: 201 CM/SEC
BH CV ECHO MEASUREMENTS AVERAGE E/E' RATIO: 6.06
BH CV ECHO SHUNT ASSESSMENT PERFORMED (HIDDEN SCRIPTING): 1
BILIRUB SERPL-MCNC: 0.2 MG/DL (ref 0–1.2)
BUN SERPL-MCNC: 16 MG/DL (ref 8–23)
BUN/CREAT SERPL: 29.6 (ref 7–25)
CALCIUM SPEC-SCNC: 8.9 MG/DL (ref 8.6–10.5)
CHLORIDE SERPL-SCNC: 101 MMOL/L (ref 98–107)
CO2 SERPL-SCNC: 26 MMOL/L (ref 22–29)
CREAT SERPL-MCNC: 0.54 MG/DL (ref 0.57–1)
DEPRECATED RDW RBC AUTO: 43.8 FL (ref 37–54)
EGFRCR SERPLBLD CKD-EPI 2021: 103.6 ML/MIN/1.73
ERYTHROCYTE [DISTWIDTH] IN BLOOD BY AUTOMATED COUNT: 16.3 % (ref 12.3–15.4)
GLOBULIN UR ELPH-MCNC: 4.1 GM/DL
GLUCOSE BLDC GLUCOMTR-MCNC: 127 MG/DL (ref 70–130)
GLUCOSE BLDC GLUCOMTR-MCNC: 140 MG/DL (ref 70–130)
GLUCOSE BLDC GLUCOMTR-MCNC: 165 MG/DL (ref 70–130)
GLUCOSE BLDC GLUCOMTR-MCNC: 179 MG/DL (ref 70–130)
GLUCOSE SERPL-MCNC: 139 MG/DL (ref 65–99)
HCT VFR BLD AUTO: 28.6 % (ref 34–46.6)
HGB BLD-MCNC: 7.8 G/DL (ref 12–15.9)
LEFT ATRIUM VOLUME INDEX: 27.3 ML/M2
LEFT ATRIUM VOLUME: 50.5 ML
MAGNESIUM SERPL-MCNC: 2.2 MG/DL (ref 1.6–2.4)
MCH RBC QN AUTO: 20.1 PG (ref 26.6–33)
MCHC RBC AUTO-ENTMCNC: 27.3 G/DL (ref 31.5–35.7)
MCV RBC AUTO: 73.7 FL (ref 79–97)
PLATELET # BLD AUTO: 692 10*3/MM3 (ref 140–450)
PMV BLD AUTO: 9.5 FL (ref 6–12)
POTASSIUM SERPL-SCNC: 4 MMOL/L (ref 3.5–5.2)
PROT SERPL-MCNC: 7.2 G/DL (ref 6–8.5)
RBC # BLD AUTO: 3.88 10*6/MM3 (ref 3.77–5.28)
SODIUM SERPL-SCNC: 138 MMOL/L (ref 136–145)
WBC NRBC COR # BLD AUTO: 14.86 10*3/MM3 (ref 3.4–10.8)

## 2024-01-15 PROCEDURE — 63710000001 INSULIN LISPRO (HUMAN) PER 5 UNITS: Performed by: FAMILY MEDICINE

## 2024-01-15 PROCEDURE — 85730 THROMBOPLASTIN TIME PARTIAL: CPT | Performed by: STUDENT IN AN ORGANIZED HEALTH CARE EDUCATION/TRAINING PROGRAM

## 2024-01-15 PROCEDURE — 25810000003 SODIUM CHLORIDE 0.9 % SOLUTION 250 ML FLEX CONT: Performed by: NURSE PRACTITIONER

## 2024-01-15 PROCEDURE — 25010000002 NA FERRIC GLUC CPLX PER 12.5 MG: Performed by: NURSE PRACTITIONER

## 2024-01-15 PROCEDURE — 97116 GAIT TRAINING THERAPY: CPT

## 2024-01-15 PROCEDURE — 83735 ASSAY OF MAGNESIUM: CPT | Performed by: INTERNAL MEDICINE

## 2024-01-15 PROCEDURE — 70450 CT HEAD/BRAIN W/O DYE: CPT

## 2024-01-15 PROCEDURE — 99232 SBSQ HOSP IP/OBS MODERATE 35: CPT | Performed by: STUDENT IN AN ORGANIZED HEALTH CARE EDUCATION/TRAINING PROGRAM

## 2024-01-15 PROCEDURE — 82948 REAGENT STRIP/BLOOD GLUCOSE: CPT

## 2024-01-15 PROCEDURE — 85027 COMPLETE CBC AUTOMATED: CPT | Performed by: FAMILY MEDICINE

## 2024-01-15 PROCEDURE — 93306 TTE W/DOPPLER COMPLETE: CPT | Performed by: INTERNAL MEDICINE

## 2024-01-15 PROCEDURE — 97535 SELF CARE MNGMENT TRAINING: CPT

## 2024-01-15 PROCEDURE — 97110 THERAPEUTIC EXERCISES: CPT

## 2024-01-15 PROCEDURE — 80053 COMPREHEN METABOLIC PANEL: CPT | Performed by: INTERNAL MEDICINE

## 2024-01-15 PROCEDURE — 25010000002 HEPARIN (PORCINE) 25000-0.45 UT/250ML-% SOLUTION: Performed by: STUDENT IN AN ORGANIZED HEALTH CARE EDUCATION/TRAINING PROGRAM

## 2024-01-15 PROCEDURE — 93306 TTE W/DOPPLER COMPLETE: CPT

## 2024-01-15 RX ADMIN — ACETAMINOPHEN 650 MG: 325 TABLET, FILM COATED ORAL at 22:04

## 2024-01-15 RX ADMIN — ACETAMINOPHEN 650 MG: 325 TABLET, FILM COATED ORAL at 12:44

## 2024-01-15 RX ADMIN — DOCUSATE SODIUM 100 MG: 100 CAPSULE, LIQUID FILLED ORAL at 21:58

## 2024-01-15 RX ADMIN — INSULIN LISPRO 2 UNITS: 100 INJECTION, SOLUTION INTRAVENOUS; SUBCUTANEOUS at 17:01

## 2024-01-15 RX ADMIN — Medication 10 ML: at 21:59

## 2024-01-15 RX ADMIN — INSULIN LISPRO 2 UNITS: 100 INJECTION, SOLUTION INTRAVENOUS; SUBCUTANEOUS at 09:05

## 2024-01-15 RX ADMIN — ATORVASTATIN CALCIUM 80 MG: 40 TABLET, FILM COATED ORAL at 21:58

## 2024-01-15 RX ADMIN — POLYETHYLENE GLYCOL 3350 17 G: 17 POWDER, FOR SOLUTION ORAL at 09:06

## 2024-01-15 RX ADMIN — DOCUSATE SODIUM 100 MG: 100 CAPSULE, LIQUID FILLED ORAL at 09:06

## 2024-01-15 RX ADMIN — HEPARIN SODIUM 18.44 UNITS/KG/HR: 10000 INJECTION, SOLUTION INTRAVENOUS at 01:01

## 2024-01-15 RX ADMIN — SODIUM CHLORIDE 250 MG: 9 INJECTION, SOLUTION INTRAVENOUS at 19:55

## 2024-01-15 RX ADMIN — PANTOPRAZOLE SODIUM 40 MG: 40 TABLET, DELAYED RELEASE ORAL at 06:10

## 2024-01-15 RX ADMIN — ACETAMINOPHEN 650 MG: 325 TABLET, FILM COATED ORAL at 01:34

## 2024-01-15 NOTE — PROGRESS NOTES
Neurology Progress Note      Chief Complaint:  Stroke  Length of Stay:  1     Interval     1/15: Working with physical therapy today, looking better and feeling stronger.  Continues to be on heparin which is now therapeutic, obtaining head CT to determine transition to Lovenox.  TTE with agitated saline completed.    1/14: Has been hyperglycemic up to 247 at 8 AM this morning.  Continues on heparin drip protocol last PTT 44.5 at 4 AM . MRI brain w/wo and MRA head and neck completed today demonstrating left temporal lobe, left external capsule, left insula, body of the left cardiac nucleus infarcts.  There is no abnormal contrast-enhancement.  MRI brain degraded by motion artifact and in the limited evaluation there is no clear significant stenosis or large vessel occlusion. SLP evaluated, no acute needs.     1/13: Stopped Eliquis, stop aspirin, started on heparin drip low goal no bolus no rebolus.  Started premedication protocol for MRI with and without contrast tomorrow morning per radiology request.    Occupational Therapy and physical therapy evaluated recommended inpatient rehab facility,     Subjective     Continues to feel slightly better exhausted from working with physical therapy but feels committed to continue to improve.  No new symptoms.      Medications:  Current Facility-Administered Medications   Medication Dose Route Frequency Provider Last Rate Last Admin    acetaminophen (TYLENOL) tablet 650 mg  650 mg Oral Q4H PRN Daniel Fleming MD   650 mg at 01/15/24 0134    Or    acetaminophen (TYLENOL) suppository 650 mg  650 mg Rectal Q4H PRN Daniel Fleming MD        atorvastatin (LIPITOR) tablet 80 mg  80 mg Oral Nightly Daniel Fleming MD   80 mg at 01/14/24 2116    bisacodyl (DULCOLAX) suppository 10 mg  10 mg Rectal Daily PRN Frank Frankel DO        dextrose (D50W) (25 g/50 mL) IV injection 25 g  25 g Intravenous Q15 Min PRN Daniel Fleming MD        dextrose  (GLUTOSE) oral gel 15 g  15 g Oral Q15 Min PRN Daniel Fleming MD        docusate sodium (COLACE) capsule 100 mg  100 mg Oral BID Daniel Fleming MD   100 mg at 01/15/24 0906    glucagon (GLUCAGEN) injection 1 mg  1 mg Intramuscular Q15 Min PRN Daniel Fleming MD        heparin 62836 units/250 mL (100 units/mL) in 0.45 % NaCl infusion  12 Units/kg/hr Intravenous Titrated Naldo Telles MD 16.31 mL/hr at 01/15/24 0302 21.018 Units/kg/hr at 01/15/24 0302    HYDROcodone-acetaminophen (NORCO) 5-325 MG per tablet 1 tablet  1 tablet Oral Q4H PRN Daniel Fleming MD        Insulin Lispro (humaLOG) injection 2-7 Units  2-7 Units Subcutaneous 4x Daily AC & at Bedtime Daniel Fleming MD   2 Units at 01/15/24 0905    labetalol (NORMODYNE,TRANDATE) injection 10 mg  10 mg Intravenous Q10 Min PRN Daniel Fleming MD        ondansetron (ZOFRAN) injection 4 mg  4 mg Intravenous Q6H PRN Daniel Fleming MD        pantoprazole (PROTONIX) EC tablet 40 mg  40 mg Oral Q AM Daniel Fleming MD   40 mg at 01/15/24 0610    polyethylene glycol (MIRALAX) packet 17 g  17 g Oral Daily Frank Frankel DO   17 g at 01/15/24 0906    sodium chloride 0.9 % flush 10 mL  10 mL Intravenous PRN Daniel Fleming MD        sodium chloride 0.9 % flush 10 mL  10 mL Intravenous Q12H Danile Fleming MD   10 mL at 01/14/24 0852    sodium chloride 0.9 % flush 10 mL  10 mL Intravenous PRN Daniel Fleming MD        sodium chloride 0.9 % infusion 40 mL  40 mL Intravenous PRN Daniel Fleming MD        traMADol (ULTRAM) tablet 25 mg  25 mg Oral Q8H PRN Daniel Fleming MD             Objective      Vital Signs  Temp:  [97.9 °F (36.6 °C)-98.6 °F (37 °C)] 98 °F (36.7 °C)  Heart Rate:  [] 84  Resp:  [16-18] 16  BP: ()/(49-80) 123/49    General Exam:  Head:  Normocephalic, atraumatic  HEENT:  Neck supple  CVS:  Regular rate and rhythm on  monitor  Lungs: Breathing comfortably on room air  Abdomen: Distended and soft  Extremities: There is bilateral lower extremity nonpitting edema up to the knees.  There is mild redness and edema of the upper right upper extremity.  Skin: There is some macular with small papular rash mostly over the ankles and the anterior surface of the left lower extremity lesser degree the right lower extremity     Neurologic Exam:     Mental Status:    -Awake, Alert, Oriented X 3  -No word finding difficulties  -No aphasia  -No dysarthria  -Follows simple and complex commands     CN II:  Visual fields full.  Pupils equally reactive to light  CN III, IV, VI:  Extraocular Muscles full with no signs of nystagmus  CN V:  Facial sensory is symmetric with no asymmetries.  CN VII:  Facial motor symmetric  CN VIII:  Gross hearing intact bilaterally  CN IX:  Palate elevates symmetrically  CN X:  Palate elevates symmetrically  CN XI:  Shoulder shrug symmetric  CN XII:  Tongue is midline on protrusion     Motor:   Tone is normal bilaterally.  (strength out of 5:  1= minimal movement, 2 = movement in plane of gravity, 3 = movement against gravity, 4 = movement against some resistance, 5 = full strength)  Motor function on the left upper and left lower extremity are 5 out of 5, except for some limitation in knee extension and knee flexion due to antalgic guarding.     Right upper extremity is remarkable for shoulder abduction flexion and extension along with internal and external rotation of 4.    Distally, finger extension and wrist extension 4+, the flexors throughout the right upper extremity are 4+.     Right lower extremity is remarkable for hip flexion, knee flexion of 4+.  Rest of the muscle groups are 5 out of 5.     DTR:  There is no clonus, no Babinski, no Vera.  There is no obvious hyperreflexia     Sensory:  There is decreased sensation to light touch on the right upper and right lower extremities.     Coordination:  There is  no overt ataxia in the upper or lower extremities that emerges beyond the weakness.     Gait  Observe while working with PT, with hemiparetic gait but able to ambulate short distances with rolling walker and assistance.    Last nurse assessment:  Interval:  (change of shift with Brenda RN)  1a. Level of Consciousness: 0-->Alert, keenly responsive  1b. LOC Questions: 0-->Answers both questions correctly  1c. LOC Commands: 0-->Performs both tasks correctly  2. Best Gaze: 0-->Normal  3. Visual: 0-->No visual loss  4. Facial Palsy: 0-->Normal symmetrical movements  5a. Motor Arm, Left: 0-->No drift, limb holds 90 (or 45) degrees for full 10 secs  5b. Motor Arm, Right: 0-->No drift, limb holds 90 (or 45) degrees for full 10 secs  6a. Motor Leg, Left: 0-->No drift, leg holds 30 degree position for full 5 secs  6b. Motor Leg, Right: 1-->Drift, leg falls by the end of the 5-sec period but does not hit bed  7. Limb Ataxia: 0-->Absent  8. Sensory: 0-->Normal, no sensory loss  9. Best Language: 0-->No aphasia, normal  10. Dysarthria: 0-->Normal  11. Extinction and Inattention (formerly Neglect): 0-->No abnormality    Total (NIH Stroke Scale): 1       Results Review:      Labs:  Last PTT of 82.4, glucose within range.  WBC of 14.9 from 8, no fever associated.  Thrombocytosis of 692.  Hemoglobin dropped of 1.1 units from 8.9 to 7.8.    Imaging:    MRI brain from 1/14/2024: Reviewed and agree with report with likely acute ischemic stroke in the left temporal lobe left external capsule and left insula and the body of the carotid nucleus on the left.  No abnormal contrast-enhancement.    DWI:      TTE 1/15/2024:    Normal LVEF 56 to 60%, no PFO, normal valves  Assessment/Plan   Patient who has a significant history of potential gynecological malignancy of unclear staging with DVTs in the left lower extremity and recently in the right upper extremity, that has occurred despite use of Eliquis.  And is presented with acute right upper  extremity and right lower extremity weakness.     The presentation was concerning for an acute ischemic stroke, she was not a candidate for TNK given active use of anticoagulation plus out of the window plus potential metastatic malignancy.  Given lack of large vessel occlusion clinical signs, and her severe reaction to iodinated contrast we have postponed the CTA and will obtain instead an MRA head and neck.    MRI brain with and without from 1/14/2024 demonstrates an acute ischemic stroke in the left temporal lobe, external capsule, body of the caudate that explains her symptoms.  There is no evidence of abnormal contrast-enhancement that is concerning for metastatic disease.  MRAs of the head and neck did not show any significant stenosis however there is severely degraded by motion.  We do not think a CTA is warranted at this time given the severe allergy and that we have a.  Reasonable mechanism of her stroke with hypercoagulability secondary to cancer.    Will continue the heparin drip, hoping to obtain therapeutic levels.  We have obtained therapeutic level today we will obtain a head CT to ensure there has been no hemorrhagic transformation.  And is to transition to Lovenox. We do not think she is responding to Eliquis given that she reports having taken Eliquis every day without skipping a dose for at least a month.    Importantly TTE did not show any PFO, meaning that the hypercoagulability is both venous and arterial.  As there is low likelihood there is a paradoxical embolism.        Hospital Problem List      Acute focal neurological deficit    DVT (deep venous thrombosis)    Vaginal bleeding    Type 2 diabetes mellitus, without long-term current use of insulin    Metastatic disease    Acute blood loss anemia    CVA (cerebral vascular accident)    Impression:  Clinical suspicion of acute ischemic stroke with RUE/RLE paresis: Etiology to be suspected embolic secondary to hypercoagulability due to  suspected gynecological malignancy of unclear staging.    Plan:  Continue heparin gtt per protocol (low goal, no bolus, no re-bolus)  Head CT today and then if no hemorrhagic transformation of the stroke start Lovenox therapeutic for DVT.  Continue atorvastatin 80 mg daily  Continue working with PT/OT  Inpatient Rehab as disposition      Medical Decision Making    Number/Complexity of Problems  Moderate  1 undiagnosed new problem with uncertain prognosis -   1 acute illness with systemic symptoms -   High  1 acute or chronic illness that pose a threat to life/body function -   High    MDM Data  Moderate - 1/3 categories  Extensive - 2/3 categories    Category 1: 3 of the following  Review of external notes  Review of results  Ordering of each unique test  Independent historian  Category 2:  Independent interpretation of test (ex: imaging)  Category 3:  Discussion of management with another provider    Category 2     Treatment Plan  Moderate - Prescription Drug management  High  Drug therapy requiring intensive monitoring for toxicity  Decision regarding hospitalization or escalation of care  De-escalate care/DNR decisions  High      Naldo Telles MD  01/15/24  11:23 CST

## 2024-01-15 NOTE — PLAN OF CARE
Goal Outcome Evaluation:  Plan of Care Reviewed With: patient        Progress: improving  Outcome Evaluation: PT tx completed. Pt c/o weakness R side and swelling BLE's. Bed mobility Juliocesar, sit<>stand CG, amb short distance with r wx CG/Juliocesar. Pt has decreased coordination and strength of RLE. This making her risk for falls, and RUE weakness. Recommend inpatient rehab.

## 2024-01-15 NOTE — PROGRESS NOTES
[facility] Medicine Services  INPATIENT PROGRESS NOTE    Patient Name: Radha Wade  Date of Admission: 1/12/2024  Today's Date: 01/15/24  Length of Stay: 1  Primary Care Physician: Myron Vines MD    Subjective   Chief Complaint: R sided weakness, constipation    HPI   Patient seen and evaluated.  She is still complaining of right leg weakness and incoordination but is worse than her right arm.    Review of Systems   All pertinent negatives and positives are as above. All other systems have been reviewed and are negative unless otherwise stated.     Objective    Temp:  [97.9 °F (36.6 °C)-98.2 °F (36.8 °C)] 98.1 °F (36.7 °C)  Heart Rate:  [] 92  Resp:  [16-18] 18  BP: (106-129)/(45-80) 120/45  Physical Exam  Constitutional:       General: She is not in acute distress.     Appearance: She is not ill-appearing or diaphoretic.   HENT:      Head: Normocephalic and atraumatic.      Right Ear: External ear normal.      Left Ear: External ear normal.      Nose: No congestion or rhinorrhea.      Mouth/Throat:      Mouth: Mucous membranes are moist.      Pharynx: No oropharyngeal exudate or posterior oropharyngeal erythema.   Eyes:      General: No scleral icterus.     Extraocular Movements: Extraocular movements intact.      Conjunctiva/sclera: Conjunctivae normal.   Cardiovascular:      Rate and Rhythm: Normal rate and regular rhythm.      Heart sounds: Normal heart sounds. No murmur heard.  Pulmonary:      Effort: Pulmonary effort is normal. No respiratory distress.      Breath sounds: Normal breath sounds. No wheezing, rhonchi or rales.   Abdominal:      General: Abdomen is flat. There is no distension.      Palpations: Abdomen is soft.      Tenderness: There is no abdominal tenderness. There is no guarding.   Musculoskeletal:         General: No swelling, tenderness or deformity.      Cervical back: Neck supple. No rigidity. No muscular tenderness.      Right lower  leg: No edema.      Left lower leg: No edema.   Lymphadenopathy:      Cervical: No cervical adenopathy.   Skin:     General: Skin is warm and dry.   Neurological:      General: No focal deficit present.      Mental Status: She is alert and oriented to person, place, and time.      Cranial Nerves: No cranial nerve deficit.      Motor: Weakness present.      Comments: Right leg > right arm weakness and incoordination noted.   Psychiatric:         Mood and Affect: Mood normal.         Behavior: Behavior normal.         Results Review:  I have reviewed the labs, radiology results, and diagnostic studies.    Laboratory Data:   Results from last 7 days   Lab Units 01/15/24  0515 01/14/24  0359 01/13/24  0602   WBC 10*3/mm3 14.86* 8.82 7.46   HEMOGLOBIN g/dL 7.8* 8.9* 8.9*   HEMATOCRIT % 28.6* 31.8* 34.4   PLATELETS 10*3/mm3 692* 605* 524*        Results from last 7 days   Lab Units 01/15/24  0515 01/14/24  0359 01/13/24  0602   SODIUM mmol/L 138 136 136   POTASSIUM mmol/L 4.0 4.5 4.2   CHLORIDE mmol/L 101 100 100   CO2 mmol/L 26.0 25.0 23.0   BUN mg/dL 16 10 11   CREATININE mg/dL 0.54* 0.47* 0.50*   CALCIUM mg/dL 8.9 8.8 8.7   BILIRUBIN mg/dL 0.2 0.2 0.2   ALK PHOS U/L 163* 229* 208*   ALT (SGPT) U/L 7 10 11   AST (SGOT) U/L 9 14 17   GLUCOSE mg/dL 139* 191* 126*       Culture Data:   Urine Culture   Date Value Ref Range Status   01/13/2024 >100,000 CFU/mL Mixed Karo Isolated  Final       Radiology Data:   Imaging Results (Last 24 Hours)       Procedure Component Value Units Date/Time    CT Head Without Contrast [[id number]] Collected: 01/15/24 1436     Updated: 01/15/24 1444    Narrative:      EXAMINATION:  CT HEAD WO CONTRAST-  1/15/2024 1:29 PM     HISTORY: Stroke, follow up; I82.619-Acute embolism and thrombosis of  superficial veins of unspecified upper extremity; N30.90-Cystitis,  unspecified without hematuria; W19.XXXA-Unspecified fall, initial  encounter; R53.1-Weakness; Z74.09-Other reduced mobility.      TECHNIQUE: Multiple axial images were obtained through the brain without  contrast infusion. Multiplanar images were reconstructed.     DLP: 605.11 mGy.cm. Automated dosage reduction technique was utilized to  reduce patient dosage.     COMPARISON: 1/13/2024.     FINDINGS: There is no hemorrhage. There is low-density in the lateral  left basal ganglia region predominantly involving the posterior aspect  of the external capsule. There is minimal low density elsewhere within  the hemispheric white matter likely related to small vessel disease.  There is minimal atrophy. The paranasal sinuses and mastoid air cells  are clear. No calvarial fracture is seen.          Impression:      1. Acute infarct in the lateral left basal ganglia region involving  predominantly the external capsule posteriorly and in the posterolateral  aspect of the putamen.  2. No hemorrhage.  3. Mild chronic small vessel ischemic white matter disease.        This report was signed and finalized on 1/15/2024 2:41 PM by Dr. Keegan Sousa MD.               I have reviewed the patient's current medications.     Assessment/Plan   Assessment  Active Hospital Problems    Diagnosis     **Acute focal neurological deficit     CVA (cerebral vascular accident)     Type 2 diabetes mellitus, without long-term current use of insulin     Metastatic disease     Acute blood loss anemia     Vaginal bleeding     DVT (deep venous thrombosis)        Treatment Plan  #1 acute CVA -patient presented with symptoms concerning for stroke.  Initial head CT was nonacute.  MRI with and without was obtained today to look for CVA versus possible mass given cancer history.  It was positive for multifocal diffusion restriction/CVA.  No obvious flow limitation was noted on angiographies.  Some was limited due to motion.  2D echo pending.  She has been on a heparin drip at this point by neurology, defer to them but suspect okay to resume Eliquis.  Will start Lovenox if hemoglobin  stable in the morning. On statin.  Continue therapies.    #2 history DVT -patient with concern about right upper extremity swelling on arrival but she does not have a DVT in that extremity.  History of DVT in her lower extremities.  In the setting of malignancy.  She has been on Eliquis at this point but transition to heparin drip here due to fear for possible failure of therapy.  Patient openly admits that she would take the medicine once a day and not regularly.  Do not suspect there is any failure of care at this time rather patient noncompliance.  Feel she is likely somewhat in the mild to current issues at hand.  Note potential recommendations for Lovenox at discharge by neurology.  Will ask her hematologist/oncologist Dr. Grullon to evaluate in the a.m. for recommendations.    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 Grullon.  It seems that a lot of the procedures and testing have been ordered prior have typically not been performed as patient did not go for them.  Suspect she is in some denial to her diagnosis.  She is also supposed to have an upcoming appointment at [place] for further evaluation.  Again we will ask hematology to evaluate in the a.m. to help with coordination of care and to ensure adequate/appropriate follow-up.    #4 history of DM2 -on sliding scale insulin and hypoglycemia protocol.    #5 anemia -stable since arrival.  Does have a history of vaginal bleeding likely in the setting of GYN malignancy.  Monitor, no obvious signs of bleeding here so far.    #6 constipation -start bowel regimen    Medical Decision Making  Number and Complexity of problems: 1 acute problem, multiple chronic  Differential Diagnosis: as above    Conditions and Status        Pt is new to me, appears stable/non-toxic     MDM Data  External documents reviewed: none  Cardiac tracing (EKG, telemetry) interpretation: sinus on monitor  Radiology interpretation: reports reviewed  Labs reviewed: as  above  Any tests that were considered but not ordered: none     Decision rules/scores evaluated (example [id number], [place], etc): none     Discussed with: patient, nursing     Care Planning  Shared decision making: Patient apprised of current labs, vitals, imaging and treatment plan.  They are agreeable with proceeding with plans as discussed.    Code status and discussions: full code    Disposition  Social Determinants of Health that impact treatment or disposition: none  I expect the patient to be discharged to possible acute inpt rehab vs snf in 1-2 days    Electronically signed by Jose Tang MD, 01/15/24, 16:34 CST.;y

## 2024-01-15 NOTE — PLAN OF CARE
Goal Outcome Evaluation:  Plan of Care Reviewed With: patient        Progress: improving  Outcome Evaluation: A&Ox4. VSS. NIHSS 2. RA. Tele normal sinus. Voiding via BSC. Up x1 assist with gait belt. IV L AC, heparin drip titrated per protocol. Call light in reach. Safety maintained.

## 2024-01-15 NOTE — PLAN OF CARE
Goal Outcome Evaluation:  Plan of Care Reviewed With: patient        Progress: no change     Pt sitting up in bed. Pt A/O X4, resp e/u, HRR, VSS, DALE. Pt has slight left sided weakness. Pt's heparin drip has been titrated as needed. Pt went down for CT head, if no bleed in stroke region Dr. Telles plans to transition pt from heparin drip to lovenox. Francisca RUBI discussed with pt new cancer diagnosis without biopsy at this time. Pt will have to reschedule appointment with Dr. Miles at [facility] to discuss cancer biopsy and treatment options. Bed in low position, call light in reach, safety maintained.

## 2024-01-15 NOTE — CASE MANAGEMENT/SOCIAL WORK
Continued Stay Note   [place]     Patient Name: Radha Wade  MRN: [id number]  Today's Date: 1/15/2024    Admit Date: 1/12/2024    Plan: Pending   Discharge Plan       Row Name 01/15/24 1556       Plan    Plan Comments Bed offered at [facility] and precert has been started. [facility]                   Discharge Codes    No documentation.                 Expected Discharge Date and Time       Expected Discharge Date Expected Discharge Time    Jan 16, 2024               [facility] [place]

## 2024-01-15 NOTE — CONSULTS
MEDICAL ONCOLOGY CONSULTATION    Pt Name: Radha Wade  MRN: [id number]  [id number]  YOB: 1960  Date of evaluation: 1/15/2024    Reason for Consultation: Continuity of care for patient previously seen with a suspected malignancy,  history of DVT and recent CVA    Requesting Physician: Dr. Frankel    History Obtained From: The patient and her , EMR    HISTORY OF PRESENT ILLNESS:      Radha Wade is a 63-year-old  female whom I have been evaluating over the last couple months referred to me regarding a suspected potential malignant process.  Radha has been to some degree in denial and has failed to follow-up or keep appointments in an attempt for an aggressive and expedited workup.  Despite that, the biggest part of her workup has been completed.  She was scheduled to see Dr. Frank Miles in evaluation to obtain tissue diagnosis on 1/16/2024.  Unfortunately, Ms. Wade was evaluated in the ED on 1/12/2022 and admitted to [facility] with a left-sided CVA and right-sided arm and leg weakness.  She has been under evaluation over the weekend for this and is anticipating discharge to rehab.  I was consulted and continuity of care.      DETAILED TUMOR HISTORY COPIED FROM MY OFFICE RECORDS      TARGET POTENTIAL MALIGNANT SITES  Enlarged uterus with abnormal vaginal bleeding, 45 pound weight loss in 3 to 6 months  Omental infiltration and nodularity progressing in 3 months  2.9 x 1.9 cm nodule in the anterior abdomen and adjacent and posterior to the umbilicus   2.7 cm left pelvic lymph node, previously 2.4 cm,   3 cm right external iliac lymph node, previously 2.1 cm   1.6 cm sclerotic bone lesion in the S1 vertebral body on CT 9/14/2023    CA 19-9- 355  -  455        TUMOR HISTORY  Radha was seen in initial medical oncological consultation on 11/9/2023, referred by Dr Myron Vines, PCP, for suspected malignant process.     Despite her age of 63, Radha continued to have regular  menstrual cycles until she fell on 7/19/2023.   In evaluation she was also found to have a tick.  She reports that an attempt was made to remove it but it was not able to be removed completely.   Since July 2023, Keiths menses began appearing every 2 weeks rather than monthly.  Dr. De Los Santos, her gynecologist referred her to the ED on 9/14/2023 due to bilateral lower extremity swelling. Noninvasive studies documented bilateral lower extremity DVTs on 9/14/2023.  She has been on Eliquis since that time.    Keiths menstrual bleeding is reported to have been continuous since being placed on Eliquis.  She continues to have leg pain, low back pain, feeling washed out and reports having had 45 pounds of weight loss in the previous 2 to 3 months.     Radha was evaluated at [facility] ER on 9/14/2023 for left leg swelling and abdominal pain.  Workup revealed the following:  bilateral LE DVTs (patient refused CTA.  Lovenox was initiated and discharged on Eliquis)   Retroperitoneal lymphadenopathy, soft tissue nodularity and stranding of omentum on CT Abdomen (Dr De Los Santos consulted, to f/u with D&C and ablation)  Reported 25 lb weight loss (in September 2023) over the previous few months.         US VENOUS DOPPLER LOWER EXTREMITIES BILATERAL on 9/14/23 at [facility]  There is evidence of deep venous thrombosis in the popliteal and posterior tibial veins in the right lower extremity.   There is also evidence of deep venous thrombosis in the superficial femoral, popliteal, posterior tibial, and peroneal veins of the left lower extremity     CT ABDOMEN PELVIS WO at [facility] on 9/14/23  Somewhat bulky retroperitoneal lymphadenopathy and pelvic lymphadenopathy worrisome for metastatic lymphadenopathy or lymphoma.   There is also some stranding and nodularity in the greater omental region anteriorly suggesting possible peritoneal metastatic disease.   The uterus is prominent in size measuring 11.7 x 6.9 x 8.7 cm but a focal mass is not seen.  Follow-up nonemergent ultrasound is recommended to evaluate the uterus for a potential mass. There is no other obvious area of primary neoplasm on the noncontrast CT images.   A vague 1.6 cm sclerotic bone lesion in the S1 vertebral body is indeterminate.        On 10/25/2023, she was seen by her PCP, Dr. Myron Vines.  Because of the history of the tick bite and rash, he requested a Borrelia Burgdorferi (Lyme disease) antibody which was positive on 10/25/2023.  Dr. Vines prescribed doxycycline 100 mg BID on 10/30/2023 which according to the patient was started somewhere around the first week of November to be taken for 1 month.        Serology collected 11/9/2023   CBC- WBC of 8.34. Hgb is 9.7 with an MCV of 74.9 and platelets 639,000.  CMP-Na+ 136, Creat 0.5, AlkPhos 149  LDH-223  B2M- 3.8  Kappa- 86.08  Lambda- 77.12  K/L ratio- 1.12  IgG 1400 ,IgA 572, IgM 73  M-spike- not applicable  Ferritin-474.6  Iron level-25  Iron Saturation-11%  TIBC-231    Folic acid-2.6  TSH-3.41   CEA-1.7  CA 19-9-355  -455    Serology collected 12/21/2023  Ferritin     443.6  Iron            19  Iron sat%     9         343  CA 19-9     240  CEA           1.7  B2M           3.9  T protein    6.8  Kappa light chain      92.43  Lambda light chain   74.51  K/L ratio                     1.24  M protein                   negative  IgG                            1398  IgA                               542  IgM                                62        Prothrombotic panel 11/9/2023  Factor V Leiden - negative  Homocysteine - 11  Lupus Anticoag - not detected  Protein C -138  Protein S - 85  Prothrombotic panel 12/21/2023  Lupus anticoagulant                                  not detected  Anticardiolipin Ab IgG                               <10  Anticardiolipin Ab IgM                               <10  Prothrombin gene mutation [id number]      Negative  MTHFR mutation C677T                          Negative  MTHFR mutation  [id number]                       Heterozygous    GUADALUPE 2 and reflex panels                           PENDING     [facility]  No suspicious cervical adenopathy.  9 mm right upper lobe pulmonary nodule.   Moderate cervical spine osteoarthritis change      CT CHEST WO CONTRAST DIAGNOSTIC on 12/5/2023 [facility]  Scattered bilateral pulmonary nodules   8 mm RUL pulmonary nodule  6 mm RUL pulmonary nodule  no mediastinal or hilar lymphadenopathy   No acute or suspicious bony finding.   partially visualized confluent retroperitoneal adenopathy.   Dome of the liver with a 10 mm hypodensity, incompletely characterized   on this exam         CT ABDOMEN PELVIS WO CONTRAST on 12/5/2023 at [facility], compared to 9/14/23  A vague, poorly defined, low-density nodule located posteriorly and superiorly in the dome of the liver was noted in the previous study with no change in size or shape in the interval   Moderate lobulation of the unenhanced kidneys bilaterally noted. An isodense mass or lesion may not be evaluated or excluded. There is moderate right hydronephrosis. There is moderate dilatation of the right proximal ureter which is completely encased by the lower abdomen/proximal pelvis by the enlarged lymph nodes. This was not noted in the previous study   Moderate diffuse enlargement of the uterus is seen which is anteverted   compressing on the urinary bladder. This is similar to the previous study   significant abdominal and pelvic lymphadenopathy. The lymph nodes are poorly identified and not separate well from each other. There is significant surrounding retroperitoneal fat infiltration  Some visualized and separable lymph nodes:  2.7 cm left pelvic lymph node, previously 2.4 cm  3 cm right external iliac lymph node, previously 2.1 cm   Omental infiltration and nodularity which has significantly more progressed since the previous study   2.9 x 1.9 cm nodule in the anterior abdomen and adjacent and  posterior to the umbilicus   vague sclerotic changes involving the anterior superior vertebral S1 is similar to the previous study with no change in the interval         Given her history with weight loss, vaginal bleeding, elevated tumor markers, imaging abnormalities with the uterus, the 2.9 x 1.9 cm nodule in the anterior abdomen suspicious for carcinomatosis, abdominal and pelvic lymphadenopathy, prothrombotic state with DVTs and subcentimeter pulmonary nodules although indeterminate, in this setting is very concerning for an active malignant process and suspicious for a GYN origin.     Completion of workup however will also include a bone scan that was ordered at last visit and not done, GI evaluation to rule out the GI tract as a potential source in addition to the other recommendations below.                    RECOMMENDATIONS on 12/21/2023  Bone scan  PET scan   C-scope - Appt with Bertha Parrish [facility] scheduled 1/3/2024, malignancy process with iron deficiency anemia  GUADALUPE 2 and reflex panels as well as the rest of the prothrombotic panel not done at a previous visit at her request and was redrawn on 12/21/2023  Pelvic ultrasound to evaluate uterus and adnexa  Consult Dr. Frank Miles GYNOnc to evaluate the The uterus which is prominent in size measuring 11.7 x 6.9 x 8.7 cm but without visualization a focal mass. Follow-up nonemergent ultrasound is recommended to evaluate the uterus for a potential mass on CT abdomen 9/14/2023  Follow-up in 2 weeks       NM PET/CT SKULL BASE TO MID THIGH on 12/26/23 at [facility], compared to 12/5/23  13 mm hypermetabolic left supraclavicular lymph node, max SUV 3.3 .  9 mm hypermetabolic right upper lobe pulmonary nodule with max SUV 2.8.   6 mm right lower lobe pulmonary nodule with mild hypermetabolic activity   Few other smaller pulmonary nodules are also present with have increased metabolic activity for small nodule size   No hypermetabolic axillary, mediastinal, or  hilar lymph nodes   7 mm hypermetabolic lymph node in the epicardial fat.   Multiple hypermetabolic liver lesions identified. For reference, 11 mm hypermetabolic right posterior liver lesion with max SUV 5.2   2.7 x 1.9 cm omental/peritoneal hypermetabolic mass in the anterior abdomen with max SUV 8.3   Diffuse omental and peritoneal nodularity and fat stranding which is likely related to carcinomatosis.   Multiple enlarged hypermetabolic retroperitoneal lymph nodes throughout the abdomen and pelvis surrounding the abdominal aorta and pelvic arterial vasculature. For reference, 1.6 cm left para-aortic lymph node with max SUV 7.5   3 cm hypermetabolic right upper pelvic mass on image 61 with max SUV 8.9.  Hypermetabolic activity is present throughout the enlarged cervix with extension into the endometrial canal which is diffusely hypermetabolic. Max SUV is 14   ADDENDUM:   Described in the body of the report but not the impression, there is mild right-sided hydronephrosis which is like related to ureteral compression by right pelvic lymphadenopathy.  There is also subcutaneous fat stranding in the left thigh which is likely related to venous compression by pelvic lymphadenopathy. However, venous thrombus could also present with this finding (not visualized on  this study but exam is performed without IV contrast)         US PELVIS COMPLETE- 12/26/2023  at [facility], compared to PET/CT on the same day. CT abdomen and pelvis 12/5/2023.   The uterus is enlarged and heterogeneous, measuring 13.6 x 6.1 x 8.1 cm   6.2 x 6.6 x 3.8 cm mass within the endometrial cavity   6.9 x 4.5 x 5.1 cm hypoechoic mass at the lower uterine segment, likely involving the cervix  18 mm solid-appearing nodule in the right ovary (Right ovary measures 3.1 x 2.8 x 2.7 cm )  The left ovary appears within normal limits and measures 2.9 x 2.1 x 2.2 cm.     Columba Coulter RN spoke with Radha on 12/29/2023 who reported that she received a message  from the office of Dr Miles GYN-ONC in [place], but had not returned call yet ot make appointment.  Gave instructions to promptly return call to get appointment for evaluation as soon as possible.  Explained results of imaging and Dr Grullon's recommendation to follow through with ordered referral (order placed 23) for GYN ONC evaluation.     Radha states that she will call office Tuesday morning and will communicate with this clinic with date of appointment with Dr Miles so that f/u with Dr Grullon can be scheduled accordingly (following Dr Miles's evaluation).   Radha verbalized understanding, then returned to call stating that she had an appointment at Dr Miles's office on 2024.       Request to [facility] radiology Dept was made for images (CT Neck,Chest, Abd& Pelvis on 23 and PET, US pelvis on 23) to be forwarded to Dr Miles at [place].     Radha canceled her GI appointment with GREYSON Mccord for colonoscopy evaluation on 1/3/2023   Radha canceled her GYN/Onc appointment with Dr Miles on 2023 and states it has been rescheduled for 2023  Several attempts were made to schedule parenteral iron infusions in our office but she declined to schedule these.          Past Medical History:    Past Medical History:   Diagnosis Date    Asthma     Bronchitis     Depression     Diabetes mellitus     DVT (deep venous thrombosis)     Heart murmur     Lyme disease     Mitral valve prolapse        Past Surgical History:    Past Surgical History:   Procedure Laterality Date     SECTION         Current Hospital Medications:      Current Facility-Administered Medications:     acetaminophen (TYLENOL) tablet 650 mg, 650 mg, Oral, Q4H PRN, 650 mg at 01/15/24 0134 **OR** acetaminophen (TYLENOL) suppository 650 mg, 650 mg, Rectal, Q4H PRN, Daniel Fleming MD    atorvastatin (LIPITOR) tablet 80 mg, 80 mg, Oral, Nightly, Daniel Fleming MD, 80 mg at 24 7280     bisacodyl (DULCOLAX) suppository 10 mg, 10 mg, Rectal, Daily PRN, Frank Frankel DO    dextrose (D50W) (25 g/50 mL) IV injection 25 g, 25 g, Intravenous, Q15 Min PRN, Daniel Fleming MD    dextrose (GLUTOSE) oral gel 15 g, 15 g, Oral, Q15 Min PRN, Daniel Fleming MD    docusate sodium (COLACE) capsule 100 mg, 100 mg, Oral, BID, Daniel Fleming MD, 100 mg at 01/14/24 0848    glucagon (GLUCAGEN) injection 1 mg, 1 mg, Intramuscular, Q15 Min PRN, Daniel Fleming MD    heparin 32381 units/250 mL (100 units/mL) in 0.45 % NaCl infusion, 12 Units/kg/hr, Intravenous, Titrated, Naldo Telles MD, Last Rate: 16.31 mL/hr at 01/15/24 0302, 21.018 Units/kg/hr at 01/15/24 0302    HYDROcodone-acetaminophen (NORCO) 5-325 MG per tablet 1 tablet, 1 tablet, Oral, Q4H PRN, Daniel Fleming MD    Insulin Lispro (humaLOG) injection 2-7 Units, 2-7 Units, Subcutaneous, 4x Daily AC & at Bedtime, Daniel Fleming MD, 4 Units at 01/14/24 2115    labetalol (NORMODYNE,TRANDATE) injection 10 mg, 10 mg, Intravenous, Q10 Min PRN, Daniel Fleming MD    ondansetron (ZOFRAN) injection 4 mg, 4 mg, Intravenous, Q6H PRN, Daniel Fleming MD    pantoprazole (PROTONIX) EC tablet 40 mg, 40 mg, Oral, Q AM, Daniel Fleming MD, 40 mg at 01/15/24 0610    polyethylene glycol (MIRALAX) packet 17 g, 17 g, Oral, Daily, Frank Frankel DO, 17 g at 01/14/24 1628    [COMPLETED] Insert Peripheral IV, , , Once **AND** sodium chloride 0.9 % flush 10 mL, 10 mL, Intravenous, PRN, Daniel Fleming MD    sodium chloride 0.9 % flush 10 mL, 10 mL, Intravenous, Q12H, Daniel Fleming MD, 10 mL at 01/14/24 0852    sodium chloride 0.9 % flush 10 mL, 10 mL, Intravenous, PRN, Daniel Fleming MD    sodium chloride 0.9 % infusion 40 mL, 40 mL, Intravenous, PRN, Daniel Fleming MD    traMADol (ULTRAM) tablet 25 mg, 25 mg, Oral, Q8H PRN, Daniel Fleming,  "MD    Allergies: No Known Allergies    Social History:    Social History     Socioeconomic History    Marital status:    Tobacco Use    Smoking status: Never    Smokeless tobacco: Never   Vaping Use    Vaping Use: Never used   Substance and Sexual Activity    Alcohol use: No    Drug use: No    Sexual activity: Defer     Partners: Male       Family History:   Family History   Problem Relation Age of Onset    Heart disease Father     Diabetes Father     Diabetes Brother     Heart disease Brother     Cancer Maternal Grandmother     Cancer Maternal Grandfather     Heart disease Paternal Grandfather        REVIEW OF SYSTEMS:    As per HPI and previous evaluations with the new CNS manifestations of improving right-sided arm and leg weakness.    Vitals:    /51 (BP Location: Left arm, Patient Position: Sitting)   Pulse 91   Temp 97.9 °F (36.6 °C) (Oral)   Resp 18   Ht 165.1 cm (65\")   Wt 77.6 kg (171 lb)   LMP  (LMP Unknown)   SpO2 100%   BMI 28.46 kg/m²     PHYSICAL EXAM:    As related by Francisca Giordano, nursing staff and   CONSTITUTIONAL: Alert, appropriate, no acute distress  EYES: Nonicteric, EOM intact, pupils equal round   ENT: Mucous membranes moist, no oropharyngeal lesions   NECK: Supple, no masses   CHEST/LUNGS: CTA bilaterally, normal respiratory effort   CARDIOVASCULAR: RRR, no murmurs  ABDOMEN: soft non-tender, active bowel sounds, no HSM  EXTREMITIES: warm, moves all fours  SKIN: warm, dry with no rashes or lesions  LYMPH: No cervical, clavicular, axillary, or inguinal lymphadenopathy  NEUROLOGIC: Right-sided arm and leg weakness, improving  PSYCH: mood and affect appropriate    CBC  Results from last 7 days   Lab Units 01/15/24  0515 01/14/24  0359 01/13/24  0602   WBC 10*3/mm3 14.86* 8.82 7.46   HEMOGLOBIN g/dL 7.8* 8.9* 8.9*   HEMATOCRIT % 28.6* 31.8* 34.4   PLATELETS 10*3/mm3 692* 605* 524*         Lab Results   Component Value Date     01/15/2024    K 4.0 01/15/2024     " "01/15/2024    CO2 26.0 01/15/2024    BUN 16 01/15/2024    CREATININE 0.54 (L) 01/15/2024    GLUCOSE 139 (H) 01/15/2024    CALCIUM 8.9 01/15/2024    BILITOT 0.2 01/15/2024    ALKPHOS 163 (H) 01/15/2024    AST 9 01/15/2024    ALT 7 01/15/2024    AGRATIO 0.8 01/15/2024    GLOB 4.1 01/15/2024       Lab Results   Component Value Date    INR 1.35 (H) 01/13/2024    INR 1.27 (H) 01/12/2024    INR 1.17 (H) 12/18/2023    PROTIME 16.8 (H) 01/13/2024    PROTIME 16.1 (H) 01/12/2024    PROTIME 15.1 (H) 12/18/2023       Cultures:    No results found for: \"BLOODCX\"  No components found for: \"URINCX\"    ASSESSMENT/PLAN:      #1  Disseminated abdominal malignancy, 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  Radha has been to some degree in denial and has failed to follow-up or keep appointments in an attempt for an aggressive and expedited workup.  Despite that, the biggest part of her workup has been completed.  She was scheduled to see Dr. Frank Miles in evaluation to obtain tissue diagnosis on 1/16/2024.  Unfortunately, Ms. Wade was evaluated in the ED on 1/12/2022 and admitted to [facility] with a left-sided CVA and right-sided arm and leg weakness.  She has been under evaluation over the weekend for this and is anticipating discharge to rehab.  I was consulted and continuity of care.      DETAILED TUMOR HISTORY COPIED FROM MY OFFICE RECORDS      TARGET POTENTIAL MALIGNANT SITES  Enlarged uterus with abnormal vaginal bleeding, 45 pound weight loss in 3 to 6 months  6.2 x 6.6 x 3.8 cm mass within the endometrial cavity    Omental infiltration and nodularity progressing in 3 months  2.9 x 1.9 cm nodule in the anterior abdomen and adjacent and posterior to the umbilicus   2.7 cm left pelvic lymph node, previously 2.4 cm,   3 cm right external iliac lymph node, previously 2.1 cm "   1.6 cm sclerotic bone lesion in the S1 vertebral body on CT 9/14/2023    CA 19-9- 355  -  455        TUMOR HISTORY  Radha was seen in initial medical oncological consultation on 11/9/2023, referred by Dr Myron Vines, PCP, for suspected malignant process.     Despite her age of 63, Radha continued to have regular menstrual cycles until she fell on 7/19/2023.   In evaluation she was also found to have a tick.  She reports that an attempt was made to remove it but it was not able to be removed completely.   Since July 2023, Keiths menses began appearing every 2 weeks rather than monthly.  Dr. De Los Santos, her gynecologist referred her to the ED on 9/14/2023 due to bilateral lower extremity swelling. Noninvasive studies documented bilateral lower extremity DVTs on 9/14/2023.  She has been on Eliquis since that time.    Keiths menstrual bleeding is reported to have been continuous since being placed on Eliquis.  She continues to have leg pain, low back pain, feeling washed out and reports having had 45 pounds of weight loss in the previous 2 to 3 months.     Radha was evaluated at [facility] ER on 9/14/2023 for left leg swelling and abdominal pain.  Workup revealed the following:  bilateral LE DVTs (patient refused CTA.  Lovenox was initiated and discharged on Eliquis)   Retroperitoneal lymphadenopathy, soft tissue nodularity and stranding of omentum on CT Abdomen (Dr De Los Santos consulted, to f/u with D&C and ablation)  Reported 25 lb weight loss (in September 2023) over the previous few months.         US VENOUS DOPPLER LOWER EXTREMITIES BILATERAL on 9/14/23 at [facility]  There is evidence of deep venous thrombosis in the popliteal and posterior tibial veins in the right lower extremity.   There is also evidence of deep venous thrombosis in the superficial femoral, popliteal, posterior tibial, and peroneal veins of the left lower extremity     CT ABDOMEN PELVIS WO at [facility] on 9/14/23  Somewhat bulky retroperitoneal  lymphadenopathy and pelvic lymphadenopathy worrisome for metastatic lymphadenopathy or lymphoma.   There is also some stranding and nodularity in the greater omental region anteriorly suggesting possible peritoneal metastatic disease.   The uterus is prominent in size measuring 11.7 x 6.9 x 8.7 cm but a focal mass is not seen. Follow-up nonemergent ultrasound is recommended to evaluate the uterus for a potential mass. There is no other obvious area of primary neoplasm on the noncontrast CT images.   A vague 1.6 cm sclerotic bone lesion in the S1 vertebral body is indeterminate.        On 10/25/2023, she was seen by her PCP, Dr. Myron Vines.  Because of the history of the tick bite and rash, he requested a Borrelia Burgdorferi (Lyme disease) antibody which was positive on 10/25/2023.  Dr. Vines prescribed doxycycline 100 mg BID on 10/30/2023 which according to the patient was started somewhere around the first week of November to be taken for 1 month.        Serology collected 11/9/2023   CBC- WBC of 8.34. Hgb is 9.7 with an MCV of 74.9 and platelets 639,000.  CMP-Na+ 136, Creat 0.5, AlkPhos 149  LDH-223  B2M- 3.8  Kappa- 86.08  Lambda- 77.12  K/L ratio- 1.12  IgG 1400 ,IgA 572, IgM 73  M-spike- not applicable  Ferritin-474.6  Iron level-25  Iron Saturation-11%  TIBC-231    Folic acid-2.6  TSH-3.41   CEA-1.7  CA 19-9-355  -455    Serology collected 12/21/2023  Ferritin     443.6  Iron            19  Iron sat%     9         343  CA 19-9     240  CEA           1.7  B2M           3.9  T protein    6.8  Kappa light chain      92.43  Lambda light chain   74.51  K/L ratio                     1.24  M protein                   negative  IgG                            1398  IgA                               542  IgM                                62        Prothrombotic panel 11/9/2023  Factor V Leiden - negative  Homocysteine - 11  Lupus Anticoag - not detected  Protein C -138  Protein S -  85  Prothrombotic panel 12/21/2023  Lupus anticoagulant                                  not detected  Anticardiolipin Ab IgG                               <10  Anticardiolipin Ab IgM                               <10  Prothrombin gene mutation [id number]      Negative  MTHFR mutation C677T                          Negative  MTHFR mutation [id number]                       Heterozygous    GUADALUPE 2 and reflex panels                           PENDING     CT SOFT TISSUE NECK WO CONTRAST on 12/5/2023 at [facility]  No suspicious cervical adenopathy.  9 mm right upper lobe pulmonary nodule.   Moderate cervical spine osteoarthritis change      CT CHEST WO CONTRAST DIAGNOSTIC on 12/5/2023 at [facility]  Scattered bilateral pulmonary nodules   8 mm RUL pulmonary nodule  6 mm RUL pulmonary nodule  no mediastinal or hilar lymphadenopathy   No acute or suspicious bony finding.   partially visualized confluent retroperitoneal adenopathy.   Dome of the liver with a 10 mm hypodensity, incompletely characterized   on this exam         CT ABDOMEN PELVIS WO CONTRAST on 12/5/2023 at [facility], compared to 9/14/23  A vague, poorly defined, low-density nodule located posteriorly and superiorly in the dome of the liver was noted in the previous study with no change in size or shape in the interval   Moderate lobulation of the unenhanced kidneys bilaterally noted. An isodense mass or lesion may not be evaluated or excluded. There is moderate right hydronephrosis. There is moderate dilatation of the right proximal ureter which is completely encased by the lower abdomen/proximal pelvis by the enlarged lymph nodes. This was not noted in the previous study   Moderate diffuse enlargement of the uterus is seen which is anteverted   compressing on the urinary bladder. This is similar to the previous study   significant abdominal and pelvic lymphadenopathy. The lymph nodes are poorly identified and not separate well from each other. There is significant surrounding  retroperitoneal fat infiltration  Some visualized and separable lymph nodes:  2.7 cm left pelvic lymph node, previously 2.4 cm  3 cm right external iliac lymph node, previously 2.1 cm   Omental infiltration and nodularity which has significantly more progressed since the previous study   2.9 x 1.9 cm nodule in the anterior abdomen and adjacent and posterior to the umbilicus   vague sclerotic changes involving the anterior superior vertebral S1 is similar to the previous study with no change in the interval         Given her history with weight loss, vaginal bleeding, elevated tumor markers, imaging abnormalities with the uterus, the 2.9 x 1.9 cm nodule in the anterior abdomen suspicious for carcinomatosis, abdominal and pelvic lymphadenopathy, prothrombotic state with DVTs and subcentimeter pulmonary nodules although indeterminate, in this setting is very concerning for an active malignant process and suspicious for a GYN origin.     Completion of workup however will also include a bone scan that was ordered at last visit and not done, GI evaluation to rule out the GI tract as a potential source in addition to the other recommendations below.                    RECOMMENDATIONS on 12/21/2023  Bone scan  PET scan   C-scope - Appt with Bertha Parrish [facility] scheduled 1/3/2024, malignancy process with iron deficiency anemia  GUADALUPE 2 and reflex panels as well as the rest of the prothrombotic panel not done at a previous visit at her request and was redrawn on 12/21/2023  Pelvic ultrasound to evaluate uterus and adnexa  Consult Dr. Frank Miles GYNOnc to evaluate the The uterus which is prominent in size measuring 11.7 x 6.9 x 8.7 cm but without visualization a focal mass. Follow-up nonemergent ultrasound is recommended to evaluate the uterus for a potential mass on CT abdomen 9/14/2023  Follow-up in 2 weeks       NM PET/CT SKULL BASE TO MID THIGH on 12/26/23 at [facility], compared to 12/5/23  13 mm hypermetabolic left  supraclavicular lymph node, max SUV 3.3 .  9 mm hypermetabolic right upper lobe pulmonary nodule with max SUV 2.8.   6 mm right lower lobe pulmonary nodule with mild hypermetabolic activity   Few other smaller pulmonary nodules are also present with have increased metabolic activity for small nodule size   No hypermetabolic axillary, mediastinal, or hilar lymph nodes   7 mm hypermetabolic lymph node in the epicardial fat.   Multiple hypermetabolic liver lesions identified. For reference, 11 mm hypermetabolic right posterior liver lesion with max SUV 5.2   2.7 x 1.9 cm omental/peritoneal hypermetabolic mass in the anterior abdomen with max SUV 8.3   Diffuse omental and peritoneal nodularity and fat stranding which is likely related to carcinomatosis.   Multiple enlarged hypermetabolic retroperitoneal lymph nodes throughout the abdomen and pelvis surrounding the abdominal aorta and pelvic arterial vasculature. For reference, 1.6 cm left para-aortic lymph node with max SUV 7.5   3 cm hypermetabolic right upper pelvic mass on image 61 with max SUV 8.9.  Hypermetabolic activity is present throughout the enlarged cervix with extension into the endometrial canal which is diffusely hypermetabolic. Max SUV is 14   ADDENDUM:   Described in the body of the report but not the impression, there is mild right-sided hydronephrosis which is like related to ureteral compression by right pelvic lymphadenopathy.  There is also subcutaneous fat stranding in the left thigh which is likely related to venous compression by pelvic lymphadenopathy. However, venous thrombus could also present with this finding (not visualized on  this study but exam is performed without IV contrast)         US PELVIS COMPLETE- 12/26/2023  at [facility], compared to PET/CT on the same day. CT abdomen and pelvis 12/5/2023.   The uterus is enlarged and heterogeneous, measuring 13.6 x 6.1 x 8.1 cm   6.2 x 6.6 x 3.8 cm mass within the endometrial cavity   6.9 x 4.5 x  5.1 cm hypoechoic mass at the lower uterine segment, likely involving the cervix  18 mm solid-appearing nodule in the right ovary (Right ovary measures 3.1 x 2.8 x 2.7 cm )  The left ovary appears within normal limits and measures 2.9 x 2.1 x 2.2 cm.     Columba Coulter, RN spoke with Radha on 12/29/2023 who reported that she received a message from the office of Dr Miles GYN-ONC in [place], but had not returned call yet ot make appointment.  Gave instructions to promptly return call to get appointment for evaluation as soon as possible.  Explained results of imaging and Dr Grullon's recommendation to follow through with ordered referral (order placed 12/21/23) for GYN ONC evaluation.     Radha states that she will call office Tuesday morning and will communicate with this clinic with date of appointment with Dr Miles so that f/u with Dr Grullon can be scheduled accordingly (following Dr Miles's evaluation).   Radha verbalized understanding, then returned to call stating that she had an appointment at Dr Miles's office on 1/9/2024.       Request to [facility] radiology Dept was made for images (CT Neck,Chest, Abd& Pelvis on 12/5/23 and PET, US pelvis on 12/26/23) to be forwarded to Dr Miles at [place].     Radha canceled her GI appointment with GREYSON Mccord for colonoscopy evaluation on 1/3/2023   Radha canceled her GYN/Onc appointment with Dr Miles on 1/9/2023 and states it has been rescheduled for 1/16/2023  Several attempts were made to schedule parenteral iron infusions in our office but she declined to schedule these.    I had an in-depth discussion and conversation with Mr. Wade this morning.  He states Radha is willing and desirous to proceed aggressively with what appears to be a widely disseminated malignant process.    Recommendations are as follows:  Evaluate results of EMMA  Anticoagulate with Lovenox after heparin is discontinued, at least until definitive tissue diagnosis is  obtained.  After tissue diagnosis and all interventions are complete, given the fact that she was noncompliant with a rigorous schedule of Eliquis adding to the current prothrombotic conditions, a repeat trial of Eliquis could be considered.  Parenteral iron infusions while hospitalized to try and get repletion ongoing  Consult GI for evaluation as above outlined  Consult Dr. De Los Santos in continuity of care, vaginal bleeding etc.  Reschedule appointments with Dr. Frank Miles  Reschedule outpatient appointment with me        #2  Acute ischemic stroke  Neurology recommended anticoagulation with heparin drip and then transition to Lovenox     MRI of the brain with and without contrast on 1/14/2023  Diffusion restriction with associated ADC mapping abnormality involving the mesial left temporal lobe, left external capsule, left insula as well as the body of the caudate nucleus. These infarcts are nonhemorrhagic.  Mild atrophy. Mild small vessel ischemic changes are noted involving the periventricular and higher white matter tracts.  Normal flow voids within the [place].        MRA of the head and neck without contrast on 1/14/2023  Carotid arteries are unremarkable. The carotid bifurcations are normal in appearance without evidence of rate limiting stenosis with both ICAs patent to the skull base  Right vertebral artery is widely patent from its origin to the skull base. The left vertebral artery is rather diminutive in size and is not well-defined in its proximal segment.    EMMA 1/15/2024:    John Grullon MD    01/15/24  08:38 CST

## 2024-01-15 NOTE — THERAPY TREATMENT NOTE
Acute Care - Occupational Therapy Treatment Note  [facility]     Patient Name: Radha Wade  : 1960  MRN: [id number]  Today's Date: 1/15/2024             Admit Date: 2024       ICD-10-CM ICD-9-CM   1. 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 Cystitis  N30.90 595.9   3. Fall, initial encounter  W19.XXXA E888.9   4. Weakness  R53.1 780.79   5. Impaired mobility [Z74.09]  Z74.09 799.89   6. Decreased activities of daily living (ADL) [Z78.9]  Z78.9 V49.89     Patient Active Problem List   Diagnosis    DVT (deep venous thrombosis)    Vaginal bleeding    History of asthma    Hyperglycemia    Abnormal CT of the abdomen    Acute focal neurological deficit    Type 2 diabetes mellitus, without long-term current use of insulin    Metastatic disease    Acute blood loss anemia    CVA (cerebral vascular accident)     Past Medical History:   Diagnosis Date    Asthma     Bronchitis     Depression     Diabetes mellitus     DVT (deep venous thrombosis)     Heart murmur     Lyme disease     Mitral valve prolapse      Past Surgical History:   Procedure Laterality Date     SECTION           OT ASSESSMENT FLOWSHEET (last 12 hours)       OT Evaluation and Treatment       Row Name 01/15/24 1512 01/15/24 0930                OT Time and Intention    Subjective Information complains of  swelling  -TS --       Document Type therapy note (daily note)  -TS --  -TS       Mode of Treatment occupational therapy  -TS --  -TS       Patient Effort good  -TS --       Comment R sided weak/discoordination  -TS --          General Information    Existing Precautions/Restrictions fall  -TS --          Pain Assessment    Pretreatment Pain Rating 0/10 - no pain  -TS --       Posttreatment Pain Rating 0/10 - no pain  -TS --          Cognition    Personal Safety Interventions fall prevention program maintained;gait belt;nonskid shoes/slippers when out of bed  -TS --          Bed Mobility    Supine-Sit Jber (Bed Mobility)  contact guard;standby assist  -TS --       Sit-Supine [place] (Bed Mobility) contact guard  -TS --          Functional Mobility    Functional Mobility- Ind. Level contact guard assist  -TS --       Functional Mobility- Comment HHA, side steps toward HOB  -TS --          Transfer Assessment/Treatment    Transfers sit-stand transfer;stand-sit transfer  -TS --          Sit-Stand Transfer    Sit-Stand [place] (Transfers) contact guard  -TS --          Stand-Sit Transfer    Stand-Sit [place] (Transfers) contact guard  -TS --          Motor Skills    Motor Skills motor control/coordination interventions  -TS --       Motor Control/Coordination Interventions gross motor coordination activities  -TS --          Wound 01/15/24 1233 sacral spine    Wound - Properties Group Placement Date: 01/15/24  -MD Placement Time: 1233  -MD Location: sacral spine  -MD    Retired Wound - Properties Group Placement Date: 01/15/24  -MD Placement Time: 1233  -MD Location: sacral spine  -MD    Retired Wound - Properties Group Date first assessed: 01/15/24  -MD Time first assessed: 1233  -MD Location: sacral spine  -MD       Plan of Care Review    Plan of Care Reviewed With patient  -TS --       Progress improving  -TS --       Outcome Evaluation Pt and HURTADO discussed compensatory techniques for increased coordination for RUE, including how to support RUE for self feeding.  -TS --          Positioning and Restraints    Pre-Treatment Position in bed  -TS --       Post Treatment Position bed  -TS --       In Bed fowlers;call light within reach;encouraged to call for assist;side rails up x2;RUE elevated;legs elevated  -TS --                 User Key  (r) = Recorded By, (t) = Taken By, (c) = Cosigned By      Initials Name Effective Dates    TS Delilah Shankar COTA 02/03/23 -     Brenda Cadena RN 08/14/23 -                      Occupational Therapy Education       Title: PT OT SLP Therapies (In Progress)       Topic:  Occupational Therapy (In Progress)       Point: ADL training (Done)       Description:   Instruct learner(s) on proper safety adaptation and remediation techniques during self care or transfers.   Instruct in proper use of assistive devices.                  Learning Progress Summary             Patient Acceptance, E, VU by LAURIE at 1/13/2024 1529   Family Acceptance, E, VU by LAURIE at 1/13/2024 1529                         Point: Home exercise program (Not Started)       Description:   Instruct learner(s) on appropriate technique for monitoring, assisting and/or progressing therapeutic exercises/activities.                  Learner Progress:  Not documented in this visit.              Point: Precautions (Done)       Description:   Instruct learner(s) on prescribed precautions during self-care and functional transfers.                  Learning Progress Summary             Patient Acceptance, E, VU by LAURIE at 1/13/2024 1529   Family Acceptance, E, VU by LAURIE at 1/13/2024 1529                         Point: Body mechanics (Not Started)       Description:   Instruct learner(s) on proper positioning and spine alignment during self-care, functional mobility activities and/or exercises.                  Learner Progress:  Not documented in this visit.                              User Key       Initials Effective Dates Name Provider Type Discipline     07/11/23 -  Columba Patterson, OTR/L, CSRS Occupational Therapist OT                      OT Recommendation and Plan     Plan of Care Review  Plan of Care Reviewed With: patient  Progress: improving  Outcome Evaluation: Pt and HURTADO discussed compensatory techniques for increased coordination for RUE, including how to support RUE for self feeding.  Plan of Care Reviewed With: patient  Outcome Evaluation: Pt and HURTADO discussed compensatory techniques for increased coordination for RUE, including how to support RUE for self feeding.     Outcome Measures       Row Name 01/15/24 1500  01/15/24 1100          How much help from another person do you currently need...    Turning from your back to your side while in flat bed without using bedrails? -- 3  -KJ     Moving from lying on back to sitting on the side of a flat bed without bedrails? -- 3  -KJ     Moving to and from a bed to a chair (including a wheelchair)? -- 3  -KJ     Standing up from a chair using your arms (e.g., wheelchair, bedside chair)? -- 3  -KJ     Climbing 3-5 steps with a railing? -- 3  -KJ     To walk in hospital room? -- 3  -KJ     AM-PAC 6 Clicks Score (PT) -- 18  -KJ     Highest Level of Mobility Goal -- 6 --> Walk 10 steps or more  -KJ        How much help from another is currently needed...    Putting on and taking off regular lower body clothing? 3  -TS --     Bathing (including washing, rinsing, and drying) 3  -TS --     Toileting (which includes using toilet bed pan or urinal) 3  -TS --     Putting on and taking off regular upper body clothing 3  -TS --     Taking care of personal grooming (such as brushing teeth) 3  -TS --     Eating meals 4  -TS --     AM-PAC 6 Clicks Score (OT) 19  -TS --        Functional Assessment    Outcome Measure Options -- AM-PAC 6 Clicks Basic Mobility (PT)  -KJ               User Key  (r) = Recorded By, (t) = Taken By, (c) = Cosigned By      Initials Name Provider Type    KJ Analy Perez, NILDA Physical Therapist Assistant    Delilah Bowman COTA Occupational Therapist Assistant                    Time Calculation:    Time Calculation- OT       Row Name 01/15/24 1552             Time Calculation- OT    OT Start Time 1512  -TS      OT Stop Time 1550  -TS      OT Time Calculation (min) 38 min  -TS      Total Timed Code Minutes- OT 38 minute(s)  -TS      OT Received On 01/15/24  -TS         Timed Charges    67496 - OT Self Care/Mgmt Minutes 38  -TS         Total Minutes    Timed Charges Total Minutes 38  -TS       Total Minutes 38  -TS                User Key  (r) = Recorded By, (t) =  Taken By, (c) = Cosigned By      Initials Name Provider Type    TS Delilah Shankar COTA Occupational Therapist Assistant                  Therapy Charges for Today       Code Description Service Date Service Provider Modifiers Qty    [id number] HC OT SELF CARE/MGMT/TRAIN EA 15 MIN 1/15/2024 Delilah Shankar COTA GO 3                 Delilah VILLALTA. BRYANT Shankar  1/15/2024

## 2024-01-15 NOTE — DISCHARGE PLACEMENT REQUEST
"Ellen Wade (63 y.o. Female)       Date of Birth   1960    Social Security Number       Address   [address] [address]    Home Phone   [phone]    MRN   [id number]       Caodaism   Other    Marital Status                               Admission Date   1/12/24    Admission Type   Emergency    Admitting Provider   Jose aTng MD    Attending Provider   Jose Tang MD    Department, Room/Bed   [facility] 3A, 327/1       Discharge Date       Discharge Disposition       Discharge Destination                                 Attending Provider: Jose Tang MD    Allergies: No Known Allergies    Isolation: None   Infection: None   Code Status: CPR    Ht: 165.1 cm (65\")   Wt: 77.6 kg (171 lb)    Admission Cmt: None   Principal Problem: Acute focal neurological deficit [R29.818]                   Active Insurance as of 1/12/2024       Primary Coverage       Payor Plan Insurance Group Employer/Plan Group    [employer] H[employer] [id number]       Payor Plan Address Payor Plan Phone Number Payor Plan Fax Number Effective Dates    [address] [phone]  10/1/2023 - None Entered    [place] [address]         Subscriber Name Subscriber Birth Date Member ID       ELLEN WADE 1960 [id number]                     Emergency Contacts        (Rel.) Home Phone Work Phone Mobile Phone    Romeo Wade (Spouse) [phone] -- --              Insurance Information                  [employer] Phone: [phone]    Subscriber: Ellen Wade Subscriber#: [id number]    Group#: [id number] Precert#: --          "

## 2024-01-15 NOTE — PAYOR COMM NOTE
"Radha Combs (63 y.o. Female) [id number]     Admit 01/12 as obs, now changed to in Pt. On 01/14.    [facility] [phone] Fax [phone].    Date of Birth   1960    Social Security Number       Address   [address] [place] [address]    Home Phone   [phone]    MRN   [id number]       Voodoo   Other    Marital Status                               Admission Date   1/12/24    Admission Type   Emergency    Admitting Provider   Jose Tang MD    Attending Provider   Jose Tang MD    Department, Room/Bed   [facility] 3A, 327/1       Discharge Date       Discharge Disposition       Discharge Destination                                 Attending Provider: Jose Tang MD    Allergies: No Known Allergies    Isolation: None   Infection: None   Code Status: CPR    Ht: 165.1 cm (65\")   Wt: 77.6 kg (171 lb)    Admission Cmt: None   Principal Problem: Acute focal neurological deficit [R29.818]                   Active Insurance as of 1/12/2024       Primary Coverage       Payor Plan Insurance Group Employer/Plan Group    [employer] [id number]       Payor Plan Address Payor Plan Phone Number Payor Plan Fax Number Effective Dates    [address] [phone]  10/1/2023 - None Entered    [place] [address]         Subscriber Name Subscriber Birth Date Member ID       RADHA COMBS 1960 [id number]                     Emergency Contacts        (Rel.) Home Phone Work Phone Mobile Phone    Romeo Combs (Spouse) [phone] -- --                 History & Physical        LeroydoDaniel freeman MD at 01/13/24 0307              [facility] Hospital Medicine Services  HISTORY AND PHYSICAL    Date of Admission: 1/12/2024  Primary Care Physician: Myron Vines MD    Subjective   Primary Historian: Patient    Chief Complaint: RLE weakness    History of Present " "Illness  63 year old female with PMH of DM 2, DVT bilateral lower extremities on Eliquis, suspected GYN cancer with lymph nodes invasion followed by Dr Grullon, who presents with complaints of RLE weakness leading to a fall since about 9pm. She also complained of RUE pain and doppler in ER showed cephalic vein thrombosis. She was evaluated by neurology and recommended for admission but not a candidate for TPA due to vaginal bleeding and Eliquis use.    Regarding GYN malignancy she was noted to have an abnormal CT abd/pelvis last September:   \"1. Somewhat bulky retroperitoneal lymphadenopathy and pelvic  lymphadenopathy worrisome for metastatic lymphadenopathy or lymphoma.  2. There is also some stranding and nodularity in the greater omental  region anteriorly suggesting possible peritoneal metastatic disease.  3. The uterus is prominent in size but a focal mass is not seen\"   She was evaluated by Dr De Los Santos for vaginal bleeding and referred for follow up D&C 1 week after discharge. She did not have this procedure. She then was evaluated by PCP and referred to Dr Grullon and after testing and imaging has been referred to Dr Miles in [place] for consultation.     Dr Grullon writes in his last progress note from 12/21/2023:  \"Given her history with weight loss, vaginal bleeding, elevated tumor markers, imaging abnormalities with the uterus, the 2.9 x 1.9 cm nodule in the anterior abdomen suspicious for carcinomatosis, abdominal and pelvic lymphadenopathy, prothrombotic state with DVTs and subcentimeter pulmonary nodules although indeterminate, in this setting is very concerning for an active malignant process and suspicious for a GYN origin.   Completion of workup however will also include a bone scan that was ordered at last visit and not done, GI evaluation to rule out the GI tract as a potential source in addition to the other recommendations below.     RECOMMENDATIONS  Bone scan  C-scope - Appt with Bertha " "[facility] scheduled 1/3/2024, malignancy process with iron deficiency anemia  GUADALUPE 2 and reflex panels as well as the rest of the prothrombotic panel not done at last visit  PET scan  Pelvic ultrasound to evaluate uterus and adnexa  Consult Dr. Frank Miles GYNOnc to evaluate the The uterus which is prominent in size measuring 11.7 x 6.9 x 8.7 cm but without visualization a focal mass. Follow-up nonemergent ultrasound is recommended to evaluate the uterus for a potential mass on CT abdomen 2023  Follow-up in 2 weeks \"    Review of Systems   Otherwise complete ROS reviewed and negative except as mentioned in the HPI.    Past Medical History:   Past Medical History:   Diagnosis Date    Asthma     Bronchitis     Depression     Diabetes mellitus     DVT (deep venous thrombosis)     Heart murmur     Lyme disease     Mitral valve prolapse      Past Surgical History:  Past Surgical History:   Procedure Laterality Date     SECTION       Social History:  reports that she has never smoked. She has never used smokeless tobacco. She reports that she does not drink alcohol and does not use drugs.    Family History: family history includes Cancer in her maternal grandfather and maternal grandmother; Diabetes in her brother and father; Heart disease in her brother, father, and paternal grandfather.       Allergies:  No Known Allergies    Medications:  Prior to Admission medications    Medication Sig Start Date End Date Taking? Authorizing Provider   acetaminophen-codeine (TYLENOL with CODEINE #3) 300-30 MG per tablet Take 1 tablet by mouth. 23 Yes Provider, MD Andrea   Apixaban Starter Pack (Eliquis DVT/PE Starter Pack) tablet therapy pack Take two 5 mg tablets by mouth every 12 hours for 7 days. Followed by one 5 mg tablet every 12 hours. (Dispense starter pack if available) 23  Yes Meghan Choudhary APRN   azithromycin (ZITHROMAX) 250 MG tablet TAKE 2 TABLETS BY MOUTH TODAY, THEN TAKE 1 " "TABLET DAILY FOR 4 DAYS AS DIRECTED 12/11/23  Yes Provider, MD Andrea   ferrous sulfate 325 (65 FE) MG tablet Take 1 tablet by mouth 2 (Two) Times a Day. 11/15/23  Yes Andrea Bright MD   furosemide (LASIX) 20 MG tablet Take 1 tablet by mouth Daily. 12/8/23  Yes ProviderAndrea MD   sulfamethoxazole-trimethoprim (BACTRIM DS,SEPTRA DS) 800-160 MG per tablet Take 1 tablet by mouth Every 12 (Twelve) Hours. 10/27/23  Yes ProviderAndrea MD   albuterol sulfate  (90 Base) MCG/ACT inhaler Inhale 2 puffs Every 4 (Four) Hours As Needed for Wheezing or Shortness of Air. 1/26/20   Shandra Dietrich APRN   Lancets (freestyle) lancets 1 each by Other route 2 (Two) Times a Day. Use as instructed 9/16/23   Meghan Choudhary APRN   polyethylene glycol (MIRALAX) 17 g packet Take 17 g by mouth Daily As Needed (Use if senna-docusate is ineffective). Obtain OTC 9/16/23   Meghan Choudhary APRN   traMADol (Ultram) 50 MG tablet Take 0.5 tablets by mouth Every 8 (Eight) Hours As Needed for Moderate Pain. 9/16/23   Meghan Choudhary APRN     I have utilized all available immediate resources to obtain, update, or review the patient's current medications (including all prescriptions, over-the-counter products, herbals, cannabis/cannabidiol products, and vitamin/mineral/dietary (nutritional) supplements).    Objective     Vital Signs: /73   Pulse 106   Temp 98.3 °F (36.8 °C)   Resp 16   Ht 165.1 cm (65\")   Wt 73.1 kg (161 lb 1.6 oz)   LMP  (LMP Unknown)   SpO2 100%   BMI 26.81 kg/m²   Physical Exam  Vitals reviewed.   Constitutional:       General: She is not in acute distress.     Appearance: She is well-developed. She is not toxic-appearing.   HENT:      Head: Normocephalic and atraumatic.      Right Ear: External ear normal.      Left Ear: External ear normal.      Mouth/Throat:      Mouth: Mucous membranes are dry.      Pharynx: Oropharynx is clear.   Eyes:      General:         Right eye: No " discharge.         Left eye: No discharge.      Extraocular Movements: Extraocular movements intact.      Conjunctiva/sclera: Conjunctivae normal.      Pupils: Pupils are equal, round, and reactive to light.   Neck:      Vascular: No JVD.   Cardiovascular:      Rate and Rhythm: Normal rate and regular rhythm.      Pulses: Normal pulses.      Heart sounds: Normal heart sounds. No murmur heard.     No friction rub. No gallop.   Pulmonary:      Effort: Pulmonary effort is normal. No respiratory distress.      Breath sounds: No stridor. No wheezing, rhonchi or rales.   Chest:      Chest wall: No tenderness.   Abdominal:      General: Bowel sounds are normal. There is no distension.      Palpations: Abdomen is soft.      Tenderness: There is no abdominal tenderness. There is no guarding or rebound.      Hernia: No hernia is present.   Musculoskeletal:         General: No swelling, tenderness or deformity. Normal range of motion.      Cervical back: Normal range of motion and neck supple. No rigidity or tenderness. No muscular tenderness.      Right lower leg: No edema.      Left lower leg: No edema.   Skin:     General: Skin is warm and dry.      Findings: No erythema or rash.   Neurological:      Mental Status: She is alert and oriented to person, place, and time.      Cranial Nerves: No cranial nerve deficit.      Sensory: Sensory deficit present.      Motor: Weakness present. No abnormal muscle tone.      Deep Tendon Reflexes: Reflexes normal.   Psychiatric:         Mood and Affect: Mood normal.         Behavior: Behavior normal.     Results Reviewed:  Lab Results (last 24 hours)       Procedure Component Value Units Date/Time    Urinalysis With Culture If Indicated - Urine, Clean Catch [[id number]]  (Abnormal) Collected: 01/13/24 0120    Specimen: Urine, Clean Catch Updated: 01/13/24 0144     Color, UA Yellow     Appearance, UA Cloudy     pH, UA 7.0     Specific Gravity, UA 1.022     Glucose, UA Negative     Ketones,  UA Negative     Bilirubin, UA Negative     Blood, UA Small (1+)     Protein, UA 30 mg/dL (1+)     Leuk Esterase, UA Moderate (2+)     Nitrite, UA Negative     Urobilinogen, UA 0.2 E.U./dL    Narrative:      In absence of clinical symptoms, the presence of pyuria, bacteria, and/or nitrites on the urinalysis result does not correlate with infection.    Urinalysis, Microscopic Only - Urine, Clean Catch [[id number]]  (Abnormal) Collected: 01/13/24 0120    Specimen: Urine, Clean Catch Updated: 01/13/24 0144     RBC, UA 3-5 /HPF      WBC, UA Too Numerous to Count /HPF      Bacteria, UA 2+ /HPF      Squamous Epithelial Cells, UA 0-2 /HPF      Yeast, UA Small/1+ Yeast /HPF      Hyaline Casts, UA 7-12 /LPF      Methodology Manual Light Microscopy    Urine Culture - Urine, Urine, Clean Catch [[id number]] Collected: 01/13/24 0120    Specimen: Urine, Clean Catch Updated: 01/13/24 0144    CBC & Differential [[id number]]  (Abnormal) Collected: 01/12/24 2311    Specimen: Blood Updated: 01/12/24 2355    Narrative:      The following orders were created for panel order CBC & Differential.  Procedure                               Abnormality         Status                     ---------                               -----------         ------                     CBC Auto Differential[[id number]]        Abnormal            Final result               Scan Slide[[id number]]                                       Final result                 Please view results for these tests on the individual orders.    CBC Auto Differential [[id number]]  (Abnormal) Collected: 01/12/24 2311    Specimen: Blood Updated: 01/12/24 2355     WBC 9.23 10*3/mm3      RBC 4.40 10*6/mm3      Hemoglobin 9.0 g/dL      Hematocrit 31.8 %      MCV 72.3 fL      MCH 20.5 pg      MCHC 28.3 g/dL      RDW 16.1 %      RDW-SD 41.9 fl      MPV 8.6 fL      Platelets 592 10*3/mm3      Neutrophil % 71.0 %      Lymphocyte % 16.8 %      Monocyte % 8.8 %      Eosinophil % 2.3 %       Basophil % 0.4 %      Immature Grans % 0.7 %      Neutrophils, Absolute 6.56 10*3/mm3      Lymphocytes, Absolute 1.55 10*3/mm3      Monocytes, Absolute 0.81 10*3/mm3      Eosinophils, Absolute 0.21 10*3/mm3      Basophils, Absolute 0.04 10*3/mm3      Immature Grans, Absolute 0.06 10*3/mm3      nRBC 0.0 /100 WBC     Scan Slide [[id number]] Collected: 01/12/24 2311    Specimen: Blood Updated: 01/12/24 2355     Hypochromia Slight/1+     Microcytes Slight/1+     Polychromasia Slight/1+     WBC Morphology Normal     Platelet Estimate Increased     Clumped Platelets Present    Magnesium [[id number]]  (Normal) Collected: 01/12/24 2311    Specimen: Blood Updated: 01/12/24 2338     Magnesium 2.2 mg/dL     Comprehensive Metabolic Panel [[id number]]  (Abnormal) Collected: 01/12/24 2311    Specimen: Blood Updated: 01/12/24 2338     Glucose 160 mg/dL      BUN 12 mg/dL      Creatinine 0.55 mg/dL      Sodium 137 mmol/L      Potassium 4.4 mmol/L      Chloride 99 mmol/L      CO2 27.0 mmol/L      Calcium 9.0 mg/dL      Total Protein 7.8 g/dL      Albumin 3.2 g/dL      ALT (SGPT) 13 U/L      AST (SGOT) 22 U/L      Alkaline Phosphatase 227 U/L      Total Bilirubin 0.2 mg/dL      Globulin 4.6 gm/dL      A/G Ratio 0.7 g/dL      BUN/Creatinine Ratio 21.8     Anion Gap 11.0 mmol/L      eGFR 103.1 mL/min/1.73     Narrative:      GFR Normal >60  Chronic Kidney Disease <60  Kidney Failure <15      Protime-INR [[id number]]  (Abnormal) Collected: 01/12/24 2311    Specimen: Blood Updated: 01/12/24 2329     Protime 16.1 Seconds      INR 1.27    aPTT [[id number]]  (Abnormal) Collected: 01/12/24 2311    Specimen: Blood Updated: 01/12/24 2329     PTT 39.8 seconds           Imaging Results (Last 24 Hours)       Procedure Component Value Units Date/Time    US Venous Doppler Upper Extremity Right (duplex) [[id number]] Resulted: 01/12/24 2359     Updated: 01/13/24 0033    CT Head Without Contrast [[id number]] Resulted: 01/12/24 2349     Updated: 01/13/24 0003     XR Chest 1 View [[id number]] Resulted: 01/12/24 2253     Updated: 01/12/24 2254          I have personally reviewed and interpreted the radiology studies and ECG obtained at time of admission.     Assessment / Plan   Assessment:   Active Hospital Problems    Diagnosis     **Acute focal neurological deficit     Type 2 diabetes mellitus, without long-term current use of insulin     Metastatic disease     Acute blood loss anemia     Vaginal bleeding     DVT (deep venous thrombosis)      Treatment Plan  The patient will be admitted to my service here at [facility].   Admit to medical floor  Vitals every 4 hours with neuro checks  Cardiac consistent carbohydrate diet  IVF KVO prn  ASA  MRI brain with and without contrast for CVA and metastatic disease  Echocardiogram 2D in AM  Lipid panel and A1C  Neurology consult    DVT bilateral lower extremities and left cephalic vein > Continue Eliquis  Suspect malignancy related    Anemia due to vaginal bleeding > Iron profile    GYN malignancy with metastatic disease> Per Dr Grullon. She has follow up with Dr Velez next Tuesday in [place] for further work up    DM 2 NID recent diagnosis>   Low dose Humalog sliding scale AC and HS    Abnormal UA > Patient has received Rocephin 1 gram in ER    DVT prophylaxis > Not indicated     Medical Decision Making  Number and Complexity of problems: 4 complex medical problems  Differential Diagnosis: Ischemic stroke, brain metastatic disease    Conditions and Status        Condition is unchanged.     [facility] Data  External documents reviewed: [facility] [facility] EHR  Cardiac tracing (EKG, telemetry) interpretation: Sinus tachycardia  Radiology interpretation: See above  Labs reviewed: See above  Any tests that were considered but not ordered: none     Decision rules/scores evaluated (example [id number], Wells, etc): N/A     Discussed with: Patient     Care Planning  Shared decision making: Patient  Code status and discussions: Full  code    Disposition  Social Determinants of Health that impact treatment or disposition: none  Estimated length of stay is to be determined.     I confirmed that the patient's advanced care plan is present, code status is documented, and a surrogate decision maker is listed in the patient's medical record.     The patient's surrogate decision maker is family, see records.     The patient was seen and examined by me on 1/13/2024 at 0307.    Electronically signed by Daniel Fleming MD, 01/13/24, 03:07 CST.              Electronically signed by Daniel Fleming MD at 01/13/24 0759          Emergency Department Notes        Stella Ortiz, RN at 01/13/24 0355          Nursing report ED to floor  Radha Wade  63 y.o.  female    HPI:   Chief Complaint   Patient presents with    RIGHT SIDED WEAKNESS    Fall       Admitting doctor:   Daniel Fleming MD    Consulting provider(s):  Consults       No orders found for last 30 day(s).             Admitting diagnosis:   The primary encounter diagnosis was Cephalic vein thrombosis. Diagnoses of Cystitis, Fall, initial encounter, and Weakness were also pertinent to this visit.    Code status:   Current Code Status       Date Active Code Status Order ID Comments User Context       Prior            Allergies:   Patient has no known allergies.    Intake and Output  No intake or output data in the 24 hours ending 01/13/24 0355    Weight:       01/12/24  2233   Weight: 73.1 kg (161 lb 1.6 oz)       Most recent vitals:   Vitals:    01/12/24 2348 01/13/24 0121 01/13/24 0159 01/13/24 0259   BP:  113/73 124/59 105/61   Pulse: 105 106 106 106   Resp:       Temp:       SpO2: 99% 100% 100% 99%   Weight:       Height:         Oxygen Therapy: .    Active LDAs/IV Access:   Lines, Drains & Airways       Active LDAs       Name Placement date Placement time Site Days    Peripheral IV 01/12/24 2311 Left Antecubital 01/12/24  2311  Antecubital  less than 1                     Labs (abnormal labs have a star):   Labs Reviewed   COMPREHENSIVE METABOLIC PANEL - Abnormal; Notable for the following components:       Result Value    Glucose 160 (*)     Creatinine 0.55 (*)     Albumin 3.2 (*)     Alkaline Phosphatase 227 (*)     All other components within normal limits    Narrative:     GFR Normal >60  Chronic Kidney Disease <60  Kidney Failure <15     PROTIME-INR - Abnormal; Notable for the following components:    Protime 16.1 (*)     INR 1.27 (*)     All other components within normal limits   APTT - Abnormal; Notable for the following components:    PTT 39.8 (*)     All other components within normal limits   URINALYSIS W/ CULTURE IF INDICATED - Abnormal; Notable for the following components:    Appearance, UA Cloudy (*)     Blood, UA Small (1+) (*)     Protein, UA 30 mg/dL (1+) (*)     Leuk Esterase, UA Moderate (2+) (*)     All other components within normal limits    Narrative:     In absence of clinical symptoms, the presence of pyuria, bacteria, and/or nitrites on the urinalysis result does not correlate with infection.   CBC WITH AUTO DIFFERENTIAL - Abnormal; Notable for the following components:    Hemoglobin 9.0 (*)     Hematocrit 31.8 (*)     MCV 72.3 (*)     MCH 20.5 (*)     MCHC 28.3 (*)     RDW 16.1 (*)     Platelets 592 (*)     Lymphocyte % 16.8 (*)     Immature Grans % 0.7 (*)     Immature Grans, Absolute 0.06 (*)     All other components within normal limits   URINALYSIS, MICROSCOPIC ONLY - Abnormal; Notable for the following components:    RBC, UA 3-5 (*)     WBC, UA Too Numerous to Count (*)     Bacteria, UA 2+ (*)     All other components within normal limits   MAGNESIUM - Normal   URINE CULTURE   SCAN SLIDE   CBC AND DIFFERENTIAL    Narrative:     The following orders were created for panel order CBC & Differential.  Procedure                               Abnormality         Status                     ---------                               -----------          "------                     CBC Auto Differential[[id number]]        Abnormal            Final result               Scan Slide[[id number]]                                       Final result                 Please view results for these tests on the individual orders.       Meds given in ED:   Medications   sodium chloride 0.9 % flush 10 mL (has no administration in time range)   cefTRIAXone (ROCEPHIN) 1,000 mg in sodium chloride 0.9 % 100 mL IVPB (0 mg Intravenous Stopped 1/13/24 0252)   acetaminophen (TYLENOL) tablet 1,000 mg (1,000 mg Oral Given 1/13/24 0210)           NIH Stroke Scale:  Interval: baseline    Isolation/Infection(s):  No active isolations   No active infections     COVID Testing  Collected .  Resulted .    Nursing report ED to floor:  Mental status: . A/o x4  Ambulatory status: .  Precautions: .    ED nurse phone extentsion- ..  1130    Electronically signed by Stella Ortiz RN at 01/13/24 3554       Bernadette Huggins APRN at 01/12/24 3808       Attestation signed by Elias Inman MD at 01/13/24 0747        SUPERVISE: For this patient encounter, I reviewed the APC's documentation, treatment plan, and medical decision making.  Elias Inman MD 1/13/2024 07:47 CST                         Subjective   History of Present Illness  Patient is a 63-year-old female that presents to the emergency department with multiple complaints.  Patient reports she was diagnosed with a DVT to the left lower extremity and is supposed to be taking Eliquis twice daily due to this.  Patient reports that she has missed 2 doses and personally changed her dose to once daily because she felt as though the Eliquis was making her nauseous.  Patient states that over the last 2 to 3 days she has been experiencing some right upper arm discomfort.  She denies any known injury to the right upper extremity.  She states it just feels very tight and \"crampy and weak \".  She denies any obvious swelling or erythema to the " right upper extremity.  Patient states that around 930 on this date patient felt as though she began having weakness to the right arm and right leg causing her to have a ground-level fall on the carpet.  She states the fall was related to the upper and lower extremity weakness.  Patient denies any chest pain, shortness of breath, abdominal pain, vomiting, constipation, or diarrhea.  She denies any fevers, body aches, or chills.  Patient states she feels like her mind is not all there and that she is confused but is alert and oriented and able to answer all questions appropriately.      Review of Systems   Constitutional:  Positive for activity change.   Gastrointestinal:  Positive for nausea.   Musculoskeletal:  Positive for arthralgias, gait problem and myalgias.   Neurological:  Positive for weakness.   All other systems reviewed and are negative.      Past Medical History:   Diagnosis Date    Asthma     Bronchitis     Depression     Diabetes mellitus     DVT (deep venous thrombosis)     Heart murmur     Lyme disease     Mitral valve prolapse        No Known Allergies    Past Surgical History:   Procedure Laterality Date     SECTION         Family History   Problem Relation Age of Onset    Heart disease Father     Diabetes Father     Diabetes Brother     Heart disease Brother     Cancer Maternal Grandmother     Cancer Maternal Grandfather     Heart disease Paternal Grandfather        Social History     Socioeconomic History    Marital status:    Tobacco Use    Smoking status: Never    Smokeless tobacco: Never   Vaping Use    Vaping Use: Never used   Substance and Sexual Activity    Alcohol use: No    Drug use: No    Sexual activity: Defer     Partners: Male           Objective   Physical Exam  Vitals and nursing note reviewed.   Constitutional:       Appearance: Normal appearance.      Comments: Nontoxic appearing. In no acute distress.    HENT:      Head: Normocephalic and atraumatic.      Right  Ear: External ear normal.      Left Ear: External ear normal.      Nose: Nose normal.      Mouth/Throat:      Mouth: Mucous membranes are moist.      Pharynx: Oropharynx is clear.   Eyes:      Extraocular Movements: Extraocular movements intact.      Conjunctiva/sclera: Conjunctivae normal.   Cardiovascular:      Rate and Rhythm: Tachycardia present.      Pulses: Normal pulses.      Heart sounds: Normal heart sounds.   Pulmonary:      Effort: Pulmonary effort is normal. No respiratory distress.      Breath sounds: Normal breath sounds. No wheezing.   Chest:      Chest wall: No tenderness.   Abdominal:      General: Bowel sounds are normal. There is no distension.      Palpations: Abdomen is soft.      Tenderness: There is no abdominal tenderness. There is no right CVA tenderness, left CVA tenderness, guarding or rebound.   Musculoskeletal:         General: Normal range of motion.      Cervical back: Normal range of motion and neck supple. No rigidity or tenderness.      Right lower leg: No edema.      Left lower leg: Edema present.      Comments: Patient reports chronic left lower extremity swelling due to known DVT.  Patient does report some tenderness noted upon exam to the right upper extremity.  No erythema or swelling noted.  Radial pulses are palpable, strong and equal bilaterally.  Dorsalis pedis pulses are palpable, strong and equal bilaterally.   Skin:     General: Skin is warm and dry.      Capillary Refill: Capillary refill takes less than 2 seconds.   Neurological:      Mental Status: She is alert and oriented to person, place, and time.      Coordination: Heel to Shin Test abnormal.      Comments: NIH score: 5 points due to mild sensory changes to the right upper and lower extremities, inability to perform heel-to-shin with the right lower extremity, her right lower extremity does have some effort against gravity but is unable to raise the leg off the bed.  Patient is alert and oriented.  Her speech is  clear.  No facial asymmetry or visual deficits noted.  Noted.   Psychiatric:         Mood and Affect: Mood normal.         Behavior: Behavior normal.         Thought Content: Thought content normal.         Judgment: Judgment normal.       Labs Reviewed   COMPREHENSIVE METABOLIC PANEL - Abnormal; Notable for the following components:       Result Value    Glucose 160 (*)     Creatinine 0.55 (*)     Albumin 3.2 (*)     Alkaline Phosphatase 227 (*)     All other components within normal limits    Narrative:     GFR Normal >60  Chronic Kidney Disease <60  Kidney Failure <15     PROTIME-INR - Abnormal; Notable for the following components:    Protime 16.1 (*)     INR 1.27 (*)     All other components within normal limits   APTT - Abnormal; Notable for the following components:    PTT 39.8 (*)     All other components within normal limits   URINALYSIS W/ CULTURE IF INDICATED - Abnormal; Notable for the following components:    Appearance, UA Cloudy (*)     Blood, UA Small (1+) (*)     Protein, UA 30 mg/dL (1+) (*)     Leuk Esterase, UA Moderate (2+) (*)     All other components within normal limits    Narrative:     In absence of clinical symptoms, the presence of pyuria, bacteria, and/or nitrites on the urinalysis result does not correlate with infection.   CBC WITH AUTO DIFFERENTIAL - Abnormal; Notable for the following components:    Hemoglobin 9.0 (*)     Hematocrit 31.8 (*)     MCV 72.3 (*)     MCH 20.5 (*)     MCHC 28.3 (*)     RDW 16.1 (*)     Platelets 592 (*)     Lymphocyte % 16.8 (*)     Immature Grans % 0.7 (*)     Immature Grans, Absolute 0.06 (*)     All other components within normal limits   URINALYSIS, MICROSCOPIC ONLY - Abnormal; Notable for the following components:    RBC, UA 3-5 (*)     WBC, UA Too Numerous to Count (*)     Bacteria, UA 2+ (*)     All other components within normal limits   MAGNESIUM - Normal   URINE CULTURE   SCAN SLIDE   CBC AND DIFFERENTIAL    Narrative:     The following orders  were created for panel order CBC & Differential.  Procedure                               Abnormality         Status                     ---------                               -----------         ------                     CBC Auto Differential[[id number]]        Abnormal            Final result               Scan Slide[[id number]]                                       Final result                 Please view results for these tests on the individual orders.        Procedures          ED Course  ED Course as of 01/13/24 0316   Sat Jan 13, 2024   0002 CT angiogram of the chest was ordered due to patient's extensive DVT history as well as missed Eliquis doses and tachycardic upon arrival.  Patient has refused the CT of her chest due to not wanting contrast dye. [KF]   0130 CT head without contrast was reviewed and interpreted by stat rad radiology.  Impression reveals no hemorrhage, hydrocephalus, mass effect, or herniation.  Bones unremarkable [KF]   0200 Neurology was paged this time. [KF]   0249 Spoke with Dr. Singleton, neurologist on-call.  He states that patient would benefit from further evaluation and treatment of stroke such as inpatient MRI.  He states that patient does not qualify for any emergent intervention at this time. Hospitalist paged at this time. [KF]      ED Course User Index  [KF] Bernadette Huggins, APRN                          Total (NIH Stroke Scale): 7                  Medical Decision Making  Radha Wade is a 63 y.o. female who presents to the ED with multiple complaints.  Patient reports she was diagnosed with a DVT to the left lower extremity and is supposed to be taking Eliquis twice daily due to this.  Patient reports that she has missed 2 doses and personally changed her dose to once daily because she felt as though the Eliquis was making her nauseous.  Patient states that over the last 2 to 3 days she has been experiencing some right upper arm discomfort.  She denies any known injury to  "the right upper extremity.  She states it just feels very tight and \"crampy and weak \".  She denies any obvious swelling or erythema to the right upper extremity.  Patient states that around 930 on this date patient felt as though she began having weakness to the right arm and right leg causing her to have a ground-level fall on the carpet.  She states the fall was related to the upper and lower extremity weakness.  Patient denies any chest pain, shortness of breath, abdominal pain, vomiting, constipation, or diarrhea.  She denies any fevers, body aches, or chills.  Patient states she feels like her mind is not all there and that she is confused but is alert and oriented and able to answer all questions appropriately.    Patient was non-toxic appearing on arrival. No acute distress was noted.  Vital signs stable.  Tachycardic upon arrival.  Afebrile.    Past medical history, surgical history, and medication regimen reviewed.     Previous notes, labs, imaging, and more reviewed.    Patient's presentation raises suspicion for differentials including, but not limited to, DVT, PE, TIA, ICH.    Please refer to above section of note for lab and imaging results that were reviewed and interpreted by radiology as well as attending physician.     Medications administered,   sodium chloride 0.9 % flush 10 mL (has no administration in time range)  cefTRIAXone (ROCEPHIN) 1,000 mg in sodium chloride 0.9 % 100 mL IVPB (1,000 mg Intravenous New Bag 1/13/24 0213)  acetaminophen (TYLENOL) tablet 1,000 mg (1,000 mg Oral Given 1/13/24 0210)     NIH score: 5 points  ABCD 2 score: 6 points, high risk  Well score for PE: 6 points, high risk    Spoke with Dr. Singleton, neurologist on-call regarding patient's presentation as well as history of extensive DVTs.  He states that he feels as though it is reasonable to admit the patient for further evaluation and treatment of possible TIA or stroke due to the unilateral weakness.    Spoke with Dr." brady Gandhiist on-call regarding admission for further workup noted accept patient for admission.    Given findings described above, patient's presentation is likely consistent with right-sided weakness, cystitis, and cephalic thrombus to the right upper extremity.     I reassessed the patient and discussed the findings of the work up so far with patient and significant other present at bedside. I said that the next step in treatment would be admission to the hospital for further workup and care. I also said that there is always some diagnostic uncertainty in the ER, that symptoms may change, and new things may be found after being admitted. Dr. Gandhi, hospitalist assumed primary care of the patient and the patient was admitted to their service in stable condition.    Dragon disclaimer:  Parts of this note may be an electronic transcription/translation of spoken language to printed text using the Dragon dictation system.       Problems Addressed:  Cephalic vein thrombosis: complicated acute illness or injury  Cystitis: complicated acute illness or injury  Fall, initial encounter: complicated acute illness or injury  Weakness: complicated acute illness or injury    Amount and/or Complexity of Data Reviewed  Labs: ordered.  Radiology: ordered.  ECG/medicine tests: ordered.    Risk  Prescription drug management.        Final diagnoses:   Cephalic vein thrombosis   Cystitis   Fall, initial encounter   Weakness       ED Disposition  ED Disposition       ED Disposition   Decision to Admit    Condition   --    Comment   Level of Care: Remote Telemetry [26]   Diagnosis: Weakness [[id number]]   Admitting Physician: MAXIMILIANO GANDHI [[id number]]   Attending Physician: MAXIMILIANO GANDHI [[id number]]                 No follow-up provider specified.       Medication List      No changes were made to your prescriptions during this visit.            Bernadette Huggins, APRN  01/13/24 0316      Electronically signed by  Elias Inman MD at 01/13/24 0747       Current Facility-Administered Medications   Medication Dose Route Frequency Provider Last Rate Last Admin    acetaminophen (TYLENOL) tablet 650 mg  650 mg Oral Q4H PRN Daniel Fleming MD   650 mg at 01/15/24 0134    Or    acetaminophen (TYLENOL) suppository 650 mg  650 mg Rectal Q4H PRN Daniel Fleming MD        atorvastatin (LIPITOR) tablet 80 mg  80 mg Oral Nightly Daniel Fleming MD   80 mg at 01/14/24 2116    bisacodyl (DULCOLAX) suppository 10 mg  10 mg Rectal Daily PRN Frank Frankel DO        dextrose (D50W) (25 g/50 mL) IV injection 25 g  25 g Intravenous Q15 Min PRN Daniel Fleming MD        dextrose (GLUTOSE) oral gel 15 g  15 g Oral Q15 Min PRN Daniel Fleming MD        docusate sodium (COLACE) capsule 100 mg  100 mg Oral BID Daniel Fleming MD   100 mg at 01/15/24 0906    glucagon (GLUCAGEN) injection 1 mg  1 mg Intramuscular Q15 Min PRN Daniel Fleming MD        heparin 23868 units/250 mL (100 units/mL) in 0.45 % NaCl infusion  12 Units/kg/hr Intravenous Titrated Naldo Telles MD 16.31 mL/hr at 01/15/24 0302 21.018 Units/kg/hr at 01/15/24 0302    HYDROcodone-acetaminophen (NORCO) 5-325 MG per tablet 1 tablet  1 tablet Oral Q4H PRN Daniel Fleming MD        Insulin Lispro (humaLOG) injection 2-7 Units  2-7 Units Subcutaneous 4x Daily AC & at Bedtime Daniel Fleming MD   2 Units at 01/15/24 0905    labetalol (NORMODYNE,TRANDATE) injection 10 mg  10 mg Intravenous Q10 Min PRN Daniel Fleming MD        ondansetron (ZOFRAN) injection 4 mg  4 mg Intravenous Q6H PRN Daniel Fleming MD        pantoprazole (PROTONIX) EC tablet 40 mg  40 mg Oral Q AM Daniel Fleming MD   40 mg at 01/15/24 0610    polyethylene glycol (MIRALAX) packet 17 g  17 g Oral Daily Frank Frankel DO   17 g at 01/15/24 0906    sodium chloride 0.9 % flush 10 mL  10 mL  Intravenous PRN Daniel Fleming MD        sodium chloride 0.9 % flush 10 mL  10 mL Intravenous Q12H Daniel Fleming MD   10 mL at 01/14/24 0852    sodium chloride 0.9 % flush 10 mL  10 mL Intravenous PRN Daniel Fleming MD        sodium chloride 0.9 % infusion 40 mL  40 mL Intravenous PRN Daniel Fleming MD        traMADol (ULTRAM) tablet 25 mg  25 mg Oral Q8H PRN Daniel Fleming MD            Physician Progress Notes (last 72 hours)        Frank Frankel DO at 01/14/24 1542              [facility] Medicine Services  INPATIENT PROGRESS NOTE    Patient Name: Radha Wade  Date of Admission: 1/12/2024  Today's Date: 01/14/24  Length of Stay: 0  Primary Care Physician: Myron Vines MD    Subjective   Chief Complaint: R sided weakness, constipation    HPI   Patient seen and evaluated earlier today prior to MRI.  Was still complaining of ongoing right-sided weakness and coordination issues.  Only other complaint was constipation.  Denies chest pain or shortness of breath.  No nausea.        Review of Systems   All pertinent negatives and positives are as above. All other systems have been reviewed and are negative unless otherwise stated.     Objective    Temp:  [97.7 °F (36.5 °C)-98.8 °F (37.1 °C)] 98.3 °F (36.8 °C)  Heart Rate:  [] 108  Resp:  [16-18] 16  BP: ()/(45-76) 125/49  Physical Exam  GEN: Awake, alert, interactive, in NAD  HEENT: PERRLA, EOMI, Anicteric, Trachea midline  Lungs: no wheezing/rales/rhonchi  Heart: RRR, +S1/s2, no rub  ABD: soft, nt/nd, +BS, no guarding/rebound  Extremities: atraumatic, no cyanosis  Neuro: AAOx3, 4/5 R sided MS compared to L  Psych: appears anxious.         Results Review:  I have reviewed the labs, radiology results, and diagnostic studies.    Laboratory Data:   Results from last 7 days   Lab Units 01/14/24  0359 01/13/24  0602 01/12/24  2311   WBC 10*3/mm3 8.82 7.46 9.23    HEMOGLOBIN g/dL 8.9* 8.9* 9.0*   HEMATOCRIT % 31.8* 34.4 31.8*   PLATELETS 10*3/mm3 605* 524* 592*        Results from last 7 days   Lab Units 01/14/24  0359 01/13/24  0602 01/12/24  2311   SODIUM mmol/L 136 136 137   POTASSIUM mmol/L 4.5 4.2 4.4   CHLORIDE mmol/L 100 100 99   CO2 mmol/L 25.0 23.0 27.0   BUN mg/dL 10 11 12   CREATININE mg/dL 0.47* 0.50* 0.55*   CALCIUM mg/dL 8.8 8.7 9.0   BILIRUBIN mg/dL 0.2 0.2 0.2   ALK PHOS U/L 229* 208* 227*   ALT (SGPT) U/L 10 11 13   AST (SGOT) U/L 14 17 22   GLUCOSE mg/dL 191* 126* 160*       Culture Data:   Urine Culture   Date Value Ref Range Status   01/13/2024 >100,000 CFU/mL Mixed [place] Isolated  Final       Radiology Data:   Imaging Results (Last 24 Hours)       Procedure Component Value Units Date/Time    MRI Angiogram Neck Without Contrast [[id number]] Collected: 01/14/24 1502     Updated: 01/14/24 1514    Narrative:      EXAMINATION: MRI ANGIOGRAM NECK WO CONTRAST-  1/14/2024 3:02 PM MR  angiogram of the neck without contrast 1/14/2024     HISTORY: Carotid artery dissection suspected.     FINDINGS: MR angiography was performed of the neck utilizing 3D  time-of-flight and MIP images. Today's study is significantly degraded  by motion. This significantly lowers the sensitivity of the exam.     The visualized aortic arch is normal in caliber. The origin of the great  vessels are widely patent. The right vertebral artery origin is widely  patent. The left vertebral artery is rather diminutive in size and its  origin and proximal segment is not well-defined. Distally it is  suspected that the left vertebral artery terminates in the posterior  inferior cerebellar artery. The right vertebral artery is dominant and  appears to be widely patent from its origin to the basilar artery.     Both common carotid arteries are patent at their origin. No focal  luminal stenosis appreciated proximal to the carotid bifurcations. Both  carotid bifurcations are unremarkable with no  evidence of rate limiting  stenosis. The ICAs are patent to the skull base without evidence of rate  limiting stenosis.       Impression:      1. Study is degraded by motion artifact. This is particularly noted on  the MIP sequences which are three-dimensional reconstructions based on  the raw source images.  2. The carotid arteries are unremarkable. The carotid bifurcations are  normal in appearance without evidence of rate limiting stenosis with  both ICAs patent to the skull base.  3. The right vertebral artery is widely patent from its origin to the  skull base. The left vertebral artery is rather diminutive in size and  is not well-defined in its proximal segment. Difficult to ascertain  whether there is some loss of definition of this vessel simply because  of its small size and adjacent pulsatility from the aortic arch and  proximal great vessels or whether it is diseased. I feel that this  vessel terminates in the posterior inferior cerebellar artery at the  level of the posterior cranial fossa.  4. If further evaluation of the carotid and vertebral arteries is felt  warranted then follow-up with CT angiography would be the study of  choice and is considered the definitive exam for evaluation of the  vessels of the head and neck. Motion artifact, although potentially  degrading the study is a much lesser factor during performance of CT  angiography as the acquisition only takes a few seconds.     This report was signed and finalized on 1/14/2024 3:11 PM by Dr. Romeo Arizmendi MD.       MRI Angiogram Head Without Contrast [[id number]] Collected: 01/14/24 1455     Updated: 01/14/24 1505    Narrative:      EXAMINATION: MRI ANGIOGRAM HEAD WO CONTRAST-  1/14/2024 2:55 PM     HISTORY: Stroke. Determine embolic source.     FINDINGS: MR angiography was performed of the [place] with 3D  time-of-flight and MIP images. Raw source images are also reviewed.     Today's study is limited secondary to rather  extensive motion artifact.  This is particularly noted on the MIPS which are reproduction's based on  the raw source images.     The right vertebral artery is dominant. The left vertebral artery is  rather diminutive in size. I feel the left vertebral artery terminates  in the posterior inferior cerebellar artery. The right vertebral artery  is patent to the basilar artery. The basilar artery is patent. The  superior cerebellar arteries appear to be grossly intact. The basilar  artery terminates into the posterior cerebral artery on the left. There  is a fetal origin of the right posterior cerebral artery.     The distal ICAs are patent. The petrous, cavernous and supraclinoid  segment of the ICAs are patent to the terminus. Grossly the anterior and  middle cerebral arteries are patent. I do not see any discrete aneurysm.  No gross evidence of high-grade stenosis but the study is very limited.  The more distal trifurcation vessels are attenuated secondary to motion  artifact.       Impression:      1. Very limited exam secondary to extensive motion artifact. The left  vertebral artery is rather diminutive and I feel terminates in the  posterior inferior cerebellar artery. The right vertebral artery is  dominant. The basilar artery is widely patent. The basilar artery  terminates in the left posterior cerebral artery with the right  posterior cerebral artery emanating from the anterior circulation via  suspected persistent fetal origin.  2. Both intracranial ICAs are patent to the terminus. Potential disease  involving the cavernous and supraclinoid segment of the ICAs cannot be  fully evaluated. The anterior and middle cerebral arteries are grossly  unremarkable. I do not see any definite aneurysm.  3. If further assessment of the [place] is felt warranted  follow-up with CT angiography would be the study of choice for further  evaluation. That is a rapid acquisition and will not be as affected  by  potential motion.     This report was signed and finalized on 1/14/2024 3:02 PM by Dr. Romeo Arizmendi MD.       MRI Brain With & Without Contrast [[id number]] Collected: 01/14/24 1438     Updated: 01/14/24 1454    Narrative:      MRI BRAIN W WO CONTRAST- 1/14/2024 10:55 AM     HISTORY: Stroke, follow up; I82.619-Acute embolism and thrombosis of  superficial veins of unspecified upper extremity; N30.90-Cystitis,  unspecified without hematuria; W19.XXXA-Unspecified fall, initial  encounter; R53.1-Weakness; Z74.09-Other reduced mobility     COMPARISON: None.       TECHNIQUE: Multiplanar imaging of the brain was performed in a routine  fashion before and after the intravenous injection of gadolinium  contrast. There is significant motion artifact on today's exam.     FINDINGS:  Diffusion: There is diffusion restriction within the mesial left  temporal lobe,, left external capsule, left insular cortex as well as  within the body of the caudate nucleus with associated ADC mapping  abnormalities consistent with acute ischemic infarction. These infarcts  are nonhemorrhagic. There is no associated mass effect. No additional  sites of diffusion restriction are present.     Midline structures: Nondisplaced.     Ventricles: Normal in configuration and symmetric in size.     Masses: No masses or mass effect.     Basilar cisterns: Maintained.     Extra axial space: No abnormal extra-axial fluid.     Gray-white matter signal: There are additional scattered foci of T2  abnormality involving the periventricular and higher white matter tract  suggesting small vessel ischemic disease.     Cerebellum: Normal.     Brainstem: Normal.     Enhancement: No abnormal enhancement.     Other: Proximal cervical spinal cord is normal. Bilateral globes and  orbits are normal in appearance. Normal cerebrovascular flow voids  noted. No abnormal signal in the mastoid air cells or paranasal sinuses.       Impression:      1. Diffusion  restriction with associated ADC mapping abnormality  involving the mesial left temporal lobe, left external capsule, left  insula as well as the body of the caudate nucleus. These infarcts are  nonhemorrhagic.  2. Mild atrophy. Mild small vessel ischemic changes are noted involving  the periventricular and higher white matter tracts.  3. Normal flow voids within the [place].  4. Today's study is degraded by motion. No abnormal contrast enhancement  is demonstrated.           This report was signed and finalized on 1/14/2024 2:50 PM by Dr. Romeo Arizmendi MD.       US Venous Doppler Upper Extremity Right (duplex) [[id number]] Collected: 01/14/24 1113     Updated: 01/14/24 1117    Narrative:      History: Pain and swelling       Impression:      Impression:  1. There is no evidence of deep venous thrombosis of the right upper  extremity.  2. There is evidence of superficial thrombus in the cephalic vein.     Comments: Right upper extremity venous duplex exam was performed using  color Doppler flow, Doppler wave form analysis, and grayscale imaging,  with and without compression. There is no evidence of deep venous  thrombosis of the internal jugular, subclavian, axillary, brachial,  radial, and ulnar veins. There is evidence of superficial thrombus  involving the cephalic vein.        This report was signed and finalized on 1/14/2024 11:14 AM by Dr. Joseph Mari MD.               I have reviewed the patient's current medications.     Assessment/Plan   Assessment  Active Hospital Problems    Diagnosis     **Acute focal neurological deficit     CVA (cerebral vascular accident)     Type 2 diabetes mellitus, without long-term current use of insulin     Metastatic disease     Acute blood loss anemia     Vaginal bleeding     DVT (deep venous thrombosis)        Treatment Plan  #1 acute CVA -patient presented with symptoms concerning for stroke.  Initial head CT was nonacute.  MRI with and without was obtained  today to look for CVA versus possible mass given cancer history.  It was positive for multifocal diffusion restriction/CVA.  No obvious flow limitation was noted on angiographies.  Some was limited due to motion.  2D echo pending.  She has been on a heparin drip at this point by neurology, defer to them but suspect okay to resume Eliquis.  On statin.  Continue therapies.    #2 history DVT -patient with concern about right upper extremity swelling on arrival but she does not have a DVT in that extremity.  History of DVT in her lower extremities.  In the setting of malignancy.  She has been on Eliquis at this point but transition to heparin drip here due to fear for possible failure of therapy.  Patient openly admits that she would take the medicine once a day and not regularly.  Do not suspect there is any failure of care at this time rather patient noncompliance.  Feel she is likely somewhat in the mild to current issues at hand.  Note potential recommendations for Lovenox at discharge by neurology.  Will ask her hematologist/oncologist Dr. Grullon to evaluate in the a.m. for recommendations.    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 Grullon.  It seems that a lot of the procedures and testing have been ordered prior have typically not been performed as patient did not go for them.  Suspect she is in some denial to her diagnosis.  She is also supposed to have an upcoming appointment at [facility] for further evaluation.  Again we will ask hematology to evaluate in the a.m. to help with coordination of care and to ensure adequate/appropriate follow-up.    #4 history of DM2 -on sliding scale insulin and hypoglycemia protocol.    #5 anemia -stable since arrival.  Does have a history of vaginal bleeding likely in the setting of GYN malignancy.  Monitor, no obvious signs of bleeding here so far.    #6 constipation -start bowel regimen    Medical Decision Making  Number and Complexity of problems: 1 acute  problem, multiple chronic  Differential Diagnosis: as above    Conditions and Status        Pt is new to me, appears stable/non-toxic     MDM Data  External documents reviewed: none  Cardiac tracing (EKG, telemetry) interpretation: sinus on monitor  Radiology interpretation: reports reviewed  Labs reviewed: as above  Any tests that were considered but not ordered: none     Decision rules/scores evaluated (example [id number], Wells, etc): none     Discussed with: patient, nursing     Care Planning  Shared decision making: Patient apprised of current labs, vitals, imaging and treatment plan.  They are agreeable with proceeding with plans as discussed.    Code status and discussions: full code    Disposition  Social Determinants of Health that impact treatment or disposition: none  I expect the patient to be discharged to possible acute inpt rehab vs snf in 1-2 days    Electronically signed by Frank Frankel DO, 01/14/24, 15:42 CST.;y    Electronically signed by Frank Frankel DO at 01/14/24 1809       Naldo Telles MD at 01/14/24 1024            Neurology Progress Note      Chief Complaint:  Stroke  Length of Stay:  0     Interval     1/14: Has been hyperglycemic up to 247 at 8 AM this morning.  Continues on heparin drip protocol last PTT 44.5 at 4 AM . MRI brain w/wo and MRA head and neck completed today demonstrating left temporal lobe, left external capsule, left insula, body of the left cardiac nucleus infarcts.  There is no abnormal contrast-enhancement.  MRI brain degraded by motion artifact and in the limited evaluation there is no clear significant stenosis or large vessel occlusion. SLP evaluated, no acute needs.     1/13: Stopped Eliquis, stop aspirin, started on heparin drip low goal no bolus no rebolus.  Started premedication protocol for MRI with and without contrast tomorrow morning per radiology request.    Occupational Therapy and physical therapy evaluated recommended inpatient rehab  facility,     Subjective     Feeling slightly better than yesterday however continues to be anxious about what the next steps will be regarding her potential cancer.  She is also anxious about the recovery from the stroke.      Medications:  Current Facility-Administered Medications   Medication Dose Route Frequency Provider Last Rate Last Admin    acetaminophen (TYLENOL) tablet 650 mg  650 mg Oral Q4H PRN Daniel Fleming MD   650 mg at 01/14/24 0328    Or    acetaminophen (TYLENOL) suppository 650 mg  650 mg Rectal Q4H PRN Daniel Fleming MD        atorvastatin (LIPITOR) tablet 80 mg  80 mg Oral Nightly Daniel Fleming MD   80 mg at 01/13/24 2113    dextrose (D50W) (25 g/50 mL) IV injection 25 g  25 g Intravenous Q15 Min PRN Daniel Fleming MD        dextrose (GLUTOSE) oral gel 15 g  15 g Oral Q15 Min PRN Daniel Fleming MD        diphenhydrAMINE (BENADRYL) capsule 50 mg  50 mg Oral Once When Specified Naldo Telles MD        Or    diphenhydrAMINE (BENADRYL) injection 50 mg  50 mg Intravenous Once When Specified Naldo Telles MD        Or    diphenhydrAMINE (BENADRYL) injection 50 mg  50 mg Intramuscular Once When Specified Naldo Telles MD        docusate sodium (COLACE) capsule 100 mg  100 mg Oral BID Daniel Fleming MD   100 mg at 01/14/24 0848    glucagon (GLUCAGEN) injection 1 mg  1 mg Intramuscular Q15 Min PRN Daniel Fleming MD        heparin 55452 units/250 mL (100 units/mL) in 0.45 % NaCl infusion  12 Units/kg/hr Intravenous Titrated Naldo Telles MD 11.31 mL/hr at 01/14/24 0504 14.575 Units/kg/hr at 01/14/24 0504    HYDROcodone-acetaminophen (NORCO) 5-325 MG per tablet 1 tablet  1 tablet Oral Q4H PRN Daniel Fleming MD        Insulin Lispro (humaLOG) injection 2-7 Units  2-7 Units Subcutaneous 4x Daily AC & at Bedtime Daniel Fleming MD   3 Units at 01/14/24 0848    labetalol  (NORMODYNE,TRANDATE) injection 10 mg  10 mg Intravenous Q10 Min PRN Daniel Fleming MD        ondansetron (ZOFRAN) injection 4 mg  4 mg Intravenous Q6H PRN Daniel Fleming MD        pantoprazole (PROTONIX) EC tablet 40 mg  40 mg Oral Q AM Daniel Fleming MD   40 mg at 01/14/24 0541    predniSONE (DELTASONE) tablet 50 mg  50 mg Oral Once Naldo Telles MD        sodium chloride 0.9 % flush 10 mL  10 mL Intravenous PRN Daniel Fleming MD        sodium chloride 0.9 % flush 10 mL  10 mL Intravenous Q12H Daniel Fleming MD   10 mL at 01/14/24 0852    sodium chloride 0.9 % flush 10 mL  10 mL Intravenous PRN Daniel Fleming MD        sodium chloride 0.9 % infusion 40 mL  40 mL Intravenous PRN Daniel Fleming MD        traMADol (ULTRAM) tablet 25 mg  25 mg Oral Q8H PRN Daniel Fleming MD             Objective      Vital Signs  Temp:  [97.7 °F (36.5 °C)-98.8 °F (37.1 °C)] 98.8 °F (37.1 °C)  Heart Rate:  [] 70  Resp:  [16-18] 16  BP: (117-140)/(45-76) 118/69    General Exam:  Head:  Normocephalic, atraumatic  HEENT:  Neck supple  CVS:  Regular rate and rhythm on monitor  Lungs: Breathing comfortably on room air  Abdomen: Distended and soft  Extremities: There is bilateral lower extremity nonpitting edema up to the knees.  There is mild redness and edema of the upper right upper extremity.  Skin: There is some macular with small papular rash mostly over the ankles and the anterior surface of the left lower extremity lesser degree the right lower extremity     Neurologic Exam:     Mental Status:    -Awake, Alert, Oriented X 3  -No word finding difficulties  -No aphasia  -No dysarthria  -Follows simple and complex commands     CN II:  Visual fields full.  Pupils equally reactive to light  CN III, IV, VI:  Extraocular Muscles full with no signs of nystagmus  CN V:  Facial sensory is symmetric with no asymmetries.  CN VII:  Facial motor  symmetric  CN VIII:  Gross hearing intact bilaterally  CN IX:  Palate elevates symmetrically  CN X:  Palate elevates symmetrically  CN XI:  Shoulder shrug symmetric  CN XII:  Tongue is midline on protrusion     Motor:   Tone is normal bilaterally.  (strength out of 5:  1= minimal movement, 2 = movement in plane of gravity, 3 = movement against gravity, 4 = movement against some resistance, 5 = full strength)  Motor function on the left upper and left lower extremity are 5 out of 5, except for some limitation in knee extension and knee flexion due to antalgic guarding.     Right upper extremity is remarkable for shoulder abduction flexion and extension along with internal and external rotation of 4 -.    Distally, finger extension and wrist extension 4, the flexors throughout the right upper extremity are 4+.     Right lower extremity is remarkable for hip flexion, knee flexion of 4+.  Rest of the muscle groups are 5 out of 5.     DTR:  There is no clonus, no Babinski, no Vera.  There is no obvious hyperreflexia     Sensory:  There is decreased sensation to light touch on the right upper and right lower extremities.     Coordination:  There is no overt ataxia in the upper or lower extremities that emerges beyond the weakness.     Gait  Not tested    Last nurse assessment:  Interval: baseline  1a. Level of Consciousness: 0-->Alert, keenly responsive  1b. LOC Questions: 0-->Answers both questions correctly  1c. LOC Commands: 0-->Performs both tasks correctly  2. Best Gaze: 0-->Normal  3. Visual: 0-->No visual loss  4. Facial Palsy: 0-->Normal symmetrical movements  5a. Motor Arm, Left: 0-->No drift, limb holds 90 (or 45) degrees for full 10 secs  5b. Motor Arm, Right: 0-->No drift, limb holds 90 (or 45) degrees for full 10 secs  6a. Motor Leg, Left: 0-->No drift, leg holds 30 degree position for full 5 secs  6b. Motor Leg, Right: 0-->No drift, leg holds 30 degree position for full 5 secs  7. Limb Ataxia:  0-->Absent  8. Sensory: 1-->Mild-to-moderate sensory loss, patient feels pinprick is less sharp or is dull on the affected side, or there is a loss of superficial pain with pinprick, but patient is aware of being touched  9. Best Language: 0-->No aphasia, normal  10. Dysarthria: 0-->Normal  11. Extinction and Inattention (formerly Neglect): 0-->No abnormality    Total (NIH Stroke Scale): 1       Results Review:      Labs:  Labs as above    Imaging:  Imaging as above    Assessment/Plan   Patient who has a significant history of potential gynecological malignancy of unclear staging with DVTs in the left lower extremity and recently in the right upper extremity, that has occurred despite use of Eliquis.  And is presented with acute right upper extremity and right lower extremity weakness.     The presentation was concerning for an acute ischemic stroke, she was not a candidate for TNK given active use of anticoagulation plus out of the window plus potential metastatic malignancy.  Given lack of large vessel occlusion clinical signs, and her severe reaction to iodinated contrast we have postponed the CTA and will obtain instead an MRA head and neck.    MRI brain with and without from 1/14/2024 demonstrates an acute ischemic stroke in the left temporal lobe, external capsule, body of the caudate that explains her symptoms.  There is no evidence of abnormal contrast-enhancement that is concerning for metastatic disease.  MRAs of the head and neck did not show any significant stenosis however there is severely degraded by motion.  We do not think a CTA is warranted at this time given the severe allergy.    Will continue the heparin drip, hoping to obtain therapeutic levels and once we get a therapeutic level head CT should be performed in order to rule out any hemorrhagic transformation of the stroke.  This is a safety measure given that we have to anticoagulate in the setting of the recent DVT.  Once we can demonstrate  that while anticoagulated there is no hemorrhagic transformation of the stroke, it would be safe to transition her to Lovenox.  We do not think she is responding to Eliquis given that she reports having taken Eliquis every day without skipping a dose for at least a month.    We are awaiting a read of TTE with agitated saline, as presence of a PFO could explain paradoxical embolism.      Hospital Problem List      Acute focal neurological deficit    DVT (deep venous thrombosis)    Vaginal bleeding    Type 2 diabetes mellitus, without long-term current use of insulin    Metastatic disease    Acute blood loss anemia    Impression:  Clinical suspicion of acute ischemic stroke with RUE/RLE paresis: Etiology to be suspected embolic secondary to hypercoagulability due to suspected gynecological malignancy of unclear staging.    Plan:  Continue heparin gtt per protocol (low goal, no bolus, no re-bolus)  Continue atorvastatin 80 mg daily  F/up TTE with agitated saline  Recommend evaluation by oncology  Continue working with PT/OT  Inpatient Rehab as disposition      Medical Decision Making    Number/Complexity of Problems  Moderate  1 undiagnosed new problem with uncertain prognosis -   1 acute illness with systemic symptoms -   High  1 acute or chronic illness that pose a threat to life/body function -   High    MDM Data  Moderate - 1/3 categories  Extensive - 2/3 categories    Category 1: 3 of the following  Review of external notes  Review of results  Ordering of each unique test  Independent historian  Category 2:  Independent interpretation of test (ex: imaging)  Category 3:  Discussion of management with another provider    Category 2     Treatment Plan  Moderate - Prescription Drug management  High  Drug therapy requiring intensive monitoring for toxicity  Decision regarding hospitalization or escalation of care  De-escalate care/DNR decisions  High      Naldo Telles MD  01/14/24  10:24 CST     Electronically  signed by Naldo Telles MD at 24 1912          Consult Notes (last 72 hours)        Naldo Telles MD at 24 0917        Consult Orders    1. Inpatient Neurology Consult Stroke [[id number]] ordered by Daniel Fleming MD at 24 0525                   Neurology Consult Note    Consult Date: 2024  Referring MD: Daniel Fleming*  Reason for Consult: Concern for Stroke    Patient: Radha Wade (63 y.o. female)  MRN: [id number]  : 1960    History of Present Illness:   Radha Wade is a 63 y.o. woman with past medical history significant for type 2 diabetes, DVT in the bilateral lower extremities on Eliquis, suspected gynecological cancer with lymph node invasion who presented to the emergency department on 2024 evening with new onset of right upper and right lower extremity weakness.     Ms. Wade is interviewed today with  at bedside.  Patient reports that last night around 9:30 PM she had sudden onset of right lower and right upper extremities weakness, with inability to bear her own weight that led to a fall.  There was no head strike and no loss of consciousness after the fall.  She reports that she felt a little bit dizzy and feeling off since last night.  She also reported the right face was feeling a little bit tingly.  Of note, she reports that about 3 days prior to presentation she started to have progressive swelling of her right upper extremity with pain and erythema.    On arrival to the emergency department, she was noted to have some weakness in the right upper extremity as well in the right lower extremity without involvement of the face.  Given swelling and erythema of the right upper extremity she underwent a Doppler that showed cephalic vein thrombosis.  Patient was evaluated by this provider, for potential stroke.  We had recommended admission for further workup but she was not a candidate for thrombolysis given  being outside the window at the time of evaluation, use of anticoagulation, potential metastatic malignancy.  There were no clinical signs of large vessel occlusion.  As she did not have any language issues, no facial involvement, no neglect, no visual field cuts.  And given history of potential angioedema/anaphylaxis from iodinated IV contrast we had determined not to pursue CTA head and neck at that moment, as he was unlikely to change any of the management then.    Significantly, she has a history of left lower extremity DVT that was discovered 4 months ago and was started on Eliquis this past November.  She reports that the left lower extremity has been weak and this has been attributed to atrophy due to deconditioning secondary to severe edema from the DVT.  She reports that she has occasional missed a few doses of Eliquis, but she has been taking it every day for at least the past month.  She also reports weight loss of about 55 pounds in the last 2 months, and low iron levels.  She suspects that she has a gynecological cancer as she has been told that she has some masses and swelling in her uterus.  She reports that she has had menstrual periods regularly without any period of menopause.  The workup for a potential cancer had started back in summer 2023.  Has been followed by Dr. Grullon who is actively working up for a malignancy. She endorses that recently as of 5 months ago she started treatment for Lyme's disease she reports she did not have any of the target rashes, but as she was tested serologically she was given 1 month of treatment which helped a generalized rash that she has had in her legs trunk and upper extremities.  She has been diagnosed with diabetes this past year.    Review Of Systems: As above    Medical History:   Past Medical/Surgical Hx:  Past Medical History:   Diagnosis Date    Asthma     Bronchitis     Depression     Diabetes mellitus     DVT (deep venous thrombosis)     Heart murmur      Lyme disease     Mitral valve prolapse      Past Surgical History:   Procedure Laterality Date     SECTION         Medications On Admission:  Medications Prior to Admission   Medication Sig Dispense Refill Last Dose    acetaminophen-codeine (TYLENOL with CODEINE #3) 300-30 MG per tablet Take 1 tablet by mouth.   Past Week    Apixaban Starter Pack (Eliquis DVT/PE Starter Pack) tablet therapy pack Take two 5 mg tablets by mouth every 12 hours for 7 days. Followed by one 5 mg tablet every 12 hours. (Dispense starter pack if available) 74 tablet 0 2024    ferrous sulfate 325 (65 FE) MG tablet Take 1 tablet by mouth 2 (Two) Times a Day.   Past Week    furosemide (LASIX) 20 MG tablet Take 1 tablet by mouth Daily.   Past Week    sulfamethoxazole-trimethoprim (BACTRIM DS,SEPTRA DS) 800-160 MG per tablet Take 1 tablet by mouth Every 12 (Twelve) Hours.   2024    albuterol sulfate  (90 Base) MCG/ACT inhaler Inhale 2 puffs Every 4 (Four) Hours As Needed for Wheezing or Shortness of Air. 1 inhaler 0 More than a month    azithromycin (ZITHROMAX) 250 MG tablet TAKE 2 TABLETS BY MOUTH TODAY, THEN TAKE 1 TABLET DAILY FOR 4 DAYS AS DIRECTED (Patient not taking: Reported on 2024)   Not Taking    Lancets (freestyle) lancets 1 each by Other route 2 (Two) Times a Day. Use as instructed 100 each 0     polyethylene glycol (MIRALAX) 17 g packet Take 17 g by mouth Daily As Needed (Use if senna-docusate is ineffective). Obtain OTC       traMADol (Ultram) 50 MG tablet Take 0.5 tablets by mouth Every 8 (Eight) Hours As Needed for Moderate Pain. (Patient not taking: Reported on 2024) 9 tablet 0 Not Taking       Current Medications:    Current Facility-Administered Medications:     acetaminophen (TYLENOL) tablet 650 mg, 650 mg, Oral, Q4H PRN **OR** acetaminophen (TYLENOL) suppository 650 mg, 650 mg, Rectal, Q4H PRN, Daniel Fleming MD    apixaban (ELIQUIS) tablet 5 mg, 5 mg, Oral, Q12H, Lonnie  Daniel Newsome MD, 5 mg at 01/13/24 0828    aspirin tablet 325 mg, 325 mg, Oral, Daily, 325 mg at 01/13/24 0828 **OR** aspirin suppository 300 mg, 300 mg, Rectal, Daily, Daniel Fleming MD    atorvastatin (LIPITOR) tablet 80 mg, 80 mg, Oral, Nightly, Daniel Fleming MD    dextrose (D50W) (25 g/50 mL) IV injection 25 g, 25 g, Intravenous, Q15 Min PRN, Daniel Fleming MD    dextrose (GLUTOSE) oral gel 15 g, 15 g, Oral, Q15 Min PRN, Daniel Fleming MD    docusate sodium (COLACE) capsule 100 mg, 100 mg, Oral, BID, Daniel Fleming MD    glucagon (GLUCAGEN) injection 1 mg, 1 mg, Intramuscular, Q15 Min PRN, Daniel Fleming MD    HYDROcodone-acetaminophen (NORCO) 5-325 MG per tablet 1 tablet, 1 tablet, Oral, Q4H PRN, Daniel Fleming MD    Insulin Lispro (humaLOG) injection 2-7 Units, 2-7 Units, Subcutaneous, 4x Daily AC & at Bedtime, Daniel Fleming MD    labetalol (NORMODYNE,TRANDATE) injection 10 mg, 10 mg, Intravenous, Q10 Min PRN, Daniel Fleming MD    ondansetron (ZOFRAN) injection 4 mg, 4 mg, Intravenous, Q6H PRN, Daniel Fleming MD    pantoprazole (PROTONIX) EC tablet 40 mg, 40 mg, Oral, Q AM, Daniel Fleming MD    [COMPLETED] Insert Peripheral IV, , , Once **AND** sodium chloride 0.9 % flush 10 mL, 10 mL, Intravenous, PRN, Daniel Fleming MD    sodium chloride 0.9 % flush 10 mL, 10 mL, Intravenous, Q12H, Daniel Fleming MD, 10 mL at 01/13/24 0828    sodium chloride 0.9 % flush 10 mL, 10 mL, Intravenous, PRN, Daniel Fleming MD    sodium chloride 0.9 % infusion 40 mL, 40 mL, Intravenous, PRN, Daniel Fleming MD    traMADol (ULTRAM) tablet 25 mg, 25 mg, Oral, Q8H PRN, Daniel Fleming MD     Allergies:  No Known Allergies    Social Hx:  Social History     Socioeconomic History    Marital status:    Tobacco Use    Smoking status: Never    Smokeless tobacco: Never   Vaping  "Use    Vaping Use: Never used   Substance and Sexual Activity    Alcohol use: No    Drug use: No    Sexual activity: Defer     Partners: Male       Family Hx:  Family History   Problem Relation Age of Onset    Heart disease Father     Diabetes Father     Diabetes Brother     Heart disease Brother     Cancer Maternal Grandmother     Cancer Maternal Grandfather     Heart disease Paternal Grandfather      Physical Examination:   Vital Signs:  Vitals:    01/13/24 0159 01/13/24 0259 01/13/24 0406 01/13/24 0700   BP: 124/59 105/61 124/49 118/53   BP Location:   Left arm Left arm   Patient Position:   Lying Lying   Pulse: 106 106 105 89   Resp:   16 16   Temp:   97.8 °F (36.6 °C) 97.8 °F (36.6 °C)   TempSrc:   Oral Oral   SpO2: 100% 99% 100% 100%   Weight:   77.6 kg (171 lb)    Height:   165.1 cm (65\")        General Exam:  Head:  Normocephalic, atraumatic  HEENT:  Neck supple  CVS:  Regular rate and rhythm on monitor  Lungs: Breathing comfortably on room air  Abdomen: Distended and soft  Extremities: There is bilateral lower extremity nonpitting edema up to the knees.  There is mild redness and edema of the upper right upper extremity.  Skin: There is some macular with small papular rash mostly over the ankles and the anterior surface of the left lower extremity lesser degree the right lower extremity    Neurologic Exam:    Mental Status:    -Awake, Alert, Oriented X 3  -No word finding difficulties  -No aphasia  -No dysarthria  -Follows simple and complex commands    CN II:  Visual fields full.  Pupils equally reactive to light  CN III, IV, VI:  Extraocular Muscles full with no signs of nystagmus  CN V:  Facial sensory is symmetric with no asymmetries.  CN VII:  Facial motor symmetric  CN VIII:  Gross hearing intact bilaterally  CN IX:  Palate elevates symmetrically  CN X:  Palate elevates symmetrically  CN XI:  Shoulder shrug symmetric  CN XII:  Tongue is midline on protrusion    Motor:   Tone is normal " bilaterally.  (strength out of 5:  1= minimal movement, 2 = movement in plane of gravity, 3 = movement against gravity, 4 = movement against some resistance, 5 = full strength)  Motor function on the left upper and left lower extremity are 5 out of 5, except for some limitation in knee extension and knee flexion due to antalgic guarding.    Right upper extremity is remarkable for shoulder abduction flexion and extension along with internal and external rotation of 4 -.    Distally, finger extension and wrist extension 4, the flexors throughout the right upper extremity are 4+.    Right lower extremity is remarkable for hip flexion, knee flexion of 4+.  Rest of the muscle groups are 5 out of 5.    DTR:  There is no clonus, no Babinski, no Vera.  There is no obvious hyperreflexia    Sensory:  There is decreased sensation to light touch on the right upper and right lower extremities.    Coordination:  There is no overt ataxia in the upper or lower extremities that emerges beyond the weakness.    Gait  Not tested    Recent Diagnostics:   Laboratory Results:   - Reviewed in EMR  Lab Results   Component Value Date    GLUCOSE 126 (H) 01/13/2024    CALCIUM 8.7 01/13/2024     01/13/2024    K 4.2 01/13/2024    CO2 23.0 01/13/2024     01/13/2024    BUN 11 01/13/2024    CREATININE 0.50 (L) 01/13/2024    EGFRIFNONA 80 12/03/2019    BCR 22.0 01/13/2024    ANIONGAP 13.0 01/13/2024     Lab Results   Component Value Date    WBC 7.46 01/13/2024    HGB 8.9 (L) 01/13/2024    HCT 34.4 01/13/2024    MCV 77.8 (L) 01/13/2024     (H) 01/13/2024     Lab Results   Component Value Date    PTT 39.8 (H) 01/12/2024    INR 1.27 (H) 01/12/2024     Lab Results   Component Value Date    TRIG 169 (H) 01/13/2024    HDL 30 (L) 01/13/2024    LDL 89 01/13/2024     Lab Results   Component Value Date    HGBA1C 6.50 (H) 01/12/2024       Imaging Results:  Imaging Results (Last 24 Hours)       Procedure Component Value Units Date/Time     CT Head Without Contrast [[id number]] Collected: 01/13/24 0746     Updated: 01/13/24 0750    Narrative:      CT BRAIN without contrast dated 1/12/2024 10:49 PM     HISTORY: Weakness. Brain fog.     COMPARISON: None      DOSE LENGTH PRODUCT: 686.84 mGy.cm . All CT scans are performed using  dose optimization techniques as appropriate to the performed exam and  including at least one of the following: Automated exposure control,  adjustment of the mA and/or kV according to size, and the use of the  iterative reconstruction technique.     In order to have a CT radiation dose as low as reasonably achievable,  Automated Exposure Control was utilized for adjustment of the mA and/or  KV according to patient size.     TECHNIQUE: Serial axial tomographic images of the brain were obtained  without the use of intravenous contrast.     FINDINGS:  The midline structures are nondisplaced. The ventricles and basilar  cisterns are normal in size and configuration. There is no evidence of  intracranial hemorrhage or mass-effect. The gray-white matter  differentiation is maintained. The sulci have a normal configuration,  and there are no abnormal extra-axial fluid collections. The structures  of the posterior fossa are unremarkable.     The included orbits and their contents are unremarkable. The visualized  paranasal sinuses, mastoid air cells and middle ear cavities are clear.  The visualized osseous structures and overlying soft tissues of the  skull and face are unremarkable.       Impression:      1. No acute intracranial process.           This report was signed and finalized on 1/13/2024 7:47 AM by Dr. Romeo Arizmendi MD.       XR Chest 1 View [[id number]] Collected: 01/13/24 0708     Updated: 01/13/24 0712    Narrative:      EXAMINATION: XR CHEST 1 VW-  1/13/2024 7:08 AM     HISTORY: Generalized weakness     FINDINGS: Upright frontal projection of the chest demonstrates a nodule  within the right upper lobe warranting  follow-up with CT imaging of the  chest. Lungs are otherwise clear with no consolidative pneumonia. No  effusion or free air is present.       Impression:      1.. Right upper lobe nodule. Follow-up with CT imaging of the chest  recommended.  2. Otherwise normal exam.     This report was signed and finalized on 1/13/2024 7:09 AM by Dr. Romeo Arizmendi MD.       US Venous Doppler Upper Extremity Right (duplex) [[id number]] Resulted: 01/12/24 2359     Updated: 01/13/24 0033             Other labs this admission:  Reviewed, remarkable for:  LDL: 89, triglycerides: 169  A1c: 6.5  Moderate pyuria, with bacteriuria, mild hematuria, urine culture pending  Hemoglobin: 8.9, MCV: 77.8, iron:17 (low), ferritin:441 (high), TIBC:216 (low), transferrin: 145 (low), TIBC: 216 (low).      Other RAD:  CT head from January 13, 2024 has been reviewed and we agree with the impression that there is no acute intracranial pathology that is obvious.      Assessment & Plan:   Patient who has a significant history of potential gynecological malignancy of unclear staging with DVTs in the left lower extremity and recently in the right upper extremity, that has occurred despite use of Eliquis.  And is presented with acute right upper extremity and right lower extremity weakness.    The presentation is concerning for an acute ischemic stroke, she was not a candidate for TNK given active use of anticoagulation plus out of the window plus potential metastatic malignancy.  Given lack of large vessel occlusion clinical signs, and her severe reaction to iodinated contrast we have postponed the CTA and will obtain instead an MRA head and neck.    Regarding anticoagulation, we think it is safer for her to stop aspirin, stop Eliquis, start her on a low-dose no bolus heparin drip.  And given her lack of response to Eliquis, potentially do to DVTs associated with malignancy, we think she will benefit from eventually transitioning to Lovenox.  Now she  reports that the subcutaneous injections of Lovenox in the past have given her severe bruising and pain in her abdominal area, have talked to her about this and she has agreed that if it is needed then she will give it a try.    Of note, patient has asked if this is possible lupus or Lyme disease that is untreated, we have mentioned that this possibility is a significantly lower in the differential and the most likely process is a potential malignancy leading to secondary hypercoagulability has led to DVTs and acute ischemic stroke.  However the latter needs to be confirmed with further imaging. We also still recommend the MRI brain with and without contrast to evaluate for potential malignancy.  She will benefit from obtaining a TTE with agitated saline to make sure she does not have a right to left shunt that could have left to a paradoxical embolism in her case.    Impression:  Clinical suspicion of acute ischemic stroke      Plan:   Stop aspirin  Stop Eliquis  Continue atorvastatin 80 mg daily  Start heparin low-dose no bolus no re-bolus.  Obtain MRI brain with and without contrast as well as MR angiogram of head and neck with and without contrast.  Per radiology she will require premedication protocol.  Obtain TTE with agitated saline  Will follow-up with results.  Evaluation by PT/OT/SLP    Naldo Telles MD  01/13/24  09:17 CST    Medical Decision Making    Number/Complexity of Problems  Moderate  1 undiagnosed new problem with uncertain prognosis -   1 acute illness with systemic symptoms -   High  1 acute or chronic illness that poses a threat to life/body function -   High     MDM Data  Moderate - 1/3 categories  Extensive - 2/3 categories    Category 1: 3 of the following  Review of external notes  Review of results  Ordering of each unique test  Independent historian  Category 2:  Independent interpretation of test (ex: imaging)  Category 3:  Discussion of management with another  provider    Category 3     Treatment Plan  Moderate - Prescription Drug management  High  Drug therapy requiring intensive monitoring for toxicity  Decision regarding hospitalization or escalation of care  De-escalate care/DNR decisions  High         Electronically signed by Naldo Telles MD at 01/13/24 6314                Darrell Rosario, PT DPT   Physical Therapist  Specialty: Physical Therapy     Plan of Care     Signed     Date of Service: 01/13/24 1542  Creation Time: 01/13/24 1542     Signed         Goal Outcome Evaluation:  Plan of Care Reviewed With: patient, spouse  Outcome Evaluation: PT hannah complete. She is alert and oriented x 4, some speech difficulty. She was independent at home in her mobility before this event. She is weak in her R LE and demos 3+/5 strength in R LE. Some mildly impaired coordination in R LE to testing and with gait. She is a fall risk and her R LE will buckle at times in stance and with movement. She needs min assist x 2 to ambulate ~ 25 ft within her room and to the chair. PT will cont with mobiltiy, balance, strengthening and coordination. REcommend d.c to acute rehab.        Anticipated Discharge Disposition (PT): inpatient rehabilitation facility

## 2024-01-15 NOTE — CASE MANAGEMENT/SOCIAL WORK
Continued Stay Note   [facility]     Patient Name: Radha Wade  MRN: [id number]  Today's Date: 1/15/2024    Admit Date: 1/12/2024    Plan: Pending   Discharge Plan       Row Name 01/15/24 1137       Plan    Plan Comments SW has sent referral to [facility] acute rehab to see if pt qualifies. Will update pt once answer from [facility] rehab received. If bed offered and accepted, precert will be started.                   Discharge Codes    No documentation.                       HORTENCIA Biswas

## 2024-01-15 NOTE — THERAPY TREATMENT NOTE
Acute Care - Physical Therapy Treatment Note  [facility]     Patient Name: Radha Wade  : 1960  MRN: [id number]  Today's Date: 1/15/2024      Visit Dx:     ICD-10-CM ICD-9-CM   1. 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 Cystitis  N30.90 595.9   3. Fall, initial encounter  W19.XXXA E888.9   4. Weakness  R53.1 780.79   5. Impaired mobility [Z74.09]  Z74.09 799.89     Patient Active Problem List   Diagnosis    DVT (deep venous thrombosis)    Vaginal bleeding    History of asthma    Hyperglycemia    Abnormal CT of the abdomen    Acute focal neurological deficit    Type 2 diabetes mellitus, without long-term current use of insulin    Metastatic disease    Acute blood loss anemia    CVA (cerebral vascular accident)     Past Medical History:   Diagnosis Date    Asthma     Bronchitis     Depression     Diabetes mellitus     DVT (deep venous thrombosis)     Heart murmur     Lyme disease     Mitral valve prolapse      Past Surgical History:   Procedure Laterality Date     SECTION       PT Assessment (last 12 hours)       PT Evaluation and Treatment       Row Name 01/15/24 1038          Physical Therapy Time and Intention    Subjective Information complains of;weakness  -KJ     Document Type therapy note (daily note)  -KJ     Mode of Treatment physical therapy  -KJ     Patient Effort good  -KJ     Comment R side weakness  -KJ       Row Name 01/15/24 1038          General Information    Existing Precautions/Restrictions fall  -KJ       Row Name 01/15/24 1038          Pain    Pretreatment Pain Rating 0/10 - no pain  -KJ     Posttreatment Pain Rating 0/10 - no pain  -KJ       Row Name 01/15/24 1038          Bed Mobility    Supine-Sit [place] (Bed Mobility) minimum assist (75% patient effort)  -KJ     Sit-Supine [place] (Bed Mobility) minimum assist (75% patient effort)  -KJ       Row Name 01/15/24 1038          Sit-Stand Transfer    Sit-Stand [place] (Transfers) contact guard  -KJ      Assistive Device (Sit-Stand Transfers) walker, 4-wheeled  -KJ       Row Name 01/15/24 1038          Stand-Sit Transfer    Stand-Sit [place] (Transfers) contact guard;verbal cues  -KJ       Row Name 01/15/24 1038          Gait/Stairs (Locomotion)    [place] Level (Gait) verbal cues;contact guard;minimum assist (75% patient effort)  -KJ     Assistive Device (Gait) walker, front-wheeled  -KJ     Distance in Feet (Gait) 35' x 2  -KJ     Deviations/Abnormal Patterns (Gait) gait speed decreased;stride length decreased  -KJ     Right Sided Gait Deviations forward flexed posture;heel strike decreased;weight shift ability decreased;foot drop/toe drag  -KJ     Comment, (Gait/Stairs) decreased coordination RLE/weakness  -KJ       Row Name 01/15/24 1038          Motor Skills    Comments, Therapeutic Exercise AROM X 10 reps  -KJ       Row Name 01/15/24 1038          Positioning and Restraints    Pre-Treatment Position in bed  -KJ     Post Treatment Position bed  -KJ     In Bed call light within reach  -KJ               User Key  (r) = Recorded By, (t) = Taken By, (c) = Cosigned By      Initials Name Provider Type    Analy Cristina, PTA Physical Therapist Assistant                    Physical Therapy Education       Title: PT OT SLP Therapies (In Progress)       Topic: Physical Therapy (In Progress)       Point: Mobility training (Done)       Learning Progress Summary             Patient Acceptance, E, ADRIÁN,DU by EAGLE at 1/13/2024 1320    Comment: benefits of activity, progression of PT                         Point: Home exercise program (Not Started)       Learner Progress:  Not documented in this visit.              Point: Body mechanics (Not Started)       Learner Progress:  Not documented in this visit.              Point: Precautions (Not Started)       Learner Progress:  Not documented in this visit.                              User Key       Initials Effective Dates Name Provider Type Rocael GUILLERMO 02/03/23  -  Darrell Rosario, PT DPT Physical Therapist PT                  PT Recommendation and Plan     Plan of Care Reviewed With: patient  Progress: improving  Outcome Evaluation: PT tx completed. Pt c/o weakness R side and swelling BLE's. Bed mobility Juliocesar, sit<>stand CG, amb short distance with r wx CG/Juliocesar. Pt has decreased coordination and strength of RLE. This making her risk for falls, and RUE weakness. Recommend inpatient rehab.   Outcome Measures       Row Name 01/15/24 1100             How much help from another person do you currently need...    Turning from your back to your side while in flat bed without using bedrails? 3  -KJ      Moving from lying on back to sitting on the side of a flat bed without bedrails? 3  -KJ      Moving to and from a bed to a chair (including a wheelchair)? 3  -KJ      Standing up from a chair using your arms (e.g., wheelchair, bedside chair)? 3  -KJ      Climbing 3-5 steps with a railing? 3  -KJ      To walk in hospital room? 3  -KJ      AM-PAC 6 Clicks Score (PT) 18  -KJ      Highest Level of Mobility Goal 6 --> Walk 10 steps or more  -KJ         Functional Assessment    Outcome Measure Options AM-PAC 6 Clicks Basic Mobility (PT)  -KJ                User Key  (r) = Recorded By, (t) = Taken By, (c) = Cosigned By      Initials Name Provider Type    Analy Cristina PTA Physical Therapist Assistant                     Time Calculation:    PT Charges       Row Name 01/15/24 1109             Time Calculation    Start Time 1038  -KJ      Stop Time 1109  -KJ      Time Calculation (min) 31 min  -KJ      PT Received On 01/15/24  -KJ      PT Goal Re-Cert Due Date 01/23/24  -KJ         Time Calculation- PT    Total Timed Code Minutes- PT 31 minute(s)  -KJ                User Key  (r) = Recorded By, (t) = Taken By, (c) = Cosigned By      Initials Name Provider Type    Analy Cristina PTA Physical Therapist Assistant                  Therapy Charges for Today       Code Description  Service Date Service Provider Modifiers Qty    38166101090 HC GAIT TRAINING EA 15 MIN 1/15/2024 Analy Perez, PTA GP 1    89230528973 HC PT THER PROC EA 15 MIN 1/15/2024 Analy Perez, PTA GP 1            PT G-Codes  Outcome Measure Options: AM-PAC 6 Clicks Basic Mobility (PT)  AM-PAC 6 Clicks Score (PT): 18  AM-PAC 6 Clicks Score (OT): 14  Modified Kathryn Scale: 4 - Moderately severe disability.  Unable to walk without assistance, and unable to attend to own bodily needs without assistance.    Analy Perez, NILDA  1/15/2024

## 2024-01-15 NOTE — PLAN OF CARE
Goal Outcome Evaluation:  Plan of Care Reviewed With: patient        Progress: improving  Outcome Evaluation: Pt and HURTADO discussed compensatory techniques for increased coordination for RUE, including how to support RUE for self feeding.

## 2024-01-16 LAB
APTT PPP: 143.4 SECONDS (ref 24.5–36)
APTT PPP: 147.2 SECONDS (ref 24.5–36)
APTT PPP: 192.8 SECONDS (ref 24.5–36)
BACTERIA UR QL AUTO: ABNORMAL /HPF
BASOPHILS # BLD AUTO: 0.07 10*3/MM3 (ref 0–0.2)
BASOPHILS NFR BLD AUTO: 0.7 % (ref 0–1.5)
BILIRUB UR QL STRIP: NEGATIVE
CLARITY UR: CLEAR
COLOR UR: ABNORMAL
DEPRECATED RDW RBC AUTO: 42.5 FL (ref 37–54)
EOSINOPHIL # BLD AUTO: 0.27 10*3/MM3 (ref 0–0.4)
EOSINOPHIL NFR BLD AUTO: 2.6 % (ref 0.3–6.2)
ERYTHROCYTE [DISTWIDTH] IN BLOOD BY AUTOMATED COUNT: 16.6 % (ref 12.3–15.4)
GLUCOSE BLDC GLUCOMTR-MCNC: 119 MG/DL (ref 70–130)
GLUCOSE BLDC GLUCOMTR-MCNC: 150 MG/DL (ref 70–130)
GLUCOSE BLDC GLUCOMTR-MCNC: 169 MG/DL (ref 70–130)
GLUCOSE BLDC GLUCOMTR-MCNC: 175 MG/DL (ref 70–130)
GLUCOSE UR STRIP-MCNC: NEGATIVE MG/DL
HCT VFR BLD AUTO: 29.6 % (ref 34–46.6)
HEMOCCULT STL QL: NEGATIVE
HGB BLD-MCNC: 8.5 G/DL (ref 12–15.9)
HGB UR QL STRIP.AUTO: ABNORMAL
HYALINE CASTS UR QL AUTO: ABNORMAL /LPF
KETONES UR QL STRIP: NEGATIVE
LEUKOCYTE ESTERASE UR QL STRIP.AUTO: ABNORMAL
LYMPHOCYTES # BLD AUTO: 1.63 10*3/MM3 (ref 0.7–3.1)
LYMPHOCYTES NFR BLD AUTO: 15.9 % (ref 19.6–45.3)
MCH RBC QN AUTO: 20.7 PG (ref 26.6–33)
MCHC RBC AUTO-ENTMCNC: 28.7 G/DL (ref 31.5–35.7)
MCV RBC AUTO: 72 FL (ref 79–97)
MONOCYTES # BLD AUTO: 0.83 10*3/MM3 (ref 0.1–0.9)
MONOCYTES NFR BLD AUTO: 8.1 % (ref 5–12)
NEUTROPHILS NFR BLD AUTO: 7.14 10*3/MM3 (ref 1.7–7)
NEUTROPHILS NFR BLD AUTO: 70 % (ref 42.7–76)
NITRITE UR QL STRIP: NEGATIVE
PH UR STRIP.AUTO: 7 [PH] (ref 5–8)
PLATELET # BLD AUTO: 606 10*3/MM3 (ref 140–450)
PMV BLD AUTO: 9.2 FL (ref 6–12)
PROT UR QL STRIP: ABNORMAL
RBC # BLD AUTO: 4.11 10*6/MM3 (ref 3.77–5.28)
RBC # UR STRIP: ABNORMAL /HPF
REF LAB TEST METHOD: ABNORMAL
SP GR UR STRIP: 1.02 (ref 1–1.03)
SQUAMOUS #/AREA URNS HPF: ABNORMAL /HPF
TRANS CELLS #/AREA URNS HPF: ABNORMAL /HPF
UROBILINOGEN UR QL STRIP: ABNORMAL
WBC # UR STRIP: ABNORMAL /HPF
WBC NRBC COR # BLD AUTO: 10.22 10*3/MM3 (ref 3.4–10.8)

## 2024-01-16 PROCEDURE — 85730 THROMBOPLASTIN TIME PARTIAL: CPT | Performed by: INTERNAL MEDICINE

## 2024-01-16 PROCEDURE — 97535 SELF CARE MNGMENT TRAINING: CPT

## 2024-01-16 PROCEDURE — 63710000001 INSULIN LISPRO (HUMAN) PER 5 UNITS: Performed by: FAMILY MEDICINE

## 2024-01-16 PROCEDURE — 85025 COMPLETE CBC W/AUTO DIFF WBC: CPT | Performed by: NURSE PRACTITIONER

## 2024-01-16 PROCEDURE — 82272 OCCULT BLD FECES 1-3 TESTS: CPT | Performed by: NURSE PRACTITIONER

## 2024-01-16 PROCEDURE — 99232 SBSQ HOSP IP/OBS MODERATE 35: CPT | Performed by: CLINICAL NURSE SPECIALIST

## 2024-01-16 PROCEDURE — 25010000002 ONDANSETRON PER 1 MG: Performed by: FAMILY MEDICINE

## 2024-01-16 PROCEDURE — 97530 THERAPEUTIC ACTIVITIES: CPT

## 2024-01-16 PROCEDURE — 25010000002 NA FERRIC GLUC CPLX PER 12.5 MG: Performed by: NURSE PRACTITIONER

## 2024-01-16 PROCEDURE — 97110 THERAPEUTIC EXERCISES: CPT

## 2024-01-16 PROCEDURE — 0 HYDROMORPHONE 1 MG/ML SOLUTION: Performed by: INTERNAL MEDICINE

## 2024-01-16 PROCEDURE — 82948 REAGENT STRIP/BLOOD GLUCOSE: CPT

## 2024-01-16 PROCEDURE — 99204 OFFICE O/P NEW MOD 45 MIN: CPT | Performed by: INTERNAL MEDICINE

## 2024-01-16 PROCEDURE — 81001 URINALYSIS AUTO W/SCOPE: CPT | Performed by: NURSE PRACTITIONER

## 2024-01-16 PROCEDURE — 25010000002 HEPARIN (PORCINE) 25000-0.45 UT/250ML-% SOLUTION: Performed by: STUDENT IN AN ORGANIZED HEALTH CARE EDUCATION/TRAINING PROGRAM

## 2024-01-16 PROCEDURE — 25010000002 CEFTRIAXONE PER 250 MG: Performed by: INTERNAL MEDICINE

## 2024-01-16 PROCEDURE — 87086 URINE CULTURE/COLONY COUNT: CPT | Performed by: NURSE PRACTITIONER

## 2024-01-16 PROCEDURE — 99221 1ST HOSP IP/OBS SF/LOW 40: CPT | Performed by: NURSE PRACTITIONER

## 2024-01-16 PROCEDURE — 25810000003 SODIUM CHLORIDE 0.9 % SOLUTION 250 ML FLEX CONT: Performed by: NURSE PRACTITIONER

## 2024-01-16 PROCEDURE — 97116 GAIT TRAINING THERAPY: CPT

## 2024-01-16 RX ADMIN — PANTOPRAZOLE SODIUM 40 MG: 40 TABLET, DELAYED RELEASE ORAL at 05:15

## 2024-01-16 RX ADMIN — POLYETHYLENE GLYCOL 3350 17 G: 17 POWDER, FOR SOLUTION ORAL at 08:08

## 2024-01-16 RX ADMIN — CEFTRIAXONE 1000 MG: 1 INJECTION, POWDER, FOR SOLUTION INTRAMUSCULAR; INTRAVENOUS at 16:15

## 2024-01-16 RX ADMIN — HYDROMORPHONE HYDROCHLORIDE 1 MG: 1 INJECTION, SOLUTION INTRAMUSCULAR; INTRAVENOUS; SUBCUTANEOUS at 20:40

## 2024-01-16 RX ADMIN — ATORVASTATIN CALCIUM 80 MG: 40 TABLET, FILM COATED ORAL at 20:29

## 2024-01-16 RX ADMIN — BISACODYL 10 MG: 10 SUPPOSITORY RECTAL at 11:10

## 2024-01-16 RX ADMIN — INSULIN LISPRO 2 UNITS: 100 INJECTION, SOLUTION INTRAVENOUS; SUBCUTANEOUS at 20:36

## 2024-01-16 RX ADMIN — INSULIN LISPRO 2 UNITS: 100 INJECTION, SOLUTION INTRAVENOUS; SUBCUTANEOUS at 12:08

## 2024-01-16 RX ADMIN — INSULIN LISPRO 2 UNITS: 100 INJECTION, SOLUTION INTRAVENOUS; SUBCUTANEOUS at 17:28

## 2024-01-16 RX ADMIN — ONDANSETRON 4 MG: 2 INJECTION INTRAMUSCULAR; INTRAVENOUS at 15:59

## 2024-01-16 RX ADMIN — ACETAMINOPHEN 650 MG: 325 TABLET, FILM COATED ORAL at 03:30

## 2024-01-16 RX ADMIN — Medication 10 ML: at 20:53

## 2024-01-16 RX ADMIN — DOCUSATE SODIUM 100 MG: 100 CAPSULE, LIQUID FILLED ORAL at 20:29

## 2024-01-16 RX ADMIN — ACETAMINOPHEN 650 MG: 325 TABLET, FILM COATED ORAL at 15:40

## 2024-01-16 RX ADMIN — DOCUSATE SODIUM 100 MG: 100 CAPSULE, LIQUID FILLED ORAL at 08:08

## 2024-01-16 RX ADMIN — SODIUM CHLORIDE 250 MG: 9 INJECTION, SOLUTION INTRAVENOUS at 08:08

## 2024-01-16 RX ADMIN — ACETAMINOPHEN 650 MG: 325 TABLET, FILM COATED ORAL at 07:23

## 2024-01-16 RX ADMIN — HEPARIN SODIUM: 10000 INJECTION, SOLUTION INTRAVENOUS at 16:17

## 2024-01-16 RX ADMIN — Medication 10 ML: at 08:08

## 2024-01-16 NOTE — PROGRESS NOTES
Neurology Progress Note      Chief Complaint:  Stroke  Length of Stay:  2     Interval     1/16: patient sitting up in bed. Had large BM today. She was seen by Dr. De Los Santos GYN who is recommend D & C and patient had declined but after speaking with me she is considering. She now reports that for the last month she had not been taking Eliquis BID and some days only taking once daily.     1/15: Working with physical therapy today, looking better and feeling stronger.  Continues to be on heparin which is now therapeutic, obtaining head CT to determine transition to Lovenox.  TTE with agitated saline completed.    1/14: Has been hyperglycemic up to 247 at 8 AM this morning.  Continues on heparin drip protocol last PTT 44.5 at 4 AM . MRI brain w/wo and MRA head and neck completed today demonstrating left temporal lobe, left external capsule, left insula, body of the left cardiac nucleus infarcts.  There is no abnormal contrast-enhancement.  MRI brain degraded by motion artifact and in the limited evaluation there is no clear significant stenosis or large vessel occlusion. SLP evaluated, no acute needs.     1/13: Stopped Eliquis, stop aspirin, started on heparin drip low goal no bolus no rebolus.  Started premedication protocol for MRI with and without contrast tomorrow morning per radiology request.    Occupational Therapy and physical therapy evaluated recommended inpatient rehab facility,     Subjective     Patient sitting up in bed. Had large BM today. No family at bedside. She was seen by Dr. De Los Santos GYN who is recommend D & C and patient had declined but after speaking with me she is considering. She now reports that for the last month she had not been taking Eliquis BID and some days only taking once daily. Dr. De Los Santos had noted that would not need to stop Heparin drip.       Medications:  Current Facility-Administered Medications   Medication Dose Route Frequency Provider Last Rate Last Admin    acetaminophen  (TYLENOL) tablet 650 mg  650 mg Oral Q4H PRN Daniel Fleming MD   650 mg at 01/16/24 0723    Or    acetaminophen (TYLENOL) suppository 650 mg  650 mg Rectal Q4H PRN Daniel Fleming MD        atorvastatin (LIPITOR) tablet 80 mg  80 mg Oral Nightly Daniel Fleming MD   80 mg at 01/15/24 2158    bisacodyl (DULCOLAX) suppository 10 mg  10 mg Rectal Daily PRN Frank Frankel DO   10 mg at 01/16/24 1110    dextrose (D50W) (25 g/50 mL) IV injection 25 g  25 g Intravenous Q15 Min PRN Daniel Fleming MD        dextrose (GLUTOSE) oral gel 15 g  15 g Oral Q15 Min PRN Daniel Fleming MD        docusate sodium (COLACE) capsule 100 mg  100 mg Oral BID Daniel Fleming MD   100 mg at 01/16/24 0808    ferric gluconate (FERRLECIT) 250 mg in sodium chloride 0.9 % 250 mL IVPB  250 mg Intravenous Daily Francisca Giordano APRN 125 mL/hr at 01/16/24 0808 250 mg at 01/16/24 0808    glucagon (GLUCAGEN) injection 1 mg  1 mg Intramuscular Q15 Min PRN Daniel Fleming MD        heparin 14661 units/250 mL (100 units/mL) in 0.45 % NaCl infusion  12 Units/kg/hr Intravenous Titrated Naldo Telles MD 13.3 mL/hr at 01/16/24 1245 17.139 Units/kg/hr at 01/16/24 1245    HYDROcodone-acetaminophen (NORCO) 5-325 MG per tablet 1 tablet  1 tablet Oral Q4H PRN Daniel Fleming MD        Insulin Lispro (humaLOG) injection 2-7 Units  2-7 Units Subcutaneous 4x Daily AC & at Bedtime Daniel Fleming MD   2 Units at 01/16/24 1208    labetalol (NORMODYNE,TRANDATE) injection 10 mg  10 mg Intravenous Q10 Min PRN Daniel Fleming MD        ondansetron (ZOFRAN) injection 4 mg  4 mg Intravenous Q6H PRN Daniel Fleming MD        pantoprazole (PROTONIX) EC tablet 40 mg  40 mg Oral Q AM Daniel Fleming MD   40 mg at 01/16/24 0515    polyethylene glycol (MIRALAX) packet 17 g  17 g Oral Daily Frank Frankel,    17 g at 01/16/24 0808    sodium chloride 0.9 % flush  10 mL  10 mL Intravenous PRN Daniel Fleming MD        sodium chloride 0.9 % flush 10 mL  10 mL Intravenous Q12H Daniel Fleming MD   10 mL at 01/16/24 0808    sodium chloride 0.9 % flush 10 mL  10 mL Intravenous PRN Daniel Fleming MD        sodium chloride 0.9 % infusion 40 mL  40 mL Intravenous PRN Daniel Fleming MD        traMADol (ULTRAM) tablet 25 mg  25 mg Oral Q8H PRN Daniel Fleming MD             Objective      Vital Signs  Temp:  [97.8 °F (36.6 °C)-98.5 °F (36.9 °C)] 98.2 °F (36.8 °C)  Heart Rate:  [84-98] 84  Resp:  [16-20] 16  BP: (103-131)/(44-70) 108/44    General Exam:  Head:  Normocephalic, atraumatic  HEENT:  Neck supple  CVS:  Regular rate and rhythm on monitor  Lungs: Breathing comfortably on room air  Abdomen: Distended and soft  Extremities: There is bilateral lower extremity nonpitting edema up to the knees.  There is mild redness and edema of the upper right upper extremity.  Skin: There is some macular with small papular rash mostly over the ankles and the anterior surface of the left lower extremity lesser degree the right lower extremity     Neurologic Exam:     Mental Status:    -Awake, Alert, Oriented X 3  -No word finding difficulties  -No aphasia  -No dysarthria  -Follows simple and complex commands     CN II:  Visual fields full.  Pupils equally reactive to light  CN III, IV, VI:  Extraocular Muscles full with no signs of nystagmus  CN V:  Facial sensory is symmetric with no asymmetries.  CN VII:  Facial motor symmetric  CN VIII:  Gross hearing intact bilaterally  CN IX:  Palate elevates symmetrically  CN X:  Palate elevates symmetrically  CN XI:  Shoulder shrug symmetric  CN XII:  Tongue is midline on protrusion     Motor:   Tone is normal bilaterally.  (strength out of 5:  1= minimal movement, 2 = movement in plane of gravity, 3 = movement against gravity, 4 = movement against some resistance, 5 = full strength)  Motor function on the  left upper and left lower extremity are 5 out of 5, except for some limitation in knee extension and knee flexion due to antalgic guarding.     Right upper extremity is remarkable for shoulder abduction flexion and extension along with internal and external rotation of 4.    Distally, finger extension and wrist extension 4+, the flexors throughout the right upper extremity are 4+.     Right lower extremity is remarkable for hip flexion, knee flexion of 4+.  Rest of the muscle groups are 5 out of 5.     DTR:  There is no clonus, no Babinski, no Vera.  There is no obvious hyperreflexia     Sensory:  There is decreased sensation to light touch on the right upper and right lower extremities.     Coordination:  There is no overt ataxia in the upper or lower extremities that emerges beyond the weakness.     Gait  Observe while working with PT, with hemiparetic gait but able to ambulate short distances with rolling walker and assistance.    Last nurse assessment:  Interval:  (shift change with MARCELO Rice and MARCELO Cruz)  1a. Level of Consciousness: 0-->Alert, keenly responsive  1b. LOC Questions: 0-->Answers both questions correctly  1c. LOC Commands: 0-->Performs both tasks correctly  2. Best Gaze: 0-->Normal  3. Visual: 0-->No visual loss  4. Facial Palsy: 0-->Normal symmetrical movements  5a. Motor Arm, Left: 0-->No drift, limb holds 90 (or 45) degrees for full 10 secs  5b. Motor Arm, Right: 0-->No drift, limb holds 90 (or 45) degrees for full 10 secs  6a. Motor Leg, Left: 0-->No drift, leg holds 30 degree position for full 5 secs  6b. Motor Leg, Right: 1-->Drift, leg falls by the end of the 5-sec period but does not hit bed  7. Limb Ataxia: 0-->Absent  8. Sensory: 0-->Normal, no sensory loss  9. Best Language: 0-->No aphasia, normal  10. Dysarthria: 0-->Normal  11. Extinction and Inattention (formerly Neglect): 0-->No abnormality    Total (NIH Stroke Scale): 1       Results Review:      Labs:  Last PTT of 82.4,  glucose within range.  WBC of 14.9 from 8, no fever associated.  Thrombocytosis of 692.  Hemoglobin dropped of 1.1 units from 8.9 to 7.8. HGB back up to 8.5 on 1/16/2024.     Imaging:    MRI brain from 1/14/2024: Reviewed and agree with report with likely acute ischemic stroke in the left temporal lobe left external capsule and left insula and the body of the carotid nucleus on the left.  No abnormal contrast-enhancement.    DWI:      TTE 1/15/2024:    Normal LVEF 56 to 60%, no PFO, normal valves    Results for orders placed during the hospital encounter of 01/12/24    Adult Transthoracic Echo Complete W/ Cont if Necessary Per Protocol (With Agitated Saline)    Interpretation Summary    Left ventricular systolic function is normal. Left ventricular ejection fraction appears to be 56 - 60%.    Normal right ventricular cavity size and systolic function noted.    No evidence of a patent foramen ovale.    No significant valvular abnormalities identified on this study.     Assessment/Plan   Patient who has a significant history of potential gynecological malignancy of unclear staging with DVTs in the left lower extremity and recently in the right upper extremity, that has occurred despite use of Eliquis.  And is presented with acute right upper extremity and right lower extremity weakness.     The presentation was concerning for an acute ischemic stroke, she was not a candidate for TNK given active use of anticoagulation plus out of the window plus potential metastatic malignancy.  Given lack of large vessel occlusion clinical signs, and her severe reaction to iodinated contrast we have postponed the CTA and will obtain instead an MRA head and neck.    MRI brain with and without from 1/14/2024 demonstrates an acute ischemic stroke in the left temporal lobe, external capsule, body of the caudate that explains her symptoms.  There is no evidence of abnormal contrast-enhancement that is concerning for metastatic disease.   MRAs of the head and neck did not show any significant stenosis however there is severely degraded by motion.  We do not think a CTA is warranted at this time given the severe allergy and that we have a.  Reasonable mechanism of her stroke with hypercoagulability secondary to cancer.    Will continue the heparin drip, hoping to obtain therapeutic levels.  We have obtained therapeutic level today we will obtain a head CT to ensure there has been no hemorrhagic transformation.  And is to transition to Lovenox. We do not think she is responding to Eliquis given that she reports having taken Eliquis every day without skipping a dose for at least a month.    Importantly TTE did not show any PFO, meaning that the hypercoagulability is both venous and arterial.  As there is low likelihood there is a paradoxical embolism.        Hospital Problem List      Acute focal neurological deficit    DVT (deep venous thrombosis)    Vaginal bleeding    Type 2 diabetes mellitus, without long-term current use of insulin    Metastatic disease    Acute blood loss anemia    CVA (cerebral vascular accident)    Impression:  Clinical suspicion of acute ischemic stroke with RUE/RLE paresis: Etiology to be suspected embolic secondary to hypercoagulability due to suspected gynecological malignancy of unclear staging.  HLD. LDL 89  DVT on Eliquis and 1/16 patient admits was not taking BID consistently.   Medication non adherence.     Plan:  May transition to Lovenox DVT treatment dose from neurologic stand point.  And will defer timing of this to attending as now patient is more agreeable to D & C per GYN recommendations. Otherwise Continue heparin gtt per protocol (low goal, no bolus, no re-bolus). Eventually can return back to Eliquis 5 mg BID.  Continue atorvastatin 80 mg daily  Continue working with PT/OT  Inpatient Rehab as disposition      Medical Decision Making    Number/Complexity of Problems  Moderate  1 undiagnosed new problem with  uncertain prognosis -   1 acute illness with systemic symptoms -   High  1 acute or chronic illness that pose a threat to life/body function -   High    MDM Data  Moderate - 1/3 categories  Extensive - 2/3 categories    Category 1: 3 of the following  Review of external notes  Review of results  Ordering of each unique test  Independent historian  Category 2:  Independent interpretation of test (ex: imaging)  Category 3:  Discussion of management with another provider    Category 2     Treatment Plan  Moderate - Prescription Drug management  High  Drug therapy requiring intensive monitoring for toxicity  Decision regarding hospitalization or escalation of care  De-escalate care/DNR decisions  High Sully Bryant, APRN  01/16/24  13:33 CST

## 2024-01-16 NOTE — PAYOR COMM NOTE
"Radha Wade (63 y.o. Female) [id number]   Continued stay,     [facility] [phone]  Fax [phone]   Date of Birth   1960    Social Security Number       Address   [address] [place] [address]    Home Phone   [phone]    MRN   [id number]       Hoahaoism   Other    Marital Status                               Admission Date   1/12/24    Admission Type   Emergency    Admitting Provider   Jose Tang MD    Attending Provider   Jose Tang MD    Department, Room/Bed   [facility] 3A, 327/1       Discharge Date       Discharge Disposition       Discharge Destination                                 Attending Provider: Jose Tang MD    Allergies: No Known Allergies    Isolation: None   Infection: None   Code Status: CPR    Ht: 165.1 cm (65\")   Wt: 77.6 kg (171 lb)    Admission Cmt: None   Principal Problem: Acute focal neurological deficit [R29.818]                   Active Insurance as of 1/12/2024       Primary Coverage       Payor Plan Insurance Group Employer/Plan Group    [employer] [id number]       Payor Plan Address Payor Plan Phone Number Payor Plan Fax Number Effective Dates    [address] [phone]  10/1/2023 - None Entered    [place] [address]         Subscriber Name Subscriber Birth Date Member ID       RADHA AWDE 1960 [id number]                     Emergency Contacts        (Rel.) Home Phone Work Phone Mobile Phone    Romeo Wade (Spouse) [phone] -- --              Current Facility-Administered Medications   Medication Dose Route Frequency Provider Last Rate Last Admin    acetaminophen (TYLENOL) tablet 650 mg  650 mg Oral Q4H PRN Daniel Fleming MD   650 mg at 01/16/24 0723    Or    acetaminophen (TYLENOL) suppository 650 mg  650 mg Rectal Q4H PRN Daniel Fleming MD        atorvastatin (LIPITOR) tablet 80 mg  80 mg Oral Nightly " Daniel Fleming MD   80 mg at 01/15/24 2158    bisacodyl (DULCOLAX) suppository 10 mg  10 mg Rectal Daily PRN Frank Frankel DO        dextrose (D50W) (25 g/50 mL) IV injection 25 g  25 g Intravenous Q15 Min PRN Daniel Fleming MD        dextrose (GLUTOSE) oral gel 15 g  15 g Oral Q15 Min PRN Daniel Fleming MD        docusate sodium (COLACE) capsule 100 mg  100 mg Oral BID Daniel Fleming MD   100 mg at 01/16/24 0808    ferric gluconate (FERRLECIT) 250 mg in sodium chloride 0.9 % 250 mL IVPB  250 mg Intravenous Daily Francisca Giordano APRN 125 mL/hr at 01/16/24 0808 250 mg at 01/16/24 0808    glucagon (GLUCAGEN) injection 1 mg  1 mg Intramuscular Q15 Min PRN Daniel Fleming MD        heparin 18408 units/250 mL (100 units/mL) in 0.45 % NaCl infusion  12 Units/kg/hr Intravenous Titrated Naldo Telles MD 15.3 mL/hr at 01/16/24 0514 19.716 Units/kg/hr at 01/16/24 0514    HYDROcodone-acetaminophen (NORCO) 5-325 MG per tablet 1 tablet  1 tablet Oral Q4H PRN Daniel Fleming MD        Insulin Lispro (humaLOG) injection 2-7 Units  2-7 Units Subcutaneous 4x Daily AC & at Bedtime Daniel Fleming MD   2 Units at 01/15/24 1701    labetalol (NORMODYNE,TRANDATE) injection 10 mg  10 mg Intravenous Q10 Min PRN Daniel Fleming MD        ondansetron (ZOFRAN) injection 4 mg  4 mg Intravenous Q6H PRN Daniel Fleming MD        pantoprazole (PROTONIX) EC tablet 40 mg  40 mg Oral Q AM Daniel Fleming MD   40 mg at 01/16/24 0515    polyethylene glycol (MIRALAX) packet 17 g  17 g Oral Daily Frank Frankel DO   17 g at 01/16/24 0808    sodium chloride 0.9 % flush 10 mL  10 mL Intravenous PRN Daniel Fleming MD        sodium chloride 0.9 % flush 10 mL  10 mL Intravenous Q12H Daniel Fleming MD   10 mL at 01/16/24 0808    sodium chloride 0.9 % flush 10 mL  10 mL Intravenous PRN Daniel Fleming MD        sodium chloride  0.9 % infusion 40 mL  40 mL Intravenous PRN Daniel Fleming MD        traMADol (ULTRAM) tablet 25 mg  25 mg Oral Q8H PRN Daniel Fleming MD            Physician Progress Notes (last 48 hours)        John Grullon MD at 24 0633            HEMATOLOGY AND MEDICAL ONCOLOGY PROGRESS NOTE    Patient name: Radha Wade  Patient : 1960  VISIT # [id number]  MR #[id number]  Room:  [facility]    SUBJECTIVE: Lying in bed, complains of abdominal cramping and lower back pain.  Complained of feeling like her bladder is not emptying and having significant pressure, discussed that this is most likely an effect of the uterus compressing on the bladder.  Urinalysis on 2024 reported 2+ bacteria and she received Rocephin.  Requested a  Tylenol and cranberry juice, spoke with MARCELO Sanford.      INTERVAL HISTORY:    Radha Wade is a 63-year-old  female whom I have been evaluating over the last couple months referred to me regarding a suspected potential malignant process.  Radha has been to some degree in denial and has failed to follow-up or keep appointments in an attempt for an aggressive and expedited workup.  Despite that, the biggest part of her workup has been completed.  She was scheduled to see Dr. Frank Miles in evaluation to obtain tissue diagnosis on 2024.  Unfortunately, Ms. Wade was evaluated in the ED on 2022 and admitted to [facility] with a left-sided CVA and right-sided arm and leg weakness.  She has been under evaluation over the weekend for this and is anticipating discharge to rehab.  I was consulted and continuity of care.        DETAILED TUMOR HISTORY COPIED FROM MY OFFICE RECORDS     TARGET POTENTIAL MALIGNANT SITES  Enlarged uterus with abnormal vaginal bleeding, 45 pound weight loss in 3 to 6 months  Omental infiltration and nodularity progressing in 3 months  2.9 x 1.9 cm nodule in the anterior abdomen and adjacent and posterior to the umbilicus    2.7 cm left pelvic lymph node, previously 2.4 cm,   3 cm right external iliac lymph node, previously 2.1 cm   1.6 cm sclerotic bone lesion in the S1 vertebral body on CT 9/14/2023   CA 19-9 of 355  and  of 455 on 12/21/2023         TUMOR HISTORY  Radha was seen in initial medical oncological consultation on 11/9/2023, referred by Dr Myron Vines, PCP, for suspected malignant process.     Despite her age of 63, Radha continued to have regular menstrual cycles until she fell on 7/19/2023.   In evaluation she was also found to have a tick.  She reports that an attempt was made to remove it but it was not able to be removed completely.   Since July 2023, Keiths menses began appearing every 2 weeks rather than monthly.  Dr. De Los Santos, her gynecologist referred her to the ED on 9/14/2023 due to bilateral lower extremity swelling. Noninvasive studies documented bilateral lower extremity DVTs on 9/14/2023.  She has been on Eliquis since that time.    Keiths menstrual bleeding is reported to have been continuous since being placed on Eliquis.  She continues to have leg pain, low back pain, feeling washed out and reports having had 45 pounds of weight loss in the previous 2 to 3 months.     Radha was evaluated at [facility] ER on 9/14/2023 for left leg swelling and abdominal pain.  Workup revealed the following:  bilateral LE DVTs (patient refused CTA.  Lovenox was initiated and discharged on Eliquis)   Retroperitoneal lymphadenopathy, soft tissue nodularity and stranding of omentum on CT Abdomen (Dr De Los Santos consulted, to f/u with D&C and ablation)  Reported 25 lb weight loss (in September 2023) over the previous few months.         US VENOUS DOPPLER LOWER EXTREMITIES BILATERAL on 9/14/23 at [facility]  There is evidence of deep venous thrombosis in the popliteal and posterior tibial veins in the right lower extremity.   There is also evidence of deep venous thrombosis in the superficial femoral, popliteal, posterior tibial,  and peroneal veins of the left lower extremity     CT ABDOMEN PELVIS WO at [facility] on 9/14/23  Somewhat bulky retroperitoneal lymphadenopathy and pelvic lymphadenopathy worrisome for metastatic lymphadenopathy or lymphoma.   There is also some stranding and nodularity in the greater omental region anteriorly suggesting possible peritoneal metastatic disease.   The uterus is prominent in size measuring 11.7 x 6.9 x 8.7 cm but a focal mass is not seen. Follow-up nonemergent ultrasound is recommended to evaluate the uterus for a potential mass. There is no other obvious area of primary neoplasm on the noncontrast CT images.   A vague 1.6 cm sclerotic bone lesion in the S1 vertebral body is indeterminate.        On 10/25/2023, she was seen by her PCP, Dr. Myron Vines.  Because of the history of the tick bite and rash, he requested a Borrelia Burgdorferi (Lyme disease) antibody which was positive on 10/25/2023.  Dr. Vines prescribed doxycycline 100 mg BID on 10/30/2023 which according to the patient was started somewhere around the first week of November to be taken for 1 month.        Serology collected 11/9/2023   CBC- WBC of 8.34. Hgb is 9.7 with an MCV of 74.9 and platelets 639,000.  CMP-Na+ 136, Creat 0.5, AlkPhos 149  LDH-223  B2M- 3.8  Kappa- 86.08  Lambda- 77.12  K/L ratio- 1.12  IgG 1400 ,IgA 572, IgM 73  M-spike- not applicable  Ferritin-474.6  Iron level-25  Iron Saturation-11%  TIBC-231    Folic acid-2.6  TSH-3.41   CEA-1.7  CA 19-9-355  -455     Serology collected 12/21/2023  Ferritin     443.6  Iron            19  Iron sat%     9         343  CA 19-9     240  CEA           1.7  B2M           3.9  T protein    6.8  Kappa light chain      92.43  Lambda light chain   74.51  K/L ratio                     1.24  M protein                   negative  IgG                            1398  IgA                               542  IgM                                62           Prothrombotic panel  11/9/2023  Factor V Leiden - negative  Homocysteine - 11  Lupus Anticoag - not detected  Protein C -138  Protein S - 85  Prothrombotic panel 12/21/2023  Lupus anticoagulant                                  not detected  Anticardiolipin Ab IgG                               <10  Anticardiolipin Ab IgM                               <10  Prothrombin gene mutation [id number]      Negative  MTHFR mutation C677T                          Negative  MTHFR mutation K1369O                       Heterozygous     GUADALUPE 2 and reflex panels                           PENDING     CT SOFT TISSUE NECK WO CONTRAST on 12/5/2023 at [facility]  No suspicious cervical adenopathy.  9 mm right upper lobe pulmonary nodule.   Moderate cervical spine osteoarthritis change      CT CHEST WO CONTRAST DIAGNOSTIC on 12/5/2023 at [facility]  Scattered bilateral pulmonary nodules   8 mm RUL pulmonary nodule  6 mm RUL pulmonary nodule  no mediastinal or hilar lymphadenopathy   No acute or suspicious bony finding.   partially visualized confluent retroperitoneal adenopathy.   Dome of the liver with a 10 mm hypodensity, incompletely characterized   on this exam         CT ABDOMEN PELVIS WO CONTRAST on 12/5/2023 at [facility], compared to 9/14/23  A vague, poorly defined, low-density nodule located posteriorly and superiorly in the dome of the liver was noted in the previous study with no change in size or shape in the interval   Moderate lobulation of the unenhanced kidneys bilaterally noted. An isodense mass or lesion may not be evaluated or excluded. There is moderate right hydronephrosis. There is moderate dilatation of the right proximal ureter which is completely encased by the lower abdomen/proximal pelvis by the enlarged lymph nodes. This was not noted in the previous study   Moderate diffuse enlargement of the uterus is seen which is anteverted   compressing on the urinary bladder. This is similar to the previous study   significant abdominal and pelvic lymphadenopathy.  The lymph nodes are poorly identified and not separate well from each other. There is significant surrounding retroperitoneal fat infiltration  Some visualized and separable lymph nodes:  2.7 cm left pelvic lymph node, previously 2.4 cm  3 cm right external iliac lymph node, previously 2.1 cm   Omental infiltration and nodularity which has significantly more progressed since the previous study   2.9 x 1.9 cm nodule in the anterior abdomen and adjacent and posterior to the umbilicus   vague sclerotic changes involving the anterior superior vertebral S1 is similar to the previous study with no change in the interval         Given her history with weight loss, vaginal bleeding, elevated tumor markers, imaging abnormalities with the uterus, the 2.9 x 1.9 cm nodule in the anterior abdomen suspicious for carcinomatosis, abdominal and pelvic lymphadenopathy, prothrombotic state with DVTs and subcentimeter pulmonary nodules although indeterminate, in this setting is very concerning for an active malignant process and suspicious for a GYN origin.     Completion of workup however will also include a bone scan that was ordered at last visit and not done, GI evaluation to rule out the GI tract as a potential source in addition to the other recommendations below.                    RECOMMENDATIONS on 12/21/2023  Bone scan  PET scan   C-scope - Appt with Bertha Parrish [facility] scheduled 1/3/2024, malignancy process with iron deficiency anemia  GUADALUPE 2 and reflex panels as well as the rest of the prothrombotic panel not done at a previous visit at her request and was redrawn on 12/21/2023  Pelvic ultrasound to evaluate uterus and adnexa  Consult Dr. Frank Miles GYNOn to evaluate the The uterus which is prominent in size measuring 11.7 x 6.9 x 8.7 cm but without visualization a focal mass. Follow-up nonemergent ultrasound is recommended to evaluate the uterus for a potential mass on CT abdomen 9/14/2023  Follow-up in 2 weeks         NM PET/CT SKULL BASE TO MID THIGH on 12/26/23 at [facility], compared to 12/5/23  13 mm hypermetabolic left supraclavicular lymph node, max SUV 3.3 .  9 mm hypermetabolic right upper lobe pulmonary nodule with max SUV 2.8.   6 mm right lower lobe pulmonary nodule with mild hypermetabolic activity   Few other smaller pulmonary nodules are also present with have increased metabolic activity for small nodule size   No hypermetabolic axillary, mediastinal, or hilar lymph nodes   7 mm hypermetabolic lymph node in the epicardial fat.   Multiple hypermetabolic liver lesions identified. For reference, 11 mm hypermetabolic right posterior liver lesion with max SUV 5.2   2.7 x 1.9 cm omental/peritoneal hypermetabolic mass in the anterior abdomen with max SUV 8.3   Diffuse omental and peritoneal nodularity and fat stranding which is likely related to carcinomatosis.   Multiple enlarged hypermetabolic retroperitoneal lymph nodes throughout the abdomen and pelvis surrounding the abdominal aorta and pelvic arterial vasculature. For reference, 1.6 cm left para-aortic lymph node with max SUV 7.5   3 cm hypermetabolic right upper pelvic mass on image 61 with max SUV 8.9.  Hypermetabolic activity is present throughout the enlarged cervix with extension into the endometrial canal which is diffusely hypermetabolic. Max SUV is 14   ADDENDUM:   Described in the body of the report but not the impression, there is mild right-sided hydronephrosis which is like related to ureteral compression by right pelvic lymphadenopathy.  There is also subcutaneous fat stranding in the left thigh which is likely related to venous compression by pelvic lymphadenopathy. However, venous thrombus could also present with this finding (not visualized on  this study but exam is performed without IV contrast)         US PELVIS COMPLETE- 12/26/2023  at [facility], compared to PET/CT on the same day. CT abdomen and pelvis 12/5/2023.   The uterus is enlarged and  heterogeneous, measuring 13.6 x 6.1 x 8.1 cm   6.2 x 6.6 x 3.8 cm mass within the endometrial cavity   6.9 x 4.5 x 5.1 cm hypoechoic mass at the lower uterine segment, likely involving the cervix  18 mm solid-appearing nodule in the right ovary (Right ovary measures 3.1 x 2.8 x 2.7 cm )  The left ovary appears within normal limits and measures 2.9 x 2.1 x 2.2 cm.      Columba Coulter RN spoke with Radha on 12/29/2023 who reported that she received a message from the office of Dr Miles GYN-ONC in [place], but had not returned call yet ot make appointment.  Gave instructions to promptly return call to get appointment for evaluation as soon as possible.  Explained results of imaging and Dr Grullon's recommendation to follow through with ordered referral (order placed 12/21/23) for GYN ONC evaluation.     Radha states that she will call office Tuesday morning and will communicate with this clinic with date of appointment with Dr Miles so that f/u with Dr Grullon can be scheduled accordingly (following Dr Miles's evaluation).   Radha verbalized understanding, then returned to call stating that she had an appointment at Dr Miles's office on 1/9/2024.       Request to [facility] radiology Dept was made for images (CT Neck,Chest, Abd& Pelvis on 12/5/23 and PET, US pelvis on 12/26/23) to be forwarded to Dr Miles at [place].      Radha canceled her GI appointment with GREYSON Mccord for colonoscopy evaluation on 1/3/2023   Radha canceled her GYN/Onc appointment with Dr Miles on 1/9/2023 and states it has been rescheduled for 1/16/2023  Several attempts were made to schedule parenteral iron infusions in our office but she declined to schedule these.       OBJECTIVE:      Vitals:    01/16/24 0304   BP: 130/53   Pulse: 95   Resp: 18   Temp: 97.8 °F (36.6 °C)   SpO2: 98%       Intake/Output Summary (Last 24 hours) at 1/16/2024 0634  Last data filed at 1/15/2024 0946  Gross per 24 hour   Intake 240 ml   Output --  "  Net 240 ml     PHYSICAL EXAM:    CONSTITUTIONAL: Alert, appropriate, no acute distress  EYES: Nonicteric, EOM intact, pupils equal round   ENT: Mucous membranes moist, no oropharyngeal lesions   NECK: Supple, no masses   CHEST/LUNGS: CTA bilaterally, normal respiratory effort   CARDIOVASCULAR: RRR, no murmurs  ABDOMEN: soft non-tender, active bowel sounds, no HSM  EXTREMITIES: warm, moves all fours  SKIN: warm, dry with no rashes or lesions  LYMPH: No cervical, clavicular, axillary, or inguinal lymphadenopathy  NEUROLOGIC: Right-sided arm weakness with some improvement although persistent right lower extremity weakness causing difficulty with mobility   PSYCH: mood and affect appropriate     CBC  Results from last 7 days   Lab Units 01/15/24  0515 01/14/24  0359 01/13/24  0602   WBC 10*3/mm3 14.86* 8.82 7.46   HEMOGLOBIN g/dL 7.8* 8.9* 8.9*   HEMATOCRIT % 28.6* 31.8* 34.4   PLATELETS 10*3/mm3 692* 605* 524*         Lab Results   Component Value Date     01/15/2024    K 4.0 01/15/2024     01/15/2024    CO2 26.0 01/15/2024    BUN 16 01/15/2024    CREATININE 0.54 (L) 01/15/2024    GLUCOSE 139 (H) 01/15/2024    CALCIUM 8.9 01/15/2024    BILITOT 0.2 01/15/2024    ALKPHOS 163 (H) 01/15/2024    AST 9 01/15/2024    ALT 7 01/15/2024    AGRATIO 0.8 01/15/2024    GLOB 4.1 01/15/2024       Lab Results   Component Value Date    INR 1.35 (H) 01/13/2024    INR 1.27 (H) 01/12/2024    INR 1.17 (H) 12/18/2023    PROTIME 16.8 (H) 01/13/2024    PROTIME 16.1 (H) 01/12/2024    PROTIME 15.1 (H) 12/18/2023       Cultures:    No results found for: \"BLOODCX\"  No components found for: \"URINCX\"    ASSESSMENT/PLAN:  #1  Disseminated abdominal malignancy, 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  Radha has been to some degree in denial and has failed to follow-up or keep " appointments in an attempt for an aggressive and expedited workup.  Despite that, the biggest part of her workup has been completed.  She was scheduled to see Dr. Frank Miles in evaluation to obtain tissue diagnosis on 1/16/2024.    TARGET POTENTIAL MALIGNANT SITES  Enlarged uterus with abnormal vaginal bleeding, 45 pound weight loss in 3 to 6 months  6.2 x 6.6 x 3.8 cm mass within the endometrial cavity    Omental infiltration and nodularity progressing in 3 months  2.9 x 1.9 cm nodule in the anterior abdomen and adjacent and posterior to the umbilicus   2.7 cm left pelvic lymph node, previously 2.4 cm,   3 cm right external iliac lymph node, previously 2.1 cm   1.6 cm sclerotic bone lesion in the S1 vertebral body on CT 9/14/2023    CA 19-9- 355  -  455     Recommendations are as follows:  After tissue diagnosis and all interventions are complete, given the fact that she was noncompliant with a rigorous schedule of Eliquis adding to the current prothrombotic conditions, a repeat trial of Eliquis could be considered.  Parenteral iron infusions while hospitalized to try and get repletion ongoing  Consult GI for evaluation as above outlined  Consult Dr. De Los Santos in continuity of care, vaginal bleeding etc.  Reschedule appointments with Dr. Frank Miles  Reschedule outpatient appointment with Dr. Grullon  Repeat urinalysis today, 1/16/2024      #2  Acute ischemic stroke  Neurology recommended anticoagulation with heparin drip and then transition to Lovenox    Unfortunately, Ms. Wade was evaluated in the ED on 1/12/2022 and admitted to [facility] with a left-sided CVA and right-sided arm and leg weakness.  She has been under evaluation over the weekend for this and is anticipating discharge to rehab.  I was consulted and continuity of care.    Prothrombotic panel 11/9/2023  Factor V Leiden - negative  Homocysteine - 11  Lupus Anticoag - not detected  Protein C -138  Protein S - 85  Prothrombotic panel  12/21/2023  Lupus anticoagulant                                  not detected  Anticardiolipin Ab IgG                               <10  Anticardiolipin Ab IgM                               <10  Prothrombin gene mutation [id number]      Negative  MTHFR mutation C677T                          Negative  MTHFR mutation [id number]                       Heterozygous  GUADALUPE 2 and reflex panels                           PENDING    MRI of the brain with and without contrast on 1/14/2023  Diffusion restriction with associated ADC mapping abnormality involving the mesial left temporal lobe, left external capsule, left insula as well as the body of the caudate nucleus. These infarcts are nonhemorrhagic.  Mild atrophy. Mild small vessel ischemic changes are noted involving the periventricular and higher white matter tracts.  Normal flow voids within the [place].        MRA of the head and neck without contrast on 1/14/2023  Carotid arteries are unremarkable. The carotid bifurcations are normal in appearance without evidence of rate limiting stenosis with both ICAs patent to the skull base  Right vertebral artery is widely patent from its origin to the skull base. The left vertebral artery is rather diminutive in size and is not well-defined in its proximal segment.     EMMA 1/15/2024:  Left ventricular systolic function is normal. Left ventricular ejection fraction appears to be 56 - 60%.  Normal right ventricular cavity size and systolic function noted.  No evidence of a patent foramen ovale.  No significant valvular abnormalities identified on this study.      Recommendations are as follows:  Anticoagulate with Lovenox after heparin is discontinued, at least until definitive tissue diagnosis is obtained.  After tissue diagnosis and all interventions are complete, given the fact that she was noncompliant with a rigorous schedule of Eliquis adding to the current prothrombotic conditions, a repeat trial of Eliquis could be  considered.    Disposition: Possible discharge [facility] 8th floor Rehab for additional rehabilitation, awaiting insurance approval    #3  Microcytic iron anemia     Latest Reference Range & Units 01/13/24 06:02   Iron 37 - 145 mcg/dL 17 (L)   Ferritin 13.00 - 150.00 ng/mL 440.90 (H)   Iron Saturation (TSAT) 20 - 50 % 8 (L)   Transferrin 200 - 360 mg/dL 145 (L)   TIBC 298 - 536 mcg/dL 216 (L)       Results from last 7 days   Lab Units 01/15/24  0515 01/14/24  0359 01/13/24  0602   WBC 10*3/mm3 14.86* 8.82 7.46   HEMOGLOBIN g/dL 7.8* 8.9* 8.9*   HEMATOCRIT % 28.6* 31.8* 34.4   PLATELETS 10*3/mm3 692* 605* 524*         Lab Results   Component Value Date    WBC 14.86 (H) 01/15/2024    HGB 7.8 (L) 01/15/2024    HCT 28.6 (L) 01/15/2024    MCV 73.7 (L) 01/15/2024     (H) 01/15/2024     Ferrlecit 250 mg IV x4 initiated on 1/15/2024. Day 2 of 4 today      Recommendations are as follows:  Parenteral iron infusions while hospitalized to try and get repletion ongoing  Consult GI for evaluation as above outlined  CBC daily, pending    Discussed plan of care with MARCELO Callahan APRN    01/16/24  06:34 CST      Physicians attestation and contribution:    I, John Grullon, personally and independently performed an evaluation on Radha Wade  I have reviewed relevant medical information/data to include but not limited to the medication list, relevant appropriate lab work and imaging when applicable.  I reviewed other physician's notes, ancillary services and nurses assessments.  I have reviewed the above documentation completed by Francisca RUBI  Please see my additional addended and/or modified contributions to the history of present illness, physical examination and assessment/medical decision-making and plan that reflects my findings and impressions.  I discussed the essential elements of the care plan with Francisca RUBI and the patient.  I have encouraged and answered all the questions raised  to the patient's understanding and satisfaction.  I concur with the above stated.    Subjective: I saw and examined Radha Wade this morning.  She still continues to have lower abdominal discomfort, significant swelling of bilateral lower extremities.   The expressive aphasia that she states was worse at presentation is resolving although she states she still has a bit of difficulty finding words.    Objective:  Mental status appears to be intact with a mild degree of expressive difficulty.  It appears as if she is able to communicate quite clearly with me however.  Neurologically she still has some coordination issues with her right hand and arm and right leg but they are significantly improved compared to presentation.  Lungs are clear heart is regular normal S1 and S2 abdomen continues to be protuberant with abdominal discomfort in the lower abdomen.  3-4+ edema of the lower extremities is present.      Assessment/plan:  Recommendations as outlined in my consult yesterday are as follows:  EMMA results are virtually normal with no obvious evidence of source of embolic phenomenon  Anticoagulate with Lovenox after heparin is discontinued, at least until definitive tissue diagnosis is obtained.  After tissue diagnosis and all interventions are complete, given the fact that she was noncompliant with a rigorous schedule of Eliquis adding to the current prothrombotic conditions, a repeat trial of Eliquis could be considered.  Parenteral iron infusions while hospitalized ongoing, day #2 today  Consult GI for evaluation as above outlined  Consult Dr. De Los Santos in continuity of care, vaginal bleeding etc.  Reschedule appointments with Dr. Frank Miles  Okay with me to transfer to [facility] rehab  Reschedule outpatient appointment with me, I will see in a couple weeks to continue close monitoring        John Grullon MD  1/16/2024 07:41 CST        Electronically signed by John Grullon  MD Wisam at 01/16/24 0921       Jose Tang MD at 01/15/24 1634              [facility] Medicine Services  INPATIENT PROGRESS NOTE    Patient Name: Radha Wade  Date of Admission: 1/12/2024  Today's Date: 01/15/24  Length of Stay: 1  Primary Care Physician: Myron Vines MD    Subjective   Chief Complaint: R sided weakness, constipation    HPI   Patient seen and evaluated.  She is still complaining of right leg weakness and incoordination but is worse than her right arm.    Review of Systems   All pertinent negatives and positives are as above. All other systems have been reviewed and are negative unless otherwise stated.     Objective    Temp:  [97.9 °F (36.6 °C)-98.2 °F (36.8 °C)] 98.1 °F (36.7 °C)  Heart Rate:  [] 92  Resp:  [16-18] 18  BP: (106-129)/(45-80) 120/45  Physical Exam  Constitutional:       General: She is not in acute distress.     Appearance: She is not ill-appearing or diaphoretic.   HENT:      Head: Normocephalic and atraumatic.      Right Ear: External ear normal.      Left Ear: External ear normal.      Nose: No congestion or rhinorrhea.      Mouth/Throat:      Mouth: Mucous membranes are moist.      Pharynx: No oropharyngeal exudate or posterior oropharyngeal erythema.   Eyes:      General: No scleral icterus.     Extraocular Movements: Extraocular movements intact.      Conjunctiva/sclera: Conjunctivae normal.   Cardiovascular:      Rate and Rhythm: Normal rate and regular rhythm.      Heart sounds: Normal heart sounds. No murmur heard.  Pulmonary:      Effort: Pulmonary effort is normal. No respiratory distress.      Breath sounds: Normal breath sounds. No wheezing, rhonchi or rales.   Abdominal:      General: Abdomen is flat. There is no distension.      Palpations: Abdomen is soft.      Tenderness: There is no abdominal tenderness. There is no guarding.   Musculoskeletal:         General: No swelling, tenderness or deformity.       Cervical back: Neck supple. No rigidity. No muscular tenderness.      Right lower leg: No edema.      Left lower leg: No edema.   Lymphadenopathy:      Cervical: No cervical adenopathy.   Skin:     General: Skin is warm and dry.   Neurological:      General: No focal deficit present.      Mental Status: She is alert and oriented to person, place, and time.      Cranial Nerves: No cranial nerve deficit.      Motor: Weakness present.      Comments: Right leg > right arm weakness and incoordination noted.   Psychiatric:         Mood and Affect: Mood normal.         Behavior: Behavior normal.         Results Review:  I have reviewed the labs, radiology results, and diagnostic studies.    Laboratory Data:   Results from last 7 days   Lab Units 01/15/24  0515 01/14/24  0359 01/13/24  0602   WBC 10*3/mm3 14.86* 8.82 7.46   HEMOGLOBIN g/dL 7.8* 8.9* 8.9*   HEMATOCRIT % 28.6* 31.8* 34.4   PLATELETS 10*3/mm3 692* 605* 524*        Results from last 7 days   Lab Units 01/15/24  0515 01/14/24  0359 01/13/24  0602   SODIUM mmol/L 138 136 136   POTASSIUM mmol/L 4.0 4.5 4.2   CHLORIDE mmol/L 101 100 100   CO2 mmol/L 26.0 25.0 23.0   BUN mg/dL 16 10 11   CREATININE mg/dL 0.54* 0.47* 0.50*   CALCIUM mg/dL 8.9 8.8 8.7   BILIRUBIN mg/dL 0.2 0.2 0.2   ALK PHOS U/L 163* 229* 208*   ALT (SGPT) U/L 7 10 11   AST (SGOT) U/L 9 14 17   GLUCOSE mg/dL 139* 191* 126*       Culture Data:   Urine Culture   Date Value Ref Range Status   01/13/2024 >100,000 CFU/mL Mixed Karo Isolated  Final       Radiology Data:   Imaging Results (Last 24 Hours)       Procedure Component Value Units Date/Time    CT Head Without Contrast [[id number]] Collected: 01/15/24 1436     Updated: 01/15/24 1444    Narrative:      EXAMINATION:  CT HEAD WO CONTRAST-  1/15/2024 1:29 PM     HISTORY: Stroke, follow up; I82.619-Acute embolism and thrombosis of  superficial veins of unspecified upper extremity; N30.90-Cystitis,  unspecified without hematuria; W19.XXXA-Unspecified  fall, initial  encounter; R53.1-Weakness; Z74.09-Other reduced mobility.     TECHNIQUE: Multiple axial images were obtained through the brain without  contrast infusion. Multiplanar images were reconstructed.     DLP: 605.11 mGy.cm. Automated dosage reduction technique was utilized to  reduce patient dosage.     COMPARISON: 1/13/2024.     FINDINGS: There is no hemorrhage. There is low-density in the lateral  left basal ganglia region predominantly involving the posterior aspect  of the external capsule. There is minimal low density elsewhere within  the hemispheric white matter likely related to small vessel disease.  There is minimal atrophy. The paranasal sinuses and mastoid air cells  are clear. No calvarial fracture is seen.          Impression:      1. Acute infarct in the lateral left basal ganglia region involving  predominantly the external capsule posteriorly and in the posterolateral  aspect of the putamen.  2. No hemorrhage.  3. Mild chronic small vessel ischemic white matter disease.        This report was signed and finalized on 1/15/2024 2:41 PM by Dr. Keegan Sousa MD.               I have reviewed the patient's current medications.     Assessment/Plan   Assessment  Active Hospital Problems    Diagnosis     **Acute focal neurological deficit     CVA (cerebral vascular accident)     Type 2 diabetes mellitus, without long-term current use of insulin     Metastatic disease     Acute blood loss anemia     Vaginal bleeding     DVT (deep venous thrombosis)        Treatment Plan  #1 acute CVA -patient presented with symptoms concerning for stroke.  Initial head CT was nonacute.  MRI with and without was obtained today to look for CVA versus possible mass given cancer history.  It was positive for multifocal diffusion restriction/CVA.  No obvious flow limitation was noted on angiographies.  Some was limited due to motion.  2D echo pending.  She has been on a heparin drip at this point by neurology, defer to  them but suspect okay to resume Eliquis.  Will start Lovenox if hemoglobin stable in the morning. On statin.  Continue therapies.    #2 history DVT -patient with concern about right upper extremity swelling on arrival but she does not have a DVT in that extremity.  History of DVT in her lower extremities.  In the setting of malignancy.  She has been on Eliquis at this point but transition to heparin drip here due to fear for possible failure of therapy.  Patient openly admits that she would take the medicine once a day and not regularly.  Do not suspect there is any failure of care at this time rather patient noncompliance.  Feel she is likely somewhat in the mild to current issues at hand.  Note potential recommendations for Lovenox at discharge by neurology.  Will ask her hematologist/oncologist Dr. Grullon to evaluate in the a.m. for recommendations.    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 Grullon.  It seems that a lot of the procedures and testing have been ordered prior have typically not been performed as patient did not go for them.  Suspect she is in some denial to her diagnosis.  She is also supposed to have an upcoming appointment at [facility] for further evaluation.  Again we will ask hematology to evaluate in the a.m. to help with coordination of care and to ensure adequate/appropriate follow-up.    #4 history of DM2 -on sliding scale insulin and hypoglycemia protocol.    #5 anemia -stable since arrival.  Does have a history of vaginal bleeding likely in the setting of GYN malignancy.  Monitor, no obvious signs of bleeding here so far.    #6 constipation -start bowel regimen    Medical Decision Making  Number and Complexity of problems: 1 acute problem, multiple chronic  Differential Diagnosis: as above    Conditions and Status        Pt is new to me, appears stable/non-toxic     MDM Data  External documents reviewed: none  Cardiac tracing (EKG, telemetry) interpretation: sinus on  monitor  Radiology interpretation: reports reviewed  Labs reviewed: as above  Any tests that were considered but not ordered: none     Decision rules/scores evaluated (example [id number], Wells, etc): none     Discussed with: patient, nursing     Care Planning  Shared decision making: Patient apprised of current labs, vitals, imaging and treatment plan.  They are agreeable with proceeding with plans as discussed.    Code status and discussions: full code    Disposition  Social Determinants of Health that impact treatment or disposition: none  I expect the patient to be discharged to possible acute inpt rehab vs snf in 1-2 days    Electronically signed by Jose Tang MD, 01/15/24, 16:34 CST.;y    Electronically signed by Jose Tang MD at 01/15/24 1642       Naldo Telles MD at 01/15/24 1123            Neurology Progress Note      Chief Complaint:  Stroke  Length of Stay:  1     Interval     1/15: Working with physical therapy today, looking better and feeling stronger.  Continues to be on heparin which is now therapeutic, obtaining head CT to determine transition to Lovenox.  TTE with agitated saline completed.    1/14: Has been hyperglycemic up to 247 at 8 AM this morning.  Continues on heparin drip protocol last PTT 44.5 at 4 AM . MRI brain w/wo and MRA head and neck completed today demonstrating left temporal lobe, left external capsule, left insula, body of the left cardiac nucleus infarcts.  There is no abnormal contrast-enhancement.  MRI brain degraded by motion artifact and in the limited evaluation there is no clear significant stenosis or large vessel occlusion. SLP evaluated, no acute needs.     1/13: Stopped Eliquis, stop aspirin, started on heparin drip low goal no bolus no rebolus.  Started premedication protocol for MRI with and without contrast tomorrow morning per radiology request.    Occupational Therapy and physical therapy evaluated recommended inpatient rehab facility,      Subjective     Continues to feel slightly better exhausted from working with physical therapy but feels committed to continue to improve.  No new symptoms.      Medications:  Current Facility-Administered Medications   Medication Dose Route Frequency Provider Last Rate Last Admin    acetaminophen (TYLENOL) tablet 650 mg  650 mg Oral Q4H PRN Daniel Fleming MD   650 mg at 01/15/24 0134    Or    acetaminophen (TYLENOL) suppository 650 mg  650 mg Rectal Q4H PRN Daniel Fleming MD        atorvastatin (LIPITOR) tablet 80 mg  80 mg Oral Nightly Daniel Fleming MD   80 mg at 01/14/24 2116    bisacodyl (DULCOLAX) suppository 10 mg  10 mg Rectal Daily PRN Frank Frankel DO        dextrose (D50W) (25 g/50 mL) IV injection 25 g  25 g Intravenous Q15 Min PRN Daniel Fleming MD        dextrose (GLUTOSE) oral gel 15 g  15 g Oral Q15 Min PRN Daniel Fleming MD        docusate sodium (COLACE) capsule 100 mg  100 mg Oral BID Daniel Fleming MD   100 mg at 01/15/24 0906    glucagon (GLUCAGEN) injection 1 mg  1 mg Intramuscular Q15 Min PRN Daniel Fleming MD        heparin 15635 units/250 mL (100 units/mL) in 0.45 % NaCl infusion  12 Units/kg/hr Intravenous Titrated Naldo Telles MD 16.31 mL/hr at 01/15/24 0302 21.018 Units/kg/hr at 01/15/24 0302    HYDROcodone-acetaminophen (NORCO) 5-325 MG per tablet 1 tablet  1 tablet Oral Q4H PRN Daniel Fleming MD        Insulin Lispro (humaLOG) injection 2-7 Units  2-7 Units Subcutaneous 4x Daily AC & at Bedtime Daniel Fleming MD   2 Units at 01/15/24 0905    labetalol (NORMODYNE,TRANDATE) injection 10 mg  10 mg Intravenous Q10 Min PRN Daniel Fleming MD        ondansetron (ZOFRAN) injection 4 mg  4 mg Intravenous Q6H PRN Daniel Fleming MD        pantoprazole (PROTONIX) EC tablet 40 mg  40 mg Oral Q AM Daniel Fleming MD   40 mg at 01/15/24 0610    polyethylene glycol  (MIRALAX) packet 17 g  17 g Oral Daily Frank Frankel, DO   17 g at 01/15/24 0906    sodium chloride 0.9 % flush 10 mL  10 mL Intravenous PRN Daniel Fleming MD        sodium chloride 0.9 % flush 10 mL  10 mL Intravenous Q12H Daniel Fleming MD   10 mL at 01/14/24 0852    sodium chloride 0.9 % flush 10 mL  10 mL Intravenous PRN Daniel Fleming MD        sodium chloride 0.9 % infusion 40 mL  40 mL Intravenous PRN Daniel Fleming MD        traMADol (ULTRAM) tablet 25 mg  25 mg Oral Q8H PRN Daniel Fleming MD             Objective      Vital Signs  Temp:  [97.9 °F (36.6 °C)-98.6 °F (37 °C)] 98 °F (36.7 °C)  Heart Rate:  [] 84  Resp:  [16-18] 16  BP: ()/(49-80) 123/49    General Exam:  Head:  Normocephalic, atraumatic  HEENT:  Neck supple  CVS:  Regular rate and rhythm on monitor  Lungs: Breathing comfortably on room air  Abdomen: Distended and soft  Extremities: There is bilateral lower extremity nonpitting edema up to the knees.  There is mild redness and edema of the upper right upper extremity.  Skin: There is some macular with small papular rash mostly over the ankles and the anterior surface of the left lower extremity lesser degree the right lower extremity     Neurologic Exam:     Mental Status:    -Awake, Alert, Oriented X 3  -No word finding difficulties  -No aphasia  -No dysarthria  -Follows simple and complex commands     CN II:  Visual fields full.  Pupils equally reactive to light  CN III, IV, VI:  Extraocular Muscles full with no signs of nystagmus  CN V:  Facial sensory is symmetric with no asymmetries.  CN VII:  Facial motor symmetric  CN VIII:  Gross hearing intact bilaterally  CN IX:  Palate elevates symmetrically  CN X:  Palate elevates symmetrically  CN XI:  Shoulder shrug symmetric  CN XII:  Tongue is midline on protrusion     Motor:   Tone is normal bilaterally.  (strength out of 5:  1= minimal movement, 2 = movement in plane of gravity, 3  = movement against gravity, 4 = movement against some resistance, 5 = full strength)  Motor function on the left upper and left lower extremity are 5 out of 5, except for some limitation in knee extension and knee flexion due to antalgic guarding.     Right upper extremity is remarkable for shoulder abduction flexion and extension along with internal and external rotation of 4.    Distally, finger extension and wrist extension 4+, the flexors throughout the right upper extremity are 4+.     Right lower extremity is remarkable for hip flexion, knee flexion of 4+.  Rest of the muscle groups are 5 out of 5.     DTR:  There is no clonus, no Babinski, no Vera.  There is no obvious hyperreflexia     Sensory:  There is decreased sensation to light touch on the right upper and right lower extremities.     Coordination:  There is no overt ataxia in the upper or lower extremities that emerges beyond the weakness.     Gait  Observe while working with PT, with hemiparetic gait but able to ambulate short distances with rolling walker and assistance.    Last nurse assessment:  Interval:  (change of shift with Brenda RN)  1a. Level of Consciousness: 0-->Alert, keenly responsive  1b. LOC Questions: 0-->Answers both questions correctly  1c. LOC Commands: 0-->Performs both tasks correctly  2. Best Gaze: 0-->Normal  3. Visual: 0-->No visual loss  4. Facial Palsy: 0-->Normal symmetrical movements  5a. Motor Arm, Left: 0-->No drift, limb holds 90 (or 45) degrees for full 10 secs  5b. Motor Arm, Right: 0-->No drift, limb holds 90 (or 45) degrees for full 10 secs  6a. Motor Leg, Left: 0-->No drift, leg holds 30 degree position for full 5 secs  6b. Motor Leg, Right: 1-->Drift, leg falls by the end of the 5-sec period but does not hit bed  7. Limb Ataxia: 0-->Absent  8. Sensory: 0-->Normal, no sensory loss  9. Best Language: 0-->No aphasia, normal  10. Dysarthria: 0-->Normal  11. Extinction and Inattention (formerly Neglect): 0-->No  abnormality    Total (NIH Stroke Scale): 1       Results Review:      Labs:  Last PTT of 82.4, glucose within range.  WBC of 14.9 from 8, no fever associated.  Thrombocytosis of 692.  Hemoglobin dropped of 1.1 units from 8.9 to 7.8.    Imaging:    MRI brain from 1/14/2024: Reviewed and agree with report with likely acute ischemic stroke in the left temporal lobe left external capsule and left insula and the body of the carotid nucleus on the left.  No abnormal contrast-enhancement.    DWI:      TTE 1/15/2024:    Normal LVEF 56 to 60%, no PFO, normal valves  Assessment/Plan   Patient who has a significant history of potential gynecological malignancy of unclear staging with DVTs in the left lower extremity and recently in the right upper extremity, that has occurred despite use of Eliquis.  And is presented with acute right upper extremity and right lower extremity weakness.     The presentation was concerning for an acute ischemic stroke, she was not a candidate for TNK given active use of anticoagulation plus out of the window plus potential metastatic malignancy.  Given lack of large vessel occlusion clinical signs, and her severe reaction to iodinated contrast we have postponed the CTA and will obtain instead an MRA head and neck.    MRI brain with and without from 1/14/2024 demonstrates an acute ischemic stroke in the left temporal lobe, external capsule, body of the caudate that explains her symptoms.  There is no evidence of abnormal contrast-enhancement that is concerning for metastatic disease.  MRAs of the head and neck did not show any significant stenosis however there is severely degraded by motion.  We do not think a CTA is warranted at this time given the severe allergy and that we have a.  Reasonable mechanism of her stroke with hypercoagulability secondary to cancer.    Will continue the heparin drip, hoping to obtain therapeutic levels.  We have obtained therapeutic level today we will obtain a head  CT to ensure there has been no hemorrhagic transformation.  And is to transition to Lovenox. We do not think she is responding to Eliquis given that she reports having taken Eliquis every day without skipping a dose for at least a month.    Importantly TTE did not show any PFO, meaning that the hypercoagulability is both venous and arterial.  As there is low likelihood there is a paradoxical embolism.        Hospital Problem List      Acute focal neurological deficit    DVT (deep venous thrombosis)    Vaginal bleeding    Type 2 diabetes mellitus, without long-term current use of insulin    Metastatic disease    Acute blood loss anemia    CVA (cerebral vascular accident)    Impression:  Clinical suspicion of acute ischemic stroke with RUE/RLE paresis: Etiology to be suspected embolic secondary to hypercoagulability due to suspected gynecological malignancy of unclear staging.    Plan:  Continue heparin gtt per protocol (low goal, no bolus, no re-bolus)  Head CT today and then if no hemorrhagic transformation of the stroke start Lovenox therapeutic for DVT.  Continue atorvastatin 80 mg daily  Continue working with PT/OT  Inpatient Rehab as disposition      Medical Decision Making    Number/Complexity of Problems  Moderate  1 undiagnosed new problem with uncertain prognosis -   1 acute illness with systemic symptoms -   High  1 acute or chronic illness that pose a threat to life/body function -   High    MDM Data  Moderate - 1/3 categories  Extensive - 2/3 categories    Category 1: 3 of the following  Review of external notes  Review of results  Ordering of each unique test  Independent historian  Category 2:  Independent interpretation of test (ex: imaging)  Category 3:  Discussion of management with another provider    Category 2     Treatment Plan  Moderate - Prescription Drug management  High  Drug therapy requiring intensive monitoring for toxicity  Decision regarding hospitalization or escalation of  care  De-escalate care/DNR decisions  High      Naldo Telles MD  01/15/24  11:23 CST     Electronically signed by Naldo Telles MD at 01/15/24 4214       Frank Frankel DO at 01/14/24 1542              [facility] Medicine Services  INPATIENT PROGRESS NOTE    Patient Name: Radha Wade  Date of Admission: 1/12/2024  Today's Date: 01/14/24  Length of Stay: 0  Primary Care Physician: Myron Vines MD    Subjective   Chief Complaint: R sided weakness, constipation    HPI   Patient seen and evaluated earlier today prior to MRI.  Was still complaining of ongoing right-sided weakness and coordination issues.  Only other complaint was constipation.  Denies chest pain or shortness of breath.  No nausea.        Review of Systems   All pertinent negatives and positives are as above. All other systems have been reviewed and are negative unless otherwise stated.     Objective    Temp:  [97.7 °F (36.5 °C)-98.8 °F (37.1 °C)] 98.3 °F (36.8 °C)  Heart Rate:  [] 108  Resp:  [16-18] 16  BP: ()/(45-76) 125/49  Physical Exam  GEN: Awake, alert, interactive, in NAD  HEENT: PERRLA, EOMI, Anicteric, Trachea midline  Lungs: no wheezing/rales/rhonchi  Heart: RRR, +S1/s2, no rub  ABD: soft, nt/nd, +BS, no guarding/rebound  Extremities: atraumatic, no cyanosis  Neuro: AAOx3, 4/5 R sided MS compared to L  Psych: appears anxious.         Results Review:  I have reviewed the labs, radiology results, and diagnostic studies.    Laboratory Data:   Results from last 7 days   Lab Units 01/14/24  0359 01/13/24  0602 01/12/24  2311   WBC 10*3/mm3 8.82 7.46 9.23   HEMOGLOBIN g/dL 8.9* 8.9* 9.0*   HEMATOCRIT % 31.8* 34.4 31.8*   PLATELETS 10*3/mm3 605* 524* 592*        Results from last 7 days   Lab Units 01/14/24  0359 01/13/24  0602 01/12/24  2311   SODIUM mmol/L 136 136 137   POTASSIUM mmol/L 4.5 4.2 4.4   CHLORIDE mmol/L 100 100 99   CO2 mmol/L 25.0 23.0 27.0   BUN mg/dL 10 11 12    CREATININE mg/dL 0.47* 0.50* 0.55*   CALCIUM mg/dL 8.8 8.7 9.0   BILIRUBIN mg/dL 0.2 0.2 0.2   ALK PHOS U/L 229* 208* 227*   ALT (SGPT) U/L 10 11 13   AST (SGOT) U/L 14 17 22   GLUCOSE mg/dL 191* 126* 160*       Culture Data:   Urine Culture   Date Value Ref Range Status   01/13/2024 >100,000 CFU/mL Mixed [place] Isolated  Final       Radiology Data:   Imaging Results (Last 24 Hours)       Procedure Component Value Units Date/Time    MRI Angiogram Neck Without Contrast [[id number]] Collected: 01/14/24 1502     Updated: 01/14/24 1514    Narrative:      EXAMINATION: MRI ANGIOGRAM NECK WO CONTRAST-  1/14/2024 3:02 PM MR  angiogram of the neck without contrast 1/14/2024     HISTORY: Carotid artery dissection suspected.     FINDINGS: MR angiography was performed of the neck utilizing 3D  time-of-flight and MIP images. Today's study is significantly degraded  by motion. This significantly lowers the sensitivity of the exam.     The visualized aortic arch is normal in caliber. The origin of the great  vessels are widely patent. The right vertebral artery origin is widely  patent. The left vertebral artery is rather diminutive in size and its  origin and proximal segment is not well-defined. Distally it is  suspected that the left vertebral artery terminates in the posterior  inferior cerebellar artery. The right vertebral artery is dominant and  appears to be widely patent from its origin to the basilar artery.     Both common carotid arteries are patent at their origin. No focal  luminal stenosis appreciated proximal to the carotid bifurcations. Both  carotid bifurcations are unremarkable with no evidence of rate limiting  stenosis. The ICAs are patent to the skull base without evidence of rate  limiting stenosis.       Impression:      1. Study is degraded by motion artifact. This is particularly noted on  the MIP sequences which are three-dimensional reconstructions based on  the raw source images.  2. The carotid  arteries are unremarkable. The carotid bifurcations are  normal in appearance without evidence of rate limiting stenosis with  both ICAs patent to the skull base.  3. The right vertebral artery is widely patent from its origin to the  skull base. The left vertebral artery is rather diminutive in size and  is not well-defined in its proximal segment. Difficult to ascertain  whether there is some loss of definition of this vessel simply because  of its small size and adjacent pulsatility from the aortic arch and  proximal great vessels or whether it is diseased. I feel that this  vessel terminates in the posterior inferior cerebellar artery at the  level of the posterior cranial fossa.  4. If further evaluation of the carotid and vertebral arteries is felt  warranted then follow-up with CT angiography would be the study of  choice and is considered the definitive exam for evaluation of the  vessels of the head and neck. Motion artifact, although potentially  degrading the study is a much lesser factor during performance of CT  angiography as the acquisition only takes a few seconds.     This report was signed and finalized on 1/14/2024 3:11 PM by Dr. Romeo Arizmendi MD.       MRI Angiogram Head Without Contrast [[id number]] Collected: 01/14/24 1455     Updated: 01/14/24 1505    Narrative:      EXAMINATION: MRI ANGIOGRAM HEAD WO CONTRAST-  1/14/2024 2:55 PM     HISTORY: Stroke. Determine embolic source.     FINDINGS: MR angiography was performed of the [place] with 3D  time-of-flight and MIP images. Raw source images are also reviewed.     Today's study is limited secondary to rather extensive motion artifact.  This is particularly noted on the MIPS which are reproduction's based on  the raw source images.     The right vertebral artery is dominant. The left vertebral artery is  rather diminutive in size. I feel the left vertebral artery terminates  in the posterior inferior cerebellar artery. The right  vertebral artery  is patent to the basilar artery. The basilar artery is patent. The  superior cerebellar arteries appear to be grossly intact. The basilar  artery terminates into the posterior cerebral artery on the left. There  is a fetal origin of the right posterior cerebral artery.     The distal ICAs are patent. The petrous, cavernous and supraclinoid  segment of the ICAs are patent to the terminus. Grossly the anterior and  middle cerebral arteries are patent. I do not see any discrete aneurysm.  No gross evidence of high-grade stenosis but the study is very limited.  The more distal trifurcation vessels are attenuated secondary to motion  artifact.       Impression:      1. Very limited exam secondary to extensive motion artifact. The left  vertebral artery is rather diminutive and I feel terminates in the  posterior inferior cerebellar artery. The right vertebral artery is  dominant. The basilar artery is widely patent. The basilar artery  terminates in the left posterior cerebral artery with the right  posterior cerebral artery emanating from the anterior circulation via  suspected persistent fetal origin.  2. Both intracranial ICAs are patent to the terminus. Potential disease  involving the cavernous and supraclinoid segment of the ICAs cannot be  fully evaluated. The anterior and middle cerebral arteries are grossly  unremarkable. I do not see any definite aneurysm.  3. If further assessment of the [place] is felt warranted  follow-up with CT angiography would be the study of choice for further  evaluation. That is a rapid acquisition and will not be as affected by  potential motion.     This report was signed and finalized on 1/14/2024 3:02 PM by Dr. Romeo Arizmendi MD.       MRI Brain With & Without Contrast [[id number]] Collected: 01/14/24 1438     Updated: 01/14/24 1454    Narrative:      MRI BRAIN W WO CONTRAST- 1/14/2024 10:55 AM     HISTORY: Stroke, follow up; I82.619-Acute embolism and  thrombosis of  superficial veins of unspecified upper extremity; N30.90-Cystitis,  unspecified without hematuria; W19.XXXA-Unspecified fall, initial  encounter; R53.1-Weakness; Z74.09-Other reduced mobility     COMPARISON: None.       TECHNIQUE: Multiplanar imaging of the brain was performed in a routine  fashion before and after the intravenous injection of gadolinium  contrast. There is significant motion artifact on today's exam.     FINDINGS:  Diffusion: There is diffusion restriction within the mesial left  temporal lobe,, left external capsule, left insular cortex as well as  within the body of the caudate nucleus with associated ADC mapping  abnormalities consistent with acute ischemic infarction. These infarcts  are nonhemorrhagic. There is no associated mass effect. No additional  sites of diffusion restriction are present.     Midline structures: Nondisplaced.     Ventricles: Normal in configuration and symmetric in size.     Masses: No masses or mass effect.     Basilar cisterns: Maintained.     Extra axial space: No abnormal extra-axial fluid.     Gray-white matter signal: There are additional scattered foci of T2  abnormality involving the periventricular and higher white matter tract  suggesting small vessel ischemic disease.     Cerebellum: Normal.     Brainstem: Normal.     Enhancement: No abnormal enhancement.     Other: Proximal cervical spinal cord is normal. Bilateral globes and  orbits are normal in appearance. Normal cerebrovascular flow voids  noted. No abnormal signal in the mastoid air cells or paranasal sinuses.       Impression:      1. Diffusion restriction with associated ADC mapping abnormality  involving the mesial left temporal lobe, left external capsule, left  insula as well as the body of the caudate nucleus. These infarcts are  nonhemorrhagic.  2. Mild atrophy. Mild small vessel ischemic changes are noted involving  the periventricular and higher white matter tracts.  3. Normal  flow voids within the [place].  4. Today's study is degraded by motion. No abnormal contrast enhancement  is demonstrated.           This report was signed and finalized on 1/14/2024 2:50 PM by Dr. Romeo Arizmendi MD.       US Venous Doppler Upper Extremity Right (duplex) [[id number]] Collected: 01/14/24 1113     Updated: 01/14/24 1117    Narrative:      History: Pain and swelling       Impression:      Impression:  1. There is no evidence of deep venous thrombosis of the right upper  extremity.  2. There is evidence of superficial thrombus in the cephalic vein.     Comments: Right upper extremity venous duplex exam was performed using  color Doppler flow, Doppler wave form analysis, and grayscale imaging,  with and without compression. There is no evidence of deep venous  thrombosis of the internal jugular, subclavian, axillary, brachial,  radial, and ulnar veins. There is evidence of superficial thrombus  involving the cephalic vein.        This report was signed and finalized on 1/14/2024 11:14 AM by Dr. Joseph Mari MD.               I have reviewed the patient's current medications.     Assessment/Plan   Assessment  Active Hospital Problems    Diagnosis     **Acute focal neurological deficit     CVA (cerebral vascular accident)     Type 2 diabetes mellitus, without long-term current use of insulin     Metastatic disease     Acute blood loss anemia     Vaginal bleeding     DVT (deep venous thrombosis)        Treatment Plan  #1 acute CVA -patient presented with symptoms concerning for stroke.  Initial head CT was nonacute.  MRI with and without was obtained today to look for CVA versus possible mass given cancer history.  It was positive for multifocal diffusion restriction/CVA.  No obvious flow limitation was noted on angiographies.  Some was limited due to motion.  2D echo pending.  She has been on a heparin drip at this point by neurology, defer to them but suspect okay to resume Eliquis.  On  statin.  Continue therapies.    #2 history DVT -patient with concern about right upper extremity swelling on arrival but she does not have a DVT in that extremity.  History of DVT in her lower extremities.  In the setting of malignancy.  She has been on Eliquis at this point but transition to heparin drip here due to fear for possible failure of therapy.  Patient openly admits that she would take the medicine once a day and not regularly.  Do not suspect there is any failure of care at this time rather patient noncompliance.  Feel she is likely somewhat in the mild to current issues at hand.  Note potential recommendations for Lovenox at discharge by neurology.  Will ask her hematologist/oncologist Dr. Grullon to evaluate in the a.m. for recommendations.    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 Grullon.  It seems that a lot of the procedures and testing have been ordered prior have typically not been performed as patient did not go for them.  Suspect she is in some denial to her diagnosis.  She is also supposed to have an upcoming appointment at [facility] for further evaluation.  Again we will ask hematology to evaluate in the a.m. to help with coordination of care and to ensure adequate/appropriate follow-up.    #4 history of DM2 -on sliding scale insulin and hypoglycemia protocol.    #5 anemia -stable since arrival.  Does have a history of vaginal bleeding likely in the setting of GYN malignancy.  Monitor, no obvious signs of bleeding here so far.    #6 constipation -start bowel regimen    Medical Decision Making  Number and Complexity of problems: 1 acute problem, multiple chronic  Differential Diagnosis: as above    Conditions and Status        Pt is new to me, appears stable/non-toxic     MDM Data  External documents reviewed: none  Cardiac tracing (EKG, telemetry) interpretation: sinus on monitor  Radiology interpretation: reports reviewed  Labs reviewed: as above  Any tests that were  considered but not ordered: none     Decision rules/scores evaluated (example [id number], [place], etc): none     Discussed with: patient, nursing     Care Planning  Shared decision making: Patient apprised of current labs, vitals, imaging and treatment plan.  They are agreeable with proceeding with plans as discussed.    Code status and discussions: full code    Disposition  Social Determinants of Health that impact treatment or disposition: none  I expect the patient to be discharged to possible acute inpt rehab vs snf in 1-2 days    Electronically signed by Frank Frankel DO, 01/14/24, 15:42 CST.;y    Electronically signed by Frank Frankel DO at 01/14/24 1809       Naldo Telles MD at 01/14/24 1024            Neurology Progress Note      Chief Complaint:  Stroke  Length of Stay:  0     Interval     1/14: Has been hyperglycemic up to 247 at 8 AM this morning.  Continues on heparin drip protocol last PTT 44.5 at 4 AM . MRI brain w/wo and MRA head and neck completed today demonstrating left temporal lobe, left external capsule, left insula, body of the left cardiac nucleus infarcts.  There is no abnormal contrast-enhancement.  MRI brain degraded by motion artifact and in the limited evaluation there is no clear significant stenosis or large vessel occlusion. SLP evaluated, no acute needs.     1/13: Stopped Eliquis, stop aspirin, started on heparin drip low goal no bolus no rebolus.  Started premedication protocol for MRI with and without contrast tomorrow morning per radiology request.    Occupational Therapy and physical therapy evaluated recommended inpatient rehab facility,     Subjective     Feeling slightly better than yesterday however continues to be anxious about what the next steps will be regarding her potential cancer.  She is also anxious about the recovery from the stroke.      Medications:  Current Facility-Administered Medications   Medication Dose Route Frequency Provider Last Rate Last  Admin    acetaminophen (TYLENOL) tablet 650 mg  650 mg Oral Q4H PRN Daniel Fleming MD   650 mg at 01/14/24 0328    Or    acetaminophen (TYLENOL) suppository 650 mg  650 mg Rectal Q4H PRN Daniel Fleming MD        atorvastatin (LIPITOR) tablet 80 mg  80 mg Oral Nightly Daniel Fleming MD   80 mg at 01/13/24 2113    dextrose (D50W) (25 g/50 mL) IV injection 25 g  25 g Intravenous Q15 Min PRN Daniel Fleming MD        dextrose (GLUTOSE) oral gel 15 g  15 g Oral Q15 Min PRN Daniel Fleming MD        diphenhydrAMINE (BENADRYL) capsule 50 mg  50 mg Oral Once When Specified Naldo Telles MD        Or    diphenhydrAMINE (BENADRYL) injection 50 mg  50 mg Intravenous Once When Specified Naldo Telles MD        Or    diphenhydrAMINE (BENADRYL) injection 50 mg  50 mg Intramuscular Once When Specified Naldo Telles MD        docusate sodium (COLACE) capsule 100 mg  100 mg Oral BID Daniel Fleming MD   100 mg at 01/14/24 0848    glucagon (GLUCAGEN) injection 1 mg  1 mg Intramuscular Q15 Min PRN Daniel Fleming MD        heparin 84321 units/250 mL (100 units/mL) in 0.45 % NaCl infusion  12 Units/kg/hr Intravenous Titrated Naldo Telles MD 11.31 mL/hr at 01/14/24 0504 14.575 Units/kg/hr at 01/14/24 0504    HYDROcodone-acetaminophen (NORCO) 5-325 MG per tablet 1 tablet  1 tablet Oral Q4H PRN Daniel Fleming MD        Insulin Lispro (humaLOG) injection 2-7 Units  2-7 Units Subcutaneous 4x Daily AC & at Bedtime Daniel Fleming MD   3 Units at 01/14/24 0848    labetalol (NORMODYNE,TRANDATE) injection 10 mg  10 mg Intravenous Q10 Min PRN Daniel Fleming MD        ondansetron (ZOFRAN) injection 4 mg  4 mg Intravenous Q6H PRN Daniel Fleming MD        pantoprazole (PROTONIX) EC tablet 40 mg  40 mg Oral Q AM Daniel Fleming MD   40 mg at 01/14/24 0541    predniSONE (DELTASONE) tablet 50 mg  50 mg Oral  Once Naldo Telles MD        sodium chloride 0.9 % flush 10 mL  10 mL Intravenous PRN Daniel Fleming MD        sodium chloride 0.9 % flush 10 mL  10 mL Intravenous Q12H Daniel Fleming MD   10 mL at 01/14/24 0852    sodium chloride 0.9 % flush 10 mL  10 mL Intravenous PRN Daniel Fleming MD        sodium chloride 0.9 % infusion 40 mL  40 mL Intravenous PRN Daniel Fleming MD        traMADol (ULTRAM) tablet 25 mg  25 mg Oral Q8H PRN Daniel Fleming MD             Objective      Vital Signs  Temp:  [97.7 °F (36.5 °C)-98.8 °F (37.1 °C)] 98.8 °F (37.1 °C)  Heart Rate:  [] 70  Resp:  [16-18] 16  BP: (117-140)/(45-76) 118/69    General Exam:  Head:  Normocephalic, atraumatic  HEENT:  Neck supple  CVS:  Regular rate and rhythm on monitor  Lungs: Breathing comfortably on room air  Abdomen: Distended and soft  Extremities: There is bilateral lower extremity nonpitting edema up to the knees.  There is mild redness and edema of the upper right upper extremity.  Skin: There is some macular with small papular rash mostly over the ankles and the anterior surface of the left lower extremity lesser degree the right lower extremity     Neurologic Exam:     Mental Status:    -Awake, Alert, Oriented X 3  -No word finding difficulties  -No aphasia  -No dysarthria  -Follows simple and complex commands     CN II:  Visual fields full.  Pupils equally reactive to light  CN III, IV, VI:  Extraocular Muscles full with no signs of nystagmus  CN V:  Facial sensory is symmetric with no asymmetries.  CN VII:  Facial motor symmetric  CN VIII:  Gross hearing intact bilaterally  CN IX:  Palate elevates symmetrically  CN X:  Palate elevates symmetrically  CN XI:  Shoulder shrug symmetric  CN XII:  Tongue is midline on protrusion     Motor:   Tone is normal bilaterally.  (strength out of 5:  1= minimal movement, 2 = movement in plane of gravity, 3 = movement against gravity, 4 = movement  against some resistance, 5 = full strength)  Motor function on the left upper and left lower extremity are 5 out of 5, except for some limitation in knee extension and knee flexion due to antalgic guarding.     Right upper extremity is remarkable for shoulder abduction flexion and extension along with internal and external rotation of 4 -.    Distally, finger extension and wrist extension 4, the flexors throughout the right upper extremity are 4+.     Right lower extremity is remarkable for hip flexion, knee flexion of 4+.  Rest of the muscle groups are 5 out of 5.     DTR:  There is no clonus, no Babinski, no Vera.  There is no obvious hyperreflexia     Sensory:  There is decreased sensation to light touch on the right upper and right lower extremities.     Coordination:  There is no overt ataxia in the upper or lower extremities that emerges beyond the weakness.     Gait  Not tested    Last nurse assessment:  Interval: baseline  1a. Level of Consciousness: 0-->Alert, keenly responsive  1b. LOC Questions: 0-->Answers both questions correctly  1c. LOC Commands: 0-->Performs both tasks correctly  2. Best Gaze: 0-->Normal  3. Visual: 0-->No visual loss  4. Facial Palsy: 0-->Normal symmetrical movements  5a. Motor Arm, Left: 0-->No drift, limb holds 90 (or 45) degrees for full 10 secs  5b. Motor Arm, Right: 0-->No drift, limb holds 90 (or 45) degrees for full 10 secs  6a. Motor Leg, Left: 0-->No drift, leg holds 30 degree position for full 5 secs  6b. Motor Leg, Right: 0-->No drift, leg holds 30 degree position for full 5 secs  7. Limb Ataxia: 0-->Absent  8. Sensory: 1-->Mild-to-moderate sensory loss, patient feels pinprick is less sharp or is dull on the affected side, or there is a loss of superficial pain with pinprick, but patient is aware of being touched  9. Best Language: 0-->No aphasia, normal  10. Dysarthria: 0-->Normal  11. Extinction and Inattention (formerly Neglect): 0-->No abnormality    Total (NIH  Stroke Scale): 1       Results Review:      Labs:  Labs as above    Imaging:  Imaging as above    Assessment/Plan   Patient who has a significant history of potential gynecological malignancy of unclear staging with DVTs in the left lower extremity and recently in the right upper extremity, that has occurred despite use of Eliquis.  And is presented with acute right upper extremity and right lower extremity weakness.     The presentation was concerning for an acute ischemic stroke, she was not a candidate for TNK given active use of anticoagulation plus out of the window plus potential metastatic malignancy.  Given lack of large vessel occlusion clinical signs, and her severe reaction to iodinated contrast we have postponed the CTA and will obtain instead an MRA head and neck.    MRI brain with and without from 1/14/2024 demonstrates an acute ischemic stroke in the left temporal lobe, external capsule, body of the caudate that explains her symptoms.  There is no evidence of abnormal contrast-enhancement that is concerning for metastatic disease.  MRAs of the head and neck did not show any significant stenosis however there is severely degraded by motion.  We do not think a CTA is warranted at this time given the severe allergy.    Will continue the heparin drip, hoping to obtain therapeutic levels and once we get a therapeutic level head CT should be performed in order to rule out any hemorrhagic transformation of the stroke.  This is a safety measure given that we have to anticoagulate in the setting of the recent DVT.  Once we can demonstrate that while anticoagulated there is no hemorrhagic transformation of the stroke, it would be safe to transition her to Lovenox.  We do not think she is responding to Eliquis given that she reports having taken Eliquis every day without skipping a dose for at least a month.    We are awaiting a read of TTE with agitated saline, as presence of a PFO could explain paradoxical  embolism.      Hospital Problem List      Acute focal neurological deficit    DVT (deep venous thrombosis)    Vaginal bleeding    Type 2 diabetes mellitus, without long-term current use of insulin    Metastatic disease    Acute blood loss anemia    Impression:  Clinical suspicion of acute ischemic stroke with RUE/RLE paresis: Etiology to be suspected embolic secondary to hypercoagulability due to suspected gynecological malignancy of unclear staging.    Plan:  Continue heparin gtt per protocol (low goal, no bolus, no re-bolus)  Continue atorvastatin 80 mg daily  F/up TTE with agitated saline  Recommend evaluation by oncology  Continue working with PT/OT  Inpatient Rehab as disposition      Medical Decision Making    Number/Complexity of Problems  Moderate  1 undiagnosed new problem with uncertain prognosis -   1 acute illness with systemic symptoms -   High  1 acute or chronic illness that pose a threat to life/body function -   High    MDM Data  Moderate - 1/3 categories  Extensive - 2/3 categories    Category 1: 3 of the following  Review of external notes  Review of results  Ordering of each unique test  Independent historian  Category 2:  Independent interpretation of test (ex: imaging)  Category 3:  Discussion of management with another provider    Category 2     Treatment Plan  Moderate - Prescription Drug management  High  Drug therapy requiring intensive monitoring for toxicity  Decision regarding hospitalization or escalation of care  De-escalate care/DNR decisions  High      Naldo Telles MD  01/14/24  10:24 CST     Electronically signed by Naldo Telles MD at 01/14/24 5110

## 2024-01-16 NOTE — PLAN OF CARE
Goal Outcome Evaluation: Patient is alert and oriented x 4, DALE, up to beside commode, voiding and had BM today after suppository. She has had complaints of pain, request only Tylenol but she does have other meds she can have.  Also c/o of nausea and has been medicated. Heparin drip infusing. Has UTI and MD ordered IVAB. GYN saw patient this am and recommended D and C, Dr. García office called after patient told me she is agreeable to procedure tomorrow. Will be NPO after midnight. LE swelling noted. Patient has been tearful today. Fall risk protocol, bed alarm is on and call light is within reach.

## 2024-01-16 NOTE — PLAN OF CARE
Goal Outcome Evaluation:  Plan of Care Reviewed With: patient        Progress: improving   VSS. Safety maintained.Heparin drip in progress. Tele on. Legs swollen. No acute distress noted at this time.

## 2024-01-16 NOTE — PLAN OF CARE
Goal Outcome Evaluation:  Plan of Care Reviewed With: patient        Progress: improving  Outcome Evaluation: OT tx completed. Pt in fowlers talking to doctor upon therapist arrival. Pt hesitant to perform therapy due to stomach cramps & just receiving suppository, agreed to activities sitting EOB. Pt performed sup>sit w Rolo, good sitting balance EOB. She completed various coordination activities with her RUE, min difficulty noted however she seems to have improved since inital eval. simulated feeding tasks with RUE, no spillage noted when scooping applesauce into cup. Pt was able to perform grooming activities with setup & S. Pt is nervous regarding her overall recovery, educated pt on OT POC & good rehab potential. continue OT POC & rec discharge to inpatient rehab

## 2024-01-16 NOTE — CONSULTS
Inpatient Gastroenterology Consult  Referring Provider: Kitty  Reason for Consultation: ARLETTE    Subjective     History of present illness: 63-year-old white female who has a long history of dysfunctional uterine bleeding, and upon seeking medical attention evaluation has demonstrated evidence of a GYN malignancy, unfortunately with evidence of carcinomatosis.  We were asked specifically to see the patient because of her iron deficiency anemia.  She has not described any bowel irregularity.  She does have a long history of heavy dysfunctional uterine bleeding, however.  She had a Hemoccult test checked today and that was negative.    Past Medical History:  Past Medical History:   Diagnosis Date    Asthma     Bronchitis     Depression     Diabetes mellitus     DVT (deep venous thrombosis)     Heart murmur     Lyme disease     Mitral valve prolapse        Past Surgical History:  Past Surgical History:   Procedure Laterality Date     SECTION          Social History:   Social History     Tobacco Use    Smoking status: Never    Smokeless tobacco: Never   Substance Use Topics    Alcohol use: No        Family History:  Family History   Problem Relation Age of Onset    Heart disease Father     Diabetes Father     Diabetes Brother     Heart disease Brother     Cancer Maternal Grandmother     Cancer Maternal Grandfather     Heart disease Paternal Grandfather        Home Meds:  Medications Prior to Admission   Medication Sig Dispense Refill Last Dose    acetaminophen-codeine (TYLENOL with CODEINE #3) 300-30 MG per tablet Take 1 tablet by mouth Every 8 (Eight) Hours As Needed for Moderate Pain.   Past Week    Apixaban Starter Pack (Eliquis DVT/PE Starter Pack) tablet therapy pack Take two 5 mg tablets by mouth every 12 hours for 7 days. Followed by one 5 mg tablet every 12 hours. (Dispense starter pack if available) 74 tablet 0 2024    furosemide (LASIX) 20 MG tablet Take 1 tablet by  mouth Daily.   Past Week    metFORMIN (GLUCOPHAGE) 500 MG tablet Take 1 tablet by mouth 2 (Two) Times a Day With Meals.   Past Week    promethazine (PHENERGAN) 25 MG tablet Take 1 tablet by mouth Every 6 (Six) Hours As Needed for Nausea or Vomiting.   Past Week    albuterol sulfate  (90 Base) MCG/ACT inhaler Inhale 2 puffs Every 4 (Four) Hours As Needed for Wheezing or Shortness of Air. 1 inhaler 0 More than a month    Lancets (freestyle) lancets 1 each by Other route 2 (Two) Times a Day. Use as instructed 100 each 0        Current Meds:   atorvastatin, 80 mg, Oral, Nightly  docusate sodium, 100 mg, Oral, BID  ferric gluconate, 250 mg, Intravenous, Daily  insulin lispro, 2-7 Units, Subcutaneous, 4x Daily AC & at Bedtime  pantoprazole, 40 mg, Oral, Q AM  polyethylene glycol, 17 g, Oral, Daily  sodium chloride, 10 mL, Intravenous, Q12H        Allergies:  No Known Allergies    Review of Systems  All systems were reviewed and negative except for:  Hematologic / Lymphatic: positive for  lymphadenopathy.  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  Recent stroke which is resulted in some dysphasia and persistent right leg weakness.    Objective     Vital Signs  Temp:  [97.8 °F (36.6 °C)-98.5 °F (36.9 °C)] 98.2 °F (36.8 °C)  Heart Rate:  [84-98] 84  Resp:  [16-20] 16  BP: (103-131)/(44-70) 108/44    Physical Exam:     General Appearance:  Alert, cooperative, in no acute distress   Head:  Normocephalic, without obvious abnormality, atraumatic   Eyes:  Lids and lashes normal, conjunctivae and sclerae pale, corneas clear, PERRLA   Ears:  Ears appear intact with no abnormalities noted   Throat:  No oral lesions, no thrush, oral mucosa moist   Neck:  No adenopathy, supple, trachea midline, no thyromegaly, no carotid bruit, no JVD   Back:  No kyphosis present, no scoliosis present, no skin lesions, erythema or scars, no tenderness to percussion, or palpation, range of motion normal  "  Lungs:  Clear to auscultation,respirations regular, even and unlabored    Heart:  Regular rhythm and normal rate, normal S1 and S2, no murmur, no gallop, no rub, no click   Breast Exam:  Deferred   Abdomen:  Normal bowel sounds, no masses, no organomegaly, soft non-tender, non-distended, no guarding, no rebound tenderness   Genitalia:  Deferred   Extremities:  Moderate lymphedema of the lower extremities.   Pulses:  Pulses palpable and equal bilaterally   Skin:  No bleeding, bruising or rash   Lymph nodes:  No palpable adenopathy   Neurologic:  Cranial nerves 2 - 12 grossly intact, sensation intact, DTR present and equal bilaterally       WBC No results found for: \"WBCS\"   HGB Hemoglobin   Date Value Ref Range Status   01/16/2024 8.5 (L) 12.0 - 15.9 g/dL Final   01/15/2024 7.8 (L) 12.0 - 15.9 g/dL Final   01/14/2024 8.9 (L) 12.0 - 15.9 g/dL Final      HCT Hematocrit   Date Value Ref Range Status   01/16/2024 29.6 (L) 34.0 - 46.6 % Final   01/15/2024 28.6 (L) 34.0 - 46.6 % Final   01/14/2024 31.8 (L) 34.0 - 46.6 % Final      Platelets No results found for: \"LABPLAT\"   MCV MCV   Date Value Ref Range Status   01/16/2024 72.0 (L) 79.0 - 97.0 fL Final   01/15/2024 73.7 (L) 79.0 - 97.0 fL Final   01/14/2024 72.3 (L) 79.0 - 97.0 fL Final          Sodium Sodium   Date Value Ref Range Status   01/15/2024 138 136 - 145 mmol/L Final   01/14/2024 136 136 - 145 mmol/L Final      Potassium Potassium   Date Value Ref Range Status   01/15/2024 4.0 3.5 - 5.2 mmol/L Final   01/14/2024 4.5 3.5 - 5.2 mmol/L Final      Chloride Chloride   Date Value Ref Range Status   01/15/2024 101 98 - 107 mmol/L Final   01/14/2024 100 98 - 107 mmol/L Final      CO2 CO2   Date Value Ref Range Status   01/15/2024 26.0 22.0 - 29.0 mmol/L Final   01/14/2024 25.0 22.0 - 29.0 mmol/L Final      BUN BUN   Date Value Ref Range Status   01/15/2024 16 8 - 23 mg/dL Final   01/14/2024 10 8 - 23 mg/dL Final      Creatinine Creatinine   Date Value Ref Range " Status   01/15/2024 0.54 (L) 0.57 - 1.00 mg/dL Final   01/14/2024 0.47 (L) 0.57 - 1.00 mg/dL Final      Calcium Calcium   Date Value Ref Range Status   01/15/2024 8.9 8.6 - 10.5 mg/dL Final   01/14/2024 8.8 8.6 - 10.5 mg/dL Final      Albumin Albumin   Date Value Ref Range Status   01/15/2024 3.1 (L) 3.5 - 5.2 g/dL Final   01/14/2024 3.2 (L) 3.5 - 5.2 g/dL Final      AST  ALT  PT/INR:   AST (SGOT)   Date Value Ref Range Status   01/15/2024 9 1 - 32 U/L Final   01/14/2024 14 1 - 32 U/L Final     ALT (SGPT)   Date Value Ref Range Status   01/15/2024 7 1 - 33 U/L Final   01/14/2024 10 1 - 33 U/L Final     Protime   Date Value Ref Range Status   01/13/2024 16.8 (H) 11.8 - 14.8 Seconds Final   /  INR   Date Value Ref Range Status   01/13/2024 1.35 (H) 0.91 - 1.09 Final            Imaging Results (Last 72 Hours)       Procedure Component Value Units Date/Time    CT Head Without Contrast [[id number]] Collected: 01/15/24 1436     Updated: 01/15/24 1444    Narrative:      EXAMINATION:  CT HEAD WO CONTRAST-  1/15/2024 1:29 PM     HISTORY: Stroke, follow up; I82.619-Acute embolism and thrombosis of  superficial veins of unspecified upper extremity; N30.90-Cystitis,  unspecified without hematuria; W19.XXXA-Unspecified fall, initial  encounter; R53.1-Weakness; Z74.09-Other reduced mobility.     TECHNIQUE: Multiple axial images were obtained through the brain without  contrast infusion. Multiplanar images were reconstructed.     DLP: 605.11 mGy.cm. Automated dosage reduction technique was utilized to  reduce patient dosage.     COMPARISON: 1/13/2024.     FINDINGS: There is no hemorrhage. There is low-density in the lateral  left basal ganglia region predominantly involving the posterior aspect  of the external capsule. There is minimal low density elsewhere within  the hemispheric white matter likely related to small vessel disease.  There is minimal atrophy. The paranasal sinuses and mastoid air cells  are clear. No calvarial  fracture is seen.          Impression:      1. Acute infarct in the lateral left basal ganglia region involving  predominantly the external capsule posteriorly and in the posterolateral  aspect of the putamen.  2. No hemorrhage.  3. Mild chronic small vessel ischemic white matter disease.        This report was signed and finalized on 1/15/2024 2:41 PM by Dr. Keegan Sousa MD.       MRI Angiogram Neck Without Contrast [[id number]] Collected: 01/14/24 1502     Updated: 01/14/24 1514    Narrative:      EXAMINATION: MRI ANGIOGRAM NECK WO CONTRAST-  1/14/2024 3:02 PM MR  angiogram of the neck without contrast 1/14/2024     HISTORY: Carotid artery dissection suspected.     FINDINGS: MR angiography was performed of the neck utilizing 3D  time-of-flight and MIP images. Today's study is significantly degraded  by motion. This significantly lowers the sensitivity of the exam.     The visualized aortic arch is normal in caliber. The origin of the great  vessels are widely patent. The right vertebral artery origin is widely  patent. The left vertebral artery is rather diminutive in size and its  origin and proximal segment is not well-defined. Distally it is  suspected that the left vertebral artery terminates in the posterior  inferior cerebellar artery. The right vertebral artery is dominant and  appears to be widely patent from its origin to the basilar artery.     Both common carotid arteries are patent at their origin. No focal  luminal stenosis appreciated proximal to the carotid bifurcations. Both  carotid bifurcations are unremarkable with no evidence of rate limiting  stenosis. The ICAs are patent to the skull base without evidence of rate  limiting stenosis.       Impression:      1. Study is degraded by motion artifact. This is particularly noted on  the MIP sequences which are three-dimensional reconstructions based on  the raw source images.  2. The carotid arteries are unremarkable. The carotid bifurcations  are  normal in appearance without evidence of rate limiting stenosis with  both ICAs patent to the skull base.  3. The right vertebral artery is widely patent from its origin to the  skull base. The left vertebral artery is rather diminutive in size and  is not well-defined in its proximal segment. Difficult to ascertain  whether there is some loss of definition of this vessel simply because  of its small size and adjacent pulsatility from the aortic arch and  proximal great vessels or whether it is diseased. I feel that this  vessel terminates in the posterior inferior cerebellar artery at the  level of the posterior cranial fossa.  4. If further evaluation of the carotid and vertebral arteries is felt  warranted then follow-up with CT angiography would be the study of  choice and is considered the definitive exam for evaluation of the  vessels of the head and neck. Motion artifact, although potentially  degrading the study is a much lesser factor during performance of CT  angiography as the acquisition only takes a few seconds.     This report was signed and finalized on 1/14/2024 3:11 PM by Dr. Romeo Arizmendi MD.       MRI Angiogram Head Without Contrast [[id number]] Collected: 01/14/24 1455     Updated: 01/14/24 1505    Narrative:      EXAMINATION: MRI ANGIOGRAM HEAD WO CONTRAST-  1/14/2024 2:55 PM     HISTORY: Stroke. Determine embolic source.     FINDINGS: MR angiography was performed of the [place] with 3D  time-of-flight and MIP images. Raw source images are also reviewed.     Today's study is limited secondary to rather extensive motion artifact.  This is particularly noted on the MIPS which are reproduction's based on  the raw source images.     The right vertebral artery is dominant. The left vertebral artery is  rather diminutive in size. I feel the left vertebral artery terminates  in the posterior inferior cerebellar artery. The right vertebral artery  is patent to the basilar artery. The  basilar artery is patent. The  superior cerebellar arteries appear to be grossly intact. The basilar  artery terminates into the posterior cerebral artery on the left. There  is a fetal origin of the right posterior cerebral artery.     The distal ICAs are patent. The petrous, cavernous and supraclinoid  segment of the ICAs are patent to the terminus. Grossly the anterior and  middle cerebral arteries are patent. I do not see any discrete aneurysm.  No gross evidence of high-grade stenosis but the study is very limited.  The more distal trifurcation vessels are attenuated secondary to motion  artifact.       Impression:      1. Very limited exam secondary to extensive motion artifact. The left  vertebral artery is rather diminutive and I feel terminates in the  posterior inferior cerebellar artery. The right vertebral artery is  dominant. The basilar artery is widely patent. The basilar artery  terminates in the left posterior cerebral artery with the right  posterior cerebral artery emanating from the anterior circulation via  suspected persistent fetal origin.  2. Both intracranial ICAs are patent to the terminus. Potential disease  involving the cavernous and supraclinoid segment of the ICAs cannot be  fully evaluated. The anterior and middle cerebral arteries are grossly  unremarkable. I do not see any definite aneurysm.  3. If further assessment of the [place] is felt warranted  follow-up with CT angiography would be the study of choice for further  evaluation. That is a rapid acquisition and will not be as affected by  potential motion.     This report was signed and finalized on 1/14/2024 3:02 PM by Dr. Romeo Arizmendi MD.       MRI Brain With & Without Contrast [[id number]] Collected: 01/14/24 1438     Updated: 01/14/24 1454    Narrative:      MRI BRAIN W WO CONTRAST- 1/14/2024 10:55 AM     HISTORY: Stroke, follow up; I82.619-Acute embolism and thrombosis of  superficial veins of unspecified upper  extremity; N30.90-Cystitis,  unspecified without hematuria; W19.XXXA-Unspecified fall, initial  encounter; R53.1-Weakness; Z74.09-Other reduced mobility     COMPARISON: None.       TECHNIQUE: Multiplanar imaging of the brain was performed in a routine  fashion before and after the intravenous injection of gadolinium  contrast. There is significant motion artifact on today's exam.     FINDINGS:  Diffusion: There is diffusion restriction within the mesial left  temporal lobe,, left external capsule, left insular cortex as well as  within the body of the caudate nucleus with associated ADC mapping  abnormalities consistent with acute ischemic infarction. These infarcts  are nonhemorrhagic. There is no associated mass effect. No additional  sites of diffusion restriction are present.     Midline structures: Nondisplaced.     Ventricles: Normal in configuration and symmetric in size.     Masses: No masses or mass effect.     Basilar cisterns: Maintained.     Extra axial space: No abnormal extra-axial fluid.     Gray-white matter signal: There are additional scattered foci of T2  abnormality involving the periventricular and higher white matter tract  suggesting small vessel ischemic disease.     Cerebellum: Normal.     Brainstem: Normal.     Enhancement: No abnormal enhancement.     Other: Proximal cervical spinal cord is normal. Bilateral globes and  orbits are normal in appearance. Normal cerebrovascular flow voids  noted. No abnormal signal in the mastoid air cells or paranasal sinuses.       Impression:      1. Diffusion restriction with associated ADC mapping abnormality  involving the mesial left temporal lobe, left external capsule, left  insula as well as the body of the caudate nucleus. These infarcts are  nonhemorrhagic.  2. Mild atrophy. Mild small vessel ischemic changes are noted involving  the periventricular and higher white matter tracts.  3. Normal flow voids within the [place].  4. Today's  study is degraded by motion. No abnormal contrast enhancement  is demonstrated.           This report was signed and finalized on 1/14/2024 2:50 PM by Dr. Romeo Arizmendi MD.       US Venous Doppler Upper Extremity Right (duplex) [[id number]] Collected: 01/14/24 1113     Updated: 01/14/24 1117    Narrative:      History: Pain and swelling       Impression:      Impression:  1. There is no evidence of deep venous thrombosis of the right upper  extremity.  2. There is evidence of superficial thrombus in the cephalic vein.     Comments: Right upper extremity venous duplex exam was performed using  color Doppler flow, Doppler wave form analysis, and grayscale imaging,  with and without compression. There is no evidence of deep venous  thrombosis of the internal jugular, subclavian, axillary, brachial,  radial, and ulnar veins. There is evidence of superficial thrombus  involving the cephalic vein.        This report was signed and finalized on 1/14/2024 11:14 AM by Dr. Joseph Mari MD.               Result Review:  I have personally reviewed the results from the time of this admission to 1/16/2024 14:58 CST and agree with these findings:  [x]  Laboratory list / accordion  []  Microbiology  [x]  Radiology  []  EKG/Telemetry   []  Cardiology/Vascular   []  Pathology  [x]  Old records  []  Other:  Most notable findings include: No new findings.      Assessment & Plan       Acute focal neurological deficit    DVT (deep venous thrombosis)    Vaginal bleeding    Type 2 diabetes mellitus, without long-term current use of insulin    Metastatic disease    Acute blood loss anemia    CVA (cerebral vascular accident)      Patient has iron deficiency anemia.  She has had severe dysfunctional uterine bleeding and this is the most likely cause of her iron deficiency.  She gives no GI symptoms and her stool is Hemoccult negative.  I do not recommend any GI evaluation at this time.  It should be noted that colonoscopies under the  situations are high risk procedures in the presence of abdominal carcinomatosis.    I discussed the patients findings and my recommendations with patient    Elias Krishna MD  01/16/24  14:58 CST    DISCLAIMER:    This physician works through a [employer] as an inpatient consultant gastroenterologist only and has no outpatient clinic for patient follow up.  Any results not available at time of inpatient discharge and/or GI clinic follow up should be managed by the hospitalist team, PCP, or outpatient gastroenterologist.

## 2024-01-16 NOTE — THERAPY TREATMENT NOTE
Acute Care - Physical Therapy Treatment Note  [facility]     Patient Name: Radha Wade  : 1960  MRN: [id number]  Today's Date: 2024      Visit Dx:     ICD-10-CM ICD-9-CM   1. 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 Cystitis  N30.90 595.9   3. Fall, initial encounter  W19.XXXA E888.9   4. Weakness  R53.1 780.79   5. Impaired mobility [Z74.09]  Z74.09 799.89   6. Decreased activities of daily living (ADL) [Z78.9]  Z78.9 V49.89     Patient Active Problem List   Diagnosis    DVT (deep venous thrombosis)    Vaginal bleeding    History of asthma    Hyperglycemia    Abnormal CT of the abdomen    Acute focal neurological deficit    Type 2 diabetes mellitus, without long-term current use of insulin    Metastatic disease    Acute blood loss anemia    CVA (cerebral vascular accident)     Past Medical History:   Diagnosis Date    Asthma     Bronchitis     Depression     Diabetes mellitus     DVT (deep venous thrombosis)     Heart murmur     Lyme disease     Mitral valve prolapse      Past Surgical History:   Procedure Laterality Date     SECTION       PT Assessment (last 12 hours)       PT Evaluation and Treatment       Row Name 24 1035          Physical Therapy Time and Intention    Subjective Information complains of;weakness  -KJ     Document Type therapy note (daily note)  -KJ     Mode of Treatment physical therapy  -KJ     Patient Effort good  -KJ     Comment R side weakness  -KJ       Row Name 24 1035          General Information    Existing Precautions/Restrictions fall  -KJ       Row Name 24 1035          Pain    Pretreatment Pain Rating 0/10 - no pain  -KJ     Posttreatment Pain Rating 0/10 - no pain  -KJ       Row Name 24 1035          Bed Mobility    Supine-Sit [place] (Bed Mobility) contact guard;verbal cues  -KJ     Sit-Supine [place] (Bed Mobility) minimum assist (75% patient effort);contact guard;verbal cues  -KJ       Row Name 24 1035           Sit-Stand Transfer    Sit-Stand [place] (Transfers) contact guard  -KJ     Assistive Device (Sit-Stand Transfers) walker, 4-wheeled  -KJ       Row Name 01/16/24 1035          Stand-Sit Transfer    Stand-Sit [place] (Transfers) contact guard;verbal cues  -KJ       Row Name 01/16/24 1035          Gait/Stairs (Locomotion)    [place] Level (Gait) verbal cues;contact guard;minimum assist (75% patient effort)  -KJ     Assistive Device (Gait) walker, front-wheeled  -KJ     Distance in Feet (Gait) 35' x 2  -KJ     Deviations/Abnormal Patterns (Gait) gait speed decreased;stride length decreased  -KJ     Right Sided Gait Deviations forward flexed posture;heel strike decreased;weight shift ability decreased;foot drop/toe drag  -KJ       Row Name 01/16/24 1035          Motor Skills    Comments, Therapeutic Exercise AROM x 15 reps  -KJ       Row Name             Wound 01/15/24 1233 sacral spine    Wound - Properties Group Placement Date: 01/15/24  -MD Placement Time: 1233  -MD Location: sacral spine  -MD    Retired Wound - Properties Group Placement Date: 01/15/24  -MD Placement Time: 1233  -MD Location: sacral spine  -MD    Retired Wound - Properties Group Date first assessed: 01/15/24  -MD Time first assessed: 1233  -MD Location: sacral spine  -MD      Row Name 01/16/24 1035          Positioning and Restraints    Pre-Treatment Position in bed  -KJ     Post Treatment Position bed  -KJ     In Bed call light within reach  -KJ               User Key  (r) = Recorded By, (t) = Taken By, (c) = Cosigned By      Initials Name Provider Type    Analy Cristina, PTA Physical Therapist Assistant    Brenda Cadena RN Registered Nurse                    Physical Therapy Education       Title: PT OT SLP Therapies (Done)       Topic: Physical Therapy (Done)       Point: Mobility training (Done)       Learning Progress Summary             Patient Acceptance, E, VU by BRENDA at 1/16/2024 0022    Acceptance, E, VU,QUETA by EAGLE at  1/13/2024 1320    Comment: benefits of activity, progression of PT                         Point: Home exercise program (Done)       Learning Progress Summary             Patient Acceptance, E, VU by  at 1/16/2024 0022                         Point: Body mechanics (Done)       Learning Progress Summary             Patient Acceptance, E, VU by  at 1/16/2024 0022                         Point: Precautions (Done)       Learning Progress Summary             Patient Acceptance, E, VU by  at 1/16/2024 0022                                         User Key       Initials Effective Dates Name Provider Type Discipline    EAGLE 02/03/23 -  Darrell Rosario, PT DPT Physical Therapist PT     04/25/23 -  Anthony Rowan, RN Registered Nurse Nurse                  PT Recommendation and Plan     Plan of Care Reviewed With: patient  Progress: improving  Outcome Evaluation: PT tx completed. Pt c/o R side weakness, and feeling overwhelmed with info today. She is clearly focused on the edema in her legs. Bed mobility CG/Juliocesar, sit<>stand CG, amb short distance utilizing r wx.  She has RLE weakness with decreased foot clearance. Cues for compensatory movement/technique to clear foot during swing phase. Recommend rehab.   Outcome Measures       Row Name 01/16/24 1100 01/15/24 1500 01/15/24 1100       How much help from another person do you currently need...    Turning from your back to your side while in flat bed without using bedrails? 4  -KJ -- 3  -KJ    Moving from lying on back to sitting on the side of a flat bed without bedrails? 4  -KJ -- 3  -KJ    Moving to and from a bed to a chair (including a wheelchair)? 3  -KJ -- 3  -KJ    Standing up from a chair using your arms (e.g., wheelchair, bedside chair)? 3  -KJ -- 3  -KJ    Climbing 3-5 steps with a railing? 3  -KJ -- 3  -KJ    To walk in hospital room? 3  -KJ -- 3  -KJ    AM-PAC 6 Clicks Score (PT) 20  -KJ -- 18  -KJ    Highest Level of Mobility Goal 6 --> Walk 10 steps or more   -KJ -- 6 --> Walk 10 steps or more  -KJ       How much help from another is currently needed...    Putting on and taking off regular lower body clothing? -- 3  -TS --    Bathing (including washing, rinsing, and drying) -- 3  -TS --    Toileting (which includes using toilet bed pan or urinal) -- 3  -TS --    Putting on and taking off regular upper body clothing -- 3  -TS --    Taking care of personal grooming (such as brushing teeth) -- 3  -TS --    Eating meals -- 4  -TS --    AM-PAC 6 Clicks Score (OT) -- 19  -TS --       Functional Assessment    Outcome Measure Options AM-PAC 6 Clicks Basic Mobility (PT)  -KJ -- AM-PAC 6 Clicks Basic Mobility (PT)  -KJ              User Key  (r) = Recorded By, (t) = Taken By, (c) = Cosigned By      Initials Name Provider Type    Analy Cristina PTA Physical Therapist Assistant    Delilah Bowman COTA Occupational Therapist Assistant                     Time Calculation:    PT Charges       Row Name 01/16/24 1109             Time Calculation    Start Time 1035  -KJ      Stop Time 1105  -KJ      Time Calculation (min) 30 min  -KJ      PT Received On 01/16/24  -KJ      PT Goal Re-Cert Due Date 01/23/24  -KJ         Time Calculation- PT    Total Timed Code Minutes- PT 30 minute(s)  -KJ                User Key  (r) = Recorded By, (t) = Taken By, (c) = Cosigned By      Initials Name Provider Type    Analy Cristina PTA Physical Therapist Assistant                  Therapy Charges for Today       Code Description Service Date Service Provider Modifiers Qty    72808605357 HC GAIT TRAINING EA 15 MIN 1/15/2024 Analy Perez, PTA GP 1    50154978832 HC PT THER PROC EA 15 MIN 1/15/2024 Analy Perez, PTA GP 1    78229104270 HC GAIT TRAINING EA 15 MIN 1/16/2024 Analy Perez, PTA GP 1    66386217761 HC PT THER PROC EA 15 MIN 1/16/2024 Analy Perez, PTA GP 1            PT G-Codes  Outcome Measure Options: AM-PAC 6 Clicks Basic Mobility (PT)  AM-PAC 6 Clicks Score (PT):  20  AM-PAC 6 Clicks Score (OT): 19  Modified [place] Scale: 4 - Moderately severe disability.  Unable to walk without assistance, and unable to attend to own bodily needs without assistance.    Analy Perez, PTA  1/16/2024

## 2024-01-16 NOTE — PROGRESS NOTES
HEMATOLOGY AND MEDICAL ONCOLOGY PROGRESS NOTE    Patient name: Radha Wade  Patient : 1960  VISIT # [id number]  MR #[id number]  Room:  327    SUBJECTIVE: Lying in bed, complains of abdominal cramping and lower back pain.  Complained of feeling like her bladder is not emptying and having significant pressure, discussed that this is most likely an effect of the uterus compressing on the bladder.  Urinalysis on 2024 reported 2+ bacteria and she received Rocephin.  Requested a  Tylenol and cranberry juice, spoke with MARCELO Sanford.      INTERVAL HISTORY:    Radha Wade is a 63-year-old  female whom I have been evaluating over the last couple months referred to me regarding a suspected potential malignant process.  Radha has been to some degree in denial and has failed to follow-up or keep appointments in an attempt for an aggressive and expedited workup.  Despite that, the biggest part of her workup has been completed.  She was scheduled to see Dr. Frank Miles in evaluation to obtain tissue diagnosis on 2024.  Unfortunately, Ms. Wade was evaluated in the ED on 2022 and admitted to [facility] with a left-sided CVA and right-sided arm and leg weakness.  She has been under evaluation over the weekend for this and is anticipating discharge to rehab.  I was consulted and continuity of care.        DETAILED TUMOR HISTORY COPIED FROM MY OFFICE RECORDS     TARGET POTENTIAL MALIGNANT SITES  Enlarged uterus with abnormal vaginal bleeding, 45 pound weight loss in 3 to 6 months  Omental infiltration and nodularity progressing in 3 months  2.9 x 1.9 cm nodule in the anterior abdomen and adjacent and posterior to the umbilicus   2.7 cm left pelvic lymph node, previously 2.4 cm,   3 cm right external iliac lymph node, previously 2.1 cm   1.6 cm sclerotic bone lesion in the S1 vertebral body on CT 2023   CA 19-9 of 355  and  of 455 on 2023         TUMOR HISTORY  Radha was seen  in initial medical oncological consultation on 11/9/2023, referred by Dr Myron Vines, PCP, for suspected malignant process.     Despite her age of 63, Radha continued to have regular menstrual cycles until she fell on 7/19/2023.   In evaluation she was also found to have a tick.  She reports that an attempt was made to remove it but it was not able to be removed completely.   Since July 2023, Keiths menses began appearing every 2 weeks rather than monthly.  Dr. De Los Santos, her gynecologist referred her to the ED on 9/14/2023 due to bilateral lower extremity swelling. Noninvasive studies documented bilateral lower extremity DVTs on 9/14/2023.  She has been on Eliquis since that time.    Keiths menstrual bleeding is reported to have been continuous since being placed on Eliquis.  She continues to have leg pain, low back pain, feeling washed out and reports having had 45 pounds of weight loss in the previous 2 to 3 months.     Radha was evaluated at [facility] ER on 9/14/2023 for left leg swelling and abdominal pain.  Workup revealed the following:  bilateral LE DVTs (patient refused CTA.  Lovenox was initiated and discharged on Eliquis)   Retroperitoneal lymphadenopathy, soft tissue nodularity and stranding of omentum on CT Abdomen (Dr De Los Santos consulted, to f/u with D&C and ablation)  Reported 25 lb weight loss (in September 2023) over the previous few months.         US VENOUS DOPPLER LOWER EXTREMITIES BILATERAL on 9/14/23 at [facility]  There is evidence of deep venous thrombosis in the popliteal and posterior tibial veins in the right lower extremity.   There is also evidence of deep venous thrombosis in the superficial femoral, popliteal, posterior tibial, and peroneal veins of the left lower extremity     CT ABDOMEN PELVIS WO at [facility] on 9/14/23  Somewhat bulky retroperitoneal lymphadenopathy and pelvic lymphadenopathy worrisome for metastatic lymphadenopathy or lymphoma.   There is also some stranding and nodularity in  the greater omental region anteriorly suggesting possible peritoneal metastatic disease.   The uterus is prominent in size measuring 11.7 x 6.9 x 8.7 cm but a focal mass is not seen. Follow-up nonemergent ultrasound is recommended to evaluate the uterus for a potential mass. There is no other obvious area of primary neoplasm on the noncontrast CT images.   A vague 1.6 cm sclerotic bone lesion in the S1 vertebral body is indeterminate.        On 10/25/2023, she was seen by her PCP, Dr. Myron Vines.  Because of the history of the tick bite and rash, he requested a Borrelia Burgdorferi (Lyme disease) antibody which was positive on 10/25/2023.  Dr. Vines prescribed doxycycline 100 mg BID on 10/30/2023 which according to the patient was started somewhere around the first week of November to be taken for 1 month.        Serology collected 11/9/2023   CBC- WBC of 8.34. Hgb is 9.7 with an MCV of 74.9 and platelets 639,000.  CMP-Na+ 136, Creat 0.5, AlkPhos 149  LDH-223  B2M- 3.8  Kappa- 86.08  Lambda- 77.12  K/L ratio- 1.12  IgG 1400 ,IgA 572, IgM 73  M-spike- not applicable  Ferritin-474.6  Iron level-25  Iron Saturation-11%  TIBC-231    Folic acid-2.6  TSH-3.41   CEA-1.7  CA 19-9-355  -455     Serology collected 12/21/2023  Ferritin     443.6  Iron            19  Iron sat%     9         343  CA 19-9     240  CEA           1.7  B2M           3.9  T protein    6.8  Kappa light chain      92.43  Lambda light chain   74.51  K/L ratio                     1.24  M protein                   negative  IgG                            1398  IgA                               542  IgM                                62           Prothrombotic panel 11/9/2023  Factor V Leiden - negative  Homocysteine - 11  Lupus Anticoag - not detected  Protein C -138  Protein S - 85  Prothrombotic panel 12/21/2023  Lupus anticoagulant                                  not detected  Anticardiolipin Ab IgG                                <10  Anticardiolipin Ab IgM                               <10  Prothrombin gene mutation [id number]      Negative  MTHFR mutation C677T                          Negative  MTHFR mutation [id number]                       Heterozygous     GUADALUPE 2 and reflex panels                           PENDING     CT SOFT TISSUE NECK WO CONTRAST on 12/5/2023 at [facility]  No suspicious cervical adenopathy.  9 mm right upper lobe pulmonary nodule.   Moderate cervical spine osteoarthritis change      CT CHEST WO CONTRAST DIAGNOSTIC on 12/5/2023 at [facility]  Scattered bilateral pulmonary nodules   8 mm RUL pulmonary nodule  6 mm RUL pulmonary nodule  no mediastinal or hilar lymphadenopathy   No acute or suspicious bony finding.   partially visualized confluent retroperitoneal adenopathy.   Dome of the liver with a 10 mm hypodensity, incompletely characterized   on this exam         CT ABDOMEN PELVIS WO CONTRAST on 12/5/2023 at [facility], compared to 9/14/23  A vague, poorly defined, low-density nodule located posteriorly and superiorly in the dome of the liver was noted in the previous study with no change in size or shape in the interval   Moderate lobulation of the unenhanced kidneys bilaterally noted. An isodense mass or lesion may not be evaluated or excluded. There is moderate right hydronephrosis. There is moderate dilatation of the right proximal ureter which is completely encased by the lower abdomen/proximal pelvis by the enlarged lymph nodes. This was not noted in the previous study   Moderate diffuse enlargement of the uterus is seen which is anteverted   compressing on the urinary bladder. This is similar to the previous study   significant abdominal and pelvic lymphadenopathy. The lymph nodes are poorly identified and not separate well from each other. There is significant surrounding retroperitoneal fat infiltration  Some visualized and separable lymph nodes:  2.7 cm left pelvic lymph node, previously 2.4 cm  3 cm right external iliac lymph node,  previously 2.1 cm   Omental infiltration and nodularity which has significantly more progressed since the previous study   2.9 x 1.9 cm nodule in the anterior abdomen and adjacent and posterior to the umbilicus   vague sclerotic changes involving the anterior superior vertebral S1 is similar to the previous study with no change in the interval         Given her history with weight loss, vaginal bleeding, elevated tumor markers, imaging abnormalities with the uterus, the 2.9 x 1.9 cm nodule in the anterior abdomen suspicious for carcinomatosis, abdominal and pelvic lymphadenopathy, prothrombotic state with DVTs and subcentimeter pulmonary nodules although indeterminate, in this setting is very concerning for an active malignant process and suspicious for a GYN origin.     Completion of workup however will also include a bone scan that was ordered at last visit and not done, GI evaluation to rule out the GI tract as a potential source in addition to the other recommendations below.                    RECOMMENDATIONS on 12/21/2023  Bone scan  PET scan   C-scope - Appt with Bertha Parrish [facility] scheduled 1/3/2024, malignancy process with iron deficiency anemia  GUADALUPE 2 and reflex panels as well as the rest of the prothrombotic panel not done at a previous visit at her request and was redrawn on 12/21/2023  Pelvic ultrasound to evaluate uterus and adnexa  Consult Dr. Frank Miles GYNOnc to evaluate the The uterus which is prominent in size measuring 11.7 x 6.9 x 8.7 cm but without visualization a focal mass. Follow-up nonemergent ultrasound is recommended to evaluate the uterus for a potential mass on CT abdomen 9/14/2023  Follow-up in 2 weeks        NM PET/CT SKULL BASE TO MID THIGH on 12/26/23 at [facility], compared to 12/5/23  13 mm hypermetabolic left supraclavicular lymph node, max SUV 3.3 .  9 mm hypermetabolic right upper lobe pulmonary nodule with max SUV 2.8.   6 mm right lower lobe pulmonary nodule with mild  hypermetabolic activity   Few other smaller pulmonary nodules are also present with have increased metabolic activity for small nodule size   No hypermetabolic axillary, mediastinal, or hilar lymph nodes   7 mm hypermetabolic lymph node in the epicardial fat.   Multiple hypermetabolic liver lesions identified. For reference, 11 mm hypermetabolic right posterior liver lesion with max SUV 5.2   2.7 x 1.9 cm omental/peritoneal hypermetabolic mass in the anterior abdomen with max SUV 8.3   Diffuse omental and peritoneal nodularity and fat stranding which is likely related to carcinomatosis.   Multiple enlarged hypermetabolic retroperitoneal lymph nodes throughout the abdomen and pelvis surrounding the abdominal aorta and pelvic arterial vasculature. For reference, 1.6 cm left para-aortic lymph node with max SUV 7.5   3 cm hypermetabolic right upper pelvic mass on image 61 with max SUV 8.9.  Hypermetabolic activity is present throughout the enlarged cervix with extension into the endometrial canal which is diffusely hypermetabolic. Max SUV is 14   ADDENDUM:   Described in the body of the report but not the impression, there is mild right-sided hydronephrosis which is like related to ureteral compression by right pelvic lymphadenopathy.  There is also subcutaneous fat stranding in the left thigh which is likely related to venous compression by pelvic lymphadenopathy. However, venous thrombus could also present with this finding (not visualized on  this study but exam is performed without IV contrast)         US PELVIS COMPLETE- 12/26/2023  at [facility], compared to PET/CT on the same day. CT abdomen and pelvis 12/5/2023.   The uterus is enlarged and heterogeneous, measuring 13.6 x 6.1 x 8.1 cm   6.2 x 6.6 x 3.8 cm mass within the endometrial cavity   6.9 x 4.5 x 5.1 cm hypoechoic mass at the lower uterine segment, likely involving the cervix  18 mm solid-appearing nodule in the right ovary (Right ovary measures 3.1 x 2.8 x 2.7  cm )  The left ovary appears within normal limits and measures 2.9 x 2.1 x 2.2 cm.      Columba Coulter, RN spoke with Radha on 12/29/2023 who reported that she received a message from the office of Dr Miles GYN-ONC in [place], but had not returned call yet ot make appointment.  Gave instructions to promptly return call to get appointment for evaluation as soon as possible.  Explained results of imaging and Dr Grullon's recommendation to follow through with ordered referral (order placed 12/21/23) for GYN ONC evaluation.     Radha states that she will call office Tuesday morning and will communicate with this clinic with date of appointment with Dr Miles so that f/u with Dr Grullon can be scheduled accordingly (following Dr Miles's evaluation).   Radha verbalized understanding, then returned to call stating that she had an appointment at Dr Miles's office on 1/9/2024.       Request to [facility] radiology Dept was made for images (CT Neck,Chest, Abd& Pelvis on 12/5/23 and PET, US pelvis on 12/26/23) to be forwarded to Dr Miles at [place].      Radha canceled her GI appointment with GREYSON Mccord for colonoscopy evaluation on 1/3/2023   Radha canceled her GYN/Onc appointment with Dr Miles on 1/9/2023 and states it has been rescheduled for 1/16/2023  Several attempts were made to schedule parenteral iron infusions in our office but she declined to schedule these.       OBJECTIVE:      Vitals:    01/16/24 0304   BP: 130/53   Pulse: 95   Resp: 18   Temp: 97.8 °F (36.6 °C)   SpO2: 98%       Intake/Output Summary (Last 24 hours) at 1/16/2024 0634  Last data filed at 1/15/2024 0946  Gross per 24 hour   Intake 240 ml   Output --   Net 240 ml     PHYSICAL EXAM:    CONSTITUTIONAL: Alert, appropriate, no acute distress  EYES: Nonicteric, EOM intact, pupils equal round   ENT: Mucous membranes moist, no oropharyngeal lesions   NECK: Supple, no masses   CHEST/LUNGS: CTA bilaterally, normal respiratory effort  "  CARDIOVASCULAR: RRR, no murmurs  ABDOMEN: soft non-tender, active bowel sounds, no HSM  EXTREMITIES: warm, moves all fours  SKIN: warm, dry with no rashes or lesions  LYMPH: No cervical, clavicular, axillary, or inguinal lymphadenopathy  NEUROLOGIC: Right-sided arm weakness with some improvement although persistent right lower extremity weakness causing difficulty with mobility   PSYCH: mood and affect appropriate     CBC  Results from last 7 days   Lab Units 01/15/24  0515 01/14/24  0359 01/13/24  0602   WBC 10*3/mm3 14.86* 8.82 7.46   HEMOGLOBIN g/dL 7.8* 8.9* 8.9*   HEMATOCRIT % 28.6* 31.8* 34.4   PLATELETS 10*3/mm3 692* 605* 524*         Lab Results   Component Value Date     01/15/2024    K 4.0 01/15/2024     01/15/2024    CO2 26.0 01/15/2024    BUN 16 01/15/2024    CREATININE 0.54 (L) 01/15/2024    GLUCOSE 139 (H) 01/15/2024    CALCIUM 8.9 01/15/2024    BILITOT 0.2 01/15/2024    ALKPHOS 163 (H) 01/15/2024    AST 9 01/15/2024    ALT 7 01/15/2024    AGRATIO 0.8 01/15/2024    GLOB 4.1 01/15/2024       Lab Results   Component Value Date    INR 1.35 (H) 01/13/2024    INR 1.27 (H) 01/12/2024    INR 1.17 (H) 12/18/2023    PROTIME 16.8 (H) 01/13/2024    PROTIME 16.1 (H) 01/12/2024    PROTIME 15.1 (H) 12/18/2023       Cultures:    No results found for: \"BLOODCX\"  No components found for: \"URINCX\"    ASSESSMENT/PLAN:  #1  Disseminated abdominal malignancy, 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  Radha has been to some degree in denial and has failed to follow-up or keep appointments in an attempt for an aggressive and expedited workup.  Despite that, the biggest part of her workup has been completed.  She was scheduled to see Dr. Frank Miles in evaluation to obtain tissue diagnosis on 1/16/2024.    TARGET POTENTIAL MALIGNANT SITES  Enlarged uterus " with abnormal vaginal bleeding, 45 pound weight loss in 3 to 6 months  6.2 x 6.6 x 3.8 cm mass within the endometrial cavity    Omental infiltration and nodularity progressing in 3 months  2.9 x 1.9 cm nodule in the anterior abdomen and adjacent and posterior to the umbilicus   2.7 cm left pelvic lymph node, previously 2.4 cm,   3 cm right external iliac lymph node, previously 2.1 cm   1.6 cm sclerotic bone lesion in the S1 vertebral body on CT 9/14/2023    CA 19-9- 355  -  455     Recommendations are as follows:  After tissue diagnosis and all interventions are complete, given the fact that she was noncompliant with a rigorous schedule of Eliquis adding to the current prothrombotic conditions, a repeat trial of Eliquis could be considered.  Parenteral iron infusions while hospitalized to try and get repletion ongoing  Consult GI for evaluation as above outlined  Consult Dr. De Los Santos in continuity of care, vaginal bleeding etc.  Reschedule appointments with Dr. Frank Miles  Reschedule outpatient appointment with Dr. Grullon  Repeat urinalysis today, 1/16/2024      #2  Acute ischemic stroke  Neurology recommended anticoagulation with heparin drip and then transition to Lovenox    Unfortunately, Ms. Wade was evaluated in the ED on 1/12/2022 and admitted to [facility] with a left-sided CVA and right-sided arm and leg weakness.  She has been under evaluation over the weekend for this and is anticipating discharge to rehab.  I was consulted and continuity of care.    Prothrombotic panel 11/9/2023  Factor V Leiden - negative  Homocysteine - 11  Lupus Anticoag - not detected  Protein C -138  Protein S - 85  Prothrombotic panel 12/21/2023  Lupus anticoagulant                                  not detected  Anticardiolipin Ab IgG                               <10  Anticardiolipin Ab IgM                               <10  Prothrombin gene mutation [id number]      Negative  MTHFR mutation C677T                           Negative  MTHFR mutation [id number]                       Heterozygous  GUADALUPE 2 and reflex panels                           PENDING    MRI of the brain with and without contrast on 1/14/2023  Diffusion restriction with associated ADC mapping abnormality involving the mesial left temporal lobe, left external capsule, left insula as well as the body of the caudate nucleus. These infarcts are nonhemorrhagic.  Mild atrophy. Mild small vessel ischemic changes are noted involving the periventricular and higher white matter tracts.  Normal flow voids within the [facility].        MRA of the head and neck without contrast on 1/14/2023  Carotid arteries are unremarkable. The carotid bifurcations are normal in appearance without evidence of rate limiting stenosis with both ICAs patent to the skull base  Right vertebral artery is widely patent from its origin to the skull base. The left vertebral artery is rather diminutive in size and is not well-defined in its proximal segment.     EMMA 1/15/2024:  Left ventricular systolic function is normal. Left ventricular ejection fraction appears to be 56 - 60%.  Normal right ventricular cavity size and systolic function noted.  No evidence of a patent foramen ovale.  No significant valvular abnormalities identified on this study.      Recommendations are as follows:  Anticoagulate with Lovenox after heparin is discontinued, at least until definitive tissue diagnosis is obtained.  After tissue diagnosis and all interventions are complete, given the fact that she was noncompliant with a rigorous schedule of Eliquis adding to the current prothrombotic conditions, a repeat trial of Eliquis could be considered.    Disposition: Possible discharge [facility] 8th floor Rehab for additional rehabilitation, awaiting insurance approval    #3  Microcytic iron anemia     Latest Reference Range & Units 01/13/24 06:02   Iron 37 - 145 mcg/dL 17 (L)   Ferritin 13.00 - 150.00 ng/mL 440.90 (H)   Iron  Saturation (TSAT) 20 - 50 % 8 (L)   Transferrin 200 - 360 mg/dL 145 (L)   TIBC 298 - 536 mcg/dL 216 (L)       Results from last 7 days   Lab Units 01/15/24  0515 01/14/24  0359 01/13/24  0602   WBC 10*3/mm3 14.86* 8.82 7.46   HEMOGLOBIN g/dL 7.8* 8.9* 8.9*   HEMATOCRIT % 28.6* 31.8* 34.4   PLATELETS 10*3/mm3 692* 605* 524*         Lab Results   Component Value Date    WBC 14.86 (H) 01/15/2024    HGB 7.8 (L) 01/15/2024    HCT 28.6 (L) 01/15/2024    MCV 73.7 (L) 01/15/2024     (H) 01/15/2024     Ferrlecit 250 mg IV x4 initiated on 1/15/2024. Day 2 of 4 today      Recommendations are as follows:  Parenteral iron infusions while hospitalized to try and get repletion ongoing  Consult GI for evaluation as above outlined  CBC daily, pending    Discussed plan of care with MARCELO Callahan APRN    01/16/24  06:34 CST      Physicians attestation and contribution:    I, John Grullon, personally and independently performed an evaluation on Radha Wade  I have reviewed relevant medical information/data to include but not limited to the medication list, relevant appropriate lab work and imaging when applicable.  I reviewed other physician's notes, ancillary services and nurses assessments.  I have reviewed the above documentation completed by Francisca RUBI  Please see my additional addended and/or modified contributions to the history of present illness, physical examination and assessment/medical decision-making and plan that reflects my findings and impressions.  I discussed the essential elements of the care plan with Francisca RUBI and the patient.  I have encouraged and answered all the questions raised to the patient's understanding and satisfaction.  I concur with the above stated.    Subjective: I saw and examined Radha Wade this morning.  She still continues to have lower abdominal discomfort, significant swelling of bilateral lower extremities.   The expressive aphasia that she  states was worse at presentation is resolving although she states she still has a bit of difficulty finding words.    Objective:  Mental status appears to be intact with a mild degree of expressive difficulty.  It appears as if she is able to communicate quite clearly with me however.  Neurologically she still has some coordination issues with her right hand and arm and right leg but they are significantly improved compared to presentation.  Lungs are clear heart is regular normal S1 and S2 abdomen continues to be protuberant with abdominal discomfort in the lower abdomen.  3-4+ edema of the lower extremities is present.      Assessment/plan:  Recommendations as outlined in my consult yesterday are as follows:  EMMA results are virtually normal with no obvious evidence of source of embolic phenomenon  Anticoagulate with Lovenox after heparin is discontinued, at least until definitive tissue diagnosis is obtained.  After tissue diagnosis and all interventions are complete, given the fact that she was noncompliant with a rigorous schedule of Eliquis adding to the current prothrombotic conditions, a repeat trial of Eliquis could be considered.  Parenteral iron infusions while hospitalized ongoing, day #2 today  Consult GI for evaluation as above outlined  Consult Dr. De Los Santos in continuity of care, vaginal bleeding etc.  Reschedule appointments with Dr. Frank Miles  [facility]  Reschedule outpatient appointment with me, I will see in a couple weeks to continue close monitoring        John Grullon MD  1/16/2024 07:41 CST

## 2024-01-16 NOTE — PLAN OF CARE
Goal Outcome Evaluation:  Plan of Care Reviewed With: patient        Progress: improving  Outcome Evaluation: PT tx completed. Pt c/o R side weakness, and feeling overwhelmed with info today. She is clearly focused on the edema in her legs. Bed mobility CG/Juliocesar, sit<>stand CG, amb short distance utilizing r wx.  She has RLE weakness with decreased foot clearance. Cues for compensatory movement/technique to clear foot during swing phase. Recommend rehab.

## 2024-01-16 NOTE — H&P (VIEW-ONLY)
[place]   Radha Wade  : 1960  MRN: [id number]  CSN: [id number]    History and Physical// obgyn consult    Subjective   Radha Wade is a 63 y.o. No obstetric history on file. Admitted with anemia  and metastatic disease undetermined origen.  Pt had abnormal  pap in 2019. I met pt ,   at that time pt had dvt and hx of bleeding with abnormal sonogram suggestive of endometrial cancer.  In 2023 pt declined  d&c.   Pt denies  htn , reports diabetes , hx of dvt, no liver or kidney problems known    Past Medical History:   Diagnosis Date    Asthma     Bronchitis     Depression     Diabetes mellitus     DVT (deep venous thrombosis)     Heart murmur     Lyme disease     Mitral valve prolapse      Past Surgical History:   Procedure Laterality Date     SECTION       Social History    Tobacco Use      Smoking status: Never      Smokeless tobacco: Never      Current Facility-Administered Medications:     acetaminophen (TYLENOL) tablet 650 mg, 650 mg, Oral, Q4H PRN, 650 mg at 24 0723 **OR** acetaminophen (TYLENOL) suppository 650 mg, 650 mg, Rectal, Q4H PRN, Daniel Fleming MD    atorvastatin (LIPITOR) tablet 80 mg, 80 mg, Oral, Nightly, Daniel Fleming MD, 80 mg at 01/15/24 2158    bisacodyl (DULCOLAX) suppository 10 mg, 10 mg, Rectal, Daily PRN, Frank Frankel DO    dextrose (D50W) (25 g/50 mL) IV injection 25 g, 25 g, Intravenous, Q15 Min PRN, Daniel Fleming MD    dextrose (GLUTOSE) oral gel 15 g, 15 g, Oral, Q15 Min PRN, Daniel Fleming MD    docusate sodium (COLACE) capsule 100 mg, 100 mg, Oral, BID, Daniel Fleming MD, 100 mg at 24 08    ferric gluconate (FERRLECIT) 250 mg in sodium chloride 0.9 % 250 mL IVPB, 250 mg, Intravenous, Daily, Francisca Giordano APRN, Last Rate: 125 mL/hr at 24 0808, 250 mg at 24 0808    glucagon (GLUCAGEN) injection 1 mg, 1 mg, Intramuscular, Q15 Min PRN, Daniel Fleming MD     "heparin 47384 units/250 mL (100 units/mL) in 0.45 % NaCl infusion, 12 Units/kg/hr, Intravenous, Titrated, Naldo Telles MD, Last Rate: 15.3 mL/hr at 01/16/24 0514, 19.716 Units/kg/hr at 01/16/24 0514    HYDROcodone-acetaminophen (NORCO) 5-325 MG per tablet 1 tablet, 1 tablet, Oral, Q4H PRN, Daniel Fleming MD    Insulin Lispro (humaLOG) injection 2-7 Units, 2-7 Units, Subcutaneous, 4x Daily AC & at Bedtime, Daniel Fleming MD, 2 Units at 01/15/24 1701    labetalol (NORMODYNE,TRANDATE) injection 10 mg, 10 mg, Intravenous, Q10 Min PRN, Daniel Fleming MD    ondansetron (ZOFRAN) injection 4 mg, 4 mg, Intravenous, Q6H PRN, Daniel Fleming MD    pantoprazole (PROTONIX) EC tablet 40 mg, 40 mg, Oral, Q AM, Daniel Fleming MD, 40 mg at 01/16/24 0515    polyethylene glycol (MIRALAX) packet 17 g, 17 g, Oral, Daily, Frank Frankel DO, 17 g at 01/16/24 0808    [COMPLETED] Insert Peripheral IV, , , Once **AND** sodium chloride 0.9 % flush 10 mL, 10 mL, Intravenous, PRN, Daniel Fleming MD    sodium chloride 0.9 % flush 10 mL, 10 mL, Intravenous, Q12H, Daniel Fleming MD, 10 mL at 01/16/24 0808    sodium chloride 0.9 % flush 10 mL, 10 mL, Intravenous, PRN, Daniel Fleming MD    sodium chloride 0.9 % infusion 40 mL, 40 mL, Intravenous, PRN, Daniel Fleming MD    traMADol (ULTRAM) tablet 25 mg, 25 mg, Oral, Q8H PRN, Daniel Fleming MD    No Known Allergies    Review of Systems      Objective   /45 (BP Location: Left arm, Patient Position: Lying)   Pulse 89   Temp 98.5 °F (36.9 °C) (Oral)   Resp 16   Ht 165.1 cm (65\")   Wt 77.6 kg (171 lb)   LMP  (LMP Unknown)   SpO2 97%   BMI 28.46 kg/m²   General: well developed; well nourished  no acute distress   Heart: regular rate and rhythm, S1, S2 normal, no murmur, click, rub or gallop   Lungs: breathing is unlabored   Abdomen: soft, non-tender; no masses  no umbilical or " inguinal hernias are present  no hepato-splenomegaly   Pelvis:: Not done today     Labs  Lab Results   Component Value Date     (H) 01/16/2024    HGB 8.5 (L) 01/16/2024    HCT 29.6 (L) 01/16/2024    WBC 10.22 01/16/2024     01/15/2024    K 4.0 01/15/2024     01/15/2024    CO2 26.0 01/15/2024    BUN 16 01/15/2024    CREATININE 0.54 (L) 01/15/2024    GLUCOSE 139 (H) 01/15/2024    ALBUMIN 3.1 (L) 01/15/2024    CALCIUM 8.9 01/15/2024    AST 9 01/15/2024    ALT 7 01/15/2024    BILITOT 0.2 01/15/2024        Assessment   Postmenopausal bleeding  Diabetes type  Hx of dvt's  Metastatic disease, un staged     The risks, benefits, and alternatives of the procedure; along with the risks of anesthesia was discussed in full with the patient and all questions were answered.       Plan   I met wit pt again in her rom, I discussed the need for tissue diagnosis and again pt declined intervention. My recommendation is for d&C  to be done under general. No need to stop heparin .  Pt reported need time  to make decision.     Radu De Los Santos MD  1/16/2024  10:51 CST

## 2024-01-16 NOTE — CASE MANAGEMENT/SOCIAL WORK
Continued Stay Note   [place] [facility]     Patient Name: Radha Wade  MRN: [id number]  Today's Date: 1/16/2024    Admit Date: 1/12/2024    Plan: Pending   Discharge Plan       Row Name 01/16/24 0954       Plan    Plan Comments [facility]. Await insurance decision.                   Discharge Codes    No documentation.                 Expected Discharge Date and Time       Expected Discharge Date Expected Discharge Time    Jan 16, 2024               [facility] [place]

## 2024-01-16 NOTE — THERAPY TREATMENT NOTE
Acute Care - Occupational Therapy Treatment Note  [facility]     Patient Name: Radha Wade  : 1960  MRN: [id number]  Today's Date: 2024             Admit Date: 2024       ICD-10-CM ICD-9-CM   1. 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 Cystitis  N30.90 595.9   3. Fall, initial encounter  W19.XXXA E888.9   4. Weakness  R53.1 780.79   5. Impaired mobility [Z74.09]  Z74.09 799.89   6. Decreased activities of daily living (ADL) [Z78.9]  Z78.9 V49.89     Patient Active Problem List   Diagnosis    DVT (deep venous thrombosis)    Vaginal bleeding    History of asthma    Hyperglycemia    Abnormal CT of the abdomen    Acute focal neurological deficit    Type 2 diabetes mellitus, without long-term current use of insulin    Metastatic disease    Acute blood loss anemia    CVA (cerebral vascular accident)     Past Medical History:   Diagnosis Date    Asthma     Bronchitis     Depression     Diabetes mellitus     DVT (deep venous thrombosis)     Heart murmur     Lyme disease     Mitral valve prolapse      Past Surgical History:   Procedure Laterality Date     SECTION           OT ASSESSMENT FLOWSHEET (last 12 hours)       OT Evaluation and Treatment       Row Name 24 1318                   OT Time and Intention    Subjective Information complains of;weakness  swelling in BLEs  -EC        Document Type therapy note (daily note)  -EC        Mode of Treatment occupational therapy  -EC           General Information    Patient Profile Reviewed yes  -EC        Existing Precautions/Restrictions fall  -EC           Pain Assessment    Pretreatment Pain Rating 0/10 - no pain  -EC        Posttreatment Pain Rating 0/10 - no pain  -EC        Pain Intervention(s) Medication (See MAR);Repositioned  -EC           Activities of Daily Living    BADL Assessment/Intervention grooming  -EC           Grooming Assessment/Training    [place] Level (Grooming) oral care regimen;wash face, hands;set  up;supervision  -EC        Position (Grooming) edge of bed sitting  -EC           Bed Mobility    Bed Mobility scooting/bridging;supine-sit;sit-supine  -EC        Scooting/Bridging [place] (Bed Mobility) moderate assist (50% patient effort);2 person assist  -EC        Supine-Sit [place] (Bed Mobility) verbal cues;minimum assist (75% patient effort)  -EC        Sit-Supine [place] (Bed Mobility) verbal cues;minimum assist (75% patient effort)  -EC        Assistive Device (Bed Mobility) bed rails  -EC           Functional Mobility    Functional Mobility- Comment decline fxnal mobility due to stomach pains, just recieved suppository  -EC           Motor Skills    Motor Skills motor control/coordination interventions  -EC        Motor Control/Coordination Interventions occupation/activity based treatment;gross motor coordination activities  -EC           Wound 01/15/24 1233 sacral spine    Wound - Properties Group Placement Date: 01/15/24  -MD Placement Time: 1233  -MD Location: sacral spine  -MD    Retired Wound - Properties Group Placement Date: 01/15/24  -MD Placement Time: 1233  -MD Location: sacral spine  -MD    Retired Wound - Properties Group Date first assessed: 01/15/24  -MD Time first assessed: 1233  -MD Location: sacral spine  -MD       Plan of Care Review    Plan of Care Reviewed With patient  -EC        Progress improving  -EC        Outcome Evaluation OT tx completed. Pt in [facility] talking to doctor upon therapist arrival. Pt hesitant to perform therapy due to stomach cramps & just receiving suppository, agreed to activities sitting EOB. Pt performed sup>sit w Rolo, good sitting balance EOB. She completed various coordination activities with her RUE, min difficulty noted however she seems to have improved since inital eval. simulated feeding tasks with RUE, no spillage noted when scooping applesauce into cup. Pt was able to perform grooming activities with setup & S. Pt is nervous regarding  her overall recovery, educated pt on OT POC & good rehab potential. continue OT POC & rec discharge to inpatient rehab  -EC           Positioning and Restraints    Pre-Treatment Position in bed  -EC        Post Treatment Position bed  -EC        In Bed fowlers;call light within reach;encouraged to call for assist;with family/caregiver;side rails up x2  -EC                  User Key  (r) = Recorded By, (t) = Taken By, (c) = Cosigned By      Initials Name Effective Dates    Brenda Cadena RN 08/14/23 -     EC Ruth Padilla OTR/L 10/13/23 -                      Occupational Therapy Education       Title: PT OT SLP Therapies (Done)       Topic: Occupational Therapy (Done)       Point: ADL training (Done)       Description:   Instruct learner(s) on proper safety adaptation and remediation techniques during self care or transfers.   Instruct in proper use of assistive devices.                  Learning Progress Summary             Patient Acceptance, E, VU by EC at 1/16/2024 1400    Acceptance, E, VU by BRENDA at 1/16/2024 0022    Acceptance, E, VU by LAURIE at 1/13/2024 1529   Family Acceptance, E, VU by LAURIE at 1/13/2024 1529                         Point: Home exercise program (Done)       Description:   Instruct learner(s) on appropriate technique for monitoring, assisting and/or progressing therapeutic exercises/activities.                  Learning Progress Summary             Patient Acceptance, E, VU by EC at 1/16/2024 1400    Acceptance, E, VU by BRENDA at 1/16/2024 0022                         Point: Precautions (Done)       Description:   Instruct learner(s) on prescribed precautions during self-care and functional transfers.                  Learning Progress Summary             Patient Acceptance, E, VU by EC at 1/16/2024 1400    Acceptance, E, VU by BRENDA at 1/16/2024 0022    Acceptance, E, VU by LAURIE at 1/13/2024 1529   Family Acceptance, E, VU by JILDEFONSO at 1/13/2024 1529                         Point: Body mechanics (Done)        Description:   Instruct learner(s) on proper positioning and spine alignment during self-care, functional mobility activities and/or exercises.                  Learning Progress Summary             Patient Acceptance, E, VU by EC at 1/16/2024 1400    Acceptance, E, VU by  at 1/16/2024 0022                                         User Key       Initials Effective Dates Name Provider Type Discipline     07/11/23 -  Columba Patterson, OTR/L, CSRS Occupational Therapist OT     04/25/23 -  Anthony Rowan, RN Registered Nurse Nurse     10/13/23 -  Ruth Padilla, OTR/L Occupational Therapist OT                      OT Recommendation and Plan     Plan of Care Review  Plan of Care Reviewed With: patient  Progress: improving  Outcome Evaluation: OT tx completed. Pt in [facility] talking to doctor upon therapist arrival. Pt hesitant to perform therapy due to stomach cramps & just receiving suppository, agreed to activities sitting EOB. Pt performed sup>sit w Rolo, good sitting balance EOB. She completed various coordination activities with her RUE, min difficulty noted however she seems to have improved since inital eval. simulated feeding tasks with RUE, no spillage noted when scooping applesauce into cup. Pt was able to perform grooming activities with setup & S. Pt is nervous regarding her overall recovery, educated pt on OT POC & good rehab potential. continue OT POC & rec discharge to inpatient rehab  Plan of Care Reviewed With: patient  Outcome Evaluation: OT tx completed. Pt in [facility] talking to doctor upon therapist arrival. Pt hesitant to perform therapy due to stomach cramps & just receiving suppository, agreed to activities sitting EOB. Pt performed sup>sit w Rolo, good sitting balance EOB. She completed various coordination activities with her RUE, min difficulty noted however she seems to have improved since inital eval. simulated feeding tasks with RUE, no spillage noted when scooping applesauce into  cup. Pt was able to perform grooming activities with setup & S. Pt is nervous regarding her overall recovery, educated pt on OT POC & good rehab potential. continue OT POC & rec discharge to inpatient rehab     Outcome Measures       Row Name 01/16/24 1400 01/16/24 1100 01/15/24 1500       How much help from another person do you currently need...    Turning from your back to your side while in flat bed without using bedrails? -- 4  -KJ --    Moving from lying on back to sitting on the side of a flat bed without bedrails? -- 4  -KJ --    Moving to and from a bed to a chair (including a wheelchair)? -- 3  -KJ --    Standing up from a chair using your arms (e.g., wheelchair, bedside chair)? -- 3  -KJ --    Climbing 3-5 steps with a railing? -- 3  -KJ --    To walk in hospital room? -- 3  -KJ --    AM-PAC 6 Clicks Score (PT) -- 20  -KJ --    Highest Level of Mobility Goal -- 6 --> Walk 10 steps or more  -KJ --       How much help from another is currently needed...    Putting on and taking off regular lower body clothing? 3  -EC -- 3  -TS    Bathing (including washing, rinsing, and drying) 3  -EC -- 3  -TS    Toileting (which includes using toilet bed pan or urinal) 3  -EC -- 3  -TS    Putting on and taking off regular upper body clothing 3  -EC -- 3  -TS    Taking care of personal grooming (such as brushing teeth) 3  -EC -- 3  -TS    Eating meals 4  -EC -- 4  -TS    AM-PAC 6 Clicks Score (OT) 19  -EC -- 19  -TS       Functional Assessment    Outcome Measure Options AM-PAC 6 Clicks Daily Activity (OT)  -EC AM-PAC 6 Clicks Basic Mobility (PT)  -KJ --      Row Name 01/15/24 1100             How much help from another person do you currently need...    Turning from your back to your side while in flat bed without using bedrails? 3  -KJ      Moving from lying on back to sitting on the side of a flat bed without bedrails? 3  -KJ      Moving to and from a bed to a chair (including a wheelchair)? 3  -KJ      Standing up from  a chair using your arms (e.g., wheelchair, bedside chair)? 3  -KJ      Climbing 3-5 steps with a railing? 3  -KJ      To walk in hospital room? 3  -KJ      AM-PAC 6 Clicks Score (PT) 18  -KJ      Highest Level of Mobility Goal 6 --> Walk 10 steps or more  -KJ         Functional Assessment    Outcome Measure Options AM-PAC 6 Clicks Basic Mobility (PT)  -KJ                User Key  (r) = Recorded By, (t) = Taken By, (c) = Cosigned By      Initials Name Provider Type    KJ Analy Perez, PTA Physical Therapist Assistant    TS Delilah Shankar COTA Occupational Therapist Assistant    EC Ruth Padilla, OTR/L Occupational Therapist                    Time Calculation:    Time Calculation- OT       Row Name 01/16/24 1351             Time Calculation- OT    OT Start Time 1318  -EC      OT Stop Time 1351  -EC      OT Time Calculation (min) 33 min  -EC      Total Timed Code Minutes- OT 33 minute(s)  -EC      OT Received On 01/16/24  -EC         Timed Charges    83556 - OT Therapeutic Activity Minutes 18  -EC      49209 - OT Self Care/Mgmt Minutes 15  -EC         Total Minutes    Timed Charges Total Minutes 33  -EC       Total Minutes 33  -EC                User Key  (r) = Recorded By, (t) = Taken By, (c) = Cosigned By      Initials Name Provider Type    EC Ruth Padilla, OTR/L Occupational Therapist                  Therapy Charges for Today       Code Description Service Date Service Provider Modifiers Qty    24847657626 HC OT THERAPEUTIC ACT EA 15 MIN 1/16/2024 Ruth Padilla, OTR/L GO 1    36230924467 HC OT SELF CARE/MGMT/TRAIN EA 15 MIN 1/16/2024 Ruth Padilla, OTR/L GO 1                 Ruth Padilla OTR/L  1/16/2024

## 2024-01-16 NOTE — CONSULTS
[facility]  INPATIENT WOUND & OSTOMY CONSULTATION    Today's Date: 01/16/24    Patient Name: Radha Wade  MRN: [id number]  [facility]: [id number]  PCP: Myron Vines MD  Referring Provider:   Consulting Provider (From admission, onward)      Start Ordered     Status Ordering Provider    01/15/24 1238 01/15/24 1239  Inpatient Wound Care MD Consult  Once        Specialty:  Wound Care  Provider:  Akua Melendez APRN    Acknowledged JEAN GIBSON           Attending Provider: Jean Gibson MD  Length of Stay: 2    SUBJECTIVE   Chief Complaint: Sacral wound    HPI: Radha Wade, a 63 y.o.female, presents with a past medical history of type 2 diabetes, Lyme disease, mitral valve prolapse.  A full past medical history as listed below.  Patient presented to the hospital on 1/12 due to right lower extremity weakness.  Patient was admitted for acute focal neurological deficit for further investigation and treatment.    Inpatient wound care consulted due to sacral wound. Patient has a stage 3 PI of sacral region. Patient reports she noticed burning and pain of area and has been putting zinc barrier to area since she has been admitted.  She attempts to reposition herself but due to RLE weakness related to hx of CVA, she is unable to reposition or ambulate independently.       Visit Dx:    ICD-10-CM ICD-9-CM   1. 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 Cystitis  N30.90 595.9   3. Fall, initial encounter  W19.XXXA E888.9   4. Weakness  R53.1 780.79   5. Impaired mobility [Z74.09]  Z74.09 799.89   6. Decreased activities of daily living (ADL) [Z78.9]  Z78.9 V49.89       Hospital Problem List:     Acute focal neurological deficit    DVT (deep venous thrombosis)    Vaginal bleeding    Type 2 diabetes mellitus, without long-term current use of insulin    Metastatic disease    Acute blood loss anemia    CVA (cerebral vascular accident)      History:   Past Medical History:   Diagnosis Date     Asthma     Bronchitis     Depression     Diabetes mellitus     DVT (deep venous thrombosis)     Heart murmur     Lyme disease     Mitral valve prolapse      Past Surgical History:   Procedure Laterality Date     SECTION       Social History     Socioeconomic History    Marital status:    Tobacco Use    Smoking status: Never    Smokeless tobacco: Never   Vaping Use    Vaping Use: Never used   Substance and Sexual Activity    Alcohol use: No    Drug use: No    Sexual activity: Defer     Partners: Male     Family History   Problem Relation Age of Onset    Heart disease Father     Diabetes Father     Diabetes Brother     Heart disease Brother     Cancer Maternal Grandmother     Cancer Maternal Grandfather     Heart disease Paternal Grandfather        Allergies:  No Known Allergies    Medications:    Current Facility-Administered Medications:     acetaminophen (TYLENOL) tablet 650 mg, 650 mg, Oral, Q4H PRN, 650 mg at 24 0723 **OR** acetaminophen (TYLENOL) suppository 650 mg, 650 mg, Rectal, Q4H PRN, Daniel Fleming MD    atorvastatin (LIPITOR) tablet 80 mg, 80 mg, Oral, Nightly, Daniel Fleming MD, 80 mg at 01/15/24 2158    bisacodyl (DULCOLAX) suppository 10 mg, 10 mg, Rectal, Daily PRN, Frank Frankel DO    dextrose (D50W) (25 g/50 mL) IV injection 25 g, 25 g, Intravenous, Q15 Min PRN, Daniel Fleming MD    dextrose (GLUTOSE) oral gel 15 g, 15 g, Oral, Q15 Min PRN, Daniel Fleming MD    docusate sodium (COLACE) capsule 100 mg, 100 mg, Oral, BID, Daniel Fleming MD, 100 mg at 24 0808    ferric gluconate (FERRLECIT) 250 mg in sodium chloride 0.9 % 250 mL IVPB, 250 mg, Intravenous, Daily, Francisca Giordano APRN, Last Rate: 125 mL/hr at 24 0808, 250 mg at 24 0808    glucagon (GLUCAGEN) injection 1 mg, 1 mg, Intramuscular, Q15 Min PRN, Daniel Fleming MD    heparin 39671 units/250 mL (100 units/mL) in 0.45 % NaCl infusion, 12  Units/kg/hr, Intravenous, Titrated, Naldo Telles MD, Last Rate: 15.3 mL/hr at 01/16/24 0514, 19.716 Units/kg/hr at 01/16/24 0514    HYDROcodone-acetaminophen (NORCO) 5-325 MG per tablet 1 tablet, 1 tablet, Oral, Q4H PRN, Daniel Fleming MD    Insulin Lispro (humaLOG) injection 2-7 Units, 2-7 Units, Subcutaneous, 4x Daily AC & at Bedtime, Daniel Fleming MD, 2 Units at 01/15/24 1701    labetalol (NORMODYNE,TRANDATE) injection 10 mg, 10 mg, Intravenous, Q10 Min PRN, aDniel Fleming MD    ondansetron (ZOFRAN) injection 4 mg, 4 mg, Intravenous, Q6H PRN, Daniel Fleming MD    pantoprazole (PROTONIX) EC tablet 40 mg, 40 mg, Oral, Q AM, Daniel Fleming MD, 40 mg at 01/16/24 0515    polyethylene glycol (MIRALAX) packet 17 g, 17 g, Oral, Daily, Frank Frankel, , 17 g at 01/16/24 0808    [COMPLETED] Insert Peripheral IV, , , Once **AND** sodium chloride 0.9 % flush 10 mL, 10 mL, Intravenous, PRN, Daniel Fleming MD    sodium chloride 0.9 % flush 10 mL, 10 mL, Intravenous, Q12H, Daniel Fleming MD, 10 mL at 01/16/24 0808    sodium chloride 0.9 % flush 10 mL, 10 mL, Intravenous, PRN, Daniel Fleming MD    sodium chloride 0.9 % infusion 40 mL, 40 mL, Intravenous, PRN, Daniel Fleming MD    traMADol (ULTRAM) tablet 25 mg, 25 mg, Oral, Q8H PRN, Daniel Fleming MD    Review of Systems:   Review of Systems   Constitutional:  Negative for chills and fever.   Respiratory:  Negative for cough and shortness of breath.    Cardiovascular:  Positive for leg swelling. Negative for chest pain and palpitations.   Genitourinary:  Negative for frequency and urgency.   Musculoskeletal:  Positive for back pain, gait problem and myalgias. Negative for arthralgias.   Skin:  Positive for color change and wound.   Neurological:  Positive for weakness. Negative for dizziness and headaches.   Psychiatric/Behavioral:  Negative for agitation and  behavioral problems.          OBJECTIVE     Vitals:    01/16/24 0746   BP: 103/45   Pulse: 89   Resp: 16   Temp: 98.5 °F (36.9 °C)   SpO2: 97%       PHYSICAL EXAM:   Physical Exam  Vitals and nursing note reviewed.   Constitutional:       General: She is awake.      Appearance: She is overweight.      Comments: Body mass index is 28.46 kg/m².    HENT:      Head: Normocephalic and atraumatic.   Eyes:      General: Lids are normal. Gaze aligned appropriately.   Cardiovascular:      Rate and Rhythm: Normal rate and regular rhythm.   Pulmonary:      Effort: Pulmonary effort is normal. No respiratory distress.   Musculoskeletal:      Cervical back: Normal range of motion and neck supple.   Feet:      Right foot:      Skin integrity: No ulcer.      Left foot:      Skin integrity: Erythema present. No ulcer.      Comments: Blanchable erythema of left heel  Skin:     General: Skin is warm and dry.      Findings: Wound present.      Comments: Stage 3 PI of sacral region. Appears to be mixed etiology of pressure and moisture. Wound bed has pink subcutaneous tissue present. Edges attached to wound bed. No undermining or tunneling present. Scant serous drainage present. Periwound area is blanchable.    Neurological:      Mental Status: She is alert.      Motor: Weakness (RLE r/t CVA) present.      Gait: Gait abnormal.   Psychiatric:         Attention and Perception: Attention normal.         Mood and Affect: Mood normal.         Speech: Speech normal.         Behavior: Behavior is cooperative.            Results Review:  Lab Results (last 48 hours)       Procedure Component Value Units Date/Time    Urinalysis, Microscopic Only - Urine, Clean Catch [[id number]]  (Abnormal) Collected: 01/16/24 0909    Specimen: Urine, Clean Catch Updated: 01/16/24 0939     RBC, UA 21-50 /HPF      WBC, UA 21-50 /HPF      Bacteria, UA 2+ /HPF      Squamous Epithelial Cells, UA 3-6 /HPF      Transitional Epithelial Cells, UA 3-6 /HPF      Hyaline  Casts, UA None Seen /LPF      Methodology Manual Light Microscopy    Urinalysis With Culture If Indicated - Urine, Clean Catch [[id number]]  (Abnormal) Collected: 01/16/24 0909    Specimen: Urine, Clean Catch Updated: 01/16/24 0939     Color, UA Straw     Appearance, UA Clear     pH, UA 7.0     Specific Gravity, UA 1.018     Glucose, UA Negative     Ketones, UA Negative     Bilirubin, UA Negative     Blood, UA Moderate (2+)     Protein, UA 30 mg/dL (1+)     Leuk Esterase, UA Moderate (2+)     Nitrite, UA Negative     Urobilinogen, UA 0.2 E.U./dL    Narrative:      Dipstick results may be inaccurate due to color interference.    Urine Culture - Urine, Urine, Clean Catch [[id number]] Collected: 01/16/24 0909    Specimen: Urine, Clean Catch Updated: 01/16/24 0939    POC Glucose Once [[id number]]  (Normal) Collected: 01/16/24 0750    Specimen: Blood Updated: 01/16/24 0800     Glucose 119 mg/dL      Comment: : [id number] Burgess GarciastonMeter ID: [id number]       aPTT [[id number]]  (Abnormal) Collected: 01/16/24 0321    Specimen: Blood Updated: 01/16/24 0403     .2 seconds     POC Glucose Once [[id number]]  (Abnormal) Collected: 01/15/24 2205    Specimen: Blood Updated: 01/15/24 2215     Glucose 140 mg/dL      Comment: : [id number] Krystal RossMeter ID: [id number]       aPTT [[id number]]  (Abnormal) Collected: 01/15/24 1840    Specimen: Blood Updated: 01/15/24 1928     PTT 90.6 seconds     POC Glucose Once [[id number]]  (Abnormal) Collected: 01/15/24 1647    Specimen: Blood Updated: 01/15/24 1729     Glucose 179 mg/dL      Comment: : [id number] Lars eMjiaaMeter ID: [id number]       aPTT [[id number]]  (Abnormal) Collected: 01/15/24 1220    Specimen: Blood Updated: 01/15/24 1326     PTT 54.3 seconds     POC Glucose Once [[id number]]  (Normal) Collected: 01/15/24 1151    Specimen: Blood Updated: 01/15/24 1215     Glucose 127 mg/dL      Comment: : [id number] Lars Stephens ID: [id number]       POC Glucose  Once [[id number]]  (Abnormal) Collected: 01/15/24 0750    Specimen: Blood Updated: 01/15/24 0806     Glucose 165 mg/dL      Comment: : [id number] Lars Stephens ID: [id number]       Comprehensive Metabolic Panel [[id number]]  (Abnormal) Collected: 01/15/24 0515    Specimen: Blood Updated: 01/15/24 0628     Glucose 139 mg/dL      BUN 16 mg/dL      Creatinine 0.54 mg/dL      Sodium 138 mmol/L      Potassium 4.0 mmol/L      Chloride 101 mmol/L      CO2 26.0 mmol/L      Calcium 8.9 mg/dL      Total Protein 7.2 g/dL      Albumin 3.1 g/dL      ALT (SGPT) 7 U/L      AST (SGOT) 9 U/L      Alkaline Phosphatase 163 U/L      Total Bilirubin 0.2 mg/dL      Globulin 4.1 gm/dL      A/G Ratio 0.8 g/dL      BUN/Creatinine Ratio 29.6     Anion Gap 11.0 mmol/L      eGFR 103.6 mL/min/1.73     Narrative:      GFR Normal >60  Chronic Kidney Disease <60  Kidney Failure <15      Magnesium [[id number]]  (Normal) Collected: 01/15/24 0515    Specimen: Blood Updated: 01/15/24 0628     Magnesium 2.2 mg/dL     CBC (No Diff) [[id number]]  (Abnormal) Collected: 01/15/24 0515    Specimen: Blood Updated: 01/15/24 0619     WBC 14.86 10*3/mm3      RBC 3.88 10*6/mm3      Hemoglobin 7.8 g/dL      Hematocrit 28.6 %      MCV 73.7 fL      MCH 20.1 pg      MCHC 27.3 g/dL      RDW 16.3 %      RDW-SD 43.8 fl      MPV 9.5 fL      Platelets 692 10*3/mm3     aPTT [[id number]]  (Abnormal) Collected: 01/15/24 0515    Specimen: Blood Updated: 01/15/24 0610     PTT 82.4 seconds     aPTT [[id number]]  (Abnormal) Collected: 01/15/24 0112    Specimen: Blood Updated: 01/15/24 0130     PTT 46.7 seconds     POC Glucose Once [[id number]]  (Abnormal) Collected: 01/14/24 1939    Specimen: Blood Updated: 01/14/24 1949     Glucose 291 mg/dL      Comment: : [id number]  AllisonMeter ID: [id number]       aPTT [[id number]]  (Abnormal) Collected: 01/14/24 1913    Specimen: Blood Updated: 01/14/24 1936     PTT 57.7 seconds     POC Glucose Once [[id number]]  (Abnormal)  Collected: 01/14/24 1734    Specimen: Blood Updated: 01/14/24 1745     Glucose 304 mg/dL      Comment: : [id number] Breann ThomasMeter ID: [id number]       aPTT [[id number]]  (Normal) Collected: 01/14/24 1325    Specimen: Blood Updated: 01/14/24 1406     PTT 35.2 seconds     Urine Culture - Urine, Urine, Clean Catch [[id number]] Collected: 01/13/24 0120    Specimen: Urine, Clean Catch Updated: 01/14/24 1215     Urine Culture >100,000 CFU/mL Mixed Karo Isolated    Narrative:      Specimen contains mixed organisms of questionable pathogenicity suggestive of contamination. If symptoms persist, suggest recollection.  Colonization of the urinary tract without infection is common. Treatment is discouraged unless the patient is symptomatic, pregnant, or undergoing an invasive urologic procedure.          Imaging Results (Last 72 Hours)       Procedure Component Value Units Date/Time    CT Head Without Contrast [[id number]] Collected: 01/15/24 1436     Updated: 01/15/24 1444    Narrative:      EXAMINATION:  CT HEAD WO CONTRAST-  1/15/2024 1:29 PM     HISTORY: Stroke, follow up; I82.619-Acute embolism and thrombosis of  superficial veins of unspecified upper extremity; N30.90-Cystitis,  unspecified without hematuria; W19.XXXA-Unspecified fall, initial  encounter; R53.1-Weakness; Z74.09-Other reduced mobility.     TECHNIQUE: Multiple axial images were obtained through the brain without  contrast infusion. Multiplanar images were reconstructed.     DLP: 605.11 mGy.cm. Automated dosage reduction technique was utilized to  reduce patient dosage.     COMPARISON: 1/13/2024.     FINDINGS: There is no hemorrhage. There is low-density in the lateral  left basal ganglia region predominantly involving the posterior aspect  of the external capsule. There is minimal low density elsewhere within  the hemispheric white matter likely related to small vessel disease.  There is minimal atrophy. The paranasal sinuses and mastoid air  cells  are clear. No calvarial fracture is seen.          Impression:      1. Acute infarct in the lateral left basal ganglia region involving  predominantly the external capsule posteriorly and in the posterolateral  aspect of the putamen.  2. No hemorrhage.  3. Mild chronic small vessel ischemic white matter disease.        This report was signed and finalized on 1/15/2024 2:41 PM by Dr. Keegan Sousa MD.       MRI Angiogram Neck Without Contrast [[id number]] Collected: 01/14/24 1502     Updated: 01/14/24 1514    Narrative:      EXAMINATION: MRI ANGIOGRAM NECK WO CONTRAST-  1/14/2024 3:02 PM MR  angiogram of the neck without contrast 1/14/2024     HISTORY: Carotid artery dissection suspected.     FINDINGS: MR angiography was performed of the neck utilizing 3D  time-of-flight and MIP images. Today's study is significantly degraded  by motion. This significantly lowers the sensitivity of the exam.     The visualized aortic arch is normal in caliber. The origin of the great  vessels are widely patent. The right vertebral artery origin is widely  patent. The left vertebral artery is rather diminutive in size and its  origin and proximal segment is not well-defined. Distally it is  suspected that the left vertebral artery terminates in the posterior  inferior cerebellar artery. The right vertebral artery is dominant and  appears to be widely patent from its origin to the basilar artery.     Both common carotid arteries are patent at their origin. No focal  luminal stenosis appreciated proximal to the carotid bifurcations. Both  carotid bifurcations are unremarkable with no evidence of rate limiting  stenosis. The ICAs are patent to the skull base without evidence of rate  limiting stenosis.       Impression:      1. Study is degraded by motion artifact. This is particularly noted on  the MIP sequences which are three-dimensional reconstructions based on  the raw source images.  2. The carotid arteries are  unremarkable. The carotid bifurcations are  normal in appearance without evidence of rate limiting stenosis with  both ICAs patent to the skull base.  3. The right vertebral artery is widely patent from its origin to the  skull base. The left vertebral artery is rather diminutive in size and  is not well-defined in its proximal segment. Difficult to ascertain  whether there is some loss of definition of this vessel simply because  of its small size and adjacent pulsatility from the aortic arch and  proximal great vessels or whether it is diseased. I feel that this  vessel terminates in the posterior inferior cerebellar artery at the  level of the posterior cranial fossa.  4. If further evaluation of the carotid and vertebral arteries is felt  warranted then follow-up with CT angiography would be the study of  choice and is considered the definitive exam for evaluation of the  vessels of the head and neck. Motion artifact, although potentially  degrading the study is a much lesser factor during performance of CT  angiography as the acquisition only takes a few seconds.     This report was signed and finalized on 1/14/2024 3:11 PM by Dr. Romeo Arizmendi MD.       MRI Angiogram Head Without Contrast [[id number]] Collected: 01/14/24 1455     Updated: 01/14/24 1505    Narrative:      EXAMINATION: MRI ANGIOGRAM HEAD WO CONTRAST-  1/14/2024 2:55 PM     HISTORY: Stroke. Determine embolic source.     FINDINGS: MR angiography was performed of the [place] with 3D  time-of-flight and MIP images. Raw source images are also reviewed.     Today's study is limited secondary to rather extensive motion artifact.  This is particularly noted on the MIPS which are reproduction's based on  the raw source images.     The right vertebral artery is dominant. The left vertebral artery is  rather diminutive in size. I feel the left vertebral artery terminates  in the posterior inferior cerebellar artery. The right vertebral  artery  is patent to the basilar artery. The basilar artery is patent. The  superior cerebellar arteries appear to be grossly intact. The basilar  artery terminates into the posterior cerebral artery on the left. There  is a fetal origin of the right posterior cerebral artery.     The distal ICAs are patent. The petrous, cavernous and supraclinoid  segment of the ICAs are patent to the terminus. Grossly the anterior and  middle cerebral arteries are patent. I do not see any discrete aneurysm.  No gross evidence of high-grade stenosis but the study is very limited.  The more distal trifurcation vessels are attenuated secondary to motion  artifact.       Impression:      1. Very limited exam secondary to extensive motion artifact. The left  vertebral artery is rather diminutive and I feel terminates in the  posterior inferior cerebellar artery. The right vertebral artery is  dominant. The basilar artery is widely patent. The basilar artery  terminates in the left posterior cerebral artery with the right  posterior cerebral artery emanating from the anterior circulation via  suspected persistent fetal origin.  2. Both intracranial ICAs are patent to the terminus. Potential disease  involving the cavernous and supraclinoid segment of the ICAs cannot be  fully evaluated. The anterior and middle cerebral arteries are grossly  unremarkable. I do not see any definite aneurysm.  3. If further assessment of the [place] is felt warranted  follow-up with CT angiography would be the study of choice for further  evaluation. That is a rapid acquisition and will not be as affected by  potential motion.     This report was signed and finalized on 1/14/2024 3:02 PM by Dr. Romeo Arizmendi MD.       MRI Brain With & Without Contrast [[id number]] Collected: 01/14/24 1438     Updated: 01/14/24 1454    Narrative:      MRI BRAIN W WO CONTRAST- 1/14/2024 10:55 AM     HISTORY: Stroke, follow up; I82.619-Acute embolism and  thrombosis of  superficial veins of unspecified upper extremity; N30.90-Cystitis,  unspecified without hematuria; W19.XXXA-Unspecified fall, initial  encounter; R53.1-Weakness; Z74.09-Other reduced mobility     COMPARISON: None.       TECHNIQUE: Multiplanar imaging of the brain was performed in a routine  fashion before and after the intravenous injection of gadolinium  contrast. There is significant motion artifact on today's exam.     FINDINGS:  Diffusion: There is diffusion restriction within the mesial left  temporal lobe,, left external capsule, left insular cortex as well as  within the body of the caudate nucleus with associated ADC mapping  abnormalities consistent with acute ischemic infarction. These infarcts  are nonhemorrhagic. There is no associated mass effect. No additional  sites of diffusion restriction are present.     Midline structures: Nondisplaced.     Ventricles: Normal in configuration and symmetric in size.     Masses: No masses or mass effect.     Basilar cisterns: Maintained.     Extra axial space: No abnormal extra-axial fluid.     Gray-white matter signal: There are additional scattered foci of T2  abnormality involving the periventricular and higher white matter tract  suggesting small vessel ischemic disease.     Cerebellum: Normal.     Brainstem: Normal.     Enhancement: No abnormal enhancement.     Other: Proximal cervical spinal cord is normal. Bilateral globes and  orbits are normal in appearance. Normal cerebrovascular flow voids  noted. No abnormal signal in the mastoid air cells or paranasal sinuses.       Impression:      1. Diffusion restriction with associated ADC mapping abnormality  involving the mesial left temporal lobe, left external capsule, left  insula as well as the body of the caudate nucleus. These infarcts are  nonhemorrhagic.  2. Mild atrophy. Mild small vessel ischemic changes are noted involving  the periventricular and higher white matter tracts.  3. Normal  flow voids within the [place].  4. Today's study is degraded by motion. No abnormal contrast enhancement  is demonstrated.           This report was signed and finalized on 1/14/2024 2:50 PM by Dr. Romeo Arizmendi MD.       US Venous Doppler Upper Extremity Right (duplex) [[id number]] Collected: 01/14/24 1113     Updated: 01/14/24 1117    Narrative:      History: Pain and swelling       Impression:      Impression:  1. There is no evidence of deep venous thrombosis of the right upper  extremity.  2. There is evidence of superficial thrombus in the cephalic vein.     Comments: Right upper extremity venous duplex exam was performed using  color Doppler flow, Doppler wave form analysis, and grayscale imaging,  with and without compression. There is no evidence of deep venous  thrombosis of the internal jugular, subclavian, axillary, brachial,  radial, and ulnar veins. There is evidence of superficial thrombus  involving the cephalic vein.        This report was signed and finalized on 1/14/2024 11:14 AM by Dr. Joseph Mari MD.                  ASSESSMENT/PLAN       Examination and evaluation of wound(s) was performed.    DIAGNOSIS:   Pressure injury of sacral region, stage 3  Hospital acquired pressure injury  Impaired mobility and ADLs    Acute focal neurological deficit    DVT (deep venous thrombosis)    Vaginal bleeding    Type 2 diabetes mellitus, without long-term current use of insulin    Metastatic disease    Acute blood loss anemia    CVA (cerebral vascular accident)       PLAN:   Orders placed for wound care and moisture/pressure management as listed below.         Start     Ordered    01/16/24 2000  Wound Care  Every 12 Hours,   Status:  Canceled        Question:  Wound Care Instructions  Answer:  Apply Moisture Barrier After Any Incontinence and PRN. Zguard or Calazime    01/16/24 0948    01/16/24 1002  Wound Care  Every 12 Hours        Question Answer Comment   Wound Locations Sacral spine     Wound Care Instructions Clean with NS. Apply therahoney to wound bed, cover with vaseline gauze, cover with a silicone foam border dressing    Cleanse Normal Saline    Intervention Thera Honey    Intervention Silicone Border Dressing    Intervention Vaseline Gauze        01/16/24 1001    01/16/24 1001  Specialty Bed Dolphin FIS Mattress  Once        Comments: For Dolphin FIS Mattress, call [employer] to order a GÃ¼venRehberi bed frame.  If bariatric/bariatric dolphin, [employer] provides frame, mattress, pump, and trapeze (if needed).  Nurse or HUC is to call [employer], the rental company, to order the equipment, provide patient's name, room number, and if in isolation. [phone].   Question:  Specialty Bed needed  Answer:  Dolphin FIS Mattress    01/16/24 1001    01/16/24 0949  Turn Patient  Now Then Every 2 Hours         01/16/24 0948    01/16/24 0949  Elevate Heels Off of Bed  Until Discontinued         01/16/24 0948    01/16/24 0949  Use Seat Cushion When Up In Chair  Continuous         01/16/24 0948    01/16/24 0949  Use Repositioning Wedge to Position Patient  Continuous        Comments: Use Comfort Glide repositioning sheet and wedges to position patient.    01/16/24 0948                     Discussed findings and treatment plan including risks, benefits, and treatment options with patient in detail. Patient agreed with treatment plan.      This document has been electronically signed by GREYSON Patel on 1/16/2024 10:00 CST

## 2024-01-16 NOTE — NURSING NOTE
Dr. Adam office called to inform patient will proceed with procedure tomorrow.  Office staff informed.

## 2024-01-16 NOTE — CONSULTS
Inpatient Gastroenterology Consult  Referring Provider: Kitty  Reason for Consultation: ARLETTE    Subjective     History of present illness: 63-year-old white female who presents with multiple coalescing problems which include deep venous thrombosis of multiple extremities, recent cerebrovascular accident, long history of what sounds like severe dysfunctional vaginal bleeding, and now evidence of extensive mesenteric adenopathy and possibly carcinomatosis.  She is scheduled for a D and C tomorrow.  We are asked to see her because of her iron deficiency anemia.  She reports no GI symptoms such as melena or hematochezia, no epigastric pain nausea or vomiting.  Recent stool for occult blood was negative.    Past Medical History:  Past Medical History:   Diagnosis Date    Asthma     Bronchitis     Depression     Diabetes mellitus     DVT (deep venous thrombosis)     Heart murmur     Lyme disease     Mitral valve prolapse        Past Surgical History:  Past Surgical History:   Procedure Laterality Date     SECTION          Social History:   Social History     Tobacco Use    Smoking status: Never    Smokeless tobacco: Never   Substance Use Topics    Alcohol use: No        Family History:  Family History   Problem Relation Age of Onset    Heart disease Father     Diabetes Father     Diabetes Brother     Heart disease Brother     Cancer Maternal Grandmother     Cancer Maternal Grandfather     Heart disease Paternal Grandfather        Home Meds:  Medications Prior to Admission   Medication Sig Dispense Refill Last Dose    acetaminophen-codeine (TYLENOL with CODEINE #3) 300-30 MG per tablet Take 1 tablet by mouth Every 8 (Eight) Hours As Needed for Moderate Pain.   Past Week    Apixaban Starter Pack (Eliquis DVT/PE Starter Pack) tablet therapy pack Take two 5 mg tablets by mouth every 12 hours for 7 days. Followed by one 5 mg tablet every 12 hours. (Dispense starter pack if available) 74  tablet 0 1/12/2024    furosemide (LASIX) 20 MG tablet Take 1 tablet by mouth Daily.   Past Week    metFORMIN (GLUCOPHAGE) 500 MG tablet Take 1 tablet by mouth 2 (Two) Times a Day With Meals.   Past Week    promethazine (PHENERGAN) 25 MG tablet Take 1 tablet by mouth Every 6 (Six) Hours As Needed for Nausea or Vomiting.   Past Week    albuterol sulfate  (90 Base) MCG/ACT inhaler Inhale 2 puffs Every 4 (Four) Hours As Needed for Wheezing or Shortness of Air. 1 inhaler 0 More than a month    Lancets (freestyle) lancets 1 each by Other route 2 (Two) Times a Day. Use as instructed 100 each 0        Current Meds:   atorvastatin, 80 mg, Oral, Nightly  docusate sodium, 100 mg, Oral, BID  ferric gluconate, 250 mg, Intravenous, Daily  insulin lispro, 2-7 Units, Subcutaneous, 4x Daily AC & at Bedtime  pantoprazole, 40 mg, Oral, Q AM  polyethylene glycol, 17 g, Oral, Daily  sodium chloride, 10 mL, Intravenous, Q12H        Allergies:  No Known Allergies    Review of Systems  All systems were reviewed and negative except for:  Genitourinary: postivie for  female:   See HPI  Hematologic / Lymphatic: positive for  lymphadenopathy  Neurological: positive for  numbness, weakness, and See HPI    Objective     Vital Signs  Temp:  [97.8 °F (36.6 °C)-98.5 °F (36.9 °C)] 98.2 °F (36.8 °C)  Heart Rate:  [84-98] 84  Resp:  [16-20] 16  BP: (103-131)/(44-70) 108/44    Physical Exam:     General Appearance:  Alert, cooperative, in no acute distress   Head:  Normocephalic, without obvious abnormality, atraumatic   Eyes:  Lids and lashes normal, conjunctivae and sclerae pale, PERRLA   Ears:  Ears appear intact with no abnormalities noted   Throat:  No oral lesions, no thrush, oral mucosa moist   Neck:  No adenopathy, supple, trachea midline, no thyromegaly, no carotid bruit, no JVD   Back:  No kyphosis present, no scoliosis present, no skin lesions, erythema or scars, no tenderness to percussion, or palpation, range of motion normal  "  Lungs:  Clear to auscultation,respirations regular, even and unlabored    Heart:  Regular rhythm and normal rate, normal S1 and S2, no murmur, no gallop, no rub, no click   Breast Exam:  Deferred   Abdomen:  Normal bowel sounds, no masses, no organomegaly, soft non-tender, non-distended, no guarding, no rebound tenderness   Genitalia:  Deferred   Extremities:  Moderate lymphedema   Pulses:  Pulses palpable and equal bilaterally   Skin:  No bleeding, bruising or rash   Lymph nodes:  No palpable adenopathy   Neurologic:  Cranial nerves 2 - 12 grossly intact, sensation intact, DTR present and equal bilaterally       WBC No results found for: \"WBCS\"   HGB Hemoglobin   Date Value Ref Range Status   01/16/2024 8.5 (L) 12.0 - 15.9 g/dL Final   01/15/2024 7.8 (L) 12.0 - 15.9 g/dL Final   01/14/2024 8.9 (L) 12.0 - 15.9 g/dL Final      HCT Hematocrit   Date Value Ref Range Status   01/16/2024 29.6 (L) 34.0 - 46.6 % Final   01/15/2024 28.6 (L) 34.0 - 46.6 % Final   01/14/2024 31.8 (L) 34.0 - 46.6 % Final      Platelets No results found for: \"LABPLAT\"   MCV MCV   Date Value Ref Range Status   01/16/2024 72.0 (L) 79.0 - 97.0 fL Final   01/15/2024 73.7 (L) 79.0 - 97.0 fL Final   01/14/2024 72.3 (L) 79.0 - 97.0 fL Final          Sodium Sodium   Date Value Ref Range Status   01/15/2024 138 136 - 145 mmol/L Final   01/14/2024 136 136 - 145 mmol/L Final      Potassium Potassium   Date Value Ref Range Status   01/15/2024 4.0 3.5 - 5.2 mmol/L Final   01/14/2024 4.5 3.5 - 5.2 mmol/L Final      Chloride Chloride   Date Value Ref Range Status   01/15/2024 101 98 - 107 mmol/L Final   01/14/2024 100 98 - 107 mmol/L Final      CO2 CO2   Date Value Ref Range Status   01/15/2024 26.0 22.0 - 29.0 mmol/L Final   01/14/2024 25.0 22.0 - 29.0 mmol/L Final      BUN BUN   Date Value Ref Range Status   01/15/2024 16 8 - 23 mg/dL Final   01/14/2024 10 8 - 23 mg/dL Final      Creatinine Creatinine   Date Value Ref Range Status   01/15/2024 0.54 (L) " 0.57 - 1.00 mg/dL Final   01/14/2024 0.47 (L) 0.57 - 1.00 mg/dL Final      Calcium Calcium   Date Value Ref Range Status   01/15/2024 8.9 8.6 - 10.5 mg/dL Final   01/14/2024 8.8 8.6 - 10.5 mg/dL Final      Albumin Albumin   Date Value Ref Range Status   01/15/2024 3.1 (L) 3.5 - 5.2 g/dL Final   01/14/2024 3.2 (L) 3.5 - 5.2 g/dL Final      AST  ALT  PT/INR:   AST (SGOT)   Date Value Ref Range Status   01/15/2024 9 1 - 32 U/L Final   01/14/2024 14 1 - 32 U/L Final     ALT (SGPT)   Date Value Ref Range Status   01/15/2024 7 1 - 33 U/L Final   01/14/2024 10 1 - 33 U/L Final     Protime   Date Value Ref Range Status   01/13/2024 16.8 (H) 11.8 - 14.8 Seconds Final   /  INR   Date Value Ref Range Status   01/13/2024 1.35 (H) 0.91 - 1.09 Final            Imaging Results (Last 72 Hours)       Procedure Component Value Units Date/Time    CT Head Without Contrast [[id number]] Collected: 01/15/24 1436     Updated: 01/15/24 1444    Narrative:      EXAMINATION:  CT HEAD WO CONTRAST-  1/15/2024 1:29 PM     HISTORY: Stroke, follow up; I82.619-Acute embolism and thrombosis of  superficial veins of unspecified upper extremity; N30.90-Cystitis,  unspecified without hematuria; W19.XXXA-Unspecified fall, initial  encounter; R53.1-Weakness; Z74.09-Other reduced mobility.     TECHNIQUE: Multiple axial images were obtained through the brain without  contrast infusion. Multiplanar images were reconstructed.     DLP: 605.11 mGy.cm. Automated dosage reduction technique was utilized to  reduce patient dosage.     COMPARISON: 1/13/2024.     FINDINGS: There is no hemorrhage. There is low-density in the lateral  left basal ganglia region predominantly involving the posterior aspect  of the external capsule. There is minimal low density elsewhere within  the hemispheric white matter likely related to small vessel disease.  There is minimal atrophy. The paranasal sinuses and mastoid air cells  are clear. No calvarial fracture is seen.           Impression:      1. Acute infarct in the lateral left basal ganglia region involving  predominantly the external capsule posteriorly and in the posterolateral  aspect of the putamen.  2. No hemorrhage.  3. Mild chronic small vessel ischemic white matter disease.        This report was signed and finalized on 1/15/2024 2:41 PM by Dr. Keegan Sousa MD.       MRI Angiogram Neck Without Contrast [[id number]] Collected: 01/14/24 1502     Updated: 01/14/24 1514    Narrative:      EXAMINATION: MRI ANGIOGRAM NECK WO CONTRAST-  1/14/2024 3:02 PM MR  angiogram of the neck without contrast 1/14/2024     HISTORY: Carotid artery dissection suspected.     FINDINGS: MR angiography was performed of the neck utilizing 3D  time-of-flight and MIP images. Today's study is significantly degraded  by motion. This significantly lowers the sensitivity of the exam.     The visualized aortic arch is normal in caliber. The origin of the great  vessels are widely patent. The right vertebral artery origin is widely  patent. The left vertebral artery is rather diminutive in size and its  origin and proximal segment is not well-defined. Distally it is  suspected that the left vertebral artery terminates in the posterior  inferior cerebellar artery. The right vertebral artery is dominant and  appears to be widely patent from its origin to the basilar artery.     Both common carotid arteries are patent at their origin. No focal  luminal stenosis appreciated proximal to the carotid bifurcations. Both  carotid bifurcations are unremarkable with no evidence of rate limiting  stenosis. The ICAs are patent to the skull base without evidence of rate  limiting stenosis.       Impression:      1. Study is degraded by motion artifact. This is particularly noted on  the MIP sequences which are three-dimensional reconstructions based on  the raw source images.  2. The carotid arteries are unremarkable. The carotid bifurcations are  normal in appearance  without evidence of rate limiting stenosis with  both ICAs patent to the skull base.  3. The right vertebral artery is widely patent from its origin to the  skull base. The left vertebral artery is rather diminutive in size and  is not well-defined in its proximal segment. Difficult to ascertain  whether there is some loss of definition of this vessel simply because  of its small size and adjacent pulsatility from the aortic arch and  proximal great vessels or whether it is diseased. I feel that this  vessel terminates in the posterior inferior cerebellar artery at the  level of the posterior cranial fossa.  4. If further evaluation of the carotid and vertebral arteries is felt  warranted then follow-up with CT angiography would be the study of  choice and is considered the definitive exam for evaluation of the  vessels of the head and neck. Motion artifact, although potentially  degrading the study is a much lesser factor during performance of CT  angiography as the acquisition only takes a few seconds.     This report was signed and finalized on 1/14/2024 3:11 PM by Dr. Romeo Arizmendi MD.       MRI Angiogram Head Without Contrast [[id number]] Collected: 01/14/24 1455     Updated: 01/14/24 1505    Narrative:      EXAMINATION: MRI ANGIOGRAM HEAD WO CONTRAST-  1/14/2024 2:55 PM     HISTORY: Stroke. Determine embolic source.     FINDINGS: MR angiography was performed of the [place] with 3D  time-of-flight and MIP images. Raw source images are also reviewed.     Today's study is limited secondary to rather extensive motion artifact.  This is particularly noted on the MIPS which are reproduction's based on  the raw source images.     The right vertebral artery is dominant. The left vertebral artery is  rather diminutive in size. I feel the left vertebral artery terminates  in the posterior inferior cerebellar artery. The right vertebral artery  is patent to the basilar artery. The basilar artery is patent.  The  superior cerebellar arteries appear to be grossly intact. The basilar  artery terminates into the posterior cerebral artery on the left. There  is a fetal origin of the right posterior cerebral artery.     The distal ICAs are patent. The petrous, cavernous and supraclinoid  segment of the ICAs are patent to the terminus. Grossly the anterior and  middle cerebral arteries are patent. I do not see any discrete aneurysm.  No gross evidence of high-grade stenosis but the study is very limited.  The more distal trifurcation vessels are attenuated secondary to motion  artifact.       Impression:      1. Very limited exam secondary to extensive motion artifact. The left  vertebral artery is rather diminutive and I feel terminates in the  posterior inferior cerebellar artery. The right vertebral artery is  dominant. The basilar artery is widely patent. The basilar artery  terminates in the left posterior cerebral artery with the right  posterior cerebral artery emanating from the anterior circulation via  suspected persistent fetal origin.  2. Both intracranial ICAs are patent to the terminus. Potential disease  involving the cavernous and supraclinoid segment of the ICAs cannot be  fully evaluated. The anterior and middle cerebral arteries are grossly  unremarkable. I do not see any definite aneurysm.  3. If further assessment of the [place] is felt warranted  follow-up with CT angiography would be the study of choice for further  evaluation. That is a rapid acquisition and will not be as affected by  potential motion.     This report was signed and finalized on 1/14/2024 3:02 PM by Dr. Romeo Arizmendi MD.       MRI Brain With & Without Contrast [[id number]] Collected: 01/14/24 1438     Updated: 01/14/24 1454    Narrative:      MRI BRAIN W WO CONTRAST- 1/14/2024 10:55 AM     HISTORY: Stroke, follow up; I82.619-Acute embolism and thrombosis of  superficial veins of unspecified upper extremity;  N30.90-Cystitis,  unspecified without hematuria; W19.XXXA-Unspecified fall, initial  encounter; R53.1-Weakness; Z74.09-Other reduced mobility     COMPARISON: None.       TECHNIQUE: Multiplanar imaging of the brain was performed in a routine  fashion before and after the intravenous injection of gadolinium  contrast. There is significant motion artifact on today's exam.     FINDINGS:  Diffusion: There is diffusion restriction within the mesial left  temporal lobe,, left external capsule, left insular cortex as well as  within the body of the caudate nucleus with associated ADC mapping  abnormalities consistent with acute ischemic infarction. These infarcts  are nonhemorrhagic. There is no associated mass effect. No additional  sites of diffusion restriction are present.     Midline structures: Nondisplaced.     Ventricles: Normal in configuration and symmetric in size.     Masses: No masses or mass effect.     Basilar cisterns: Maintained.     Extra axial space: No abnormal extra-axial fluid.     Gray-white matter signal: There are additional scattered foci of T2  abnormality involving the periventricular and higher white matter tract  suggesting small vessel ischemic disease.     Cerebellum: Normal.     Brainstem: Normal.     Enhancement: No abnormal enhancement.     Other: Proximal cervical spinal cord is normal. Bilateral globes and  orbits are normal in appearance. Normal cerebrovascular flow voids  noted. No abnormal signal in the mastoid air cells or paranasal sinuses.       Impression:      1. Diffusion restriction with associated ADC mapping abnormality  involving the mesial left temporal lobe, left external capsule, left  insula as well as the body of the caudate nucleus. These infarcts are  nonhemorrhagic.  2. Mild atrophy. Mild small vessel ischemic changes are noted involving  the periventricular and higher white matter tracts.  3. Normal flow voids within the [place].  4. Today's study is  "degraded by motion. No abnormal contrast enhancement  is demonstrated.           This report was signed and finalized on 1/14/2024 2:50 PM by Dr. Romeo Arizmendi MD.       US Venous Doppler Upper Extremity Right (duplex) [[id number]] Collected: 01/14/24 1113     Updated: 01/14/24 1117    Narrative:      History: Pain and swelling       Impression:      Impression:  1. There is no evidence of deep venous thrombosis of the right upper  extremity.  2. There is evidence of superficial thrombus in the cephalic vein.     Comments: Right upper extremity venous duplex exam was performed using  color Doppler flow, Doppler wave form analysis, and grayscale imaging,  with and without compression. There is no evidence of deep venous  thrombosis of the internal jugular, subclavian, axillary, brachial,  radial, and ulnar veins. There is evidence of superficial thrombus  involving the cephalic vein.        This report was signed and finalized on 1/14/2024 11:14 AM by Dr. Joseph Mari MD.               Result Review:  I have personally reviewed the results from the time of this admission to 1/16/2024 15:35 CST and agree with these findings:  [x]  Laboratory list / accordion  []  Microbiology  [x]  Radiology  []  EKG/Telemetry   []  Cardiology/Vascular   []  Pathology  [x]  Old records  []  Other:  Most notable findings include: Iron deficiency anemia noted (elevated ferritin noted out an \"acute phase reactant\".  Stool noted to be Hemoccult negative.      Assessment & Plan       Acute focal neurological deficit    DVT (deep venous thrombosis)    Vaginal bleeding    Type 2 diabetes mellitus, without long-term current use of insulin    Metastatic disease    Acute blood loss anemia    CVA (cerebral vascular accident)      Iron deficiency anemia.  I suspect her dysfunctional vaginal bleeding is an adequate and sufficient cause for all of this.  We will await further evaluation per GYN and oncology.  It should be noted that if she " has evidence of abdominal carcinomatosis that colonoscopy is often difficult if not impossible due to restricted colon movement.  Will follow at a distance for now.    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  Any results not available at time of inpatient discharge and/or GI clinic follow up should be managed by the hospitalist team, PCP, or outpatient gastroenterologist.

## 2024-01-16 NOTE — NURSING NOTE
[facility]  INPATIENT WOUND & OSTOMY CARE    Today's Date: 01/16/24    Patient Name: Radha Wade  MRN: [id number]  [facility]: [id number]  PCP: Myron Vines MD  Attending Provider: Jose Tang MD  Length of Stay: 2    Wound care consult placed for open area to medial sacrum. Picture of wound was taken 1/15/24. See Akua Melendez's initial consult note for details. Patient has a stage 3 pressure injury to sacrum. This was not documented present on admission, making this a hospital acquired pressure injury. I ordered pressure ulcer prevention measures and a dolphin bed. I also ordered wound care per Akua Melendez's recommendation. Patient educated on importance of turning every 2 hours. Patient already has a waffle cushion in her room.     Inpatient wound care will continue to follow during hospital stay.  Please contact if any issues or concerns arise.     This document has been electronically signed by Cande Moreno RN on 1/16/2024 10:15 CST

## 2024-01-16 NOTE — CONSULTS
[place]   Radha Wade  : 1960  MRN: [id number]  CSN: [id number]    History and Physical// obgyn consult    Subjective   Radha Wade is a 63 y.o. No obstetric history on file. Admitted with anemia  and metastatic disease undetermined origen.  Pt had abnormal  pap in 2019. I met pt ,   at that time pt had dvt and hx of bleeding with abnormal sonogram suggestive of endometrial cancer.  In 2023 pt declined  d&c.   Pt denies  htn , reports diabetes , hx of dvt, no liver or kidney problems known    Past Medical History:   Diagnosis Date    Asthma     Bronchitis     Depression     Diabetes mellitus     DVT (deep venous thrombosis)     Heart murmur     Lyme disease     Mitral valve prolapse      Past Surgical History:   Procedure Laterality Date     SECTION       Social History    Tobacco Use      Smoking status: Never      Smokeless tobacco: Never      Current Facility-Administered Medications:     acetaminophen (TYLENOL) tablet 650 mg, 650 mg, Oral, Q4H PRN, 650 mg at 24 0723 **OR** acetaminophen (TYLENOL) suppository 650 mg, 650 mg, Rectal, Q4H PRN, Daniel Fleming MD    atorvastatin (LIPITOR) tablet 80 mg, 80 mg, Oral, Nightly, Daniel Fleming MD, 80 mg at 01/15/24 2158    bisacodyl (DULCOLAX) suppository 10 mg, 10 mg, Rectal, Daily PRN, Frank Frankel DO    dextrose (D50W) (25 g/50 mL) IV injection 25 g, 25 g, Intravenous, Q15 Min PRN, Daniel Fleming MD    dextrose (GLUTOSE) oral gel 15 g, 15 g, Oral, Q15 Min PRN, Daniel Fleming MD    docusate sodium (COLACE) capsule 100 mg, 100 mg, Oral, BID, Daniel Fleming MD, 100 mg at 24 08    ferric gluconate (FERRLECIT) 250 mg in sodium chloride 0.9 % 250 mL IVPB, 250 mg, Intravenous, Daily, Francisca Giordano APRN, Last Rate: 125 mL/hr at 24 0808, 250 mg at 24 0808    glucagon (GLUCAGEN) injection 1 mg, 1 mg, Intramuscular, Q15 Min PRN, Daniel Fleming MD     "heparin 85819 units/250 mL (100 units/mL) in 0.45 % NaCl infusion, 12 Units/kg/hr, Intravenous, Titrated, Naldo Telles MD, Last Rate: 15.3 mL/hr at 01/16/24 0514, 19.716 Units/kg/hr at 01/16/24 0514    HYDROcodone-acetaminophen (NORCO) 5-325 MG per tablet 1 tablet, 1 tablet, Oral, Q4H PRN, Daniel Fleming MD    Insulin Lispro (humaLOG) injection 2-7 Units, 2-7 Units, Subcutaneous, 4x Daily AC & at Bedtime, Daniel Fleming MD, 2 Units at 01/15/24 1701    labetalol (NORMODYNE,TRANDATE) injection 10 mg, 10 mg, Intravenous, Q10 Min PRN, Daniel Fleming MD    ondansetron (ZOFRAN) injection 4 mg, 4 mg, Intravenous, Q6H PRN, Daniel Fleming MD    pantoprazole (PROTONIX) EC tablet 40 mg, 40 mg, Oral, Q AM, Daniel Fleming MD, 40 mg at 01/16/24 0515    polyethylene glycol (MIRALAX) packet 17 g, 17 g, Oral, Daily, Frank Frankel DO, 17 g at 01/16/24 0808    [COMPLETED] Insert Peripheral IV, , , Once **AND** sodium chloride 0.9 % flush 10 mL, 10 mL, Intravenous, PRN, Daniel Fleming MD    sodium chloride 0.9 % flush 10 mL, 10 mL, Intravenous, Q12H, Daniel Fleming MD, 10 mL at 01/16/24 0808    sodium chloride 0.9 % flush 10 mL, 10 mL, Intravenous, PRN, Daniel Fleming MD    sodium chloride 0.9 % infusion 40 mL, 40 mL, Intravenous, PRN, Daniel Fleming MD    traMADol (ULTRAM) tablet 25 mg, 25 mg, Oral, Q8H PRN, Daniel Fleming MD    No Known Allergies    Review of Systems      Objective   /45 (BP Location: Left arm, Patient Position: Lying)   Pulse 89   Temp 98.5 °F (36.9 °C) (Oral)   Resp 16   Ht 165.1 cm (65\")   Wt 77.6 kg (171 lb)   LMP  (LMP Unknown)   SpO2 97%   BMI 28.46 kg/m²   General: well developed; well nourished  no acute distress   Heart: regular rate and rhythm, S1, S2 normal, no murmur, click, rub or gallop   Lungs: breathing is unlabored   Abdomen: soft, non-tender; no masses  no umbilical or " inguinal hernias are present  no hepato-splenomegaly   Pelvis:: Not done today     Labs  Lab Results   Component Value Date     (H) 01/16/2024    HGB 8.5 (L) 01/16/2024    HCT 29.6 (L) 01/16/2024    WBC 10.22 01/16/2024     01/15/2024    K 4.0 01/15/2024     01/15/2024    CO2 26.0 01/15/2024    BUN 16 01/15/2024    CREATININE 0.54 (L) 01/15/2024    GLUCOSE 139 (H) 01/15/2024    ALBUMIN 3.1 (L) 01/15/2024    CALCIUM 8.9 01/15/2024    AST 9 01/15/2024    ALT 7 01/15/2024    BILITOT 0.2 01/15/2024        Assessment   Postmenopausal bleeding  Diabetes type  Hx of dvt's  Metastatic disease, un staged     The risks, benefits, and alternatives of the procedure; along with the risks of anesthesia was discussed in full with the patient and all questions were answered.       Plan   I met wit pt again in her rom, I discussed the need for tissue diagnosis and again pt declined intervention. My recommendation is for d&C  to be done under general. No need to stop heparin .  Pt reported need time  to make decision.     Radu De Los Santos MD  1/16/2024  10:51 CST

## 2024-01-17 ENCOUNTER — ANESTHESIA (OUTPATIENT)
Dept: PERIOP | Facility: HOSPITAL | Age: 64
End: 2024-01-17
Payer: COMMERCIAL

## 2024-01-17 ENCOUNTER — ANESTHESIA EVENT (OUTPATIENT)
Dept: PERIOP | Facility: HOSPITAL | Age: 64
End: 2024-01-17
Payer: COMMERCIAL

## 2024-01-17 PROBLEM — E43 SEVERE MALNUTRITION: Status: ACTIVE | Noted: 2024-01-17

## 2024-01-17 LAB
APTT PPP: 125.8 SECONDS (ref 24.5–36)
APTT PPP: 48.9 SECONDS (ref 24.5–36)
APTT PPP: 84.9 SECONDS (ref 24.5–36)
BACTERIA SPEC AEROBE CULT: NO GROWTH
BASOPHILS # BLD AUTO: 0.09 10*3/MM3 (ref 0–0.2)
BASOPHILS NFR BLD AUTO: 0.8 % (ref 0–1.5)
DEPRECATED RDW RBC AUTO: 43.9 FL (ref 37–54)
EOSINOPHIL # BLD AUTO: 0.23 10*3/MM3 (ref 0–0.4)
EOSINOPHIL NFR BLD AUTO: 2 % (ref 0.3–6.2)
ERYTHROCYTE [DISTWIDTH] IN BLOOD BY AUTOMATED COUNT: 16.9 % (ref 12.3–15.4)
GLUCOSE BLDC GLUCOMTR-MCNC: 110 MG/DL (ref 70–130)
GLUCOSE BLDC GLUCOMTR-MCNC: 114 MG/DL (ref 70–130)
GLUCOSE BLDC GLUCOMTR-MCNC: 121 MG/DL (ref 70–130)
GLUCOSE BLDC GLUCOMTR-MCNC: 138 MG/DL (ref 70–130)
GLUCOSE BLDC GLUCOMTR-MCNC: 196 MG/DL (ref 70–130)
HCT VFR BLD AUTO: 30.1 % (ref 34–46.6)
HGB BLD-MCNC: 8.5 G/DL (ref 12–15.9)
LYMPHOCYTES # BLD AUTO: 2.22 10*3/MM3 (ref 0.7–3.1)
LYMPHOCYTES NFR BLD AUTO: 19.2 % (ref 19.6–45.3)
MCH RBC QN AUTO: 20.7 PG (ref 26.6–33)
MCHC RBC AUTO-ENTMCNC: 28.2 G/DL (ref 31.5–35.7)
MCV RBC AUTO: 73.4 FL (ref 79–97)
MONOCYTES # BLD AUTO: 0.93 10*3/MM3 (ref 0.1–0.9)
MONOCYTES NFR BLD AUTO: 8 % (ref 5–12)
NEUTROPHILS NFR BLD AUTO: 65.2 % (ref 42.7–76)
NEUTROPHILS NFR BLD AUTO: 7.55 10*3/MM3 (ref 1.7–7)
PLATELET # BLD AUTO: 636 10*3/MM3 (ref 140–450)
PMV BLD AUTO: 8.8 FL (ref 6–12)
RBC # BLD AUTO: 4.1 10*6/MM3 (ref 3.77–5.28)
WBC NRBC COR # BLD AUTO: 11.58 10*3/MM3 (ref 3.4–10.8)

## 2024-01-17 PROCEDURE — 25010000002 FENTANYL CITRATE (PF) 100 MCG/2ML SOLUTION

## 2024-01-17 PROCEDURE — 63710000001 INSULIN LISPRO (HUMAN) PER 5 UNITS: Performed by: INTERNAL MEDICINE

## 2024-01-17 PROCEDURE — 85025 COMPLETE CBC W/AUTO DIFF WBC: CPT | Performed by: INTERNAL MEDICINE

## 2024-01-17 PROCEDURE — 85730 THROMBOPLASTIN TIME PARTIAL: CPT | Performed by: INTERNAL MEDICINE

## 2024-01-17 PROCEDURE — 82948 REAGENT STRIP/BLOOD GLUCOSE: CPT

## 2024-01-17 PROCEDURE — 25010000002 CEFTRIAXONE PER 250 MG: Performed by: INTERNAL MEDICINE

## 2024-01-17 PROCEDURE — 25010000002 HYDROMORPHONE PER 4 MG: Performed by: ANESTHESIOLOGY

## 2024-01-17 PROCEDURE — 25010000002 ENOXAPARIN PER 10 MG: Performed by: INTERNAL MEDICINE

## 2024-01-17 PROCEDURE — 97116 GAIT TRAINING THERAPY: CPT

## 2024-01-17 PROCEDURE — 0UBGXZX EXCISION OF VAGINA, EXTERNAL APPROACH, DIAGNOSTIC: ICD-10-PCS | Performed by: OBSTETRICS & GYNECOLOGY

## 2024-01-17 PROCEDURE — 97535 SELF CARE MNGMENT TRAINING: CPT

## 2024-01-17 PROCEDURE — 25810000003 SODIUM CHLORIDE 0.9 % SOLUTION 250 ML FLEX CONT: Performed by: NURSE PRACTITIONER

## 2024-01-17 PROCEDURE — 25810000003 SODIUM CHLORIDE PER 500 ML: Performed by: OBSTETRICS & GYNECOLOGY

## 2024-01-17 PROCEDURE — 25810000003 LACTATED RINGERS PER 1000 ML: Performed by: ANESTHESIOLOGY

## 2024-01-17 PROCEDURE — 97110 THERAPEUTIC EXERCISES: CPT

## 2024-01-17 PROCEDURE — 25010000002 PROPOFOL 10 MG/ML EMULSION

## 2024-01-17 PROCEDURE — 25010000002 NA FERRIC GLUC CPLX PER 12.5 MG: Performed by: NURSE PRACTITIONER

## 2024-01-17 PROCEDURE — 88305 TISSUE EXAM BY PATHOLOGIST: CPT | Performed by: OBSTETRICS & GYNECOLOGY

## 2024-01-17 PROCEDURE — 25010000002 ONDANSETRON PER 1 MG

## 2024-01-17 RX ORDER — DROPERIDOL 2.5 MG/ML
0.62 INJECTION, SOLUTION INTRAMUSCULAR; INTRAVENOUS ONCE AS NEEDED
Status: DISCONTINUED | OUTPATIENT
Start: 2024-01-17 | End: 2024-01-17 | Stop reason: HOSPADM

## 2024-01-17 RX ORDER — DOCUSATE SODIUM 100 MG/1
100 CAPSULE, LIQUID FILLED ORAL 2 TIMES DAILY
Status: DISCONTINUED | OUTPATIENT
Start: 2024-01-17 | End: 2024-01-18 | Stop reason: HOSPADM

## 2024-01-17 RX ORDER — ONDANSETRON 2 MG/ML
INJECTION INTRAMUSCULAR; INTRAVENOUS AS NEEDED
Status: DISCONTINUED | OUTPATIENT
Start: 2024-01-17 | End: 2024-01-17 | Stop reason: SURG

## 2024-01-17 RX ORDER — LIDOCAINE HYDROCHLORIDE 20 MG/ML
INJECTION, SOLUTION EPIDURAL; INFILTRATION; INTRACAUDAL; PERINEURAL AS NEEDED
Status: DISCONTINUED | OUTPATIENT
Start: 2024-01-17 | End: 2024-01-17 | Stop reason: SURG

## 2024-01-17 RX ORDER — FENTANYL CITRATE 50 UG/ML
25 INJECTION, SOLUTION INTRAMUSCULAR; INTRAVENOUS
Status: DISCONTINUED | OUTPATIENT
Start: 2024-01-17 | End: 2024-01-17 | Stop reason: HOSPADM

## 2024-01-17 RX ORDER — HYDROCODONE BITARTRATE AND ACETAMINOPHEN 5; 325 MG/1; MG/1
1 TABLET ORAL EVERY 4 HOURS PRN
Status: DISCONTINUED | OUTPATIENT
Start: 2024-01-17 | End: 2024-01-18 | Stop reason: HOSPADM

## 2024-01-17 RX ORDER — IBUPROFEN 600 MG/1
1 TABLET ORAL
Status: DISCONTINUED | OUTPATIENT
Start: 2024-01-17 | End: 2024-01-18 | Stop reason: HOSPADM

## 2024-01-17 RX ORDER — PROPOFOL 10 MG/ML
VIAL (ML) INTRAVENOUS AS NEEDED
Status: DISCONTINUED | OUTPATIENT
Start: 2024-01-17 | End: 2024-01-17 | Stop reason: SURG

## 2024-01-17 RX ORDER — POLYETHYLENE GLYCOL 3350 17 G/17G
17 POWDER, FOR SOLUTION ORAL DAILY
Status: DISCONTINUED | OUTPATIENT
Start: 2024-01-18 | End: 2024-01-18 | Stop reason: HOSPADM

## 2024-01-17 RX ORDER — DEXTROSE MONOHYDRATE 25 G/50ML
50 INJECTION, SOLUTION INTRAVENOUS
Status: DISCONTINUED | OUTPATIENT
Start: 2024-01-17 | End: 2024-01-18 | Stop reason: HOSPADM

## 2024-01-17 RX ORDER — ACETAMINOPHEN 325 MG/1
650 TABLET ORAL EVERY 6 HOURS PRN
Status: DISCONTINUED | OUTPATIENT
Start: 2024-01-17 | End: 2024-01-18 | Stop reason: HOSPADM

## 2024-01-17 RX ORDER — FLUMAZENIL 0.1 MG/ML
0.2 INJECTION INTRAVENOUS AS NEEDED
Status: DISCONTINUED | OUTPATIENT
Start: 2024-01-17 | End: 2024-01-17 | Stop reason: HOSPADM

## 2024-01-17 RX ORDER — ONDANSETRON 2 MG/ML
4 INJECTION INTRAMUSCULAR; INTRAVENOUS
Status: DISCONTINUED | OUTPATIENT
Start: 2024-01-17 | End: 2024-01-17 | Stop reason: HOSPADM

## 2024-01-17 RX ORDER — FENTANYL CITRATE 50 UG/ML
INJECTION, SOLUTION INTRAMUSCULAR; INTRAVENOUS AS NEEDED
Status: DISCONTINUED | OUTPATIENT
Start: 2024-01-17 | End: 2024-01-17 | Stop reason: SURG

## 2024-01-17 RX ORDER — INSULIN LISPRO 100 [IU]/ML
2-7 INJECTION, SOLUTION INTRAVENOUS; SUBCUTANEOUS
Status: DISCONTINUED | OUTPATIENT
Start: 2024-01-17 | End: 2024-01-18 | Stop reason: HOSPADM

## 2024-01-17 RX ORDER — IBUPROFEN 600 MG/1
600 TABLET ORAL ONCE AS NEEDED
Status: DISCONTINUED | OUTPATIENT
Start: 2024-01-17 | End: 2024-01-17 | Stop reason: HOSPADM

## 2024-01-17 RX ORDER — LABETALOL HYDROCHLORIDE 5 MG/ML
10 INJECTION, SOLUTION INTRAVENOUS
Status: DISCONTINUED | OUTPATIENT
Start: 2024-01-17 | End: 2024-01-18 | Stop reason: HOSPADM

## 2024-01-17 RX ORDER — SODIUM CHLORIDE, SODIUM LACTATE, POTASSIUM CHLORIDE, CALCIUM CHLORIDE 600; 310; 30; 20 MG/100ML; MG/100ML; MG/100ML; MG/100ML
9 INJECTION, SOLUTION INTRAVENOUS CONTINUOUS
Status: DISCONTINUED | OUTPATIENT
Start: 2024-01-17 | End: 2024-01-18 | Stop reason: HOSPADM

## 2024-01-17 RX ORDER — PHENAZOPYRIDINE HYDROCHLORIDE 100 MG/1
100 TABLET, FILM COATED ORAL
Status: DISCONTINUED | OUTPATIENT
Start: 2024-01-17 | End: 2024-01-18 | Stop reason: HOSPADM

## 2024-01-17 RX ORDER — MIDAZOLAM HYDROCHLORIDE 1 MG/ML
1 INJECTION INTRAMUSCULAR; INTRAVENOUS
Status: DISCONTINUED | OUTPATIENT
Start: 2024-01-17 | End: 2024-01-17 | Stop reason: HOSPADM

## 2024-01-17 RX ORDER — DEXTROSE MONOHYDRATE 25 G/50ML
12.5 INJECTION, SOLUTION INTRAVENOUS AS NEEDED
Status: DISCONTINUED | OUTPATIENT
Start: 2024-01-17 | End: 2024-01-17 | Stop reason: HOSPADM

## 2024-01-17 RX ORDER — NICOTINE POLACRILEX 4 MG
15 LOZENGE BUCCAL
Status: DISCONTINUED | OUTPATIENT
Start: 2024-01-17 | End: 2024-01-18 | Stop reason: HOSPADM

## 2024-01-17 RX ORDER — ACETAMINOPHEN 650 MG/1
650 SUPPOSITORY RECTAL EVERY 4 HOURS PRN
Status: DISCONTINUED | OUTPATIENT
Start: 2024-01-17 | End: 2024-01-18 | Stop reason: HOSPADM

## 2024-01-17 RX ORDER — SODIUM CHLORIDE 0.9 % (FLUSH) 0.9 %
10 SYRINGE (ML) INJECTION EVERY 12 HOURS SCHEDULED
Status: DISCONTINUED | OUTPATIENT
Start: 2024-01-17 | End: 2024-01-17 | Stop reason: HOSPADM

## 2024-01-17 RX ORDER — PANTOPRAZOLE SODIUM 40 MG/1
40 TABLET, DELAYED RELEASE ORAL
Status: DISCONTINUED | OUTPATIENT
Start: 2024-01-18 | End: 2024-01-18 | Stop reason: HOSPADM

## 2024-01-17 RX ORDER — LABETALOL HYDROCHLORIDE 5 MG/ML
5 INJECTION, SOLUTION INTRAVENOUS
Status: DISCONTINUED | OUTPATIENT
Start: 2024-01-17 | End: 2024-01-17 | Stop reason: HOSPADM

## 2024-01-17 RX ORDER — OXYCODONE AND ACETAMINOPHEN 10; 325 MG/1; MG/1
1 TABLET ORAL ONCE AS NEEDED
Status: DISCONTINUED | OUTPATIENT
Start: 2024-01-17 | End: 2024-01-17 | Stop reason: HOSPADM

## 2024-01-17 RX ORDER — NALOXONE HCL 0.4 MG/ML
0.04 VIAL (ML) INJECTION AS NEEDED
Status: DISCONTINUED | OUTPATIENT
Start: 2024-01-17 | End: 2024-01-17 | Stop reason: HOSPADM

## 2024-01-17 RX ORDER — ONDANSETRON 2 MG/ML
4 INJECTION INTRAMUSCULAR; INTRAVENOUS EVERY 6 HOURS PRN
Status: DISCONTINUED | OUTPATIENT
Start: 2024-01-17 | End: 2024-01-18 | Stop reason: HOSPADM

## 2024-01-17 RX ORDER — SODIUM CHLORIDE 9 MG/ML
INJECTION, SOLUTION INTRAVENOUS AS NEEDED
Status: DISCONTINUED | OUTPATIENT
Start: 2024-01-17 | End: 2024-01-17 | Stop reason: HOSPADM

## 2024-01-17 RX ORDER — ENOXAPARIN SODIUM 100 MG/ML
1 INJECTION SUBCUTANEOUS EVERY 12 HOURS
Status: DISCONTINUED | OUTPATIENT
Start: 2024-01-17 | End: 2024-01-18 | Stop reason: HOSPADM

## 2024-01-17 RX ORDER — SODIUM CHLORIDE 0.9 % (FLUSH) 0.9 %
10 SYRINGE (ML) INJECTION AS NEEDED
Status: DISCONTINUED | OUTPATIENT
Start: 2024-01-17 | End: 2024-01-17 | Stop reason: HOSPADM

## 2024-01-17 RX ORDER — BISACODYL 10 MG
10 SUPPOSITORY, RECTAL RECTAL DAILY PRN
Status: DISCONTINUED | OUTPATIENT
Start: 2024-01-17 | End: 2024-01-18 | Stop reason: HOSPADM

## 2024-01-17 RX ORDER — ATORVASTATIN CALCIUM 40 MG/1
80 TABLET, FILM COATED ORAL NIGHTLY
Status: DISCONTINUED | OUTPATIENT
Start: 2024-01-17 | End: 2024-01-18 | Stop reason: HOSPADM

## 2024-01-17 RX ORDER — HYDROMORPHONE HYDROCHLORIDE 1 MG/ML
0.5 INJECTION, SOLUTION INTRAMUSCULAR; INTRAVENOUS; SUBCUTANEOUS
Status: DISCONTINUED | OUTPATIENT
Start: 2024-01-17 | End: 2024-01-17 | Stop reason: HOSPADM

## 2024-01-17 RX ADMIN — FENTANYL CITRATE 25 MCG: 50 INJECTION, SOLUTION INTRAMUSCULAR; INTRAVENOUS at 13:16

## 2024-01-17 RX ADMIN — PROPOFOL 170 MG: 10 INJECTION, EMULSION INTRAVENOUS at 13:13

## 2024-01-17 RX ADMIN — FENTANYL CITRATE 25 MCG: 50 INJECTION, SOLUTION INTRAMUSCULAR; INTRAVENOUS at 13:09

## 2024-01-17 RX ADMIN — LIDOCAINE HYDROCHLORIDE 80 MG: 20 INJECTION, SOLUTION EPIDURAL; INFILTRATION; INTRACAUDAL; PERINEURAL at 13:09

## 2024-01-17 RX ADMIN — ACETAMINOPHEN 650 MG: 325 TABLET, FILM COATED ORAL at 05:06

## 2024-01-17 RX ADMIN — SODIUM CHLORIDE, POTASSIUM CHLORIDE, SODIUM LACTATE AND CALCIUM CHLORIDE 9 ML/HR: 600; 310; 30; 20 INJECTION, SOLUTION INTRAVENOUS at 12:54

## 2024-01-17 RX ADMIN — ATORVASTATIN CALCIUM 80 MG: 40 TABLET ORAL at 20:04

## 2024-01-17 RX ADMIN — Medication 10 ML: at 10:22

## 2024-01-17 RX ADMIN — INSULIN LISPRO 2 UNITS: 100 INJECTION, SOLUTION INTRAVENOUS; SUBCUTANEOUS at 20:14

## 2024-01-17 RX ADMIN — HYDROMORPHONE HYDROCHLORIDE 0.5 MG: 1 INJECTION, SOLUTION INTRAMUSCULAR; INTRAVENOUS; SUBCUTANEOUS at 13:53

## 2024-01-17 RX ADMIN — CEFTRIAXONE 1000 MG: 1 INJECTION, POWDER, FOR SOLUTION INTRAMUSCULAR; INTRAVENOUS at 17:39

## 2024-01-17 RX ADMIN — ACETAMINOPHEN 650 MG: 325 TABLET, FILM COATED ORAL at 10:59

## 2024-01-17 RX ADMIN — PHENAZOPYRIDINE HYDROCHLORIDE 100 MG: 100 TABLET ORAL at 20:04

## 2024-01-17 RX ADMIN — PANTOPRAZOLE SODIUM 40 MG: 40 TABLET, DELAYED RELEASE ORAL at 05:28

## 2024-01-17 RX ADMIN — ACETAMINOPHEN 650 MG: 325 TABLET, FILM COATED ORAL at 16:28

## 2024-01-17 RX ADMIN — ONDANSETRON 4 MG: 2 INJECTION INTRAMUSCULAR; INTRAVENOUS at 13:18

## 2024-01-17 RX ADMIN — DOCUSATE SODIUM 100 MG: 100 CAPSULE, LIQUID FILLED ORAL at 20:04

## 2024-01-17 RX ADMIN — Medication 10 ML: at 20:04

## 2024-01-17 RX ADMIN — ENOXAPARIN SODIUM 80 MG: 100 INJECTION SUBCUTANEOUS at 20:04

## 2024-01-17 RX ADMIN — SODIUM CHLORIDE 250 MG: 9 INJECTION, SOLUTION INTRAVENOUS at 10:22

## 2024-01-17 RX ADMIN — FENTANYL CITRATE 25 MCG: 50 INJECTION, SOLUTION INTRAMUSCULAR; INTRAVENOUS at 13:13

## 2024-01-17 RX ADMIN — FENTANYL CITRATE 25 MCG: 50 INJECTION, SOLUTION INTRAMUSCULAR; INTRAVENOUS at 13:19

## 2024-01-17 RX ADMIN — ACETAMINOPHEN 650 MG: 325 TABLET, FILM COATED ORAL at 23:54

## 2024-01-17 RX ADMIN — HYDROCODONE BITARTRATE AND ACETAMINOPHEN 1 TABLET: 5; 325 TABLET ORAL at 02:14

## 2024-01-17 NOTE — PROGRESS NOTES
[facility] Medicine Services  INPATIENT PROGRESS NOTE    Patient Name: Radha Wade  Date of Admission: 1/12/2024  Today's Date: 01/16/24  Length of Stay: 2  Primary Care Physician: Myron Vines MD    Subjective   Chief Complaint: R sided weakness, constipation    HPI   Patient seen and evaluated.  She is still complaining of right leg weakness and incoordination but is worse than her right arm.  She is planned for D&C tomorrow.    Review of Systems   All pertinent negatives and positives are as above. All other systems have been reviewed and are negative unless otherwise stated.     Objective    Temp:  [97.8 °F (36.6 °C)-98.5 °F (36.9 °C)] 98 °F (36.7 °C)  Heart Rate:  [77-98] 77  Resp:  [16-20] 18  BP: (103-130)/(44-56) 106/52  Physical Exam  Constitutional:       General: She is not in acute distress.     Appearance: She is not ill-appearing or diaphoretic.   HENT:      Head: Normocephalic and atraumatic.      Right Ear: External ear normal.      Left Ear: External ear normal.      Nose: No congestion or rhinorrhea.      Mouth/Throat:      Mouth: Mucous membranes are moist.      Pharynx: No oropharyngeal exudate or posterior oropharyngeal erythema.   Eyes:      General: No scleral icterus.     Extraocular Movements: Extraocular movements intact.      Conjunctiva/sclera: Conjunctivae normal.   Cardiovascular:      Rate and Rhythm: Normal rate and regular rhythm.      Heart sounds: Normal heart sounds. No murmur heard.  Pulmonary:      Effort: Pulmonary effort is normal. No respiratory distress.      Breath sounds: Normal breath sounds. No wheezing, rhonchi or rales.   Abdominal:      General: Abdomen is flat. There is no distension.      Palpations: Abdomen is soft.      Tenderness: There is no abdominal tenderness. There is no guarding.   Musculoskeletal:         General: No swelling, tenderness or deformity.      Cervical back: Neck supple. No rigidity. No muscular  tenderness.      Right lower leg: No edema.      Left lower leg: No edema.   Lymphadenopathy:      Cervical: No cervical adenopathy.   Skin:     General: Skin is warm and dry.   Neurological:      General: No focal deficit present.      Mental Status: She is alert and oriented to person, place, and time.      Cranial Nerves: No cranial nerve deficit.      Motor: Weakness present.      Comments: Right leg > right arm weakness and incoordination noted.   Psychiatric:         Mood and Affect: Mood normal.         Behavior: Behavior normal.         Results Review:  I have reviewed the labs, radiology results, and diagnostic studies.    Laboratory Data:   Results from last 7 days   Lab Units 01/16/24  1013 01/15/24  0515 01/14/24  0359   WBC 10*3/mm3 10.22 14.86* 8.82   HEMOGLOBIN g/dL 8.5* 7.8* 8.9*   HEMATOCRIT % 29.6* 28.6* 31.8*   PLATELETS 10*3/mm3 606* 692* 605*        Results from last 7 days   Lab Units 01/15/24  0515 01/14/24  0359 01/13/24  0602   SODIUM mmol/L 138 136 136   POTASSIUM mmol/L 4.0 4.5 4.2   CHLORIDE mmol/L 101 100 100   CO2 mmol/L 26.0 25.0 23.0   BUN mg/dL 16 10 11   CREATININE mg/dL 0.54* 0.47* 0.50*   CALCIUM mg/dL 8.9 8.8 8.7   BILIRUBIN mg/dL 0.2 0.2 0.2   ALK PHOS U/L 163* 229* 208*   ALT (SGPT) U/L 7 10 11   AST (SGOT) U/L 9 14 17   GLUCOSE mg/dL 139* 191* 126*       Culture Data:   Urine Culture   Date Value Ref Range Status   01/13/2024 >100,000 CFU/mL Mixed Karo Isolated  Final       Radiology Data:   Imaging Results (Last 24 Hours)       ** No results found for the last 24 hours. **            I have reviewed the patient's current medications.     Assessment/Plan   Assessment  Active Hospital Problems    Diagnosis     **Acute focal neurological deficit     CVA (cerebral vascular accident)     Type 2 diabetes mellitus, without long-term current use of insulin     Metastatic disease     Acute blood loss anemia     Vaginal bleeding     DVT (deep venous thrombosis)        Treatment  Plan  #1 acute CVA -patient presented with symptoms concerning for stroke.  Initial head CT was nonacute.  MRI with and without was obtained today to look for CVA versus possible mass given cancer history.  It was positive for multifocal diffusion restriction/CVA.  No obvious flow limitation was noted on angiographies.  Some was limited due to motion.  2D echo pending.  She has been on a heparin drip at this point by neurology, defer to them but suspect okay to resume Eliquis.  Will start Lovenox after D&C in the morning.  Continue heparin drip for now.    #2 history DVT -patient with concern about right upper extremity swelling on arrival but she does not have a DVT in that extremity.  History of DVT in her lower extremities.  In the setting of malignancy.  She has been on Eliquis at this point but transition to heparin drip here due to fear for possible failure of therapy.  Patient openly admits that she would take the medicine once a day and not regularly.  Do not suspect there is any failure of care at this time rather patient noncompliance.  Feel she is likely somewhat in the mild to current issues at hand.  Note potential recommendations for Lovenox at discharge by neurology.  Will transition to Lovenox after D&C procedure tomorrow and possibly discharge on Lovenox.    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 Grullon.  It seems that a lot of the procedures and testing have been ordered prior have typically not been performed as patient did not go for them.  Suspect she is in some denial to her diagnosis.  She is also supposed to have an upcoming appointment at [place] for further evaluation.  Oncology input appreciated.  Recommended gastroenterology evaluation.  Will follow recommendations.    #4 history of DM2 -on sliding scale insulin and hypoglycemia protocol.    #5 anemia -stable since arrival.  Does have a history of vaginal bleeding likely in the setting of GYN malignancy.  Plan for D&C  in the morning.  N.p.o. from midnight.  Monitor, no obvious signs of bleeding here so far.    #6 constipation -start bowel regimen    #7.  Urinary tract infection-urinalysis suggestive of urinary tract infection although sample was contaminated.  Will start IV antibiotic with ceftriaxone.    Medical Decision Making  Number and Complexity of problems: 1 acute problem, multiple chronic  Differential Diagnosis: as above    Conditions and Status        Pt is new to me, appears stable/non-toxic     MDM Data  External documents reviewed: none  Cardiac tracing (EKG, telemetry) interpretation: sinus on monitor  Radiology interpretation: reports reviewed  Labs reviewed: as above  Any tests that were considered but not ordered: none     Decision rules/scores evaluated (example [id number], [place], etc): none     Discussed with: patient, nursing     Care Planning  Shared decision making: Patient apprised of current labs, vitals, imaging and treatment plan.  They are agreeable with proceeding with plans as discussed.    Code status and discussions: full code    Disposition  Social Determinants of Health that impact treatment or disposition: none  I expect the patient to be discharged to possible acute inpt rehab vs snf in 1-2 days    Electronically signed by Jose Tang MD, 01/16/24, 18:41 CST.;y

## 2024-01-17 NOTE — ANESTHESIA PROCEDURE NOTES
Airway  Urgency: elective    Date/Time: 1/17/2024 1:11 PM  End Time:1/17/2024 1:11 PM  Airway not difficult    General Information and Staff    Patient location during procedure: OR  CRNA/CAA: Demian Altamirano CRNA  SRNA: Igor Colorado SRNA  Indications and Patient Condition  Indications for airway management: airway protection    Preoxygenated: yes  Mask difficulty assessment: 0 - not attempted    Final Airway Details  Final airway type: supraglottic airway      Successful airway: I-gel  Size 4     Number of attempts at approach: 1  Assessment: lips, teeth, and gum same as pre-op

## 2024-01-17 NOTE — PLAN OF CARE
Goal Outcome Evaluation:  Plan of Care Reviewed With: patient           Outcome Evaluation: Pt A&Ox4, anxious with increased pain, able to make needs known. Medications given and BG monitored per orders, PRN Dilaudid and Norco given with positive effect. IV to LAC and LFA maintained, continuous heparin drip. Up to BSC x1, voiding. BLE edema. NPO @0000 for D&C. Safety maintained, call light within reach.    Ptt ordered for 01:40, lab notified this AM of no ptt results in chart after phlebotomy had been on floor. Reordered STAT ptt. Pending results.

## 2024-01-17 NOTE — ANESTHESIA PREPROCEDURE EVALUATION
Anesthesia Evaluation     Patient summary reviewed   no history of anesthetic complications:   NPO Solid Status: > 8 hours             Airway   Mallampati: II  TM distance: >3 FB  Neck ROM: full  Dental          Pulmonary    (-) COPD, asthma, sleep apnea, not a smoker  Cardiovascular     (+) DVT  (-) pacemaker, past MI, angina, cardiac stents      Neuro/Psych  (+) CVA residual symptoms  (-) seizures, TIA  GI/Hepatic/Renal/Endo    (+) diabetes mellitus  (-) GERD, liver disease, no renal disease    Musculoskeletal     Abdominal    Substance History      OB/GYN          Other   blood dyscrasia anemia,     ROS/Med Hx Other: Gyn malignancy                  Anesthesia Plan    ASA 3 - emergent     general     intravenous induction     Anesthetic plan, risks, benefits, and alternatives have been provided, discussed and informed consent has been obtained with: patient.    CODE STATUS:    Code Status (Patient has no pulse and is not breathing): CPR (Attempt to Resuscitate)  Medical Interventions (Patient has pulse or is breathing): Full Support

## 2024-01-17 NOTE — PROGRESS NOTES
HEMATOLOGY AND MEDICAL ONCOLOGY PROGRESS NOTE    Patient name: Radha Wade  Patient : 1960  VISIT # [id number]  MR #[id number]  Room:  327    SUBJECTIVE: Resting in bed.  Continues to have abdominal cramping and lower back pain but has improved with the use of Dilaudid.  Repeat urinalysis indicated 2+ bacteria and she is now receiving Rocephin.  Anxious/nervous about having D&C done this morning.  Persistent right lower extremity weakness with impaired mobility, participating with PT.    INTERVAL HISTORY:    Radha Wade is a 63-year-old  female whom I have been evaluating over the last couple months referred to me regarding a suspected potential malignant process.  Radha has been to some degree in denial and has failed to follow-up or keep appointments in an attempt for an aggressive and expedited workup.  Despite that, the biggest part of her workup has been completed.  She was scheduled to see Dr. Frank Miles in evaluation to obtain tissue diagnosis on 2024.  Unfortunately, Ms. Wade was evaluated in the ED on 2022 and admitted to [facility] with a left-sided CVA and right-sided arm and leg weakness.  She has been under evaluation over the weekend for this and is anticipating discharge to rehab.  I was consulted and continuity of care.        DETAILED TUMOR HISTORY COPIED FROM MY OFFICE RECORDS     TARGET POTENTIAL MALIGNANT SITES  Enlarged uterus with abnormal vaginal bleeding, 45 pound weight loss in 3 to 6 months  Omental infiltration and nodularity progressing in 3 months  2.9 x 1.9 cm nodule in the anterior abdomen and adjacent and posterior to the umbilicus   2.7 cm left pelvic lymph node, previously 2.4 cm,   3 cm right external iliac lymph node, previously 2.1 cm   1.6 cm sclerotic bone lesion in the S1 vertebral body on CT 2023   CA 19-9 of 355  and  of 455 on 2023         TUMOR HISTORY  Radha was seen in initial medical oncological consultation on  11/9/2023, referred by Dr Myron Vines, PCP, for suspected malignant process.     Despite her age of 63, Radha continued to have regular menstrual cycles until she fell on 7/19/2023.   In evaluation she was also found to have a tick.  She reports that an attempt was made to remove it but it was not able to be removed completely.   Since July 2023, Radha's menses began appearing every 2 weeks rather than monthly.    Dr. De Los Santos, her gynecologist recommended D&C at that time however Radha declined due to difficulty and pain in the lower pelvic area.  Radha and her  confirm that he tried to get a D&C done sometime in August 2023.    Dr. De Los Santos, her gynecologist referred her to the ED on 9/14/2023 due to bilateral lower extremity swelling. Noninvasive studies documented bilateral lower extremity DVTs on 9/14/2023.  She has been on Eliquis since that time.    Keiths menstrual bleeding is reported to have been continuous since being placed on Eliquis.  She continues to have leg pain, low back pain, feeling washed out and reports having had 45 pounds of weight loss in the previous 2 to 3 months.     Radha was evaluated at [facility] ER on 9/14/2023 for left leg swelling and abdominal pain.  Workup revealed the following:  bilateral LE DVTs (patient refused CTA.  Lovenox was initiated and discharged on Eliquis)   Retroperitoneal lymphadenopathy, soft tissue nodularity and stranding of omentum on CT Abdomen (Dr De Los Santos consulted, to f/u with D&C and ablation)  Reported 25 lb weight loss (in September 2023) over the previous few months.         US VENOUS DOPPLER LOWER EXTREMITIES BILATERAL on 9/14/23 at [facility]  There is evidence of deep venous thrombosis in the popliteal and posterior tibial veins in the right lower extremity.   There is also evidence of deep venous thrombosis in the superficial femoral, popliteal, posterior tibial, and peroneal veins of the left lower extremity     CT ABDOMEN PELVIS WO at [facility] on  9/14/23  Somewhat bulky retroperitoneal lymphadenopathy and pelvic lymphadenopathy worrisome for metastatic lymphadenopathy or lymphoma.   There is also some stranding and nodularity in the greater omental region anteriorly suggesting possible peritoneal metastatic disease.   The uterus is prominent in size measuring 11.7 x 6.9 x 8.7 cm but a focal mass is not seen. Follow-up nonemergent ultrasound is recommended to evaluate the uterus for a potential mass. There is no other obvious area of primary neoplasm on the noncontrast CT images.   A vague 1.6 cm sclerotic bone lesion in the S1 vertebral body is indeterminate.        On 10/25/2023, she was seen by her PCP, Dr. Myron Vines.  Because of the history of the tick bite and rash, he requested a Borrelia Burgdorferi (Lyme disease) antibody which was positive on 10/25/2023.  Dr. Vines prescribed doxycycline 100 mg BID on 10/30/2023 which according to the patient was started somewhere around the first week of November to be taken for 1 month.        Serology collected 11/9/2023   CBC- WBC of 8.34. Hgb is 9.7 with an MCV of 74.9 and platelets 639,000.  CMP-Na+ 136, Creat 0.5, AlkPhos 149  LDH-223  B2M- 3.8  Kappa- 86.08  Lambda- 77.12  K/L ratio- 1.12  IgG 1400 ,IgA 572, IgM 73  M-spike- not applicable  Ferritin-474.6  Iron level-25  Iron Saturation-11%  TIBC-231    Folic acid-2.6  TSH-3.41   CEA-1.7  CA 19-9-355  -455     Serology collected 12/21/2023  Ferritin     443.6  Iron            19  Iron sat%     9         343  CA 19-9     240  CEA           1.7  B2M           3.9  T protein    6.8  Kappa light chain      92.43  Lambda light chain   74.51  K/L ratio                     1.24  M protein                   negative  IgG                            1398  IgA                               542  IgM                                62           Prothrombotic panel 11/9/2023  Factor V Leiden - negative  Homocysteine - 11  Lupus Anticoag - not  detected  Protein C -138  Protein S - 85  Prothrombotic panel 12/21/2023  Lupus anticoagulant                                  not detected  Anticardiolipin Ab IgG                               <10  Anticardiolipin Ab IgM                               <10  Prothrombin gene mutation [id number]      Negative  MTHFR mutation C677T                          Negative  MTHFR mutation [id number]                       Heterozygous     GUADALUPE 2 and reflex panels                           PENDING     CT SOFT TISSUE NECK WO CONTRAST on 12/5/2023 at [facility]  No suspicious cervical adenopathy.  9 mm right upper lobe pulmonary nodule.   Moderate cervical spine osteoarthritis change      CT CHEST WO CONTRAST DIAGNOSTIC on 12/5/2023 at [facility]  Scattered bilateral pulmonary nodules   8 mm RUL pulmonary nodule  6 mm RUL pulmonary nodule  no mediastinal or hilar lymphadenopathy   No acute or suspicious bony finding.   partially visualized confluent retroperitoneal adenopathy.   Dome of the liver with a 10 mm hypodensity, incompletely characterized   on this exam         CT ABDOMEN PELVIS WO CONTRAST on 12/5/2023 at [facility], compared to 9/14/23  A vague, poorly defined, low-density nodule located posteriorly and superiorly in the dome of the liver was noted in the previous study with no change in size or shape in the interval   Moderate lobulation of the unenhanced kidneys bilaterally noted. An isodense mass or lesion may not be evaluated or excluded. There is moderate right hydronephrosis. There is moderate dilatation of the right proximal ureter which is completely encased by the lower abdomen/proximal pelvis by the enlarged lymph nodes. This was not noted in the previous study   Moderate diffuse enlargement of the uterus is seen which is anteverted   compressing on the urinary bladder. This is similar to the previous study   significant abdominal and pelvic lymphadenopathy. The lymph nodes are poorly identified and not separate well from each other.  There is significant surrounding retroperitoneal fat infiltration  Some visualized and separable lymph nodes:  2.7 cm left pelvic lymph node, previously 2.4 cm  3 cm right external iliac lymph node, previously 2.1 cm   Omental infiltration and nodularity which has significantly more progressed since the previous study   2.9 x 1.9 cm nodule in the anterior abdomen and adjacent and posterior to the umbilicus   vague sclerotic changes involving the anterior superior vertebral S1 is similar to the previous study with no change in the interval         Given her history with weight loss, vaginal bleeding, elevated tumor markers, imaging abnormalities with the uterus, the 2.9 x 1.9 cm nodule in the anterior abdomen suspicious for carcinomatosis, abdominal and pelvic lymphadenopathy, prothrombotic state with DVTs and subcentimeter pulmonary nodules although indeterminate, in this setting is very concerning for an active malignant process and suspicious for a GYN origin.     Completion of workup however will also include a bone scan that was ordered at last visit and not done, GI evaluation to rule out the GI tract as a potential source in addition to the other recommendations below.                    RECOMMENDATIONS on 12/21/2023  Bone scan  PET scan   C-scope - Appt with Bertha Parrish [facility] scheduled 1/3/2024, malignancy process with iron deficiency anemia  GUADALUPE 2 and reflex panels as well as the rest of the prothrombotic panel not done at a previous visit at her request and was redrawn on 12/21/2023  Pelvic ultrasound to evaluate uterus and adnexa  Consult Dr. Frank Miles GYNOnc to evaluate the The uterus which is prominent in size measuring 11.7 x 6.9 x 8.7 cm but without visualization a focal mass. Follow-up nonemergent ultrasound is recommended to evaluate the uterus for a potential mass on CT abdomen 9/14/2023  Follow-up in 2 weeks        NM PET/CT SKULL BASE TO MID THIGH on 12/26/23 at [facility], compared to  12/5/23  13 mm hypermetabolic left supraclavicular lymph node, max SUV 3.3 .  9 mm hypermetabolic right upper lobe pulmonary nodule with max SUV 2.8.   6 mm right lower lobe pulmonary nodule with mild hypermetabolic activity   Few other smaller pulmonary nodules are also present with have increased metabolic activity for small nodule size   No hypermetabolic axillary, mediastinal, or hilar lymph nodes   7 mm hypermetabolic lymph node in the epicardial fat.   Multiple hypermetabolic liver lesions identified. For reference, 11 mm hypermetabolic right posterior liver lesion with max SUV 5.2   2.7 x 1.9 cm omental/peritoneal hypermetabolic mass in the anterior abdomen with max SUV 8.3   Diffuse omental and peritoneal nodularity and fat stranding which is likely related to carcinomatosis.   Multiple enlarged hypermetabolic retroperitoneal lymph nodes throughout the abdomen and pelvis surrounding the abdominal aorta and pelvic arterial vasculature. For reference, 1.6 cm left para-aortic lymph node with max SUV 7.5   3 cm hypermetabolic right upper pelvic mass on image 61 with max SUV 8.9.  Hypermetabolic activity is present throughout the enlarged cervix with extension into the endometrial canal which is diffusely hypermetabolic. Max SUV is 14   ADDENDUM:   Described in the body of the report but not the impression, there is mild right-sided hydronephrosis which is like related to ureteral compression by right pelvic lymphadenopathy.  There is also subcutaneous fat stranding in the left thigh which is likely related to venous compression by pelvic lymphadenopathy. However, venous thrombus could also present with this finding (not visualized on  this study but exam is performed without IV contrast)         US PELVIS COMPLETE- 12/26/2023  at [facility], compared to PET/CT on the same day. CT abdomen and pelvis 12/5/2023.   The uterus is enlarged and heterogeneous, measuring 13.6 x 6.1 x 8.1 cm   6.2 x 6.6 x 3.8 cm mass within the  endometrial cavity   6.9 x 4.5 x 5.1 cm hypoechoic mass at the lower uterine segment, likely involving the cervix  18 mm solid-appearing nodule in the right ovary (Right ovary measures 3.1 x 2.8 x 2.7 cm )  The left ovary appears within normal limits and measures 2.9 x 2.1 x 2.2 cm.      Columba Coulter, RN spoke with Radha on 12/29/2023 who reported that she received a message from the office of Dr Miles GYN-ONC in [place], but had not returned call yet ot make appointment.  Gave instructions to promptly return call to get appointment for evaluation as soon as possible.  Explained results of imaging and Dr Grullon's recommendation to follow through with ordered referral (order placed 12/21/23) for GYN ONC evaluation.     Radha states that she will call office Tuesday morning and will communicate with this clinic with date of appointment with Dr Miles so that f/u with Dr Grullon can be scheduled accordingly (following Dr Miles's evaluation).   Radha verbalized understanding, then returned to call stating that she had an appointment at Dr Miles's office on 1/9/2024.       Request to [facility] radiology Dept was made for images (CT Neck,Chest, Abd& Pelvis on 12/5/23 and PET, US pelvis on 12/26/23) to be forwarded to Dr Miles at [place].      Radha canceled her GI appointment with GREYSON Mccord for colonoscopy evaluation on 1/3/2023   Radha canceled her GYN/Onc appointment with Dr Miles on 1/9/2023 and states it has been rescheduled for 1/16/2023  Several attempts were made to schedule parenteral iron infusions in our office but she declined to schedule these.       OBJECTIVE:      Vitals:    01/17/24 0214   BP: 115/74   Pulse: 108   Resp: 20   Temp: 98.2 °F (36.8 °C)   SpO2: 95%       Intake/Output Summary (Last 24 hours) at 1/17/2024 0642  Last data filed at 1/16/2024 2040  Gross per 24 hour   Intake 240 ml   Output 300 ml   Net -60 ml     PHYSICAL EXAM:    CONSTITUTIONAL: Alert, appropriate, no  "acute distress  EYES: Nonicteric, EOM intact, pupils equal round   ENT: Mucous membranes moist, no oropharyngeal lesions   NECK: Supple, no masses   CHEST/LUNGS: CTA bilaterally, normal respiratory effort   CARDIOVASCULAR: RRR, no murmurs  ABDOMEN: soft non-tender, active bowel sounds, no HSM  EXTREMITIES: warm, moves all fours.  Bilateral lower extremity 2+ pitting edema  SKIN: warm, dry with no rashes or lesions  LYMPH: No cervical, clavicular, axillary, or inguinal lymphadenopathy  NEUROLOGIC: Right-sided arm weakness improved now just complains of numbness/tingling. Persistent right lower extremity weakness with impaired mobility.   PSYCH: mood and affect appropriate.  Anxious/nervous     CBC  Results from last 7 days   Lab Units 01/17/24  0628 01/16/24  1013 01/15/24  0515   WBC 10*3/mm3 11.58* 10.22 14.86*   HEMOGLOBIN g/dL 8.5* 8.5* 7.8*   HEMATOCRIT % 30.1* 29.6* 28.6*   PLATELETS 10*3/mm3 636* 606* 692*         Lab Results   Component Value Date     01/15/2024    K 4.0 01/15/2024     01/15/2024    CO2 26.0 01/15/2024    BUN 16 01/15/2024    CREATININE 0.54 (L) 01/15/2024    GLUCOSE 139 (H) 01/15/2024    CALCIUM 8.9 01/15/2024    BILITOT 0.2 01/15/2024    ALKPHOS 163 (H) 01/15/2024    AST 9 01/15/2024    ALT 7 01/15/2024    AGRATIO 0.8 01/15/2024    GLOB 4.1 01/15/2024       Lab Results   Component Value Date    INR 1.35 (H) 01/13/2024    INR 1.27 (H) 01/12/2024    INR 1.17 (H) 12/18/2023    PROTIME 16.8 (H) 01/13/2024    PROTIME 16.1 (H) 01/12/2024    PROTIME 15.1 (H) 12/18/2023       Cultures:    No results found for: \"BLOODCX\"  No components found for: \"URINCX\"    ASSESSMENT/PLAN:  #1  Disseminated abdominal malignancy, 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  Radha has been to some degree in denial and has failed to follow-up or keep " appointments in an attempt for an aggressive and expedited workup.  Despite that, the biggest part of her workup has been completed.  She was scheduled to see Dr. Frank Miles in evaluation to obtain tissue diagnosis on 1/16/2024.    TARGET POTENTIAL MALIGNANT SITES  Enlarged uterus with abnormal vaginal bleeding, 45 pound weight loss in 3 to 6 months  6.2 x 6.6 x 3.8 cm mass within the endometrial cavity    Omental infiltration and nodularity progressing in 3 months  2.9 x 1.9 cm nodule in the anterior abdomen and adjacent and posterior to the umbilicus   2.7 cm left pelvic lymph node, previously 2.4 cm,   3 cm right external iliac lymph node, previously 2.1 cm   1.6 cm sclerotic bone lesion in the S1 vertebral body on CT 9/14/2023    CA 19-9- 355  -  455       Evaluated by Dr. De Los Santos on 1/16/2024 and planning for D&C today, 1/17/2024    Recommendations are as follows:  After tissue diagnosis and all interventions are complete, given the fact that she was noncompliant with a rigorous schedule of Eliquis adding to the current prothrombotic conditions, a repeat trial of Eliquis could be considered.  Reschedule appointment with Dr. Frank Miles  Reschedule outpatient appointment with Dr. Grullon        #2  Acute ischemic stroke  Neurology recommended anticoagulation with heparin drip and then transition to Lovenox    Unfortunately, Ms. Wade was evaluated in the ED on 1/12/2022 and admitted to [facility] with a left-sided CVA and right-sided arm and leg weakness.  She has been under evaluation over the weekend for this and is anticipating discharge to rehab.  I was consulted and continuity of care.    Prothrombotic panel 11/9/2023  Factor V Leiden - negative  Homocysteine - 11  Lupus Anticoag - not detected  Protein C -138  Protein S - 85  Prothrombotic panel 12/21/2023  Lupus anticoagulant                                  not detected  Anticardiolipin Ab IgG                               <10  Anticardiolipin  Ab IgM                               <10  Prothrombin gene mutation [id number]      Negative  MTHFR mutation C677T                          Negative  MTHFR mutation [id number]                       Heterozygous  GUADALUPE 2 and reflex panels                           PENDING    MRI of the brain with and without contrast on 1/14/2023  Diffusion restriction with associated ADC mapping abnormality involving the mesial left temporal lobe, left external capsule, left insula as well as the body of the caudate nucleus. These infarcts are nonhemorrhagic.  Mild atrophy. Mild small vessel ischemic changes are noted involving the periventricular and higher white matter tracts.  Normal flow voids within the [place] of [place].        MRA of the head and neck without contrast on 1/14/2023  Carotid arteries are unremarkable. The carotid bifurcations are normal in appearance without evidence of rate limiting stenosis with both ICAs patent to the skull base  Right vertebral artery is widely patent from its origin to the skull base. The left vertebral artery is rather diminutive in size and is not well-defined in its proximal segment.     EMMA 1/15/2024:  Left ventricular systolic function is normal. Left ventricular ejection fraction appears to be 56 - 60%.  Normal right ventricular cavity size and systolic function noted.  No evidence of a patent foramen ovale.  No significant valvular abnormalities identified on this study.      Recommendations are as follows:  Anticoagulate with Lovenox after heparin is discontinued, at least until definitive tissue diagnosis is obtained.  After tissue diagnosis and all interventions are complete, given the fact that she was noncompliant with a rigorous schedule of Eliquis adding to the current prothrombotic conditions, a repeat trial of Eliquis could be considered.    Disposition: Possible discharge [facility] 8th floor Rehab for additional rehabilitation, awaiting insurance approval    #3  Microcytic iron  anemia     Latest Reference Range & Units 01/13/24 06:02   Iron 37 - 145 mcg/dL 17 (L)   Ferritin 13.00 - 150.00 ng/mL 440.90 (H)   Iron Saturation (TSAT) 20 - 50 % 8 (L)   Transferrin 200 - 360 mg/dL 145 (L)   TIBC 298 - 536 mcg/dL 216 (L)          Results from last 7 days   Lab Units 01/15/24  0515 01/14/24  0359 01/13/24  0602   WBC 10*3/mm3 14.86* 8.82 7.46   HEMOGLOBIN g/dL 7.8* 8.9* 8.9*   HEMATOCRIT % 28.6* 31.8* 34.4   PLATELETS 10*3/mm3 692* 605* 524*         Lab Results   Component Value Date    WBC 11.58 (H) 01/17/2024    HGB 8.5 (L) 01/17/2024    HCT 30.1 (L) 01/17/2024    MCV 73.4 (L) 01/17/2024     (H) 01/17/2024     Ferrlecit 250 mg IV x4 initiated on 1/15/2024. Day 3 of 4 today    Gastroenterology assisting and indicated suspects that her dysfunctional vaginal bleeding is significant cause for anemia and plan is to wait for further GYN evaluation before proceeding with any further investigation from a GI standpoint.      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  I reviewed other physician's notes, ancillary services and nurses assessments.  I have reviewed the above documentation completed by Francisca Giordano APRN  Please see my additional addended and/or modified contributions to the history of present illness, physical examination and assessment/medical decision-making and plan that  reflects my findings and impressions.  I discussed the essential elements of the care plan with Francisca RUBI and the patient.  I have encouraged and answered all the questions raised to the patient's understanding and satisfaction.  I concur with the above stated.    Subjective: Awake, alert, very talkative and interactive.  Stated she had a difficult time last night with urinary incontinence, apparent aggressive diuresis successful.  She is aware of plans on going for D&C today.  Her  is present during evaluation and discussion    Objective: She is in no acute distress.  Breathing comfortably, pain controlled  Clear lungs bilaterally, heart regular rate rhythm normal S1-S2 no S3.  Abdomen soft without rebound or tenderness.  Appears to possibly have a fluid wave    Assessment/plan:  Dr. De Los Santos, her gynecologist will be doing a D&C and biopsy today to obtain tissue to be able to proceed with treatment recommendations.    I had an in-depth discussion with Radha and her  today.  They both state that Dr. Antoine denies desired to do a D&C in August 2023 however she declined due to the fact that she is having lower abdominal discomfort along with dysfunctional bleeding and she did not want to entertain an intervention.  She realizes that may have postpone the diagnosis allowing progression of the process until this point.  I will await the results of the biopsy and develop a plan accordingly.    John Grullon MD  1/17/2024 07:33 CST

## 2024-01-17 NOTE — PAYOR COMM NOTE
"Ellen Wade (63 y.o. Female)   [id number]   Continued stay,     [facility] [phone]  Fax [phone]      Date of Birth   1960    Social Security Number       Address   [address] [address]    Home Phone   [phone]    MRN   [id number]       Voodoo   Other    Marital Status                               Admission Date   1/12/24    Admission Type   Emergency    Admitting Provider   Jose Tang MD    Attending Provider   Jose Tang MD    Department, Room/Bed   [facility] 3A, 327/1       Discharge Date       Discharge Disposition       Discharge Destination                                 Attending Provider: Jose Tang MD    Allergies: No Known Allergies    Isolation: None   Infection: None   Code Status: CPR    Ht: 165.1 cm (65\")   Wt: 77.6 kg (171 lb)    Admission Cmt: None   Principal Problem: Acute focal neurological deficit [R29.818]                   Active Insurance as of 1/12/2024       Primary Coverage       Payor Plan Insurance Group Employer/Plan Group    [employer] ANTH [id number] [id number]       Payor Plan Address Payor Plan Phone Number Payor Plan Fax Number Effective Dates    [address] [phone]  10/1/2023 - None Entered    [facility] [id number]         Subscriber Name Subscriber Birth Date Member ID       ELLEN WADE 1960 [id number]                     Emergency Contacts        (Rel.) Home Phone Work Phone Mobile Phone    Romeo Wade (Spouse) [phone] -- --              Vital Signs (last day)       Date/Time Temp Temp src Pulse Resp BP Patient Position SpO2    01/17/24 0653 98 (36.7) Oral 96 16 115/46 Sitting 95    01/17/24 0214 98.2 (36.8) -- 108 20 115/74 Sitting 95    01/16/24 2300 97.9 (36.6) Oral 71 16 105/46 Lying 94    01/16/24 1945 98.1 (36.7) Oral 75 16 118/47 Lying 97    01/16/24 1640 98 (36.7) Oral 77 18 106/52 Lying 96    01/16/24 1128 98.2 " (36.8) Oral 84 16 108/44 Lying 95    01/16/24 0746 98.5 (36.9) Oral 89 16 103/45 Lying 97    01/16/24 0304 97.8 (36.6) Oral 95 18 130/53 Lying 98    01/16/24 0000 98 (36.7) Oral 98 20 110/50 Sitting 96          Current Facility-Administered Medications   Medication Dose Route Frequency Provider Last Rate Last Admin    acetaminophen (TYLENOL) tablet 650 mg  650 mg Oral Q4H PRN Daniel Fleming MD   650 mg at 01/17/24 0506    Or    acetaminophen (TYLENOL) suppository 650 mg  650 mg Rectal Q4H PRN Daniel Fleming MD        atorvastatin (LIPITOR) tablet 80 mg  80 mg Oral Nightly Daniel Fleming MD   80 mg at 01/16/24 2029    bisacodyl (DULCOLAX) suppository 10 mg  10 mg Rectal Daily PRN Frank Frankel DO   10 mg at 01/16/24 1110    cefTRIAXone (ROCEPHIN) 1,000 mg in sodium chloride 0.9 % 100 mL IVPB  1,000 mg Intravenous Q24H Jose Tang  mL/hr at 01/16/24 1615 1,000 mg at 01/16/24 1615    dextrose (D50W) (25 g/50 mL) IV injection 25 g  25 g Intravenous Q15 Min PRN Daniel Fleming MD        dextrose (GLUTOSE) oral gel 15 g  15 g Oral Q15 Min PRN Daniel Fleming MD        docusate sodium (COLACE) capsule 100 mg  100 mg Oral BID Daniel Fleming MD   100 mg at 01/16/24 2029    ferric gluconate (FERRLECIT) 250 mg in sodium chloride 0.9 % 250 mL IVPB  250 mg Intravenous Daily Francisca Giordano APRN 125 mL/hr at 01/16/24 0808 250 mg at 01/16/24 0808    glucagon (GLUCAGEN) injection 1 mg  1 mg Intramuscular Q15 Min PRN Daniel Fleming MD        heparin 35310 units/250 mL (100 units/mL) in 0.45 % NaCl infusion  12 Units/kg/hr Intravenous Titrated Naldo Telles MD 10.3 mL/hr at 01/17/24 0802 13.273 Units/kg/hr at 01/17/24 0802    HYDROcodone-acetaminophen (NORCO) 5-325 MG per tablet 1 tablet  1 tablet Oral Q4H PRN Jose Tang MD   1 tablet at 01/17/24 0214    HYDROmorphone (DILAUDID) injection 1 mg  1 mg Intravenous Q2H PRN Kitty  Jose BAKER MD   1 mg at 24    Insulin Lispro (humaLOG) injection 2-7 Units  2-7 Units Subcutaneous 4x Daily AC & at Bedtime Daniel Fleming MD   2 Units at 24    labetalol (NORMODYNE,TRANDATE) injection 10 mg  10 mg Intravenous Q10 Min PRN Daniel Fleming MD        ondansetron (ZOFRAN) injection 4 mg  4 mg Intravenous Q6H PRN Daniel Fleming MD   4 mg at 24 1559    pantoprazole (PROTONIX) EC tablet 40 mg  40 mg Oral Q AM Daniel Fleming MD   40 mg at 24 0528    polyethylene glycol (MIRALAX) packet 17 g  17 g Oral Daily Frank Frankel,    17 g at 24 0808    sodium chloride 0.9 % flush 10 mL  10 mL Intravenous PRN Daniel Fleming MD        sodium chloride 0.9 % flush 10 mL  10 mL Intravenous Q12H Daniel Fleming MD   10 mL at 24    sodium chloride 0.9 % flush 10 mL  10 mL Intravenous PRN Daniel Fleming MD        sodium chloride 0.9 % infusion 40 mL  40 mL Intravenous PRN Daniel Fleming MD        traMADol (ULTRAM) tablet 25 mg  25 mg Oral Q8H PRN Daniel Fleming MD            Physician Progress Notes (last 24 hours)        John Grullon MD at 24 0629            HEMATOLOGY AND MEDICAL ONCOLOGY PROGRESS NOTE    Patient name: Radha Wade  Patient : 1960  VISIT # [id number]  MR #[id number]  Room:  [facility]    SUBJECTIVE: Resting in bed.  Continues to have abdominal cramping and lower back pain but has improved with the use of Dilaudid.  Repeat urinalysis indicated 2+ bacteria and she is now receiving Rocephin.  Anxious/nervous about having D&C done this morning.  Persistent right lower extremity weakness with impaired mobility, participating with PT.    INTERVAL HISTORY:    Radha Wade is a 63-year-old  female whom I have been evaluating over the last couple months referred to me regarding a suspected potential malignant process.  Radha has been  to some degree in denial and has failed to follow-up or keep appointments in an attempt for an aggressive and expedited workup.  Despite that, the biggest part of her workup has been completed.  She was scheduled to see Dr. Frank Miles in evaluation to obtain tissue diagnosis on 1/16/2024.  Unfortunately, Ms. Wade was evaluated in the ED on 1/12/2022 and admitted to [facility] with a left-sided CVA and right-sided arm and leg weakness.  She has been under evaluation over the weekend for this and is anticipating discharge to rehab.  I was consulted and continuity of care.        DETAILED TUMOR HISTORY COPIED FROM MY OFFICE RECORDS     TARGET POTENTIAL MALIGNANT SITES  Enlarged uterus with abnormal vaginal bleeding, 45 pound weight loss in 3 to 6 months  Omental infiltration and nodularity progressing in 3 months  2.9 x 1.9 cm nodule in the anterior abdomen and adjacent and posterior to the umbilicus   2.7 cm left pelvic lymph node, previously 2.4 cm,   3 cm right external iliac lymph node, previously 2.1 cm   1.6 cm sclerotic bone lesion in the S1 vertebral body on CT 9/14/2023   CA 19-9 of 355  and  of 455 on 12/21/2023         TUMOR HISTORY  Radha was seen in initial medical oncological consultation on 11/9/2023, referred by Dr Myron Vines, PCP, for suspected malignant process.     Despite her age of 63, Radha continued to have regular menstrual cycles until she fell on 7/19/2023.   In evaluation she was also found to have a tick.  She reports that an attempt was made to remove it but it was not able to be removed completely.   Since July 2023, Radha's menses began appearing every 2 weeks rather than monthly.    Dr. De Los Santos, her gynecologist recommended D&C at that time however Radha declined due to difficulty and pain in the lower pelvic area.  Radha and her  confirm that he tried to get a D&C done sometime in August 2023.    Dr. De Los Santos, her gynecologist referred her to the ED on 9/14/2023  due to bilateral lower extremity swelling. Noninvasive studies documented bilateral lower extremity DVTs on 9/14/2023.  She has been on Eliquis since that time.    Radha's menstrual bleeding is reported to have been continuous since being placed on Eliquis.  She continues to have leg pain, low back pain, feeling washed out and reports having had 45 pounds of weight loss in the previous 2 to 3 months.     Radha was evaluated at [facility] ER on 9/14/2023 for left leg swelling and abdominal pain.  Workup revealed the following:  bilateral LE DVTs (patient refused CTA.  Lovenox was initiated and discharged on Eliquis)   Retroperitoneal lymphadenopathy, soft tissue nodularity and stranding of omentum on CT Abdomen (Dr De Los Santos consulted, to f/u with D&C and ablation)  Reported 25 lb weight loss (in September 2023) over the previous few months.         US VENOUS DOPPLER LOWER EXTREMITIES BILATERAL on 9/14/23 at [facility]  There is evidence of deep venous thrombosis in the popliteal and posterior tibial veins in the right lower extremity.   There is also evidence of deep venous thrombosis in the superficial femoral, popliteal, posterior tibial, and peroneal veins of the left lower extremity     CT ABDOMEN PELVIS WO at [facility] on 9/14/23  Somewhat bulky retroperitoneal lymphadenopathy and pelvic lymphadenopathy worrisome for metastatic lymphadenopathy or lymphoma.   There is also some stranding and nodularity in the greater omental region anteriorly suggesting possible peritoneal metastatic disease.   The uterus is prominent in size measuring 11.7 x 6.9 x 8.7 cm but a focal mass is not seen. Follow-up nonemergent ultrasound is recommended to evaluate the uterus for a potential mass. There is no other obvious area of primary neoplasm on the noncontrast CT images.   A vague 1.6 cm sclerotic bone lesion in the S1 vertebral body is indeterminate.        On 10/25/2023, she was seen by her PCP, Dr. Myron Vines.  Because of the history of the  tick bite and rash, he requested a Borrelia Burgdorferi (Lyme disease) antibody which was positive on 10/25/2023.  Dr. Vines prescribed doxycycline 100 mg BID on 10/30/2023 which according to the patient was started somewhere around the first week of November to be taken for 1 month.        Serology collected 11/9/2023   CBC- WBC of 8.34. Hgb is 9.7 with an MCV of 74.9 and platelets 639,000.  CMP-Na+ 136, Creat 0.5, AlkPhos 149  LDH-223  B2M- 3.8  Kappa- 86.08  Lambda- 77.12  K/L ratio- 1.12  IgG 1400 ,IgA 572, IgM 73  M-spike- not applicable  Ferritin-474.6  Iron level-25  Iron Saturation-11%  TIBC-231    Folic acid-2.6  TSH-3.41   CEA-1.7  CA 19-9-355  -455     Serology collected 12/21/2023  Ferritin     443.6  Iron            19  Iron sat%     9         343  CA 19-9     240  CEA           1.7  B2M           3.9  T protein    6.8  Kappa light chain      92.43  Lambda light chain   74.51  K/L ratio                     1.24  M protein                   negative  IgG                            1398  IgA                               542  IgM                                62           Prothrombotic panel 11/9/2023  Factor V Leiden - negative  Homocysteine - 11  Lupus Anticoag - not detected  Protein C -138  Protein S - 85  Prothrombotic panel 12/21/2023  Lupus anticoagulant                                  not detected  Anticardiolipin Ab IgG                               <10  Anticardiolipin Ab IgM                               <10  Prothrombin gene mutation [id number]      Negative  MTHFR mutation C677T                          Negative  MTHFR mutation [id number]                       Heterozygous     GUADALUPE 2 and reflex panels                           PENDING     CT SOFT TISSUE NECK WO CONTRAST on 12/5/2023 at [facility]  No suspicious cervical adenopathy.  9 mm right upper lobe pulmonary nodule.   Moderate cervical spine osteoarthritis change      CT CHEST WO CONTRAST DIAGNOSTIC on 12/5/2023 at [facility]  Scattered  bilateral pulmonary nodules   8 mm RUL pulmonary nodule  6 mm RUL pulmonary nodule  no mediastinal or hilar lymphadenopathy   No acute or suspicious bony finding.   partially visualized confluent retroperitoneal adenopathy.   Dome of the liver with a 10 mm hypodensity, incompletely characterized   on this exam         CT ABDOMEN PELVIS WO CONTRAST on 12/5/2023 at [facility], compared to 9/14/23  A vague, poorly defined, low-density nodule located posteriorly and superiorly in the dome of the liver was noted in the previous study with no change in size or shape in the interval   Moderate lobulation of the unenhanced kidneys bilaterally noted. An isodense mass or lesion may not be evaluated or excluded. There is moderate right hydronephrosis. There is moderate dilatation of the right proximal ureter which is completely encased by the lower abdomen/proximal pelvis by the enlarged lymph nodes. This was not noted in the previous study   Moderate diffuse enlargement of the uterus is seen which is anteverted   compressing on the urinary bladder. This is similar to the previous study   significant abdominal and pelvic lymphadenopathy. The lymph nodes are poorly identified and not separate well from each other. There is significant surrounding retroperitoneal fat infiltration  Some visualized and separable lymph nodes:  2.7 cm left pelvic lymph node, previously 2.4 cm  3 cm right external iliac lymph node, previously 2.1 cm   Omental infiltration and nodularity which has significantly more progressed since the previous study   2.9 x 1.9 cm nodule in the anterior abdomen and adjacent and posterior to the umbilicus   vague sclerotic changes involving the anterior superior vertebral S1 is similar to the previous study with no change in the interval         Given her history with weight loss, vaginal bleeding, elevated tumor markers, imaging abnormalities with the uterus, the 2.9 x 1.9 cm nodule in the anterior abdomen suspicious for  carcinomatosis, abdominal and pelvic lymphadenopathy, prothrombotic state with DVTs and subcentimeter pulmonary nodules although indeterminate, in this setting is very concerning for an active malignant process and suspicious for a GYN origin.     Completion of workup however will also include a bone scan that was ordered at last visit and not done, GI evaluation to rule out the GI tract as a potential source in addition to the other recommendations below.                    RECOMMENDATIONS on 12/21/2023  Bone scan  PET scan   C-scope - Appt with [facility] scheduled 1/3/2024, malignancy process with iron deficiency anemia  GUADALUPE 2 and reflex panels as well as the rest of the prothrombotic panel not done at a previous visit at her request and was redrawn on 12/21/2023  Pelvic ultrasound to evaluate uterus and adnexa  Consult Dr. Frank Miles GYNOn to evaluate the The uterus which is prominent in size measuring 11.7 x 6.9 x 8.7 cm but without visualization a focal mass. Follow-up nonemergent ultrasound is recommended to evaluate the uterus for a potential mass on CT abdomen 9/14/2023  Follow-up in 2 weeks        NM PET/CT SKULL BASE TO MID THIGH on 12/26/23 at [facility], compared to 12/5/23  13 mm hypermetabolic left supraclavicular lymph node, max SUV 3.3 .  9 mm hypermetabolic right upper lobe pulmonary nodule with max SUV 2.8.   6 mm right lower lobe pulmonary nodule with mild hypermetabolic activity   Few other smaller pulmonary nodules are also present with have increased metabolic activity for small nodule size   No hypermetabolic axillary, mediastinal, or hilar lymph nodes   7 mm hypermetabolic lymph node in the epicardial fat.   Multiple hypermetabolic liver lesions identified. For reference, 11 mm hypermetabolic right posterior liver lesion with max SUV 5.2   2.7 x 1.9 cm omental/peritoneal hypermetabolic mass in the anterior abdomen with max SUV 8.3   Diffuse omental and peritoneal nodularity and  fat stranding which is likely related to carcinomatosis.   Multiple enlarged hypermetabolic retroperitoneal lymph nodes throughout the abdomen and pelvis surrounding the abdominal aorta and pelvic arterial vasculature. For reference, 1.6 cm left para-aortic lymph node with max SUV 7.5   3 cm hypermetabolic right upper pelvic mass on image 61 with max SUV 8.9.  Hypermetabolic activity is present throughout the enlarged cervix with extension into the endometrial canal which is diffusely hypermetabolic. Max SUV is 14   ADDENDUM:   Described in the body of the report but not the impression, there is mild right-sided hydronephrosis which is like related to ureteral compression by right pelvic lymphadenopathy.  There is also subcutaneous fat stranding in the left thigh which is likely related to venous compression by pelvic lymphadenopathy. However, venous thrombus could also present with this finding (not visualized on  this study but exam is performed without IV contrast)         US PELVIS COMPLETE- 12/26/2023  at [facility], compared to PET/CT on the same day. CT abdomen and pelvis 12/5/2023.   The uterus is enlarged and heterogeneous, measuring 13.6 x 6.1 x 8.1 cm   6.2 x 6.6 x 3.8 cm mass within the endometrial cavity   6.9 x 4.5 x 5.1 cm hypoechoic mass at the lower uterine segment, likely involving the cervix  18 mm solid-appearing nodule in the right ovary (Right ovary measures 3.1 x 2.8 x 2.7 cm )  The left ovary appears within normal limits and measures 2.9 x 2.1 x 2.2 cm.      Columba Coulter RN spoke with Radha on 12/29/2023 who reported that she received a message from the office of Dr Miles GYN-ONC in [place], but had not returned call yet ot make appointment.  Gave instructions to promptly return call to get appointment for evaluation as soon as possible.  Explained results of imaging and Dr Grullon's recommendation to follow through with ordered referral (order placed 12/21/23) for GYN ONC evaluation.      Radha states that she will call office Tuesday morning and will communicate with this clinic with date of appointment with Dr Miles so that f/u with Dr Grullon can be scheduled accordingly (following Dr Miles's evaluation).   Radha verbalized understanding, then returned to call stating that she had an appointment at Dr Miles's office on 1/9/2024.       Request to [facility] radiology Dept was made for images (CT Neck,Chest, Abd& Pelvis on 12/5/23 and PET, US pelvis on 12/26/23) to be forwarded to Dr Miles at [place].      Radha canceled her GI appointment with GREYSON Mccord for colonoscopy evaluation on 1/3/2023   Radha canceled her GYN/Onc appointment with Dr Miles on 1/9/2023 and states it has been rescheduled for 1/16/2023  Several attempts were made to schedule parenteral iron infusions in our office but she declined to schedule these.       OBJECTIVE:      Vitals:    01/17/24 0214   BP: 115/74   Pulse: 108   Resp: 20   Temp: 98.2 °F (36.8 °C)   SpO2: 95%       Intake/Output Summary (Last 24 hours) at 1/17/2024 0642  Last data filed at 1/16/2024 2040  Gross per 24 hour   Intake 240 ml   Output 300 ml   Net -60 ml     PHYSICAL EXAM:    CONSTITUTIONAL: Alert, appropriate, no acute distress  EYES: Nonicteric, EOM intact, pupils equal round   ENT: Mucous membranes moist, no oropharyngeal lesions   NECK: Supple, no masses   CHEST/LUNGS: CTA bilaterally, normal respiratory effort   CARDIOVASCULAR: RRR, no murmurs  ABDOMEN: soft non-tender, active bowel sounds, no HSM  EXTREMITIES: warm, moves all fours.  Bilateral lower extremity 2+ pitting edema  SKIN: warm, dry with no rashes or lesions  LYMPH: No cervical, clavicular, axillary, or inguinal lymphadenopathy  NEUROLOGIC: Right-sided arm weakness improved now just complains of numbness/tingling. Persistent right lower extremity weakness with impaired mobility.   PSYCH: mood and affect appropriate.  Anxious/nervous     CBC  Results from last 7 days   Lab  "Units 01/17/24  0628 01/16/24  1013 01/15/24  0515   WBC 10*3/mm3 11.58* 10.22 14.86*   HEMOGLOBIN g/dL 8.5* 8.5* 7.8*   HEMATOCRIT % 30.1* 29.6* 28.6*   PLATELETS 10*3/mm3 636* 606* 692*         Lab Results   Component Value Date     01/15/2024    K 4.0 01/15/2024     01/15/2024    CO2 26.0 01/15/2024    BUN 16 01/15/2024    CREATININE 0.54 (L) 01/15/2024    GLUCOSE 139 (H) 01/15/2024    CALCIUM 8.9 01/15/2024    BILITOT 0.2 01/15/2024    ALKPHOS 163 (H) 01/15/2024    AST 9 01/15/2024    ALT 7 01/15/2024    AGRATIO 0.8 01/15/2024    GLOB 4.1 01/15/2024       Lab Results   Component Value Date    INR 1.35 (H) 01/13/2024    INR 1.27 (H) 01/12/2024    INR 1.17 (H) 12/18/2023    PROTIME 16.8 (H) 01/13/2024    PROTIME 16.1 (H) 01/12/2024    PROTIME 15.1 (H) 12/18/2023       Cultures:    No results found for: \"BLOODCX\"  No components found for: \"URINCX\"    ASSESSMENT/PLAN:  #1  Disseminated abdominal malignancy, 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  Radha has been to some degree in denial and has failed to follow-up or keep appointments in an attempt for an aggressive and expedited workup.  Despite that, the biggest part of her workup has been completed.  She was scheduled to see Dr. Frank Miles in evaluation to obtain tissue diagnosis on 1/16/2024.    TARGET POTENTIAL MALIGNANT SITES  Enlarged uterus with abnormal vaginal bleeding, 45 pound weight loss in 3 to 6 months  6.2 x 6.6 x 3.8 cm mass within the endometrial cavity    Omental infiltration and nodularity progressing in 3 months  2.9 x 1.9 cm nodule in the anterior abdomen and adjacent and posterior to the umbilicus   2.7 cm left pelvic lymph node, previously 2.4 cm,   3 cm right external iliac lymph node, previously 2.1 cm   1.6 cm sclerotic bone lesion in the S1 vertebral body on CT " 9/14/2023    CA 19-9- 355  -  455       Evaluated by Dr. De Los Santos on 1/16/2024 and planning for D&C today, 1/17/2024    Recommendations are as follows:  After tissue diagnosis and all interventions are complete, given the fact that she was noncompliant with a rigorous schedule of Eliquis adding to the current prothrombotic conditions, a repeat trial of Eliquis could be considered.  Reschedule appointment with Dr. Frank Miles  Reschedule outpatient appointment with Dr. Grullon        #2  Acute ischemic stroke  Neurology recommended anticoagulation with heparin drip and then transition to Lovenox    Unfortunately, Ms. Wade was evaluated in the ED on 1/12/2022 and admitted to [facility] with a left-sided CVA and right-sided arm and leg weakness.  She has been under evaluation over the weekend for this and is anticipating discharge to rehab.  I was consulted and continuity of care.    Prothrombotic panel 11/9/2023  Factor V Leiden - negative  Homocysteine - 11  Lupus Anticoag - not detected  Protein C -138  Protein S - 85  Prothrombotic panel 12/21/2023  Lupus anticoagulant                                  not detected  Anticardiolipin Ab IgG                               <10  Anticardiolipin Ab IgM                               <10  Prothrombin gene mutation [id number]      Negative  MTHFR mutation C677T                          Negative  MTHFR mutation [id number]                       Heterozygous  GUADALUPE 2 and reflex panels                           PENDING    MRI of the brain with and without contrast on 1/14/2023  Diffusion restriction with associated ADC mapping abnormality involving the mesial left temporal lobe, left external capsule, left insula as well as the body of the caudate nucleus. These infarcts are nonhemorrhagic.  Mild atrophy. Mild small vessel ischemic changes are noted involving the periventricular and higher white matter tracts.  Normal flow voids within the [place].        MRA of the head  and neck without contrast on 1/14/2023  Carotid arteries are unremarkable. The carotid bifurcations are normal in appearance without evidence of rate limiting stenosis with both ICAs patent to the skull base  Right vertebral artery is widely patent from its origin to the skull base. The left vertebral artery is rather diminutive in size and is not well-defined in its proximal segment.     EMMA 1/15/2024:  Left ventricular systolic function is normal. Left ventricular ejection fraction appears to be 56 - 60%.  Normal right ventricular cavity size and systolic function noted.  No evidence of a patent foramen ovale.  No significant valvular abnormalities identified on this study.      Recommendations are as follows:  Anticoagulate with Lovenox after heparin is discontinued, at least until definitive tissue diagnosis is obtained.  After tissue diagnosis and all interventions are complete, given the fact that she was noncompliant with a rigorous schedule of Eliquis adding to the current prothrombotic conditions, a repeat trial of Eliquis could be considered.    Disposition: Possible discharge [address] [facility] for additional rehabilitation, awaiting insurance approval    #3  Microcytic iron anemia     Latest Reference Range & Units 01/13/24 06:02   Iron 37 - 145 mcg/dL 17 (L)   Ferritin 13.00 - 150.00 ng/mL 440.90 (H)   Iron Saturation (TSAT) 20 - 50 % 8 (L)   Transferrin 200 - 360 mg/dL 145 (L)   TIBC 298 - 536 mcg/dL 216 (L)          Results from last 7 days   Lab Units 01/15/24  0515 01/14/24  0359 01/13/24  0602   WBC 10*3/mm3 14.86* 8.82 7.46   HEMOGLOBIN g/dL 7.8* 8.9* 8.9*   HEMATOCRIT % 28.6* 31.8* 34.4   PLATELETS 10*3/mm3 692* 605* 524*         Lab Results   Component Value Date    WBC 11.58 (H) 01/17/2024    HGB 8.5 (L) 01/17/2024    HCT 30.1 (L) 01/17/2024    MCV 73.4 (L) 01/17/2024     (H) 01/17/2024     Ferrlecit 250 mg IV x4 initiated on 1/15/2024. Day 3 of 4 today    Gastroenterology assisting  and indicated suspects that her dysfunctional vaginal bleeding is significant cause for anemia and plan is to wait for further GYN evaluation before proceeding with any further investigation from a GI standpoint.      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  I reviewed other physician's notes, ancillary services and nurses assessments.  I have reviewed the above documentation completed by Francisca RUBI  Please see my additional addended and/or modified contributions to the history of present illness, physical examination and assessment/medical decision-making and plan that reflects my findings and impressions.  I discussed the essential elements of the care plan with Francisca RUBI and the patient.  I have encouraged and answered all the questions raised to the patient's understanding and satisfaction.  I concur with the above stated.    Subjective: Awake, alert, very talkative and interactive.  Stated she had a difficult time last night with urinary incontinence, apparent aggressive diuresis successful.  She is aware of plans on going for D&C today.  Her  is present during evaluation and discussion    Objective: She is in no acute distress.  Breathing comfortably, pain controlled  Clear lungs bilaterally, heart regular rate rhythm normal S1-S2 no S3.  Abdomen soft without rebound or  tenderness.  Appears to possibly have a fluid wave    Assessment/plan:  Dr. De Los Santos, her gynecologist will be doing a D&C and biopsy today to obtain tissue to be able to proceed with treatment recommendations.    I had an in-depth discussion with Radha and her  today.  They both state that  Arash denies desired to do a D&C in August 2023 however she declined due to the fact that she is having lower abdominal discomfort along with dysfunctional bleeding and she did not want to entertain an intervention.  She realizes that may have postpone the diagnosis allowing progression of the process until this point.  I will await the results of the biopsy and develop a plan accordingly.    John Grullon MD  1/17/2024 07:33 CST        Electronically signed by John Grullon MD at 01/17/24 0752       Jose Tang MD at 01/16/24 1841              [facility] Medicine Services  INPATIENT PROGRESS NOTE    Patient Name: Radha Wade  Date of Admission: 1/12/2024  Today's Date: 01/16/24  Length of Stay: 2  Primary Care Physician: Myron Vines MD    Subjective   Chief Complaint: R sided weakness, constipation    HPI   Patient seen and evaluated.  She is still complaining of right leg weakness and incoordination but is worse than her right arm.  She is planned for D&C tomorrow.    Review of Systems   All pertinent negatives and positives are as above. All other systems have been reviewed and are negative unless otherwise stated.     Objective    Temp:  [97.8 °F (36.6 °C)-98.5 °F (36.9 °C)] 98 °F (36.7 °C)  Heart Rate:  [77-98] 77  Resp:  [16-20] 18  BP: (103-130)/(44-56) 106/52  Physical Exam  Constitutional:       General: She is not in acute distress.     Appearance: She is not ill-appearing or diaphoretic.   HENT:      Head: Normocephalic and atraumatic.      Right Ear: External ear normal.      Left Ear: External ear normal.      Nose: No  congestion or rhinorrhea.      Mouth/Throat:      Mouth: Mucous membranes are moist.      Pharynx: No oropharyngeal exudate or posterior oropharyngeal erythema.   Eyes:      General: No scleral icterus.     Extraocular Movements: Extraocular movements intact.      Conjunctiva/sclera: Conjunctivae normal.   Cardiovascular:      Rate and Rhythm: Normal rate and regular rhythm.      Heart sounds: Normal heart sounds. No murmur heard.  Pulmonary:      Effort: Pulmonary effort is normal. No respiratory distress.      Breath sounds: Normal breath sounds. No wheezing, rhonchi or rales.   Abdominal:      General: Abdomen is flat. There is no distension.      Palpations: Abdomen is soft.      Tenderness: There is no abdominal tenderness. There is no guarding.   Musculoskeletal:         General: No swelling, tenderness or deformity.      Cervical back: Neck supple. No rigidity. No muscular tenderness.      Right lower leg: No edema.      Left lower leg: No edema.   Lymphadenopathy:      Cervical: No cervical adenopathy.   Skin:     General: Skin is warm and dry.   Neurological:      General: No focal deficit present.      Mental Status: She is alert and oriented to person, place, and time.      Cranial Nerves: No cranial nerve deficit.      Motor: Weakness present.      Comments: Right leg > right arm weakness and incoordination noted.   Psychiatric:         Mood and Affect: Mood normal.         Behavior: Behavior normal.         Results Review:  I have reviewed the labs, radiology results, and diagnostic studies.    Laboratory Data:   Results from last 7 days   Lab Units 01/16/24  1013 01/15/24  0515 01/14/24  0359   WBC 10*3/mm3 10.22 14.86* 8.82   HEMOGLOBIN g/dL 8.5* 7.8* 8.9*   HEMATOCRIT % 29.6* 28.6* 31.8*   PLATELETS 10*3/mm3 606* 692* 605*        Results from last 7 days   Lab Units 01/15/24  0515 01/14/24  0359 01/13/24  0602   SODIUM mmol/L 138 136 136   POTASSIUM mmol/L 4.0 4.5 4.2   CHLORIDE mmol/L 101 100 100    CO2 mmol/L 26.0 25.0 23.0   BUN mg/dL 16 10 11   CREATININE mg/dL 0.54* 0.47* 0.50*   CALCIUM mg/dL 8.9 8.8 8.7   BILIRUBIN mg/dL 0.2 0.2 0.2   ALK PHOS U/L 163* 229* 208*   ALT (SGPT) U/L 7 10 11   AST (SGOT) U/L 9 14 17   GLUCOSE mg/dL 139* 191* 126*       Culture Data:   Urine Culture   Date Value Ref Range Status   01/13/2024 >100,000 CFU/mL Mixed Karo Isolated  Final       Radiology Data:   Imaging Results (Last 24 Hours)       ** No results found for the last 24 hours. **            I have reviewed the patient's current medications.     Assessment/Plan   Assessment  Active Hospital Problems    Diagnosis     **Acute focal neurological deficit     CVA (cerebral vascular accident)     Type 2 diabetes mellitus, without long-term current use of insulin     Metastatic disease     Acute blood loss anemia     Vaginal bleeding     DVT (deep venous thrombosis)        Treatment Plan  #1 acute CVA -patient presented with symptoms concerning for stroke.  Initial head CT was nonacute.  MRI with and without was obtained today to look for CVA versus possible mass given cancer history.  It was positive for multifocal diffusion restriction/CVA.  No obvious flow limitation was noted on angiographies.  Some was limited due to motion.  2D echo pending.  She has been on a heparin drip at this point by neurology, defer to them but suspect okay to resume Eliquis.  Will start Lovenox after D&C in the morning.  Continue heparin drip for now.    #2 history DVT -patient with concern about right upper extremity swelling on arrival but she does not have a DVT in that extremity.  History of DVT in her lower extremities.  In the setting of malignancy.  She has been on Eliquis at this point but transition to heparin drip here due to fear for possible failure of therapy.  Patient openly admits that she would take the medicine once a day and not regularly.  Do not suspect there is any failure of care at this time rather patient  noncompliance.  Feel she is likely somewhat in the mild to current issues at hand.  Note potential recommendations for Lovenox at discharge by neurology.  Will transition to Lovenox after D&C procedure tomorrow and possibly discharge on Lovenox.    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 Grullon.  It seems that a lot of the procedures and testing have been ordered prior have typically not been performed as patient did not go for them.  Suspect she is in some denial to her diagnosis.  She is also supposed to have an upcoming appointment at [place] for further evaluation.  Oncology input appreciated.  Recommended gastroenterology evaluation.  Will follow recommendations.    #4 history of DM2 -on sliding scale insulin and hypoglycemia protocol.    #5 anemia -stable since arrival.  Does have a history of vaginal bleeding likely in the setting of GYN malignancy.  Plan for D&C in the morning.  N.p.o. from midnight.  Monitor, no obvious signs of bleeding here so far.    #6 constipation -start bowel regimen    #7.  Urinary tract infection-urinalysis suggestive of urinary tract infection although sample was contaminated.  Will start IV antibiotic with ceftriaxone.    Medical Decision Making  Number and Complexity of problems: 1 acute problem, multiple chronic  Differential Diagnosis: as above    Conditions and Status        Pt is new to me, appears stable/non-toxic     MDM Data  External documents reviewed: none  Cardiac tracing (EKG, telemetry) interpretation: sinus on monitor  Radiology interpretation: reports reviewed  Labs reviewed: as above  Any tests that were considered but not ordered: none     Decision rules/scores evaluated (example [id number], [place], etc): none     Discussed with: patient, nursing     Care Planning  Shared decision making: Patient apprised of current labs, vitals, imaging and treatment plan.  They are agreeable with proceeding with plans as discussed.    Code status and  discussions: full code    Disposition  Social Determinants of Health that impact treatment or disposition: none  I expect the patient to be discharged to possible acute inpt rehab vs snf in 1-2 days    Electronically signed by Jose Tang MD, 01/16/24, 18:41 CST.;y    Electronically signed by Jose Tang MD at 01/16/24 1844       Sully Bryant, APRN at 01/16/24 1333       Attestation signed by Naldo Telles MD at 01/16/24 1824 (Updated)    I have reviewed this documentation and agree.    63F w/ T2DM, endometrial Ca likely stage IV, recent BLE DVT on Eliquis, p/w abrupt R-hemiparesis.  Found to have new RUE DVT and left insular stroke.     Etiology:  hypercoagulability due to malignancy.  Plan: Recommending switch to Lovenox versus Eliquis  (initially had recommended Lovenox treatment dose given reported adherence to Eliquis, no new reports indicates that she was underdosing the Eliquis will need to confirm this). Patient agreeable for L&D, she will coordinate this procedure with timing of stoppage of anticoagulation.  IPR dispo.      Rest of the plan as below.  Neurology to sign off can reach out again for questions.      Naldo Telles MD                    Neurology Progress Note      Chief Complaint:  Stroke  Length of Stay:  2     Interval     1/16: patient sitting up in bed. Had large BM today. She was seen by Dr. De Los Santos GYN who is recommend D & C and patient had declined but after speaking with me she is considering. She now reports that for the last month she had not been taking Eliquis BID and some days only taking once daily.     1/15: Working with physical therapy today, looking better and feeling stronger.  Continues to be on heparin which is now therapeutic, obtaining head CT to determine transition to Lovenox.  TTE with agitated saline completed.    1/14: Has been hyperglycemic up to 247 at 8 AM this morning.  Continues on heparin drip protocol last PTT 44.5 at 4 AM .  MRI brain w/wo and MRA head and neck completed today demonstrating left temporal lobe, left external capsule, left insula, body of the left cardiac nucleus infarcts.  There is no abnormal contrast-enhancement.  MRI brain degraded by motion artifact and in the limited evaluation there is no clear significant stenosis or large vessel occlusion. SLP evaluated, no acute needs.     1/13: Stopped Eliquis, stop aspirin, started on heparin drip low goal no bolus no rebolus.  Started premedication protocol for MRI with and without contrast tomorrow morning per radiology request.    Occupational Therapy and physical therapy evaluated recommended inpatient rehab facility,     Subjective     Patient sitting up in bed. Had large BM today. No family at bedside. She was seen by Dr. De Los Santos GYN who is recommend D & C and patient had declined but after speaking with me she is considering. She now reports that for the last month she had not been taking Eliquis BID and some days only taking once daily. Dr. De Los Santos had noted that would not need to stop Heparin drip.       Medications:  Current Facility-Administered Medications   Medication Dose Route Frequency Provider Last Rate Last Admin    acetaminophen (TYLENOL) tablet 650 mg  650 mg Oral Q4H PRN Daniel Fleming MD   650 mg at 01/16/24 0723    Or    acetaminophen (TYLENOL) suppository 650 mg  650 mg Rectal Q4H PRN Daniel Fleming MD        atorvastatin (LIPITOR) tablet 80 mg  80 mg Oral Nightly Daniel Fleming MD   80 mg at 01/15/24 2158    bisacodyl (DULCOLAX) suppository 10 mg  10 mg Rectal Daily PRN Frank Frankel DO   10 mg at 01/16/24 1110    dextrose (D50W) (25 g/50 mL) IV injection 25 g  25 g Intravenous Q15 Min PRN Daniel Fleming MD        dextrose (GLUTOSE) oral gel 15 g  15 g Oral Q15 Min PRN Daniel Fleming MD        docusate sodium (COLACE) capsule 100 mg  100 mg Oral BID Daniel Fleming MD   100 mg at 01/16/24  0808    ferric gluconate (FERRLECIT) 250 mg in sodium chloride 0.9 % 250 mL IVPB  250 mg Intravenous Daily Francisca Giordano APRN 125 mL/hr at 01/16/24 0808 250 mg at 01/16/24 0808    glucagon (GLUCAGEN) injection 1 mg  1 mg Intramuscular Q15 Min PRN Daniel Fleming MD        heparin 07944 units/250 mL (100 units/mL) in 0.45 % NaCl infusion  12 Units/kg/hr Intravenous Titrated Naldo Telles MD 13.3 mL/hr at 01/16/24 1245 17.139 Units/kg/hr at 01/16/24 1245    HYDROcodone-acetaminophen (NORCO) 5-325 MG per tablet 1 tablet  1 tablet Oral Q4H PRN Daniel Fleming MD        Insulin Lispro (humaLOG) injection 2-7 Units  2-7 Units Subcutaneous 4x Daily AC & at Bedtime Daniel Fleming MD   2 Units at 01/16/24 1208    labetalol (NORMODYNE,TRANDATE) injection 10 mg  10 mg Intravenous Q10 Min PRN Daniel Fleming MD        ondansetron (ZOFRAN) injection 4 mg  4 mg Intravenous Q6H PRN Daniel Fleming MD        pantoprazole (PROTONIX) EC tablet 40 mg  40 mg Oral Q AM Daniel Fleming MD   40 mg at 01/16/24 0515    polyethylene glycol (MIRALAX) packet 17 g  17 g Oral Daily Frank Frankel F, DO   17 g at 01/16/24 0808    sodium chloride 0.9 % flush 10 mL  10 mL Intravenous PRN Daniel Fleming MD        sodium chloride 0.9 % flush 10 mL  10 mL Intravenous Q12H Daniel Fleming MD   10 mL at 01/16/24 0808    sodium chloride 0.9 % flush 10 mL  10 mL Intravenous PRN Daniel Fleming MD        sodium chloride 0.9 % infusion 40 mL  40 mL Intravenous PRN Daniel Fleming MD        traMADol (ULTRAM) tablet 25 mg  25 mg Oral Q8H PRN Daniel Fleming MD             Objective      Vital Signs  Temp:  [97.8 °F (36.6 °C)-98.5 °F (36.9 °C)] 98.2 °F (36.8 °C)  Heart Rate:  [84-98] 84  Resp:  [16-20] 16  BP: (103-131)/(44-70) 108/44    General Exam:  Head:  Normocephalic, atraumatic  HEENT:  Neck supple  CVS:  Regular rate and rhythm on  monitor  Lungs: Breathing comfortably on room air  Abdomen: Distended and soft  Extremities: There is bilateral lower extremity nonpitting edema up to the knees.  There is mild redness and edema of the upper right upper extremity.  Skin: There is some macular with small papular rash mostly over the ankles and the anterior surface of the left lower extremity lesser degree the right lower extremity     Neurologic Exam:     Mental Status:    -Awake, Alert, Oriented X 3  -No word finding difficulties  -No aphasia  -No dysarthria  -Follows simple and complex commands     CN II:  Visual fields full.  Pupils equally reactive to light  CN III, IV, VI:  Extraocular Muscles full with no signs of nystagmus  CN V:  Facial sensory is symmetric with no asymmetries.  CN VII:  Facial motor symmetric  CN VIII:  Gross hearing intact bilaterally  CN IX:  Palate elevates symmetrically  CN X:  Palate elevates symmetrically  CN XI:  Shoulder shrug symmetric  CN XII:  Tongue is midline on protrusion     Motor:   Tone is normal bilaterally.  (strength out of 5:  1= minimal movement, 2 = movement in plane of gravity, 3 = movement against gravity, 4 = movement against some resistance, 5 = full strength)  Motor function on the left upper and left lower extremity are 5 out of 5, except for some limitation in knee extension and knee flexion due to antalgic guarding.     Right upper extremity is remarkable for shoulder abduction flexion and extension along with internal and external rotation of 4.    Distally, finger extension and wrist extension 4+, the flexors throughout the right upper extremity are 4+.     Right lower extremity is remarkable for hip flexion, knee flexion of 4+.  Rest of the muscle groups are 5 out of 5.     DTR:  There is no clonus, no Babinski, no Vera.  There is no obvious hyperreflexia     Sensory:  There is decreased sensation to light touch on the right upper and right lower extremities.     Coordination:  There is  no overt ataxia in the upper or lower extremities that emerges beyond the weakness.     Gait  Observe while working with PT, with hemiparetic gait but able to ambulate short distances with rolling walker and assistance.    Last nurse assessment:  Interval:  (shift change with MARCELO Rice and MARCELO Cruz)  1a. Level of Consciousness: 0-->Alert, keenly responsive  1b. LOC Questions: 0-->Answers both questions correctly  1c. LOC Commands: 0-->Performs both tasks correctly  2. Best Gaze: 0-->Normal  3. Visual: 0-->No visual loss  4. Facial Palsy: 0-->Normal symmetrical movements  5a. Motor Arm, Left: 0-->No drift, limb holds 90 (or 45) degrees for full 10 secs  5b. Motor Arm, Right: 0-->No drift, limb holds 90 (or 45) degrees for full 10 secs  6a. Motor Leg, Left: 0-->No drift, leg holds 30 degree position for full 5 secs  6b. Motor Leg, Right: 1-->Drift, leg falls by the end of the 5-sec period but does not hit bed  7. Limb Ataxia: 0-->Absent  8. Sensory: 0-->Normal, no sensory loss  9. Best Language: 0-->No aphasia, normal  10. Dysarthria: 0-->Normal  11. Extinction and Inattention (formerly Neglect): 0-->No abnormality    Total (NIH Stroke Scale): 1       Results Review:      Labs:  Last PTT of 82.4, glucose within range.  WBC of 14.9 from 8, no fever associated.  Thrombocytosis of 692.  Hemoglobin dropped of 1.1 units from 8.9 to 7.8. HGB back up to 8.5 on 1/16/2024.     Imaging:    MRI brain from 1/14/2024: Reviewed and agree with report with likely acute ischemic stroke in the left temporal lobe left external capsule and left insula and the body of the carotid nucleus on the left.  No abnormal contrast-enhancement.    DWI:      TTE 1/15/2024:    Normal LVEF 56 to 60%, no PFO, normal valves    Results for orders placed during the hospital encounter of 01/12/24    Adult Transthoracic Echo Complete W/ Cont if Necessary Per Protocol (With Agitated Saline)    Interpretation Summary    Left ventricular systolic function is  normal. Left ventricular ejection fraction appears to be 56 - 60%.    Normal right ventricular cavity size and systolic function noted.    No evidence of a patent foramen ovale.    No significant valvular abnormalities identified on this study.     Assessment/Plan   Patient who has a significant history of potential gynecological malignancy of unclear staging with DVTs in the left lower extremity and recently in the right upper extremity, that has occurred despite use of Eliquis.  And is presented with acute right upper extremity and right lower extremity weakness.     The presentation was concerning for an acute ischemic stroke, she was not a candidate for TNK given active use of anticoagulation plus out of the window plus potential metastatic malignancy.  Given lack of large vessel occlusion clinical signs, and her severe reaction to iodinated contrast we have postponed the CTA and will obtain instead an MRA head and neck.    MRI brain with and without from 1/14/2024 demonstrates an acute ischemic stroke in the left temporal lobe, external capsule, body of the caudate that explains her symptoms.  There is no evidence of abnormal contrast-enhancement that is concerning for metastatic disease.  MRAs of the head and neck did not show any significant stenosis however there is severely degraded by motion.  We do not think a CTA is warranted at this time given the severe allergy and that we have a.  Reasonable mechanism of her stroke with hypercoagulability secondary to cancer.    Will continue the heparin drip, hoping to obtain therapeutic levels.  We have obtained therapeutic level today we will obtain a head CT to ensure there has been no hemorrhagic transformation.  And is to transition to Lovenox. We do not think she is responding to Eliquis given that she reports having taken Eliquis every day without skipping a dose for at least a month.    Importantly TTE did not show any PFO, meaning that the hypercoagulability  is both venous and arterial.  As there is low likelihood there is a paradoxical embolism.        Hospital Problem List      Acute focal neurological deficit    DVT (deep venous thrombosis)    Vaginal bleeding    Type 2 diabetes mellitus, without long-term current use of insulin    Metastatic disease    Acute blood loss anemia    CVA (cerebral vascular accident)    Impression:  Clinical suspicion of acute ischemic stroke with RUE/RLE paresis: Etiology to be suspected embolic secondary to hypercoagulability due to suspected gynecological malignancy of unclear staging.  HLD. LDL 89  DVT on Eliquis and 1/16 patient admits was not taking BID consistently.   Medication non adherence.     Plan:  May transition to Lovenox DVT treatment dose from neurologic stand point.  And will defer timing of this to attending as now patient is more agreeable to D & C per GYN recommendations. Otherwise Continue heparin gtt per protocol (low goal, no bolus, no re-bolus). Eventually can return back to Eliquis 5 mg BID.  Continue atorvastatin 80 mg daily  Continue working with PT/OT  Inpatient Rehab as disposition      Medical Decision Making    Number/Complexity of Problems  Moderate  1 undiagnosed new problem with uncertain prognosis -   1 acute illness with systemic symptoms -   High  1 acute or chronic illness that pose a threat to life/body function -   High    MDM Data  Moderate - 1/3 categories  Extensive - 2/3 categories    Category 1: 3 of the following  Review of external notes  Review of results  Ordering of each unique test  Independent historian  Category 2:  Independent interpretation of test (ex: imaging)  Category 3:  Discussion of management with another provider    Category 2     Treatment Plan  Moderate - Prescription Drug management  High  Drug therapy requiring intensive monitoring for toxicity  Decision regarding hospitalization or escalation of care  De-escalate care/DNR decisions  High      Sully Bryant,  APRN  01/16/24  13:33 CST     Electronically signed by Naldo Telles MD at 01/16/24 0500

## 2024-01-17 NOTE — PROGRESS NOTES
Malnutrition Severity Assessment    Patient Name:  Radha Wade  YOB: 1960  MRN: [id number]  Admit Date:  1/12/2024    Patient meets criteria for : Severe Malnutrition (Secondary signs: stage III p.i. sacrum,generalized weakness and imparied mobility)      Malnutrition Severity Assessment  Malnutrition Type: Chronic Disease - Related Malnutrition  Malnutrition Type (last 8 hours)       Malnutrition Severity Assessment       Row Name 01/17/24 1409       Malnutrition Severity Assessment    Malnutrition Type Chronic Disease - Related Malnutrition      Row Name 01/17/24 1409       Insufficient Energy Intake     Insufficient Energy Intake Findings Severe    Insufficient Energy Intake  <75% of est. energy requirement for > or equal to 1 month      Row Name 01/17/24 1409       Unintentional Weight Loss     Unintentional Weight Loss Findings Severe    Unintentional Weight Loss  Weight loss greater than 7.5% in three months  22 lbs (11%) over the past four months per wt report      Row Name 01/17/24 1409       Muscle Loss    Loss of Muscle Mass Findings Moderate    Sidney Region Moderate - slight depression    Clavicle Bone Region Moderate - some protrusion in females, visible in males    Acromion Bone Region Moderate - acromion may slightly protrude    Dorsal Hand Region Moderate - slight depression      Row Name 01/17/24 1409       Fat Loss    Subcutaneous Fat Loss Findings Moderate    Orbital Region  Moderate -  somewhat hollowness, slightly dark circles    Upper Arm Region Moderate - some fat tissue, not ample      Row Name 01/17/24 1409       Criteria Met (Must meet criteria for severity in at least 2 of these categories: M Wasting, Fat Loss, Fluid, Secondary Signs, Wt. Status, Intake)    Patient meets criteria for  Severe Malnutrition  Secondary signs: stage III p.i. sacrum,generalized weakness and imparied mobility                    Electronically signed by:  Danette Mcdaniels MS,RDN,LD  01/17/24  14:21 CST

## 2024-01-17 NOTE — PLAN OF CARE
"Goal Outcome Evaluation:  Plan of Care Reviewed With: patient, spouse, daughter        Progress: no change  Outcome Evaluation: Pt is AOx4, complaints of \"spasms\" improved with PRN medications, safety maintained. Meds resumed post op per verbal phone order from Dr De Los Santos. Up to BSC this shift with 1 assist.                               "

## 2024-01-17 NOTE — OP NOTE
Aramis Wade  : 1960  MRN: [id number]  CSN: [id number]     Preoperative Dx: carcinomatosis  Postoperative Dx: possible endometrial cancer    Procedure Preformed: exam under anesthesia, vaginal biopsy     Surgeon:  Radu De Los Santos MD    Anesthesia: General    Estimated Blood Loss: 100ml    Findings:external genitalia is normal,  rectal exam is normal, there is a mass anterior to the rectum, mobile,   Urethra external os is clear,  below the urethra a mass coming from  the uterus reaches  to 1 cms  to the external urethral meatus.  The mass  it made impossible to vis cervix. No dilation and curettage done.  The mass is mobile about 14 cms in length and width.   Biopsy of the lesion done under urethra    Description of procedure: Patient was placed in the lithotomy position prepped and draped.  The bladder was drained using a metal metal catheter.  The weighted speculum was placed in the vagina and the cervix was not visualized.  The mass extends  under the urethra , it is free mobile about  14 cms. One  biopsy talken from underneath the urethra.      Vaginal instruments were removed and patient was replaced to the supine position and allowed to waken and taken to recovery room in stable condition.    The estimated blood loss was 100  mL's.  At the end of the procedure the needle instrument and sponge counts were correct.    Complications: None     Radu De Los Santos MD  2024  13:51 CST

## 2024-01-17 NOTE — THERAPY TREATMENT NOTE
"Acute Care - Occupational Therapy Treatment Note  [facility]     Patient Name: Radha Wade  : 1960  MRN: [id number]  Today's Date: 2024             Admit Date: 2024       ICD-10-CM ICD-9-CM   1. 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 Cystitis  N30.90 595.9   3. Fall, initial encounter  W19.XXXA E888.9   4. Weakness  R53.1 780.79   5. Impaired mobility [Z74.09]  Z74.09 799.89   6. Decreased activities of daily living (ADL) [Z78.9]  Z78.9 V49.89     Patient Active Problem List   Diagnosis    DVT (deep venous thrombosis)    Vaginal bleeding    History of asthma    Hyperglycemia    Abnormal CT of the abdomen    Acute focal neurological deficit    Type 2 diabetes mellitus, without long-term current use of insulin    Metastatic disease    Acute blood loss anemia    CVA (cerebral vascular accident)     Past Medical History:   Diagnosis Date    Asthma     Bronchitis     Depression     Diabetes mellitus     DVT (deep venous thrombosis)     Heart murmur     Lyme disease     Mitral valve prolapse      Past Surgical History:   Procedure Laterality Date     SECTION           OT ASSESSMENT FLOWSHEET (last 12 hours)       OT Evaluation and Treatment       Row Name 24 0755                   OT Time and Intention    Subjective Information complains of  \"tingling\" on R side  -TS        Document Type therapy note (daily note)  -TS        Mode of Treatment occupational therapy  -TS        Patient Effort good  -TS           General Information    Existing Precautions/Restrictions fall  -TS           Pain Assessment    Pretreatment Pain Rating 0/10 - no pain  -TS        Posttreatment Pain Rating 0/10 - no pain  -TS           Cognition    Orientation Status (Cognition) oriented x 4  -TS        Personal Safety Interventions fall prevention program maintained;gait belt;nonskid shoes/slippers when out of bed  -TS           Activities of Daily Living    BADL Assessment/Intervention bathing;upper body " dressing;lower body dressing;toileting  -TS           Bathing Assessment/Intervention    [place] Level (Bathing) upper body;lower body;perineal area;set up;standby assist  -TS        Assistive Devices (Bathing) hand-held shower spray hose;tub bench;grab bar, tub/shower  -TS        Position (Bathing) unsupported sitting  -TS           Upper Body Dressing Assessment/Training    [place] Level (Upper Body Dressing) don;doff;set up;contact guard assist  -TS        Position (Upper Body Dressing) unsupported sitting  -TS           Lower Body Dressing Assessment/Training    [place] Level (Lower Body Dressing) don;doff;set up;moderate assist (50% patient effort)  -TS        Position (Lower Body Dressing) unsupported sitting  -TS           Toileting Assessment/Training    [place] Level (Toileting) toileting skills;standby assist;perform perineal hygiene  -TS        Assistive Devices (Toileting) commode;grab bar/safety frame  -TS        Position (Toileting) unsupported sitting  -TS           Bed Mobility    Supine-Sit [place] (Bed Mobility) contact guard  -TS        Sit-Supine [place] (Bed Mobility) contact guard  -TS        Assistive Device (Bed Mobility) bed rails;head of bed elevated  -TS           Functional Mobility    Functional Mobility- Ind. Level standby assist  -TS        Functional Mobility- Device walker, front-wheeled  -TS           Transfer Assessment/Treatment    Transfers sit-stand transfer;stand-sit transfer  -TS           Sit-Stand Transfer    Sit-Stand [place] (Transfers) contact guard  -TS        Assistive Device (Sit-Stand Transfers) walker, front-wheeled  -TS           Stand-Sit Transfer    Stand-Sit [place] (Transfers) standby assist  -TS        Assistive Device (Stand-Sit Transfers) walker, front-wheeled  -TS           Toilet Transfer    Type (Toilet Transfer) sit-stand;stand-sit  -TS        [place] Level (Toilet Transfer) standby assist  -TS        Assistive  Device (Toilet Transfer) commode;grab bars/safety frame;walker, front-wheeled  -TS           Wound 01/15/24 1233 sacral spine    Wound - Properties Group Placement Date: 01/15/24  -MD Placement Time: 1233 -MD Location: sacral spine  -MD    Retired Wound - Properties Group Placement Date: 01/15/24  -MD Placement Time: 1233  -MD Location: sacral spine  -MD    Retired Wound - Properties Group Date first assessed: 01/15/24  -MD Time first assessed: 1233 -MD Location: sacral spine  -MD       Plan of Care Review    Plan of Care Reviewed With patient  -TS        Progress improving  -TS           Positioning and Restraints    Pre-Treatment Position in bed  -TS        Post Treatment Position bed  -TS        In Bed fowlers;call light within reach;encouraged to call for assist;side rails up x3;legs elevated  -TS                  User Key  (r) = Recorded By, (t) = Taken By, (c) = Cosigned By      Initials Name Effective Dates    TS Delilah Shankar COTA 02/03/23 -     Brenda Cadena RN 08/14/23 -                      Occupational Therapy Education       Title: PT OT SLP Therapies (Done)       Topic: Occupational Therapy (Done)       Point: ADL training (Done)       Description:   Instruct learner(s) on proper safety adaptation and remediation techniques during self care or transfers.   Instruct in proper use of assistive devices.                  Learning Progress Summary             Patient Acceptance, E, VU by EC at 1/16/2024 1400    Acceptance, E, VU by BRENDA at 1/16/2024 0022    Acceptance, E, VU by LAURIE at 1/13/2024 1529   Family Acceptance, E, VU by LAURIE at 1/13/2024 1529                         Point: Home exercise program (Done)       Description:   Instruct learner(s) on appropriate technique for monitoring, assisting and/or progressing therapeutic exercises/activities.                  Learning Progress Summary             Patient Acceptance, E, VU by EC at 1/16/2024 1400    Acceptance, E, VU by BRENDA at 1/16/2024  0022                         Point: Precautions (Done)       Description:   Instruct learner(s) on prescribed precautions during self-care and functional transfers.                  Learning Progress Summary             Patient Acceptance, E, VU by EC at 1/16/2024 1400    Acceptance, E, VU by  at 1/16/2024 0022    Acceptance, E, VU by  at 1/13/2024 1529   Family Acceptance, E, VU by J at 1/13/2024 1529                         Point: Body mechanics (Done)       Description:   Instruct learner(s) on proper positioning and spine alignment during self-care, functional mobility activities and/or exercises.                  Learning Progress Summary             Patient Acceptance, E, VU by EC at 1/16/2024 1400    Acceptance, E, VU by  at 1/16/2024 0022                                         User Key       Initials Effective Dates Name Provider Type Discipline    ILDEFONSO 07/11/23 -  Columba Patterson, OTR/L, CSRS Occupational Therapist OT     04/25/23 -  Anthony Rowan, MARCELO Registered Nurse Nurse     10/13/23 -  Ruth Padilla, OTR/L Occupational Therapist OT                      OT Recommendation and Plan     Plan of Care Review  Plan of Care Reviewed With: patient  Progress: improving  Outcome Evaluation: Pt and HURTADO discussed compensatory techniques for increased coordination for RUE, including how to support RUE for self feeding.  Plan of Care Reviewed With: patient  Outcome Evaluation: Pt and HURTADO discussed compensatory techniques for increased coordination for RUE, including how to support RUE for self feeding.     Outcome Measures       Row Name 01/17/24 1300 01/17/24 1100 01/16/24 1400       How much help from another person do you currently need...    Turning from your back to your side while in flat bed without using bedrails? -- 3  -KJ --    Moving from lying on back to sitting on the side of a flat bed without bedrails? -- 3  -KJ --    Moving to and from a bed to a chair (including a wheelchair)? -- 3  -KJ  --    Standing up from a chair using your arms (e.g., wheelchair, bedside chair)? -- 3  -KJ --    Climbing 3-5 steps with a railing? -- 3  -KJ --    To walk in hospital room? -- 4  -KJ --    AM-PAC 6 Clicks Score (PT) -- 19  -KJ --    Highest Level of Mobility Goal -- 6 --> Walk 10 steps or more  -KJ --       How much help from another is currently needed...    Putting on and taking off regular lower body clothing? 3  -TS -- 3  -EC    Bathing (including washing, rinsing, and drying) 3  -TS -- 3  -EC    Toileting (which includes using toilet bed pan or urinal) 3  -TS -- 3  -EC    Putting on and taking off regular upper body clothing 3  -TS -- 3  -EC    Taking care of personal grooming (such as brushing teeth) 3  -TS -- 3  -EC    Eating meals 4  -TS -- 4  -EC    AM-PAC 6 Clicks Score (OT) 19  -TS -- 19  -EC       Functional Assessment    Outcome Measure Options -- AM-PAC 6 Clicks Basic Mobility (PT)  -KJ AM-[facility] 6 Clicks Daily Activity (OT)  -EC      Row Name 01/16/24 1100 01/15/24 1500 01/15/24 1100       How much help from another person do you currently need...    Turning from your back to your side while in flat bed without using bedrails? 4  -KJ -- 3  -KJ    Moving from lying on back to sitting on the side of a flat bed without bedrails? 4  -KJ -- 3  -KJ    Moving to and from a bed to a chair (including a wheelchair)? 3  -KJ -- 3  -KJ    Standing up from a chair using your arms (e.g., wheelchair, bedside chair)? 3  -KJ -- 3  -KJ    Climbing 3-5 steps with a railing? 3  -KJ -- 3  -KJ    To walk in hospital room? 3  -KJ -- 3  -KJ    AM-PAC 6 Clicks Score (PT) 20  -KJ -- 18  -KJ    Highest Level of Mobility Goal 6 --> Walk 10 steps or more  -KJ -- 6 --> Walk 10 steps or more  -KJ       How much help from another is currently needed...    Putting on and taking off regular lower body clothing? -- 3  -TS --    Bathing (including washing, rinsing, and drying) -- 3  -TS --    Toileting (which includes using toilet bed pan  or urinal) -- 3  -TS --    Putting on and taking off regular upper body clothing -- 3  -TS --    Taking care of personal grooming (such as brushing teeth) -- 3  -TS --    Eating meals -- 4  -TS --    AM-PAC 6 Clicks Score (OT) -- 19  -TS --       Functional Assessment    Outcome Measure Options AM-PAC 6 Clicks Basic Mobility (PT)  -KJ -- AM-PAC 6 Clicks Basic Mobility (PT)  -KJ              User Key  (r) = Recorded By, (t) = Taken By, (c) = Cosigned By      Initials Name Provider Type    Analy Cristina, PTA Physical Therapist Assistant    Delilah Bowman COTA Occupational Therapist Assistant    Ruth Damon, OTR/L Occupational Therapist                    Time Calculation:    Time Calculation- OT       Row Name 01/17/24 1308             Time Calculation- OT    OT Start Time 0755  -TS      OT Stop Time 0910  -TS      OT Time Calculation (min) 75 min  -TS      Total Timed Code Minutes- OT 75 minute(s)  -TS      OT Received On 01/17/24  -TS         Timed Charges    36970 - OT Self Care/Mgmt Minutes 75  -TS         Total Minutes    Timed Charges Total Minutes 75  -TS       Total Minutes 75  -TS                User Key  (r) = Recorded By, (t) = Taken By, (c) = Cosigned By      Initials Name Provider Type     Delilah Shankar COTA Occupational Therapist Assistant                  Therapy Charges for Today       Code Description Service Date Service Provider Modifiers Qty    [id number] HC OT SELF CARE/MGMT/TRAIN EA 15 MIN 1/17/2024 Delilah Shankar COTA GO 5                 BRYANT Niño  1/17/2024

## 2024-01-17 NOTE — THERAPY TREATMENT NOTE
"Acute Care - Physical Therapy Treatment Note  [facility]     Patient Name: Radha Wade  : 1960  MRN: [id number]  Today's Date: 2024      Visit Dx:     ICD-10-CM ICD-9-CM   1. 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 Cystitis  N30.90 595.9   3. Fall, initial encounter  W19.XXXA E888.9   4. Weakness  R53.1 780.79   5. Impaired mobility [Z74.09]  Z74.09 799.89   6. Decreased activities of daily living (ADL) [Z78.9]  Z78.9 V49.89     Patient Active Problem List   Diagnosis    DVT (deep venous thrombosis)    Vaginal bleeding    History of asthma    Hyperglycemia    Abnormal CT of the abdomen    Acute focal neurological deficit    Type 2 diabetes mellitus, without long-term current use of insulin    Metastatic disease    Acute blood loss anemia    CVA (cerebral vascular accident)     Past Medical History:   Diagnosis Date    Asthma     Bronchitis     Depression     Diabetes mellitus     DVT (deep venous thrombosis)     Heart murmur     Lyme disease     Mitral valve prolapse      Past Surgical History:   Procedure Laterality Date     SECTION       PT Assessment (last 12 hours)       PT Evaluation and Treatment       Row Name 24 1115          Physical Therapy Time and Intention    Subjective Information complains of;weakness  -KJ     Document Type therapy note (daily note)  -KJ     Mode of Treatment physical therapy  -KJ     Patient Effort good  -KJ     Comment R side weakness/ reports R side feels \"tingly and weaker today\"  -KJ       Row Name 24 1115          General Information    Existing Precautions/Restrictions fall  -KJ       Row Name 24 1115          Pain    Pretreatment Pain Rating 0/10 - no pain  -KJ     Posttreatment Pain Rating 0/10 - no pain  -KJ       Row Name 24 1115          Bed Mobility    Supine-Sit [place] (Bed Mobility) minimum assist (75% patient effort)  -KJ     Sit-Supine [place] (Bed Mobility) minimum assist (75% patient effort)  -KJ  "      Row Name 01/17/24 1115          Sit-Stand Transfer    Sit-Stand [place] (Transfers) minimum assist (75% patient effort)  -KJ     Assistive Device (Sit-Stand Transfers) walker, front-wheeled  -KJ       Row Name 01/17/24 1115          Stand-Sit Transfer    Stand-Sit [place] (Transfers) supervision;verbal cues  -KJ       Row Name 01/17/24 1115          Gait/Stairs (Locomotion)    [place] Level (Gait) verbal cues;contact guard;minimum assist (75% patient effort)  -KJ     Assistive Device (Gait) walker, front-wheeled  -KJ     Distance in Feet (Gait) 35' x 2  -KJ     Deviations/Abnormal Patterns (Gait) gait speed decreased;right sided deviations  -KJ     Right Sided Gait Deviations foot drop/toe drag;heel strike decreased;forward flexed posture  -KJ     Comment, (Gait/Stairs) cues for R foot clearance  -KJ       Row Name 01/17/24 1115          Motor Skills    Comments, Therapeutic Exercise AROM x 15 reps  -KJ       Row Name             Wound 01/15/24 1233 sacral spine    Wound - Properties Group Placement Date: 01/15/24  -MD Placement Time: 1233  -MD Location: sacral spine  -MD    Retired Wound - Properties Group Placement Date: 01/15/24  -MD Placement Time: 1233  -MD Location: sacral spine  -MD    Retired Wound - Properties Group Date first assessed: 01/15/24  -MD Time first assessed: 1233 -MD Location: sacral spine  -MD      Row Name 01/17/24 1115          Positioning and Restraints    Pre-Treatment Position in bed  -KJ     Post Treatment Position bed  -KJ     In Bed call light within reach  -KJ               User Key  (r) = Recorded By, (t) = Taken By, (c) = Cosigned By      Initials Name Provider Type    Analy Cristina PTA Physical Therapist Assistant    Brenda Cadena RN Registered Nurse                    Physical Therapy Education       Title: PT OT SLP Therapies (Done)       Topic: Physical Therapy (Done)       Point: Mobility training (Done)       Learning Progress Summary            "  Patient Acceptance, E, VU by  at 1/16/2024 0022    Acceptance, E, VU,DU by EAGLE at 1/13/2024 1320    Comment: benefits of activity, progression of PT                         Point: Home exercise program (Done)       Learning Progress Summary             Patient Acceptance, E, VU by  at 1/16/2024 0022                         Point: Body mechanics (Done)       Learning Progress Summary             Patient Acceptance, E, VU by  at 1/16/2024 0022                         Point: Precautions (Done)       Learning Progress Summary             Patient Acceptance, E, VU by  at 1/16/2024 0022                                         User Key       Initials Effective Dates Name Provider Type Discipline     02/03/23 -  Darrell Rosario, PT DPT Physical Therapist PT    BRENDA 04/25/23 -  Anthony Rowan, RN Registered Nurse Nurse                  PT Recommendation and Plan     Plan of Care Reviewed With: patient  Progress: improving  Outcome Evaluation: PT tx completed. Pt c/o R side \"tingling and feels weaker today\". States, \" I feel different and something isnt right\". She has numerous complaints of pain, weakness, or worried about her edema in BLE's. She seems very anxious. Bed mobility Juliocesar, amb short distance with r wx. RLE weak, has decreased foot clearance. Decreased tolerance to activity. Recommend rehab.   Outcome Measures       Row Name 01/17/24 1100 01/16/24 1400 01/16/24 1100       How much help from another person do you currently need...    Turning from your back to your side while in flat bed without using bedrails? 3  -KJ -- 4  -KJ    Moving from lying on back to sitting on the side of a flat bed without bedrails? 3  -KJ -- 4  -KJ    Moving to and from a bed to a chair (including a wheelchair)? 3  -KJ -- 3  -KJ    Standing up from a chair using your arms (e.g., wheelchair, bedside chair)? 3  -KJ -- 3  -KJ    Climbing 3-5 steps with a railing? 3  -KJ -- 3  -KJ    To walk in hospital room? 4  -KJ -- 3  -KJ    " -[facility] 6 Clicks Score (PT) 19  -KJ -- 20  -KJ    Highest Level of Mobility Goal 6 --> Walk 10 steps or more  -KJ -- 6 --> Walk 10 steps or more  -KJ       How much help from another is currently needed...    Putting on and taking off regular lower body clothing? -- 3  -EC --    Bathing (including washing, rinsing, and drying) -- 3  -EC --    Toileting (which includes using toilet bed pan or urinal) -- 3  -EC --    Putting on and taking off regular upper body clothing -- 3  -EC --    Taking care of personal grooming (such as brushing teeth) -- 3  -EC --    Eating meals -- 4  -EC --    AM-[facility] 6 Clicks Score (OT) -- 19  -EC --       Functional Assessment    Outcome Measure Options AM-[facility] 6 Clicks Basic Mobility (PT)  -KJ AM-[facility] 6 Clicks Daily Activity (OT)  -EC AM-[facility] 6 Clicks Basic Mobility (PT)  -KJ      Row Name 01/15/24 1500 01/15/24 1100          How much help from another person do you currently need...    Turning from your back to your side while in flat bed without using bedrails? -- 3  -KJ     Moving from lying on back to sitting on the side of a flat bed without bedrails? -- 3  -KJ     Moving to and from a bed to a chair (including a wheelchair)? -- 3  -KJ     Standing up from a chair using your arms (e.g., wheelchair, bedside chair)? -- 3  -KJ     Climbing 3-5 steps with a railing? -- 3  -KJ     To walk in hospital room? -- 3  -KJ     -[facility] 6 Clicks Score (PT) -- 18  -KJ     Highest Level of Mobility Goal -- 6 --> Walk 10 steps or more  -KJ        How much help from another is currently needed...    Putting on and taking off regular lower body clothing? 3  -TS --     Bathing (including washing, rinsing, and drying) 3  -TS --     Toileting (which includes using toilet bed pan or urinal) 3  -TS --     Putting on and taking off regular upper body clothing 3  -TS --     Taking care of personal grooming (such as brushing teeth) 3  -TS --     Eating meals 4  -TS --     AM-[facility] 6 Clicks Score (OT) 19  -TS --         Functional Assessment    Outcome Measure Options -- AM-PAC 6 Clicks Basic Mobility (PT)  -KJ               User Key  (r) = Recorded By, (t) = Taken By, (c) = Cosigned By      Initials Name Provider Type    Analy Cristina PTA Physical Therapist Assistant    Delilah Bowman COTA Occupational Therapist Assistant    Ruth Damon, OTR/L Occupational Therapist                     Time Calculation:    PT Charges       Row Name 01/17/24 1141             Time Calculation    Start Time 1115  -KJ      Stop Time 1141  -KJ      Time Calculation (min) 26 min  -KJ      PT Received On 01/17/24  -KJ      PT Goal Re-Cert Due Date 01/23/24  -KJ         Time Calculation- PT    Total Timed Code Minutes- PT 26 minute(s)  -KJ                User Key  (r) = Recorded By, (t) = Taken By, (c) = Cosigned By      Initials Name Provider Type    Analy Cristina PTA Physical Therapist Assistant                  Therapy Charges for Today       Code Description Service Date Service Provider Modifiers Qty    63083166109 HC GAIT TRAINING EA 15 MIN 1/16/2024 Analy Perez, PTA GP 1    70164206282 HC PT THER PROC EA 15 MIN 1/16/2024 Analy Perez, PTA GP 1    46068385749 HC GAIT TRAINING EA 15 MIN 1/17/2024 Analy Perez, PTA GP 1    17202236310 HC PT THER PROC EA 15 MIN 1/17/2024 Analy Perez, PTA GP 1            PT G-Codes  Outcome Measure Options: AM-PAC 6 Clicks Basic Mobility (PT)  AM-PAC 6 Clicks Score (PT): 19  AM-PAC 6 Clicks Score (OT): 19  Modified [place] Scale: 4 - Moderately severe disability.  Unable to walk without assistance, and unable to attend to own bodily needs without assistance.    Analy Perez PTA  1/17/2024

## 2024-01-17 NOTE — PLAN OF CARE
Goal Outcome Evaluation:  Plan of Care Reviewed With: patient, spouse        Progress: improving  Outcome Evaluation: Ntn follow up. Unintentional wt loss over the past four months. Poor appeite with altered taste. Encourage oral intake with meals and oral supplement. Advised of alternate menu selections and available snacks. Cardiac/CCHO diet. Boost Glucose Control BID. Oral intake 68% of the past seven meals. Cont to follow for plan of care.

## 2024-01-17 NOTE — PLAN OF CARE
"Goal Outcome Evaluation:  Plan of Care Reviewed With: patient        Progress: improving  Outcome Evaluation: PT tx completed. Pt c/o R side \"tingling and feels weaker today\". States, \" I feel different and something isnt right\". She has numerous complaints of pain, weakness, or worried about her edema in BLE's. She seems very anxious. Bed mobility Juliocesar, amb short distance with r wx. RLE weak, has decreased foot clearance. Decreased tolerance to activity. Recommend rehab.                               "

## 2024-01-17 NOTE — ANESTHESIA POSTPROCEDURE EVALUATION
"Patient: Radha Wade    Procedure Summary       Date: 01/17/24 Room / Location: [facility] OR  /  [facility] OR    Anesthesia Start: 1305 Anesthesia Stop: 1338    Procedure: DILATATION AND CURETTAGE (Vagina) Diagnosis:     Surgeons: Radu De Los Santos MD Provider: Frank Cohen CRNA    Anesthesia Type: general ASA Status: 3 - Emergent            Anesthesia Type: general    Vitals  Vitals Value Taken Time   /53 01/17/24 1415   Temp 98.5 °F (36.9 °C) 01/17/24 1420   Pulse 76 01/17/24 1420   Resp 15 01/17/24 1420   SpO2 99 % 01/17/24 1420           Post Anesthesia Care and Evaluation    Patient location during evaluation: PACU  Patient participation: complete - patient participated  Level of consciousness: awake and awake and alert  Pain score: 0  Pain management: adequate    Airway patency: patent  Anesthetic complications: No anesthetic complications  PONV Status: none  Cardiovascular status: acceptable  Respiratory status: acceptable  Hydration status: acceptable    Comments: Patient discharged according to acceptable Dante score per RN assessment. See nursing records for further information.     Blood pressure 105/48, pulse 81, temperature 97.6 °F (36.4 °C), temperature source Oral, resp. rate 16, height 165.1 cm (65\"), weight 77.6 kg (171 lb), SpO2 98%.      "

## 2024-01-18 ENCOUNTER — HOSPITAL ENCOUNTER (INPATIENT)
Age: 64
LOS: 8 days | Discharge: HOME HEALTH | DRG: 056 | End: 2024-01-26
Attending: PSYCHIATRY & NEUROLOGY | Admitting: PSYCHIATRY & NEUROLOGY
Payer: COMMERCIAL

## 2024-01-18 VITALS
RESPIRATION RATE: 16 BRPM | BODY MASS INDEX: 27.69 KG/M2 | WEIGHT: 166.2 LBS | HEART RATE: 88 BPM | DIASTOLIC BLOOD PRESSURE: 51 MMHG | SYSTOLIC BLOOD PRESSURE: 111 MMHG | HEIGHT: 65 IN | OXYGEN SATURATION: 96 % | TEMPERATURE: 98.1 F

## 2024-01-18 DIAGNOSIS — I63.9 ACUTE STROKE DUE TO ISCHEMIA (HCC): ICD-10-CM

## 2024-01-18 DIAGNOSIS — C54.1 ENDOMETRIAL ADENOCARCINOMA (HCC): ICD-10-CM

## 2024-01-18 DIAGNOSIS — Z51.81 ANTICOAGULATION MANAGEMENT ENCOUNTER: ICD-10-CM

## 2024-01-18 DIAGNOSIS — Z79.01 ANTICOAGULATION MANAGEMENT ENCOUNTER: ICD-10-CM

## 2024-01-18 DIAGNOSIS — I82.4Y1 ACUTE DEEP VEIN THROMBOSIS (DVT) OF PROXIMAL VEIN OF RIGHT LOWER EXTREMITY (HCC): ICD-10-CM

## 2024-01-18 DIAGNOSIS — R10.30 LOWER ABDOMINAL PAIN: ICD-10-CM

## 2024-01-18 DIAGNOSIS — N85.8 UTERINE MASS: ICD-10-CM

## 2024-01-18 DIAGNOSIS — C80.1 CANCER (HCC): ICD-10-CM

## 2024-01-18 DIAGNOSIS — Z51.5 PALLIATIVE CARE PATIENT: Primary | ICD-10-CM

## 2024-01-18 PROBLEM — I69.391 DYSPHAGIA DUE TO RECENT CEREBRAL INFARCTION: Status: ACTIVE | Noted: 2024-01-18

## 2024-01-18 PROBLEM — R53.1 WEAKNESS: Status: ACTIVE | Noted: 2024-01-18

## 2024-01-18 PROBLEM — E78.49 OTHER HYPERLIPIDEMIA: Status: ACTIVE | Noted: 2024-01-18

## 2024-01-18 PROBLEM — N30.00 ACUTE CYSTITIS WITHOUT HEMATURIA: Status: ACTIVE | Noted: 2024-01-18

## 2024-01-18 PROBLEM — E11.8 TYPE 2 DIABETES MELLITUS WITH COMPLICATION, WITHOUT LONG-TERM CURRENT USE OF INSULIN (HCC): Status: ACTIVE | Noted: 2024-01-18

## 2024-01-18 LAB
BASOPHILS # BLD AUTO: 0.1 10*3/MM3 (ref 0–0.2)
BASOPHILS NFR BLD AUTO: 0.9 % (ref 0–1.5)
DEPRECATED RDW RBC AUTO: 41.9 FL (ref 37–54)
EOSINOPHIL # BLD AUTO: 0.26 10*3/MM3 (ref 0–0.4)
EOSINOPHIL NFR BLD AUTO: 2.4 % (ref 0.3–6.2)
ERYTHROCYTE [DISTWIDTH] IN BLOOD BY AUTOMATED COUNT: 17.2 % (ref 12.3–15.4)
GLUCOSE BLD-MCNC: 143 MG/DL (ref 70–99)
GLUCOSE BLDC GLUCOMTR-MCNC: 151 MG/DL (ref 70–130)
GLUCOSE BLDC GLUCOMTR-MCNC: 165 MG/DL (ref 70–130)
HCT VFR BLD AUTO: 30.9 % (ref 34–46.6)
HGB BLD-MCNC: 8.9 G/DL (ref 12–15.9)
LYMPHOCYTES # BLD AUTO: 2.24 10*3/MM3 (ref 0.7–3.1)
LYMPHOCYTES NFR BLD AUTO: 20.3 % (ref 19.6–45.3)
MCH RBC QN AUTO: 20.4 PG (ref 26.6–33)
MCHC RBC AUTO-ENTMCNC: 28.8 G/DL (ref 31.5–35.7)
MCV RBC AUTO: 70.9 FL (ref 79–97)
MONOCYTES # BLD AUTO: 1.16 10*3/MM3 (ref 0.1–0.9)
MONOCYTES NFR BLD AUTO: 10.5 % (ref 5–12)
NEUTROPHILS NFR BLD AUTO: 6.84 10*3/MM3 (ref 1.7–7)
NEUTROPHILS NFR BLD AUTO: 61.7 % (ref 42.7–76)
PERFORMED ON: ABNORMAL
PLATELET # BLD AUTO: 638 10*3/MM3 (ref 140–450)
PMV BLD AUTO: 9.2 FL (ref 6–12)
PREALB SERPL-MCNC: 10 MG/DL (ref 20–40)
RBC # BLD AUTO: 4.36 10*6/MM3 (ref 3.77–5.28)
SARS-COV-2 AG RESP QL IA.RAPID: NORMAL
TSH SERPL DL<=0.005 MIU/L-ACNC: 1.21 UIU/ML (ref 0.35–5.5)
VIT B12 SERPL-MCNC: 779 PG/ML (ref 211–946)
WBC NRBC COR # BLD AUTO: 11.06 10*3/MM3 (ref 3.4–10.8)

## 2024-01-18 PROCEDURE — 6370000000 HC RX 637 (ALT 250 FOR IP): Performed by: PSYCHIATRY & NEUROLOGY

## 2024-01-18 PROCEDURE — 85025 COMPLETE CBC W/AUTO DIFF WBC: CPT | Performed by: INTERNAL MEDICINE

## 2024-01-18 PROCEDURE — 1180000000 HC REHAB R&B

## 2024-01-18 PROCEDURE — 94760 N-INVAS EAR/PLS OXIMETRY 1: CPT

## 2024-01-18 PROCEDURE — 82607 VITAMIN B-12: CPT

## 2024-01-18 PROCEDURE — 36415 COLL VENOUS BLD VENIPUNCTURE: CPT

## 2024-01-18 PROCEDURE — 63710000001 INSULIN LISPRO (HUMAN) PER 5 UNITS: Performed by: INTERNAL MEDICINE

## 2024-01-18 PROCEDURE — 94150 VITAL CAPACITY TEST: CPT

## 2024-01-18 PROCEDURE — 0 HYDROMORPHONE 1 MG/ML SOLUTION: Performed by: INTERNAL MEDICINE

## 2024-01-18 PROCEDURE — 6360000002 HC RX W HCPCS: Performed by: PSYCHIATRY & NEUROLOGY

## 2024-01-18 PROCEDURE — 87426 SARSCOV CORONAVIRUS AG IA: CPT | Performed by: INTERNAL MEDICINE

## 2024-01-18 PROCEDURE — 82962 GLUCOSE BLOOD TEST: CPT

## 2024-01-18 PROCEDURE — 25010000002 ENOXAPARIN PER 10 MG: Performed by: INTERNAL MEDICINE

## 2024-01-18 PROCEDURE — 25810000003 SODIUM CHLORIDE 0.9 % SOLUTION 250 ML FLEX CONT: Performed by: INTERNAL MEDICINE

## 2024-01-18 PROCEDURE — 82948 REAGENT STRIP/BLOOD GLUCOSE: CPT

## 2024-01-18 PROCEDURE — 84134 ASSAY OF PREALBUMIN: CPT

## 2024-01-18 PROCEDURE — 84443 ASSAY THYROID STIM HORMONE: CPT

## 2024-01-18 PROCEDURE — 25010000002 NA FERRIC GLUC CPLX PER 12.5 MG: Performed by: INTERNAL MEDICINE

## 2024-01-18 RX ORDER — ATORVASTATIN CALCIUM 80 MG/1
80 TABLET, FILM COATED ORAL NIGHTLY
Start: 2024-01-18

## 2024-01-18 RX ORDER — ENOXAPARIN SODIUM 100 MG/ML
1 INJECTION SUBCUTANEOUS 2 TIMES DAILY
Status: DISCONTINUED | OUTPATIENT
Start: 2024-01-18 | End: 2024-01-25

## 2024-01-18 RX ORDER — ALBUTEROL SULFATE 2.5 MG/3ML
2.5 SOLUTION RESPIRATORY (INHALATION) EVERY 4 HOURS PRN
Status: DISCONTINUED | OUTPATIENT
Start: 2024-01-18 | End: 2024-01-26 | Stop reason: HOSPADM

## 2024-01-18 RX ORDER — DEXTROSE MONOHYDRATE 100 MG/ML
INJECTION, SOLUTION INTRAVENOUS CONTINUOUS PRN
Status: DISCONTINUED | OUTPATIENT
Start: 2024-01-18 | End: 2024-01-26 | Stop reason: HOSPADM

## 2024-01-18 RX ORDER — OXYCODONE HYDROCHLORIDE 5 MG/1
5 TABLET ORAL EVERY 4 HOURS PRN
Start: 2024-01-18

## 2024-01-18 RX ORDER — POLYETHYLENE GLYCOL 3350 17 G/17G
17 POWDER, FOR SOLUTION ORAL DAILY
Status: ON HOLD | COMMUNITY
Start: 2024-01-19 | End: 2024-01-26 | Stop reason: HOSPADM

## 2024-01-18 RX ORDER — ATORVASTATIN CALCIUM 80 MG/1
80 TABLET, FILM COATED ORAL NIGHTLY
Status: ON HOLD | COMMUNITY
Start: 2024-01-18 | End: 2024-01-26

## 2024-01-18 RX ORDER — PHENAZOPYRIDINE HYDROCHLORIDE 100 MG/1
100 TABLET, FILM COATED ORAL
Status: DISPENSED | OUTPATIENT
Start: 2024-01-18 | End: 2024-01-25

## 2024-01-18 RX ORDER — OXYCODONE HYDROCHLORIDE 5 MG/1
5 CAPSULE ORAL EVERY 4 HOURS PRN
Status: ON HOLD | COMMUNITY
End: 2024-01-26 | Stop reason: HOSPADM

## 2024-01-18 RX ORDER — PROMETHAZINE HYDROCHLORIDE 25 MG/1
25 TABLET ORAL EVERY 6 HOURS PRN
Status: DISCONTINUED | OUTPATIENT
Start: 2024-01-18 | End: 2024-01-25

## 2024-01-18 RX ORDER — ENOXAPARIN SODIUM 100 MG/ML
1 INJECTION SUBCUTANEOUS 2 TIMES DAILY
Status: ON HOLD | COMMUNITY
Start: 2024-01-18 | End: 2024-01-26 | Stop reason: HOSPADM

## 2024-01-18 RX ORDER — CEFDINIR 300 MG/1
300 CAPSULE ORAL 2 TIMES DAILY
Start: 2024-01-18 | End: 2024-01-22

## 2024-01-18 RX ORDER — PHENAZOPYRIDINE HYDROCHLORIDE 100 MG/1
100 TABLET, FILM COATED ORAL
Start: 2024-01-18 | End: 2024-01-20

## 2024-01-18 RX ORDER — PANTOPRAZOLE SODIUM 40 MG/1
40 TABLET, DELAYED RELEASE ORAL
Status: DISCONTINUED | OUTPATIENT
Start: 2024-01-19 | End: 2024-01-26 | Stop reason: HOSPADM

## 2024-01-18 RX ORDER — PHENAZOPYRIDINE HYDROCHLORIDE 100 MG/1
100 TABLET, FILM COATED ORAL
Status: ON HOLD | COMMUNITY
Start: 2024-01-18 | End: 2024-01-26 | Stop reason: HOSPADM

## 2024-01-18 RX ORDER — OXYCODONE HYDROCHLORIDE 5 MG/1
5 TABLET ORAL EVERY 4 HOURS PRN
Status: DISCONTINUED | OUTPATIENT
Start: 2024-01-18 | End: 2024-01-19

## 2024-01-18 RX ORDER — HYDROCODONE BITARTRATE AND ACETAMINOPHEN 5; 325 MG/1; MG/1
1 TABLET ORAL EVERY 4 HOURS PRN
Qty: 18 TABLET | Refills: 0 | Status: CANCELLED | OUTPATIENT
Start: 2024-01-18

## 2024-01-18 RX ORDER — PANTOPRAZOLE SODIUM 40 MG/1
40 TABLET, DELAYED RELEASE ORAL
Start: 2024-01-19

## 2024-01-18 RX ORDER — ATORVASTATIN CALCIUM 80 MG/1
80 TABLET, FILM COATED ORAL NIGHTLY
Status: DISCONTINUED | OUTPATIENT
Start: 2024-01-18 | End: 2024-01-26 | Stop reason: HOSPADM

## 2024-01-18 RX ORDER — ACETAMINOPHEN 325 MG/1
650 TABLET ORAL EVERY 4 HOURS PRN
Status: DISCONTINUED | OUTPATIENT
Start: 2024-01-18 | End: 2024-01-26 | Stop reason: HOSPADM

## 2024-01-18 RX ORDER — POLYETHYLENE GLYCOL 3350 17 G/17G
17 POWDER, FOR SOLUTION ORAL DAILY PRN
Status: DISCONTINUED | OUTPATIENT
Start: 2024-01-18 | End: 2024-01-26 | Stop reason: HOSPADM

## 2024-01-18 RX ORDER — INSULIN LISPRO 100 [IU]/ML
0-4 INJECTION, SOLUTION INTRAVENOUS; SUBCUTANEOUS
Status: DISCONTINUED | OUTPATIENT
Start: 2024-01-18 | End: 2024-01-23

## 2024-01-18 RX ORDER — ENOXAPARIN SODIUM 100 MG/ML
80 INJECTION SUBCUTANEOUS EVERY 12 HOURS SCHEDULED
Start: 2024-01-18 | End: 2024-02-17

## 2024-01-18 RX ORDER — CEFDINIR 300 MG/1
300 CAPSULE ORAL 2 TIMES DAILY
Status: DISCONTINUED | OUTPATIENT
Start: 2024-01-18 | End: 2024-01-19

## 2024-01-18 RX ORDER — PROMETHAZINE HYDROCHLORIDE 25 MG/1
25 TABLET ORAL EVERY 6 HOURS PRN
Status: ON HOLD | COMMUNITY
End: 2024-01-26 | Stop reason: HOSPADM

## 2024-01-18 RX ORDER — PANTOPRAZOLE SODIUM 40 MG/1
40 TABLET, DELAYED RELEASE ORAL
Status: ON HOLD | COMMUNITY
Start: 2024-01-19 | End: 2024-01-26 | Stop reason: HOSPADM

## 2024-01-18 RX ORDER — INSULIN LISPRO 100 [IU]/ML
0-4 INJECTION, SOLUTION INTRAVENOUS; SUBCUTANEOUS NIGHTLY
Status: DISCONTINUED | OUTPATIENT
Start: 2024-01-18 | End: 2024-01-23

## 2024-01-18 RX ORDER — CEFDINIR 300 MG/1
300 CAPSULE ORAL 2 TIMES DAILY
Status: ON HOLD | COMMUNITY
Start: 2024-01-18 | End: 2024-01-26 | Stop reason: HOSPADM

## 2024-01-18 RX ORDER — ALBUTEROL SULFATE 2.5 MG/3ML
2.5 SOLUTION RESPIRATORY (INHALATION)
Status: DISCONTINUED | OUTPATIENT
Start: 2024-01-18 | End: 2024-01-18

## 2024-01-18 RX ORDER — POLYETHYLENE GLYCOL 3350 17 G/17G
17 POWDER, FOR SOLUTION ORAL DAILY
Start: 2024-01-18

## 2024-01-18 RX ADMIN — ENOXAPARIN SODIUM 80 MG: 100 INJECTION SUBCUTANEOUS at 09:01

## 2024-01-18 RX ADMIN — DOCUSATE SODIUM 100 MG: 100 CAPSULE, LIQUID FILLED ORAL at 09:00

## 2024-01-18 RX ADMIN — ACETAMINOPHEN 650 MG: 325 TABLET ORAL at 22:46

## 2024-01-18 RX ADMIN — PANTOPRAZOLE SODIUM 40 MG: 40 TABLET, DELAYED RELEASE ORAL at 05:09

## 2024-01-18 RX ADMIN — ACETAMINOPHEN 650 MG: 325 TABLET ORAL at 16:55

## 2024-01-18 RX ADMIN — ENOXAPARIN SODIUM 80 MG: 100 INJECTION SUBCUTANEOUS at 21:14

## 2024-01-18 RX ADMIN — Medication 10 ML: at 09:01

## 2024-01-18 RX ADMIN — POLYETHYLENE GLYCOL 3350 17 G: 17 POWDER, FOR SOLUTION ORAL at 09:02

## 2024-01-18 RX ADMIN — CEFDINIR 300 MG: 300 CAPSULE ORAL at 21:14

## 2024-01-18 RX ADMIN — PHENAZOPYRIDINE 100 MG: 100 TABLET ORAL at 16:55

## 2024-01-18 RX ADMIN — PHENAZOPYRIDINE HYDROCHLORIDE 100 MG: 100 TABLET ORAL at 09:00

## 2024-01-18 RX ADMIN — HYDROCODONE BITARTRATE AND ACETAMINOPHEN 1 TABLET: 5; 325 TABLET ORAL at 04:33

## 2024-01-18 RX ADMIN — INSULIN LISPRO 2 UNITS: 100 INJECTION, SOLUTION INTRAVENOUS; SUBCUTANEOUS at 09:11

## 2024-01-18 RX ADMIN — ATORVASTATIN CALCIUM 80 MG: 80 TABLET, FILM COATED ORAL at 21:14

## 2024-01-18 RX ADMIN — HYDROMORPHONE HYDROCHLORIDE 1 MG: 1 INJECTION, SOLUTION INTRAMUSCULAR; INTRAVENOUS; SUBCUTANEOUS at 09:00

## 2024-01-18 RX ADMIN — SODIUM CHLORIDE 250 MG: 9 INJECTION, SOLUTION INTRAVENOUS at 09:00

## 2024-01-18 RX ADMIN — METFORMIN HYDROCHLORIDE 500 MG: 500 TABLET ORAL at 16:55

## 2024-01-18 RX ADMIN — INSULIN LISPRO 2 UNITS: 100 INJECTION, SOLUTION INTRAVENOUS; SUBCUTANEOUS at 12:53

## 2024-01-18 RX ADMIN — ACETAMINOPHEN 650 MG: 325 TABLET, FILM COATED ORAL at 12:53

## 2024-01-18 ASSESSMENT — PAIN SCALES - GENERAL
PAINLEVEL_OUTOF10: 8
PAINLEVEL_OUTOF10: 4
PAINLEVEL_OUTOF10: 1

## 2024-01-18 ASSESSMENT — PAIN DESCRIPTION - DESCRIPTORS: DESCRIPTORS: CRAMPING

## 2024-01-18 ASSESSMENT — PAIN DESCRIPTION - ORIENTATION: ORIENTATION: LOWER

## 2024-01-18 ASSESSMENT — PAIN DESCRIPTION - LOCATION: LOCATION: ABDOMEN;BACK

## 2024-01-18 ASSESSMENT — PAIN - FUNCTIONAL ASSESSMENT: PAIN_FUNCTIONAL_ASSESSMENT: PREVENTS OR INTERFERES SOME ACTIVE ACTIVITIES AND ADLS

## 2024-01-18 NOTE — PLAN OF CARE
Goal Outcome Evaluation:  Plan of Care Reviewed With: patient             Outcome Evaluation: Pt A&Ox4, less anxious this shift. Medications given and BG monitored per orders, PRN Tylenol and Norco given with positive effect. IV to LAC dc this shift d/t leaking, unable to access new IV. Will discuss consulting VAT with dayshift RN if not dcing today. Up to BSC x1, voiding. BLE edema. Safety maintained, call light within reach.

## 2024-01-18 NOTE — THERAPY DISCHARGE NOTE
Acute Care - Occupational Therapy Discharge Summary  [facility]     Patient Name: Radha Wade  : 1960  MRN: [id number]    Today's Date: 2024                 Admit Date: 2024        OT Recommendation and Plan    Visit Dx:    ICD-10-CM ICD-9-CM   1. 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 Cystitis  N30.90 595.9   3. Fall, initial encounter  W19.XXXA E888.9   4. Weakness  R53.1 780.79   5. Impaired mobility [Z74.09]  Z74.09 799.89   6. Decreased activities of daily living (ADL) [Z78.9]  Z78.9 V49.89   7. Cancer  C80.1 199.1   8. 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  C78.6 197.6                OT Rehab Goals       Row Name 24 1400             Dressing Goal 1 (OT)    Activity/Device (Dressing Goal 1, OT) dressing skills, all  -JJ      [place]/Cues Needed (Dressing Goal 1, OT) minimum assist (75% or more patient effort)  -JJ      Time Frame (Dressing Goal 1, OT) long term goal (LTG);by discharge  -JJ      Progress/Outcome (Dressing Goal 1, OT) goal not met;discharged from facility  -JJ         Toileting Goal 1 (OT)    Activity/Device (Toileting Goal 1, OT) toileting skills, all  -JJ      [place] Level/Cues Needed (Toileting Goal 1, OT) minimum assist (75% or more patient effort)  -JJ      Time Frame (Toileting Goal 1, OT) long term goal (LTG);by discharge  -JJ      Progress/Outcome (Toileting Goal 1, OT) goal not met;discharged from facility  -JJ         Self-Feeding Goal 1 (OT)    Activity/Device (Self-Feeding Goal 1, OT) self-feeding skills, all  -JJ      [place] Level/Cues Needed (Self-Feeding Goal 1, OT) standby assist  -JJ      Time Frame (Self-Feeding Goal 1, OT) long term goal (LTG);by discharge  -JJ      Strategies/Barriers (Self-Feeding Goal 1, OT) With min spillage or less utilizing R UE to complete task.  -JJ      Progress/Outcomes (Self-Feeding Goal 1, OT) goal not met;discharged from facility  -JJ         Problem Specific Goal 1 (OT)    Problem Specific  Goal 1 (OT) Pt will independently complete [facility] HEP to improve independence with adls.  -      Time Frame (Problem Specific Goal 1, OT) long term goal (LTG);by discharge  -      Progress/Outcome (Problem Specific Goal 1, OT) goal not met;discharged from facility  -                User Key  (r) = Recorded By, (t) = Taken By, (c) = Cosigned By      Initials Name Provider Type Discipline    Columba Hernandez, OTR/L, CSRS Occupational Therapist OT                     Outcome Measures       Row Name 01/17/24 1300 01/17/24 1100 01/16/24 1400       How much help from another person do you currently need...    Turning from your back to your side while in flat bed without using bedrails? -- 3  -KJ --    Moving from lying on back to sitting on the side of a flat bed without bedrails? -- 3  -KJ --    Moving to and from a bed to a chair (including a wheelchair)? -- 3  -KJ --    Standing up from a chair using your arms (e.g., wheelchair, bedside chair)? -- 3  -KJ --    Climbing 3-5 steps with a railing? -- 3  -KJ --    To walk in hospital room? -- 4  -KJ --    AM-PAC 6 Clicks Score (PT) -- 19  -KJ --    Highest Level of Mobility Goal -- 6 --> Walk 10 steps or more  -KJ --       How much help from another is currently needed...    Putting on and taking off regular lower body clothing? 3  -TS -- 3  -EC    Bathing (including washing, rinsing, and drying) 3  -TS -- 3  -EC    Toileting (which includes using toilet bed pan or urinal) 3  -TS -- 3  -EC    Putting on and taking off regular upper body clothing 3  -TS -- 3  -EC    Taking care of personal grooming (such as brushing teeth) 3  -TS -- 3  -EC    Eating meals 4  -TS -- 4  -EC    AM-PAC 6 Clicks Score (OT) 19  -TS -- 19  -EC       Functional Assessment    Outcome Measure Options -- AM-PAC 6 Clicks Basic Mobility (PT)  -KJ AM-PAC 6 Clicks Daily Activity (OT)  -EC      Row Name 01/16/24 1100 01/15/24 1500          How much help from another person do you currently  need...    Turning from your back to your side while in flat bed without using bedrails? 4  -KJ --     Moving from lying on back to sitting on the side of a flat bed without bedrails? 4  -KJ --     Moving to and from a bed to a chair (including a wheelchair)? 3  -KJ --     Standing up from a chair using your arms (e.g., wheelchair, bedside chair)? 3  -KJ --     Climbing 3-5 steps with a railing? 3  -KJ --     To walk in hospital room? 3  -KJ --     AM-PAC 6 Clicks Score (PT) 20  -KJ --     Highest Level of Mobility Goal 6 --> Walk 10 steps or more  -KJ --        How much help from another is currently needed...    Putting on and taking off regular lower body clothing? -- 3  -TS     Bathing (including washing, rinsing, and drying) -- 3  -TS     Toileting (which includes using toilet bed pan or urinal) -- 3  -TS     Putting on and taking off regular upper body clothing -- 3  -TS     Taking care of personal grooming (such as brushing teeth) -- 3  -TS     Eating meals -- 4  -TS     AM-PAC 6 Clicks Score (OT) -- 19  -TS        Functional Assessment    Outcome Measure Options AM-PAC 6 Clicks Basic Mobility (PT)  -KJ --               User Key  (r) = Recorded By, (t) = Taken By, (c) = Cosigned By      Initials Name Provider Type    Analy Cristina, PTA Physical Therapist Assistant    Delilah Bowman COTA Occupational Therapist Assistant    Ruth Damon, OTR/L Occupational Therapist                    Timed Therapy Charges  Total Units: 5      Suggested Charges  Total Units: 5      Procedure Name Documented Minutes Units Code    HC OT SELF CARE/MGMT/TRAIN EA 15 MIN 75 5   41466 (CPT®)                 Documented Minutes  Total Minutes: 75      Therapy Provided Minutes    70221 - OT Self Care/Mgmt Minutes 75                        OT Discharge Summary  Anticipated Discharge Disposition (OT): inpatient rehabilitation facility  Reason for Discharge: Discharge from facility  Outcomes Achieved: Refer to plan of  care for updates on goals achieved  Discharge Destination: Inpatient rehabilitation facility      Columba Patterson OTR/ANA, CSRS  1/18/2024

## 2024-01-18 NOTE — PROGRESS NOTES
HEMATOLOGY AND MEDICAL ONCOLOGY PROGRESS NOTE    Patient name: Radha Wade  Patient : 1960  VISIT # [id number]  MR #[id number]  Room:  327    SUBJECTIVE: Up to side of bed.  Complains of having significant lower back abdominal pain with cramping with difficulty urinating.  IV site to left AC has infiltrated, edema and tenderness noted, spoke with Krystal ROBERTSON and requested a warm compress.  Currently does not have an IV site, anticipate consulting vascular access team.  Discussed Dr. Saleh's findings yesterday during procedure, 14 cm mass.  Continues to have significant right lower extremity weakness with impaired mobility, participating with PT.    INTERVAL HISTORY:    Radha Wade is a 63-year-old  female whom I have been evaluating over the last couple months referred to me regarding a suspected potential malignant process.  Radha has been to some degree in denial and has failed to follow-up or keep appointments in an attempt for an aggressive and expedited workup.  Despite that, the biggest part of her workup has been completed.  She was scheduled to see Dr. Frank Miles in evaluation to obtain tissue diagnosis on 2024.  Unfortunately, Ms. Wade was evaluated in the ED on 2022 and admitted to [facility] with a left-sided CVA and right-sided arm and leg weakness.  She has been under evaluation over the weekend for this and is anticipating discharge to rehab.  I was consulted and continuity of care.        DETAILED TUMOR HISTORY COPIED FROM MY OFFICE RECORDS     TARGET POTENTIAL MALIGNANT SITES  Enlarged uterus with abnormal vaginal bleeding, 45 pound weight loss in 3 to 6 months  Omental infiltration and nodularity progressing in 3 months  2.9 x 1.9 cm nodule in the anterior abdomen and adjacent and posterior to the umbilicus   2.7 cm left pelvic lymph node, previously 2.4 cm,   3 cm right external iliac lymph node, previously 2.1 cm   1.6 cm sclerotic bone lesion in the S1  vertebral body on CT 9/14/2023   CA 19-9 of 355  and  of 455 on 12/21/2023         TUMOR HISTORY  Radha was seen in initial medical oncological consultation on 11/9/2023, referred by Dr Myron Vines, PCP, for suspected malignant process.     Despite her age of 63, Radha continued to have regular menstrual cycles until she fell on 7/19/2023.   In evaluation she was also found to have a tick.  She reports that an attempt was made to remove it but it was not able to be removed completely.   Since July 2023, Keiths menses began appearing every 2 weeks rather than monthly.    Dr. De Los Santos, her gynecologist recommended D&C at that time however Radha declined due to difficulty and pain in the lower pelvic area.  Radha and her  confirm that he tried to get a D&C done sometime in August 2023.    Dr. De Los Santos, her gynecologist referred her to the ED on 9/14/2023 due to bilateral lower extremity swelling. Noninvasive studies documented bilateral lower extremity DVTs on 9/14/2023.  She has been on Eliquis since that time.    Keiths menstrual bleeding is reported to have been continuous since being placed on Eliquis.  She continues to have leg pain, low back pain, feeling washed out and reports having had 45 pounds of weight loss in the previous 2 to 3 months.     Radha was evaluated at [facility] ER on 9/14/2023 for left leg swelling and abdominal pain.  Workup revealed the following:  bilateral LE DVTs (patient refused CTA.  Lovenox was initiated and discharged on Eliquis)   Retroperitoneal lymphadenopathy, soft tissue nodularity and stranding of omentum on CT Abdomen (Dr De Los Santos consulted, to f/u with D&C and ablation)  Reported 25 lb weight loss (in September 2023) over the previous few months.         US VENOUS DOPPLER LOWER EXTREMITIES BILATERAL on 9/14/23 at [facility]  There is evidence of deep venous thrombosis in the popliteal and posterior tibial veins in the right lower extremity.   There is also evidence of  deep venous thrombosis in the superficial femoral, popliteal, posterior tibial, and peroneal veins of the left lower extremity     CT ABDOMEN PELVIS WO at [facility] on 9/14/23  Somewhat bulky retroperitoneal lymphadenopathy and pelvic lymphadenopathy worrisome for metastatic lymphadenopathy or lymphoma.   There is also some stranding and nodularity in the greater omental region anteriorly suggesting possible peritoneal metastatic disease.   The uterus is prominent in size measuring 11.7 x 6.9 x 8.7 cm but a focal mass is not seen. Follow-up nonemergent ultrasound is recommended to evaluate the uterus for a potential mass. There is no other obvious area of primary neoplasm on the noncontrast CT images.   A vague 1.6 cm sclerotic bone lesion in the S1 vertebral body is indeterminate.        On 10/25/2023, she was seen by her PCP, Dr. Myron Vines.  Because of the history of the tick bite and rash, he requested a Borrelia Burgdorferi (Lyme disease) antibody which was positive on 10/25/2023.  Dr. Vines prescribed doxycycline 100 mg BID on 10/30/2023 which according to the patient was started somewhere around the first week of November to be taken for 1 month.        Serology collected 11/9/2023   CBC- WBC of 8.34. Hgb is 9.7 with an MCV of 74.9 and platelets 639,000.  CMP-Na+ 136, Creat 0.5, AlkPhos 149  LDH-223  B2M- 3.8  Kappa- 86.08  Lambda- 77.12  K/L ratio- 1.12  IgG 1400 ,IgA 572, IgM 73  M-spike- not applicable  Ferritin-474.6  Iron level-25  Iron Saturation-11%  TIBC-231    Folic acid-2.6  TSH-3.41   CEA-1.7  CA 19-9-355  -455     Serology collected 12/21/2023  Ferritin     443.6  Iron            19  Iron sat%     9         343  CA 19-9     240  CEA           1.7  B2M           3.9  T protein    6.8  Kappa light chain      92.43  Lambda light chain   74.51  K/L ratio                     1.24  M protein                   negative  IgG                            1398  IgA                                542  IgM                                62           Prothrombotic panel 11/9/2023  Factor V Leiden - negative  Homocysteine - 11  Lupus Anticoag - not detected  Protein C -138  Protein S - 85  Prothrombotic panel 12/21/2023  Lupus anticoagulant                                  not detected  Anticardiolipin Ab IgG                               <10  Anticardiolipin Ab IgM                               <10  Prothrombin gene mutation [id number]      Negative  MTHFR mutation C677T                          Negative  MTHFR mutation [id number]                       Heterozygous     GUADALUPE 2 and reflex panels                           PENDING     CT SOFT TISSUE NECK WO CONTRAST on 12/5/2023 at [facility]  No suspicious cervical adenopathy.  9 mm right upper lobe pulmonary nodule.   Moderate cervical spine osteoarthritis change      CT CHEST WO CONTRAST DIAGNOSTIC on 12/5/2023 at [facility]  Scattered bilateral pulmonary nodules   8 mm RUL pulmonary nodule  6 mm RUL pulmonary nodule  no mediastinal or hilar lymphadenopathy   No acute or suspicious bony finding.   partially visualized confluent retroperitoneal adenopathy.   Dome of the liver with a 10 mm hypodensity, incompletely characterized   on this exam         CT ABDOMEN PELVIS WO CONTRAST on 12/5/2023 at [facility], compared to 9/14/23  A vague, poorly defined, low-density nodule located posteriorly and superiorly in the dome of the liver was noted in the previous study with no change in size or shape in the interval   Moderate lobulation of the unenhanced kidneys bilaterally noted. An isodense mass or lesion may not be evaluated or excluded. There is moderate right hydronephrosis. There is moderate dilatation of the right proximal ureter which is completely encased by the lower abdomen/proximal pelvis by the enlarged lymph nodes. This was not noted in the previous study   Moderate diffuse enlargement of the uterus is seen which is anteverted   compressing on the urinary bladder. This is similar to  the previous study   significant abdominal and pelvic lymphadenopathy. The lymph nodes are poorly identified and not separate well from each other. There is significant surrounding retroperitoneal fat infiltration  Some visualized and separable lymph nodes:  2.7 cm left pelvic lymph node, previously 2.4 cm  3 cm right external iliac lymph node, previously 2.1 cm   Omental infiltration and nodularity which has significantly more progressed since the previous study   2.9 x 1.9 cm nodule in the anterior abdomen and adjacent and posterior to the umbilicus   vague sclerotic changes involving the anterior superior vertebral S1 is similar to the previous study with no change in the interval         Given her history with weight loss, vaginal bleeding, elevated tumor markers, imaging abnormalities with the uterus, the 2.9 x 1.9 cm nodule in the anterior abdomen suspicious for carcinomatosis, abdominal and pelvic lymphadenopathy, prothrombotic state with DVTs and subcentimeter pulmonary nodules although indeterminate, in this setting is very concerning for an active malignant process and suspicious for a GYN origin.     Completion of workup however will also include a bone scan that was ordered at last visit and not done, GI evaluation to rule out the GI tract as a potential source in addition to the other recommendations below.                    RECOMMENDATIONS on 12/21/2023  Bone scan  PET scan   C-scope - Appt with Bertha Parrish [facility] scheduled 1/3/2024, malignancy process with iron deficiency anemia  GUADALUPE 2 and reflex panels as well as the rest of the prothrombotic panel not done at a previous visit at her request and was redrawn on 12/21/2023  Pelvic ultrasound to evaluate uterus and adnexa  Consult Dr. Frank Miles GYNOnc to evaluate the The uterus which is prominent in size measuring 11.7 x 6.9 x 8.7 cm but without visualization a focal mass. Follow-up nonemergent ultrasound is recommended to evaluate the  uterus for a potential mass on CT abdomen 9/14/2023  Follow-up in 2 weeks        NM PET/CT SKULL BASE TO MID THIGH on 12/26/23 at [facility], compared to 12/5/23  13 mm hypermetabolic left supraclavicular lymph node, max SUV 3.3 .  9 mm hypermetabolic right upper lobe pulmonary nodule with max SUV 2.8.   6 mm right lower lobe pulmonary nodule with mild hypermetabolic activity   Few other smaller pulmonary nodules are also present with have increased metabolic activity for small nodule size   No hypermetabolic axillary, mediastinal, or hilar lymph nodes   7 mm hypermetabolic lymph node in the epicardial fat.   Multiple hypermetabolic liver lesions identified. For reference, 11 mm hypermetabolic right posterior liver lesion with max SUV 5.2   2.7 x 1.9 cm omental/peritoneal hypermetabolic mass in the anterior abdomen with max SUV 8.3   Diffuse omental and peritoneal nodularity and fat stranding which is likely related to carcinomatosis.   Multiple enlarged hypermetabolic retroperitoneal lymph nodes throughout the abdomen and pelvis surrounding the abdominal aorta and pelvic arterial vasculature. For reference, 1.6 cm left para-aortic lymph node with max SUV 7.5   3 cm hypermetabolic right upper pelvic mass on image 61 with max SUV 8.9.  Hypermetabolic activity is present throughout the enlarged cervix with extension into the endometrial canal which is diffusely hypermetabolic. Max SUV is 14   ADDENDUM:   Described in the body of the report but not the impression, there is mild right-sided hydronephrosis which is like related to ureteral compression by right pelvic lymphadenopathy.  There is also subcutaneous fat stranding in the left thigh which is likely related to venous compression by pelvic lymphadenopathy. However, venous thrombus could also present with this finding (not visualized on  this study but exam is performed without IV contrast)         US PELVIS COMPLETE- 12/26/2023  at [facility], compared to PET/CT on the same  day. CT abdomen and pelvis 12/5/2023.   The uterus is enlarged and heterogeneous, measuring 13.6 x 6.1 x 8.1 cm   6.2 x 6.6 x 3.8 cm mass within the endometrial cavity   6.9 x 4.5 x 5.1 cm hypoechoic mass at the lower uterine segment, likely involving the cervix  18 mm solid-appearing nodule in the right ovary (Right ovary measures 3.1 x 2.8 x 2.7 cm )  The left ovary appears within normal limits and measures 2.9 x 2.1 x 2.2 cm.      Columba Coulter, RN spoke with Radha on 12/29/2023 who reported that she received a message from the office of Dr Miles GYN-ONC in [place], but had not returned call yet ot make appointment.  Gave instructions to promptly return call to get appointment for evaluation as soon as possible.  Explained results of imaging and Dr Grullon's recommendation to follow through with ordered referral (order placed 12/21/23) for GYN ONC evaluation.     aRdha states that she will call office Tuesday morning and will communicate with this clinic with date of appointment with Dr Miles so that f/u with Dr Grullon can be scheduled accordingly (following Dr Miles's evaluation).   Radha verbalized understanding, then returned to call stating that she had an appointment at Dr Miles's office on 1/9/2024.       Request to [facility] radiology Dept was made for images (CT Neck,Chest, Abd& Pelvis on 12/5/23 and PET, US pelvis on 12/26/23) to be forwarded to Dr Miles at [place].      Radha canceled her GI appointment with GREYSON Mccord for colonoscopy evaluation on 1/3/2023   Radha canceled her GYN/Onc appointment with Dr Miles on 1/9/2023 and states it has been rescheduled for 1/16/2023  Several attempts were made to schedule parenteral iron infusions in our office but she declined to schedule these.       Evaluated by Dr. De Los Santos on 1/16/2024 and attempted D&C on 1/17/2024. Dr. De Los Santos documented due to 14 cm mass unable to visualize the cervix. The mass was described as anterior to the  rectum, mobile, and extended below the urethra.  A biopsy of the lesion was obtained, pathology in process.    Dr. Grullon had an in-depth discussion with Radha and her  on 1/17/2024.  They both stated that Dr. De Los Santos desired to do a D&C in August 2023 however she declined due to the fact that she was having lower abdominal discomfort along with dysfunctional bleeding and she did not want to entertain an intervention.  She realizes that may have postpone the diagnosis allowing progression of the process until this point.    OBJECTIVE:      Vitals:    01/18/24 0401   BP: 120/41   Pulse: 97   Resp: 16   Temp:    SpO2: 98%       Intake/Output Summary (Last 24 hours) at 1/18/2024 0640  Last data filed at 1/18/2024 0400  Gross per 24 hour   Intake 780 ml   Output 1000 ml   Net -220 ml     PHYSICAL EXAM:    CONSTITUTIONAL: Alert, appropriate, no acute distress  EYES: Nonicteric, EOM intact, pupils equal round   ENT: Mucous membranes moist, no oropharyngeal lesions   NECK: Supple, no masses   CHEST/LUNGS: CTA bilaterally, normal respiratory effort   CARDIOVASCULAR: RRR, no murmurs  ABDOMEN: soft non-tender, active bowel sounds, no HSM  EXTREMITIES: warm, moves all fours.  Bilateral lower extremity 2+ pitting edema.  Left upper extremity with edema noted proximal to left AC from IV infiltration site.  SKIN: warm, dry with no rashes or lesions  LYMPH: No cervical, clavicular, axillary, or inguinal lymphadenopathy  NEUROLOGIC: Right-sided arm weakness improved now just complains of numbness/tingling. Persistent right lower extremity weakness with impaired mobility.   PSYCH: mood and affect appropriate.  Anxious/nervous     CBC  Results from last 7 days   Lab Units 01/18/24  0446 01/17/24  0628 01/16/24  1013   WBC 10*3/mm3 11.06* 11.58* 10.22   HEMOGLOBIN g/dL 8.9* 8.5* 8.5*   HEMATOCRIT % 30.9* 30.1* 29.6*   PLATELETS 10*3/mm3 638* 636* 606*         Lab Results   Component Value Date     01/15/2024    K 4.0  "01/15/2024     01/15/2024    CO2 26.0 01/15/2024    BUN 16 01/15/2024    CREATININE 0.54 (L) 01/15/2024    GLUCOSE 139 (H) 01/15/2024    CALCIUM 8.9 01/15/2024    BILITOT 0.2 01/15/2024    ALKPHOS 163 (H) 01/15/2024    AST 9 01/15/2024    ALT 7 01/15/2024    AGRATIO 0.8 01/15/2024    GLOB 4.1 01/15/2024       Lab Results   Component Value Date    INR 1.35 (H) 01/13/2024    INR 1.27 (H) 01/12/2024    INR 1.17 (H) 12/18/2023    PROTIME 16.8 (H) 01/13/2024    PROTIME 16.1 (H) 01/12/2024    PROTIME 15.1 (H) 12/18/2023       Cultures:    No results found for: \"BLOODCX\"  No components found for: \"URINCX\"    ASSESSMENT/PLAN:  #1  Disseminated abdominal malignancy, 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  Radha has been to some degree in denial and has failed to follow-up or keep appointments in an attempt for an aggressive and expedited workup.  Despite that, the biggest part of her workup has been completed.  She was scheduled to see Dr. Frank Miles in evaluation to obtain tissue diagnosis on 1/16/2024.    TARGET POTENTIAL MALIGNANT SITES  Enlarged uterus with abnormal vaginal bleeding, 45 pound weight loss in 3 to 6 months  6.2 x 6.6 x 3.8 cm mass within the endometrial cavity    Omental infiltration and nodularity progressing in 3 months  2.9 x 1.9 cm nodule in the anterior abdomen and adjacent and posterior to the umbilicus   2.7 cm left pelvic lymph node, previously 2.4 cm,   3 cm right external iliac lymph node, previously 2.1 cm   1.6 cm sclerotic bone lesion in the S1 vertebral body on CT 9/14/2023    CA 19-9- 355  -  455       Evaluated by Dr. De Los Santos on 1/16/2024 and attempted D&C on 1/17/2024. Dr. De Los Santos documented due to 14 cm mass unable to visualize the cervix. The mass was described as anterior to the rectum, mobile, and extended below " the urethra.  A biopsy of the lesion was obtained, pathology in process.    Dr. Grullon had an in-depth discussion with Radha and her  on 1/17/2024.  They both stated that Dr. De Los Santos desired to do a D&C in August 2023 however she declined due to the fact that she was having lower abdominal discomfort along with dysfunctional bleeding and she did not want to entertain an intervention.  She realizes that may have postpone the diagnosis allowing progression of the process until this point.      Recommendations are as follows:  Awaiting biopsy results from 1/17/2024  After tissue diagnosis and all interventions are complete, given the fact that she was noncompliant with a rigorous schedule of Eliquis adding to the current prothrombotic conditions, a repeat trial of Eliquis could be considered.  Reschedule appointment with Dr. Frank Miles  Reschedule outpatient appointment with Dr. Grullon        #2  Acute ischemic stroke  Neurology recommended anticoagulation, now transitioned from heparin drip to Lovenox 80 mg SQ every 12 hr on 1/17/2024    Unfortunately, Ms. Wade was evaluated in the ED on 1/12/2022 and admitted to [facility] with a left-sided CVA and right-sided arm and leg weakness.  She has been under evaluation over the weekend for this and is anticipating discharge to rehab.  I was consulted and continuity of care.    Prothrombotic panel 11/9/2023  Factor V Leiden - negative  Homocysteine - 11  Lupus Anticoag - not detected  Protein C -138  Protein S - 85  Prothrombotic panel 12/21/2023  Lupus anticoagulant                                  not detected  Anticardiolipin Ab IgG                               <10  Anticardiolipin Ab IgM                               <10  Prothrombin gene mutation [id number]      Negative  MTHFR mutation C677T                          Negative  MTHFR mutation [id number]                       Heterozygous  GUADALUPE 2 and reflex panels                           PENDING    MRI of the brain  with and without contrast on 1/14/2023  Diffusion restriction with associated ADC mapping abnormality involving the mesial left temporal lobe, left external capsule, left insula as well as the body of the caudate nucleus. These infarcts are nonhemorrhagic.  Mild atrophy. Mild small vessel ischemic changes are noted involving the periventricular and higher white matter tracts.  Normal flow voids within the [place].        MRA of the head and neck without contrast on 1/14/2023  Carotid arteries are unremarkable. The carotid bifurcations are normal in appearance without evidence of rate limiting stenosis with both ICAs patent to the skull base  Right vertebral artery is widely patent from its origin to the skull base. The left vertebral artery is rather diminutive in size and is not well-defined in its proximal segment.     EMMA 1/15/2024:  Left ventricular systolic function is normal. Left ventricular ejection fraction appears to be 56 - 60%.  Normal right ventricular cavity size and systolic function noted.  No evidence of a patent foramen ovale.  No significant valvular abnormalities identified on this study.      Recommendations are as follows:  Continue anticoagulation with Lovenox, at least until definitive tissue diagnosis is obtained.  After tissue diagnosis and all interventions are complete, given the fact that she was noncompliant with a rigorous schedule of Eliquis adding to the current prothrombotic conditions, a repeat trial of Eliquis could be considered.    Disposition: Possible discharge [facility] 8th floor Rehab for additional rehabilitation, awaiting insurance approval    #3  Microcytic iron anemia     Latest Reference Range & Units 01/13/24 06:02   Iron 37 - 145 mcg/dL 17 (L)   Ferritin 13.00 - 150.00 ng/mL 440.90 (H)   Iron Saturation (TSAT) 20 - 50 % 8 (L)   Transferrin 200 - 360 mg/dL 145 (L)   TIBC 298 - 536 mcg/dL 216 (L)          Results from last 7 days   Lab Units 01/15/24  5019  01/14/24  0359 01/13/24  0602   WBC 10*3/mm3 14.86* 8.82 7.46   HEMOGLOBIN g/dL 7.8* 8.9* 8.9*   HEMATOCRIT % 28.6* 31.8* 34.4   PLATELETS 10*3/mm3 692* 605* 524*     Results from last 7 days   Lab Units 01/18/24  0446 01/17/24  0628 01/16/24  1013   WBC 10*3/mm3 11.06* 11.58* 10.22   HEMOGLOBIN g/dL 8.9* 8.5* 8.5*   HEMATOCRIT % 30.9* 30.1* 29.6*   PLATELETS 10*3/mm3 638* 636* 606*       Lab Results   Component Value Date    WBC 11.06 (H) 01/18/2024    HGB 8.9 (L) 01/18/2024    HCT 30.9 (L) 01/18/2024    MCV 70.9 (L) 01/18/2024     (H) 01/18/2024     Ferrlecit 250 mg IV x4 initiated on 1/15/2024. Day 4 of 4 today    Gastroenterology assisting and indicated suspects that her dysfunctional vaginal bleeding is significant cause for anemia and plan is to wait for further GYN evaluation before proceeding with any further investigation from a GI standpoint.      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  I reviewed other physician's notes, ancillary services and nurses assessments.  I have reviewed the above documentation completed by Francisca Giordano APRN  Please see my additional addended and/or modified contributions to the history of present illness, physical examination and assessment/medical decision-making and plan that reflects my findings and impressions.  I discussed the essential elements of  the care plan with Francisca RUBI and the patient.  I have encouraged and answered all the questions raised to the patient's understanding and satisfaction.  I concur with the above stated.    Subjective: Awake, alert and talkative.  She is sitting on side of the bed.  Still complaining of right leg weakness more than the right arm discoordination and weakness.  Continues to have abdominal bloating and lower extremity edema, more so on the left    Objective:  Bilateral lower extremity edema left greater than right.  Abdominal bloating.  Lungs clear heart regular normal S1 and S2  Mental status is intact neurologically she has decreased strength and coordination in the right hand and arm but even less strength in the right lower extremity    Assessment/plan:  I had an extended discussion with Radha this morning.  I also spoke with the nursing staff.  I explained the status of disease and we are aware that she has at this time as I have documented above.  Prognosis is discussed regarding the incurable nature of this disease process if we are able to document the malignancy which is most certainly present.  This would be a stage IV disease process.  This would not be amenable to surgical intervention.  Other than supportive care, chemoimmunotherapy and radiation therapy would be treatment considerations once stabilized.  Radha voices her desire to go to rehab and gain strength before embarking on specific therapy for the abdominal process.  I am of the understanding that she is scheduled to be transferred to [facility] rehab today.   I will make arrangements to follow-up with her on the eighth floor at [facility].  I have contacted pathology and spoke to Huy who will be handing off my request to Dr. Zarina Britton to give me a call whenever she is able to review the slides and verify if we have been able to document malignancy.  My clinical impression is that this is an advanced endometrial carcinoma.  I  will be getting back in touch with the patient and her  once we are able to [place] and develop a treatment plan.  I am holding off on calling Dr. Festus Mancilla with XRT because [facility].  We will do this as an outpatient once recovered.      John Grullon MD  1/18/2024 14:39 CST

## 2024-01-18 NOTE — THERAPY DISCHARGE NOTE
Acute Care - Physical Therapy Discharge Summary  [facility]       Patient Name: Radha Wade  : 1960  MRN: [id number]    Today's Date: 2024                 Admit Date: 2024      PT Recommendation and Plan    Visit Dx:    ICD-10-CM ICD-9-CM   1. 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 Cystitis  N30.90 595.9   3. Fall, initial encounter  W19.XXXA E888.9   4. Weakness  R53.1 780.79   5. Impaired mobility [Z74.09]  Z74.09 799.89   6. Decreased activities of daily living (ADL) [Z78.9]  Z78.9 V49.89   7. Cancer  C80.1 199.1   8. 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    Turning from your back to your side while in flat bed without using bedrails? -- 3  -KJ --    Moving from lying on back to sitting on the side of a flat bed without bedrails? -- 3  -KJ --    Moving to and from a bed to a chair (including a wheelchair)? -- 3  -KJ --    Standing up from a chair using your arms (e.g., wheelchair, bedside chair)? -- 3  -KJ --    Climbing 3-5 steps with a railing? -- 3  -KJ --    To walk in hospital room? -- 4  -KJ --    AM-PAC 6 Clicks Score (PT) -- 19  -KJ --    Highest Level of Mobility Goal -- 6 --> Walk 10 steps or more  -KJ --       How much help from another is currently needed...    Putting on and taking off regular lower body clothing? 3  -TS -- 3  -EC    Bathing (including washing, rinsing, and drying) 3  -TS -- 3  -EC    Toileting (which includes using toilet bed pan or urinal) 3  -TS -- 3  -EC    Putting on and taking off regular upper body clothing 3  -TS -- 3  -EC    Taking care of personal grooming (such as brushing teeth) 3  -TS -- 3  -EC    Eating meals 4  -TS -- 4  -EC    AM-PAC 6 Clicks Score (OT) 19  -TS -- 19  -EC       Functional Assessment    Outcome Measure Options -- AM-PAC 6 Clicks Basic Mobility (PT)  -KJ AM-PAC 6  Clicks Daily Activity (OT)  -EC      Row Name 01/16/24 1100             How much help from another person do you currently need...    Turning from your back to your side while in flat bed without using bedrails? 4  -KJ      Moving from lying on back to sitting on the side of a flat bed without bedrails? 4  -KJ      Moving to and from a bed to a chair (including a wheelchair)? 3  -KJ      Standing up from a chair using your arms (e.g., wheelchair, bedside chair)? 3  -KJ      Climbing 3-5 steps with a railing? 3  -KJ      To walk in hospital room? 3  -KJ      AM-PAC 6 Clicks Score (PT) 20  -KJ      Highest Level of Mobility Goal 6 --> Walk 10 steps or more  -KJ         Functional Assessment    Outcome Measure Options AM-PAC 6 Clicks Basic Mobility (PT)  -KJ                User Key  (r) = Recorded By, (t) = Taken By, (c) = Cosigned By      Initials Name Provider Type    KJ Analy Perez, PTA Physical Therapist Assistant    Delilah Bowman COTA Occupational Therapist Assistant    Ruth Damon, OTR/L Occupational Therapist                         PT Rehab Goals       Row Name 01/18/24 1517             Bed Mobility Goal 1 (PT)    Activity/Assistive Device (Bed Mobility Goal 1, PT) sit to supine/supine to sit  -JANAE      [place] Level/Cues Needed (Bed Mobility Goal 1, PT) standby assist  -JANAE      Time Frame (Bed Mobility Goal 1, PT) long term goal (LTG);10 days  -JANAE      Progress/Outcomes (Bed Mobility Goal 1, PT) goal not met  -JANAE         Transfer Goal 1 (PT)    Activity/Assistive Device (Transfer Goal 1, PT) sit-to-stand/stand-to-sit;bed-to-chair/chair-to-bed  -JANAE      [place] Level/Cues Needed (Transfer Goal 1, PT) contact guard required  -JANAE      Time Frame (Transfer Goal 1, PT) long term goal (LTG);10 days  -JANAE      Progress/Outcome (Transfer Goal 1, PT) goal not met  -JANAE         Gait Training Goal 1 (PT)    Activity/Assistive Device (Gait Training Goal 1, PT) gait (walking  locomotion);assistive device use;decrease fall risk;improve balance and speed;increase endurance/gait distance  -JANAE      [place] Level (Gait Training Goal 1, PT) contact guard required  -JANAE      Distance (Gait Training Goal 1, PT) 50 ft  -JANAE      Time Frame (Gait Training Goal 1, PT) long term goal (LTG);10 days  -JANAE      Progress/Outcome (Gait Training Goal 1, PT) goal not met  -JANAE                User Key  (r) = Recorded By, (t) = Taken By, (c) = Cosigned By      Initials Name Provider Type Discipline    Abhi De PTA Physical Therapist Assistant PT                        PT Discharge Summary  Anticipated Discharge Disposition (PT): inpatient rehabilitation facility  Reason for Discharge: Discharge from facility  Outcomes Achieved: Refer to plan of care for updates on goals achieved  Discharge Destination: Inpatient rehabilitation facility      Abhi Martinez PTA   1/18/2024

## 2024-01-18 NOTE — DISCHARGE SUMMARY
[facility] Medicine Services  DISCHARGE SUMMARY       Date of Admission: 1/12/2024  Date of Discharge:  1/18/2024  Primary Care Physician: Myron Vines MD    Presenting Problem/History of Present Illness: Right arm and leg weakness    Final Discharge Diagnoses:  Active Hospital Problems    Diagnosis     **Acute focal neurological deficit     Acute cystitis without hematuria     Severe malnutrition     CVA (cerebral vascular accident)     Type 2 diabetes mellitus, without long-term current use of insulin     Metastatic disease     Acute blood loss anemia     Vaginal bleeding     DVT (deep venous thrombosis)        Consults: Neurology, OB/GYN, hematology oncology, gastroenterology    Procedures Performed: D&C under general anesthesia    Pertinent Test Results:   Results for orders placed during the hospital encounter of 01/12/24    Adult Transthoracic Echo Complete W/ Cont if Necessary Per Protocol (With Agitated Saline)    Interpretation Summary    Left ventricular systolic function is normal. Left ventricular ejection fraction appears to be 56 - 60%.    Normal right ventricular cavity size and systolic function noted.    No evidence of a patent foramen ovale.    No significant valvular abnormalities identified on this study.      Imaging Results (All)       Procedure Component Value Units Date/Time    CT Head Without Contrast [[id number]] Collected: 01/15/24 1436     Updated: 01/15/24 1444    Narrative:      EXAMINATION:  CT HEAD WO CONTRAST-  1/15/2024 1:29 PM     HISTORY: Stroke, follow up; I82.619-Acute embolism and thrombosis of  superficial veins of unspecified upper extremity; N30.90-Cystitis,  unspecified without hematuria; W19.XXXA-Unspecified fall, initial  encounter; R53.1-Weakness; Z74.09-Other reduced mobility.     TECHNIQUE: Multiple axial images were obtained through the brain without  contrast infusion. Multiplanar images were reconstructed.     DLP: 605.11  mGy.cm. Automated dosage reduction technique was utilized to  reduce patient dosage.     COMPARISON: 1/13/2024.     FINDINGS: There is no hemorrhage. There is low-density in the lateral  left basal ganglia region predominantly involving the posterior aspect  of the external capsule. There is minimal low density elsewhere within  the hemispheric white matter likely related to small vessel disease.  There is minimal atrophy. The paranasal sinuses and mastoid air cells  are clear. No calvarial fracture is seen.          Impression:      1. Acute infarct in the lateral left basal ganglia region involving  predominantly the external capsule posteriorly and in the posterolateral  aspect of the putamen.  2. No hemorrhage.  3. Mild chronic small vessel ischemic white matter disease.        This report was signed and finalized on 1/15/2024 2:41 PM by Dr. Keegan Sousa MD.       MRI Angiogram Neck Without Contrast [[id number]] Collected: 01/14/24 1502     Updated: 01/14/24 1514    Narrative:      EXAMINATION: MRI ANGIOGRAM NECK WO CONTRAST-  1/14/2024 3:02 PM MR  angiogram of the neck without contrast 1/14/2024     HISTORY: Carotid artery dissection suspected.     FINDINGS: MR angiography was performed of the neck utilizing 3D  time-of-flight and MIP images. Today's study is significantly degraded  by motion. This significantly lowers the sensitivity of the exam.     The visualized aortic arch is normal in caliber. The origin of the great  vessels are widely patent. The right vertebral artery origin is widely  patent. The left vertebral artery is rather diminutive in size and its  origin and proximal segment is not well-defined. Distally it is  suspected that the left vertebral artery terminates in the posterior  inferior cerebellar artery. The right vertebral artery is dominant and  appears to be widely patent from its origin to the basilar artery.     Both common carotid arteries are patent at their origin. No  focal  luminal stenosis appreciated proximal to the carotid bifurcations. Both  carotid bifurcations are unremarkable with no evidence of rate limiting  stenosis. The ICAs are patent to the skull base without evidence of rate  limiting stenosis.       Impression:      1. Study is degraded by motion artifact. This is particularly noted on  the MIP sequences which are three-dimensional reconstructions based on  the raw source images.  2. The carotid arteries are unremarkable. The carotid bifurcations are  normal in appearance without evidence of rate limiting stenosis with  both ICAs patent to the skull base.  3. The right vertebral artery is widely patent from its origin to the  skull base. The left vertebral artery is rather diminutive in size and  is not well-defined in its proximal segment. Difficult to ascertain  whether there is some loss of definition of this vessel simply because  of its small size and adjacent pulsatility from the aortic arch and  proximal great vessels or whether it is diseased. I feel that this  vessel terminates in the posterior inferior cerebellar artery at the  level of the posterior cranial fossa.  4. If further evaluation of the carotid and vertebral arteries is felt  warranted then follow-up with CT angiography would be the study of  choice and is considered the definitive exam for evaluation of the  vessels of the head and neck. Motion artifact, although potentially  degrading the study is a much lesser factor during performance of CT  angiography as the acquisition only takes a few seconds.     This report was signed and finalized on 1/14/2024 3:11 PM by Dr. Romeo Arizmendi MD.       MRI Angiogram Head Without Contrast [[id number]] Collected: 01/14/24 1455     Updated: 01/14/24 1505    Narrative:      EXAMINATION: MRI ANGIOGRAM HEAD WO CONTRAST-  1/14/2024 2:55 PM     HISTORY: Stroke. Determine embolic source.     FINDINGS: MR angiography was performed of the [place] with  3D  time-of-flight and MIP images. Raw source images are also reviewed.     Today's study is limited secondary to rather extensive motion artifact.  This is particularly noted on the MIPS which are reproduction's based on  the raw source images.     The right vertebral artery is dominant. The left vertebral artery is  rather diminutive in size. I feel the left vertebral artery terminates  in the posterior inferior cerebellar artery. The right vertebral artery  is patent to the basilar artery. The basilar artery is patent. The  superior cerebellar arteries appear to be grossly intact. The basilar  artery terminates into the posterior cerebral artery on the left. There  is a fetal origin of the right posterior cerebral artery.     The distal ICAs are patent. The petrous, cavernous and supraclinoid  segment of the ICAs are patent to the terminus. Grossly the anterior and  middle cerebral arteries are patent. I do not see any discrete aneurysm.  No gross evidence of high-grade stenosis but the study is very limited.  The more distal trifurcation vessels are attenuated secondary to motion  artifact.       Impression:      1. Very limited exam secondary to extensive motion artifact. The left  vertebral artery is rather diminutive and I feel terminates in the  posterior inferior cerebellar artery. The right vertebral artery is  dominant. The basilar artery is widely patent. The basilar artery  terminates in the left posterior cerebral artery with the right  posterior cerebral artery emanating from the anterior circulation via  suspected persistent fetal origin.  2. Both intracranial ICAs are patent to the terminus. Potential disease  involving the cavernous and supraclinoid segment of the ICAs cannot be  fully evaluated. The anterior and middle cerebral arteries are grossly  unremarkable. I do not see any definite aneurysm.  3. If further assessment of the [place] is felt warranted  follow-up with CT angiography  would be the study of choice for further  evaluation. That is a rapid acquisition and will not be as affected by  potential motion.     This report was signed and finalized on 1/14/2024 3:02 PM by Dr. Romeo Arizmendi MD.       MRI Brain With & Without Contrast [[id number]] Collected: 01/14/24 1438     Updated: 01/14/24 1454    Narrative:      MRI BRAIN W WO CONTRAST- 1/14/2024 10:55 AM     HISTORY: Stroke, follow up; I82.619-Acute embolism and thrombosis of  superficial veins of unspecified upper extremity; N30.90-Cystitis,  unspecified without hematuria; W19.XXXA-Unspecified fall, initial  encounter; R53.1-Weakness; Z74.09-Other reduced mobility     COMPARISON: None.       TECHNIQUE: Multiplanar imaging of the brain was performed in a routine  fashion before and after the intravenous injection of gadolinium  contrast. There is significant motion artifact on today's exam.     FINDINGS:  Diffusion: There is diffusion restriction within the mesial left  temporal lobe,, left external capsule, left insular cortex as well as  within the body of the caudate nucleus with associated ADC mapping  abnormalities consistent with acute ischemic infarction. These infarcts  are nonhemorrhagic. There is no associated mass effect. No additional  sites of diffusion restriction are present.     Midline structures: Nondisplaced.     Ventricles: Normal in configuration and symmetric in size.     Masses: No masses or mass effect.     Basilar cisterns: Maintained.     Extra axial space: No abnormal extra-axial fluid.     Gray-white matter signal: There are additional scattered foci of T2  abnormality involving the periventricular and higher white matter tract  suggesting small vessel ischemic disease.     Cerebellum: Normal.     Brainstem: Normal.     Enhancement: No abnormal enhancement.     Other: Proximal cervical spinal cord is normal. Bilateral globes and  orbits are normal in appearance. Normal cerebrovascular flow voids  noted. No  abnormal signal in the mastoid air cells or paranasal sinuses.       Impression:      1. Diffusion restriction with associated ADC mapping abnormality  involving the mesial left temporal lobe, left external capsule, left  insula as well as the body of the caudate nucleus. These infarcts are  nonhemorrhagic.  2. Mild atrophy. Mild small vessel ischemic changes are noted involving  the periventricular and higher white matter tracts.  3. Normal flow voids within the [place].  4. Today's study is degraded by motion. No abnormal contrast enhancement  is demonstrated.           This report was signed and finalized on 1/14/2024 2:50 PM by Dr. Romeo Arizmendi MD.       US Venous Doppler Upper Extremity Right (duplex) [[id number]] Collected: 01/14/24 1113     Updated: 01/14/24 1117    Narrative:      History: Pain and swelling       Impression:      Impression:  1. There is no evidence of deep venous thrombosis of the right upper  extremity.  2. There is evidence of superficial thrombus in the cephalic vein.     Comments: Right upper extremity venous duplex exam was performed using  color Doppler flow, Doppler wave form analysis, and grayscale imaging,  with and without compression. There is no evidence of deep venous  thrombosis of the internal jugular, subclavian, axillary, brachial,  radial, and ulnar veins. There is evidence of superficial thrombus  involving the cephalic vein.        This report was signed and finalized on 1/14/2024 11:14 AM by Dr. Joseph Mari MD.       CT Head Without Contrast [[id number]] Collected: 01/13/24 0746     Updated: 01/13/24 0750    Narrative:      CT BRAIN without contrast dated 1/12/2024 10:49 PM     HISTORY: Weakness. Brain fog.     COMPARISON: None      DOSE LENGTH PRODUCT: 686.84 mGy.cm . All CT scans are performed using  dose optimization techniques as appropriate to the performed exam and  including at least one of the following: Automated exposure control,  adjustment  of the mA and/or kV according to size, and the use of the  iterative reconstruction technique.     In order to have a CT radiation dose as low as reasonably achievable,  Automated Exposure Control was utilized for adjustment of the mA and/or  KV according to patient size.     TECHNIQUE: Serial axial tomographic images of the brain were obtained  without the use of intravenous contrast.     FINDINGS:  The midline structures are nondisplaced. The ventricles and basilar  cisterns are normal in size and configuration. There is no evidence of  intracranial hemorrhage or mass-effect. The gray-white matter  differentiation is maintained. The sulci have a normal configuration,  and there are no abnormal extra-axial fluid collections. The structures  of the posterior fossa are unremarkable.     The included orbits and their contents are unremarkable. The visualized  paranasal sinuses, mastoid air cells and middle ear cavities are clear.  The visualized osseous structures and overlying soft tissues of the  skull and face are unremarkable.       Impression:      1. No acute intracranial process.           This report was signed and finalized on 1/13/2024 7:47 AM by Dr. Romeo Arizmendi MD.       XR Chest 1 View [[id number]] Collected: 01/13/24 0708     Updated: 01/13/24 0712    Narrative:      EXAMINATION: XR CHEST 1 VW-  1/13/2024 7:08 AM     HISTORY: Generalized weakness     FINDINGS: Upright frontal projection of the chest demonstrates a nodule  within the right upper lobe warranting follow-up with CT imaging of the  chest. Lungs are otherwise clear with no consolidative pneumonia. No  effusion or free air is present.       Impression:      1.. Right upper lobe nodule. Follow-up with CT imaging of the chest  recommended.  2. Otherwise normal exam.     This report was signed and finalized on 1/13/2024 7:09 AM by Dr. Romeo Arizmendi MD.             LAB RESULTS:      Lab 01/18/24  0446 01/17/24  1825 01/17/24  1224  01/17/24  0628 01/16/24  1736 01/16/24  1013 01/15/24  1220 01/15/24  0515 01/14/24  1325 01/14/24  0359 01/13/24  1939 01/13/24  1637 01/13/24  0602 01/12/24  2311   WBC 11.06*  --   --  11.58*  --  10.22  --  14.86*  --  8.82  --   --    < > 9.23   HEMOGLOBIN 8.9*  --   --  8.5*  --  8.5*  --  7.8*  --  8.9*  --   --    < > 9.0*   HEMATOCRIT 30.9*  --   --  30.1*  --  29.6*  --  28.6*  --  31.8*  --   --    < > 31.8*   PLATELETS 638*  --   --  636*  --  606*  --  692*  --  605*  --   --    < > 592*   NEUTROS ABS 6.84  --   --  7.55*  --  7.14*  --   --   --  7.29*  --   --   --  6.56   IMMATURE GRANS (ABS)  --   --   --   --   --   --   --   --   --   --   --   --   --  0.06*   LYMPHS ABS 2.24  --   --  2.22  --  1.63  --   --   --   --   --   --   --  1.55   MONOS ABS 1.16*  --   --  0.93*  --  0.83  --   --   --   --   --   --   --  0.81   EOS ABS 0.26  --   --  0.23  --  0.27  --   --   --  0.09  --   --   --  0.21   MCV 70.9*  --   --  73.4*  --  72.0*  --  73.7*  --  72.3*  --   --    < > 72.3*   PROTIME  --   --   --   --   --   --   --   --   --   --   --  16.8*  --  16.1*   APTT  --  48.9* 125.8* 84.9* 143.4* 192.8*   < > 82.4*   < > 44.5*   < >  --   --  39.8*    < > = values in this interval not displayed.         Lab 01/15/24  0515 01/14/24  0359 01/13/24  0602 01/12/24  2311   SODIUM 138 136 136 137   POTASSIUM 4.0 4.5 4.2 4.4   CHLORIDE 101 100 100 99   CO2 26.0 25.0 23.0 27.0   ANION GAP 11.0 11.0 13.0 11.0   BUN 16 10 11 12   CREATININE 0.54* 0.47* 0.50* 0.55*   EGFR 103.6 107.1 105.5 103.1   GLUCOSE 139* 191* 126* 160*   CALCIUM 8.9 8.8 8.7 9.0   MAGNESIUM 2.2  --   --  2.2   HEMOGLOBIN A1C  --   --   --  6.50*         Lab 01/15/24  0515 01/14/24  0359 01/13/24  0602 01/12/24  2311   TOTAL PROTEIN 7.2 7.9 7.3 7.8   ALBUMIN 3.1* 3.2* 2.8* 3.2*   GLOBULIN 4.1 4.7 4.5 4.6   ALT (SGPT) 7 10 11 13   AST (SGOT) 9 14 17 22   BILIRUBIN 0.2 0.2 0.2 0.2   ALK PHOS 163* 229* 208* 227*         Lab  "01/13/24  1637 01/12/24  2311   PROTIME 16.8* 16.1*   INR 1.35* 1.27*         Lab 01/13/24  0602   CHOLESTEROL 149   LDL CHOL 89   HDL CHOL 30*   TRIGLYCERIDES 169*         Lab 01/13/24  0602   IRON 17*   IRON SATURATION (TSAT) 8*   TIBC 216*   TRANSFERRIN 145*   FERRITIN 440.90*         Brief Urine Lab Results  (Last result in the past 365 days)        Color   Clarity   Blood   Leuk Est   Nitrite   Protein   CREAT   Urine HCG        01/16/24 0909 Straw   Clear   Moderate (2+)   Moderate (2+)   Negative   30 mg/dL (1+)                 Microbiology Results (last 10 days)       Procedure Component Value - Date/Time    Urine Culture - Urine, Urine, Clean Catch [[id number]]  (Normal) Collected: 01/16/24 0909    Lab Status: Final result Specimen: Urine, Clean Catch Updated: 01/17/24 0907     Urine Culture No growth    Urine Culture - Urine, Urine, Clean Catch [[id number]] Collected: 01/13/24 0120    Lab Status: Final result Specimen: Urine, Clean Catch Updated: 01/14/24 1215     Urine Culture >100,000 CFU/mL Mixed [place] Isolated    Narrative:      Specimen contains mixed organisms of questionable pathogenicity suggestive of contamination. If symptoms persist, suggest recollection.  Colonization of the urinary tract without infection is common. Treatment is discouraged unless the patient is symptomatic, pregnant, or undergoing an invasive urologic procedure.            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 She also complained of RUE pain and doppler in ER showed cephalic vein thrombosis. She was evaluated by neurology and recommended for admission but not a candidate for TPA due to vaginal bleeding and Eliquis use.     Regarding GYN malignancy she was noted to have an abnormal CT abd/pelvis last September:   \"1. " "Somewhat bulky retroperitoneal lymphadenopathy and pelvic  lymphadenopathy worrisome for metastatic lymphadenopathy or lymphoma.  2. There is also some stranding and nodularity in the greater omental  region anteriorly suggesting possible peritoneal metastatic disease.  3. The uterus is prominent in size but a focal mass is not seen\"   She was evaluated by Dr De Los Santos for vaginal bleeding and referred for follow up D&C 1 week after discharge. She did not have this procedure. She then was evaluated by PCP and referred to Dr Grullon and after testing and imaging has been referred to Dr Miles in [place] for consultation.      Dr Grullon writes in his last progress note from 12/21/2023:  \"Given her history with weight loss, vaginal bleeding, elevated tumor markers, imaging abnormalities with the uterus, the 2.9 x 1.9 cm nodule in the anterior abdomen suspicious for carcinomatosis, abdominal and pelvic lymphadenopathy, prothrombotic state with DVTs and subcentimeter pulmonary nodules although indeterminate, in this setting is very concerning for an active malignant process and suspicious for a GYN origin.   Completion of workup however will also include a bone scan that was ordered at last visit and not done, GI evaluation to rule out the GI tract as a potential source in addition to the other recommendations below.     RECOMMENDATIONS  Bone scan  C-scope - Appt with Bertha Par[facility] scheduled 1/3/2024, malignancy process with iron deficiency anemia  GUADALUPE 2 and reflex panels as well as the rest of the prothrombotic panel not done at last visit  PET scan  Pelvic ultrasound to evaluate uterus and adnexa  Consult Dr. Frank Miles [facility]YNOn to evaluate the The uterus which is prominent in size measuring 11.7 x 6.9 x 8.7 cm but without visualization a focal mass. Follow-up nonemergent ultrasound is recommended to evaluate the uterus for a potential mass on CT abdomen 9/14/2023  Follow-up in 2 weeks.    The patient " "was admitted and treated for stroke with right arm and leg weakness.  It was found that patient was noncompliant with her Eliquis home medication.  However due to concerns for possible Eliquis failure, patient was started on anticoagulation with heparin drip after a hemorrhagic stroke was ruled out with negative CT of the brain.  She was reviewed by neurology and had an MRI of the brain with/without and MRA of the head and neck which demonstrated left temporal lobe, left external capsule, left insula, body of the left cardiac nucleus infarcts.  There was no abnormal contrast-enhancement.  It was thought that her stroke was caused by hypercoagulability from her suspected metastatic OB/GYN cancer.  She was reviewed by hematology oncology and had iron infusions for anemia of chronic disease.  She also had evaluation by OB/GYN and had D&C and biopsy of a uterine/cervical lesion under general anesthesia without any complications.  Patient was recommended to have inpatient rehab for her stroke and right arm and leg weakness.  She was discharged in stable condition to [facility] rehab for physical rehabilitation.  Patient is to follow-up with her oncologist Dr. Grullon for biopsy results and planning of treatment for her suspected metastatic gynecological cancer.  She is also to follow-up with OB/GYN and neurology.  Patient is to be on Lovenox for anticoagulation until after biopsy results are obtained to see if she can be safely transition back to oral Eliquis.  She had a urinary tract infection during the admission and was discharged on cefdinir.    Physical Exam on Discharge:  BP 95/41 (BP Location: Left arm, Patient Position: Sitting)   Pulse 92   Temp 98.4 °F (36.9 °C) (Oral)   Resp 18   Ht 165.1 cm (65\")   Wt 75.4 kg (166 lb 3.2 oz)   LMP  (LMP Unknown)   SpO2 98%   BMI 27.66 kg/m²   Physical Exam  Constitutional:       General: She is not in acute distress.     Appearance: She is not ill-appearing or diaphoretic. "   HENT:      Head: Normocephalic and atraumatic.      Right Ear: External ear normal.      Left Ear: External ear normal.      Nose: No congestion or rhinorrhea.      Mouth/Throat:      Mouth: Mucous membranes are moist.      Pharynx: No oropharyngeal exudate or posterior oropharyngeal erythema.   Eyes:      General: No scleral icterus.     Extraocular Movements: Extraocular movements intact.      Conjunctiva/sclera: Conjunctivae normal.   Cardiovascular:      Rate and Rhythm: Normal rate and regular rhythm.      Heart sounds: Normal heart sounds. No murmur heard.  Pulmonary:      Effort: Pulmonary effort is normal. No respiratory distress.      Breath sounds: Normal breath sounds. No wheezing, rhonchi or rales.   Abdominal:      General: Abdomen is flat. There is no distension.      Palpations: Abdomen is soft.      Tenderness: There is no abdominal tenderness. There is no guarding.   Musculoskeletal:         General: No swelling, tenderness or deformity.      Cervical back: Neck supple. No rigidity. No muscular tenderness.      Right lower leg: No edema.      Left lower leg: No edema.   Lymphadenopathy:      Cervical: No cervical adenopathy.   Skin:     General: Skin is warm and dry.   Neurological:      General: No focal deficit present.      Mental Status: She is alert and oriented to person, place, and time.      Cranial Nerves: No cranial nerve deficit.      Motor: Weakness present.      Comments: Right leg > right arm weakness and incoordination noted.   Psychiatric:         Mood and Affect: Mood normal.         Behavior: Behavior normal.     Condition on Discharge: Stable    Discharge Disposition:  Rehab Facility or Unit (DC - External)    Discharge Medications:     Discharge Medications        New Medications        Instructions Start Date   atorvastatin 80 MG tablet  Commonly known as: LIPITOR   80 mg, Oral, Nightly      cefdinir 300 MG capsule  Commonly known as: OMNICEF   300 mg, Oral, 2 Times Daily       Enoxaparin Sodium 80 MG/0.8ML solution prefilled syringe syringe  Commonly known as: LOVENOX   80 mg, Subcutaneous, Every 12 Hours Scheduled      oxyCODONE 5 MG immediate release tablet  Commonly known as: Roxicodone   5 mg, Oral, Every 4 Hours PRN      pantoprazole 40 MG EC tablet  Commonly known as: PROTONIX   40 mg, Oral, Every Early Morning   Start Date: January 19, 2024     phenazopyridine 100 MG tablet  Commonly known as: PYRIDIUM   100 mg, Oral, 3 Times Daily With Meals      polyethylene glycol 17 g packet  Commonly known as: MIRALAX   17 g, Oral, Daily             Continue These Medications        Instructions Start Date   albuterol sulfate  (90 Base) MCG/ACT inhaler  Commonly known as: PROVENTIL HFA;VENTOLIN HFA;PROAIR HFA   2 puffs, Inhalation, Every 4 Hours PRN      freestyle lancets   1 each, Other, 2 Times Daily, Use as instructed      metFORMIN 500 MG tablet  Commonly known as: GLUCOPHAGE   500 mg, Oral, 2 Times Daily With Meals      promethazine 25 MG tablet  Commonly known as: PHENERGAN   25 mg, Oral, Every 6 Hours PRN             Stop These Medications      acetaminophen-codeine 300-30 MG per tablet  Commonly known as: TYLENOL with CODEINE #3     Eliquis DVT/PE Starter Pack tablet therapy pack  Generic drug: Apixaban Starter Pack     furosemide 20 MG tablet  Commonly known as: LASIX            This patient does not have current or prior documentation of an left ventricular ejection fraction (LVEF) of less than or equal to 40%.      Discharge Diet:   Diet Instructions       Diet: Cardiac Diets, Diabetic Diets; Healthy Heart (2-3 Na+); Thin (IDDSI 0); Consistent Carbohydrate      Discharge Diet:  Cardiac Diets  Diabetic Diets       Cardiac Diet: Healthy Heart (2-3 Na+)    Fluid Consistency: Thin (IDDSI 0)    Diabetic Diet: Consistent Carbohydrate            Activity at Discharge:   Activity Instructions       Activity as Tolerated              Follow-up Appointments:   No future  appointments.    Test Results Pending at Discharge: None    Electronically signed by Jose Tang MD, 01/18/24, 10:28 CST.    Time: 38 minutes of time was spent evaluating patient and planning discharge.

## 2024-01-18 NOTE — PROGRESS NOTES
[facility] Medicine Services  INPATIENT PROGRESS NOTE    Patient Name: Radha Wade  Date of Admission: 1/12/2024  Today's Date: 01/17/24  Length of Stay: 3  Primary Care Physician: Myron Vines MD    Subjective   Chief Complaint: R sided weakness, constipation    HPI   Patient seen and evaluated.  She is still complaining of right leg weakness and incoordination but is worse than her right arm.  She complains of bladder spasms and dysuria.  Will start Pyridium.  She is planned for D&C today.  Will transition heparin drip to subcutaneous Lovenox after procedure.  Plan to discharge the next 24 hours.    Review of Systems   All pertinent negatives and positives are as above. All other systems have been reviewed and are negative unless otherwise stated.     Objective    Temp:  [97.6 °F (36.4 °C)-98.5 °F (36.9 °C)] 98.4 °F (36.9 °C)  Heart Rate:  [] 95  Resp:  [15-20] 16  BP: (102-132)/(46-74) 121/61  Physical Exam  Constitutional:       General: She is not in acute distress.     Appearance: She is not ill-appearing or diaphoretic.   HENT:      Head: Normocephalic and atraumatic.      Right Ear: External ear normal.      Left Ear: External ear normal.      Nose: No congestion or rhinorrhea.      Mouth/Throat:      Mouth: Mucous membranes are moist.      Pharynx: No oropharyngeal exudate or posterior oropharyngeal erythema.   Eyes:      General: No scleral icterus.     Extraocular Movements: Extraocular movements intact.      Conjunctiva/sclera: Conjunctivae normal.   Cardiovascular:      Rate and Rhythm: Normal rate and regular rhythm.      Heart sounds: Normal heart sounds. No murmur heard.  Pulmonary:      Effort: Pulmonary effort is normal. No respiratory distress.      Breath sounds: Normal breath sounds. No wheezing, rhonchi or rales.   Abdominal:      General: Abdomen is flat. There is no distension.      Palpations: Abdomen is soft.      Tenderness: There is no  abdominal tenderness. There is no guarding.   Musculoskeletal:         General: No swelling, tenderness or deformity.      Cervical back: Neck supple. No rigidity. No muscular tenderness.      Right lower leg: No edema.      Left lower leg: No edema.   Lymphadenopathy:      Cervical: No cervical adenopathy.   Skin:     General: Skin is warm and dry.   Neurological:      General: No focal deficit present.      Mental Status: She is alert and oriented to person, place, and time.      Cranial Nerves: No cranial nerve deficit.      Motor: Weakness present.      Comments: Right leg > right arm weakness and incoordination noted.   Psychiatric:         Mood and Affect: Mood normal.         Behavior: Behavior normal.         Results Review:  I have reviewed the labs, radiology results, and diagnostic studies.    Laboratory Data:   Results from last 7 days   Lab Units 01/17/24  0628 01/16/24  1013 01/15/24  0515   WBC 10*3/mm3 11.58* 10.22 14.86*   HEMOGLOBIN g/dL 8.5* 8.5* 7.8*   HEMATOCRIT % 30.1* 29.6* 28.6*   PLATELETS 10*3/mm3 636* 606* 692*        Results from last 7 days   Lab Units 01/15/24  0515 01/14/24  0359 01/13/24  0602   SODIUM mmol/L 138 136 136   POTASSIUM mmol/L 4.0 4.5 4.2   CHLORIDE mmol/L 101 100 100   CO2 mmol/L 26.0 25.0 23.0   BUN mg/dL 16 10 11   CREATININE mg/dL 0.54* 0.47* 0.50*   CALCIUM mg/dL 8.9 8.8 8.7   BILIRUBIN mg/dL 0.2 0.2 0.2   ALK PHOS U/L 163* 229* 208*   ALT (SGPT) U/L 7 10 11   AST (SGOT) U/L 9 14 17   GLUCOSE mg/dL 139* 191* 126*       Culture Data:   Urine Culture   Date Value Ref Range Status   01/13/2024 >100,000 CFU/mL Mixed Karo Isolated  Final       Radiology Data:   Imaging Results (Last 24 Hours)       ** No results found for the last 24 hours. **            I have reviewed the patient's current medications.     Assessment/Plan   Assessment  Active Hospital Problems    Diagnosis     **Acute focal neurological deficit     Severe malnutrition     CVA (cerebral vascular  accident)     Type 2 diabetes mellitus, without long-term current use of insulin     Metastatic disease     Acute blood loss anemia     Vaginal bleeding     DVT (deep venous thrombosis)        Treatment Plan  #1 acute CVA -patient presented with symptoms concerning for stroke.  Initial head CT was nonacute.  MRI with and without was obtained today to look for CVA versus possible mass given cancer history.  It was positive for multifocal diffusion restriction/CVA.  No obvious flow limitation was noted on angiographies.  Some was limited due to motion.  2D echo pending.  She has been on a heparin drip at this point by neurology, defer to them but suspect okay to resume Eliquis.  Will discontinue heparin drip and start Lovenox after D&C today.She complains of bladder spasms and dysuria. Plan to discharge in the next 24 hours to inpatient rehab.    #2 history DVT -patient with concern about right upper extremity swelling on arrival but she does not have a DVT in that extremity.  History of DVT in her lower extremities.  In the setting of malignancy.  She has been on Eliquis at this point but transition to heparin drip here due to fear for possible failure of therapy.  Patient openly admits that she would take the medicine once a day and not regularly.  Do not suspect there is any failure of care at this time rather patient noncompliance.  Feel she is likely somewhat in the mild to current issues at hand.  Note potential recommendations for Lovenox at discharge by neurology.  Will transition to Lovenox after D&C procedure today and discharged on Lovenox.  Can transition to p.o. Eliquis after histopathology of pelvic/vaginal lesion is obtained.    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 Grullon.  It seems that a lot of the procedures and testing have been ordered prior have typically not been performed as patient did not go for them.  Suspect she is in some denial to her diagnosis.  She is also supposed to  have an upcoming appointment at [facility] for further evaluation.  Oncology input appreciated.     #4 history of DM2 -on sliding scale insulin and hypoglycemia protocol.    #5 anemia -stable since arrival.  Does have a history of vaginal bleeding likely in the setting of GYN malignancy.  Plan for D&C in the today.    #6 constipation -start bowel regimen    #7.  Urinary tract infection-urinalysis suggestive of urinary tract infection although sample was contaminated.  She complains of bladder spasms and dysuria.  Will start Pyridium.  Continue IV ceftriaxone for UTI.    Medical Decision Making  Number and Complexity of problems: 1 acute problem, multiple chronic  Differential Diagnosis: as above    Conditions and Status        Pt is new to me, appears stable/non-toxic     MDM Data  External documents reviewed: none  Cardiac tracing (EKG, telemetry) interpretation: sinus on monitor  Radiology interpretation: reports reviewed  Labs reviewed: as above  Any tests that were considered but not ordered: none     Decision rules/scores evaluated (example [id number], Wells, etc): none     Discussed with: patient, nursing     Care Planning  Shared decision making: Patient apprised of current labs, vitals, imaging and treatment plan.  They are agreeable with proceeding with plans as discussed.    Code status and discussions: full code    Disposition  Social Determinants of Health that impact treatment or disposition: none    I expect the patient to be discharged to possible acute inpt rehab  in 24 hours.    Electronically signed by Jose Tang MD, 01/17/24, 18:23 CST.;y

## 2024-01-18 NOTE — PAYOR COMM NOTE
"Radha Wade (63 y.o. Female)      [id number]   DC today ?    [facility] [phone]  Fax [phone]      Date of Birth   1960    Social Security Number       Address   [address] [place] [address]    Home Phone   [phone]    MRN   [id number]       Orthodoxy   Other    Marital Status                               Admission Date   1/12/24    Admission Type   Emergency    Admitting Provider   Jose Tang MD    Attending Provider   Jose Tang MD    Department, Room/Bed   [facility] 3A, 327/1       Discharge Date       Discharge Disposition   Rehab Facility or Unit (DC - External)    Discharge Destination                                 Attending Provider: Jose Tang MD    Allergies: No Known Allergies    Isolation: None   Infection: None   Code Status: CPR    Ht: 165.1 cm (65\")   Wt: 75.4 kg (166 lb 3.2 oz)    Admission Cmt: None   Principal Problem: Acute focal neurological deficit [R29.818]                   Active Insurance as of 1/12/2024       Primary Coverage       Payor Plan Insurance Group Employer/Plan Group    [employer] H[employer] [id number]       Payor Plan Address Payor Plan Phone Number Payor Plan Fax Number Effective Dates    [address] [phone]  10/1/2023 - None Entered    [place] [address]         Subscriber Name Subscriber Birth Date Member ID       RADHA WADE 1960 [id number]                     Emergency Contacts        (Rel.) Home Phone Work Phone Mobile Phone    Romeo Wade (Spouse) [phone] -- --                 Discharge Summary        Jose Tang MD at 01/18/24 1008                [facility] Medicine Services  DISCHARGE SUMMARY       Date of Admission: 1/12/2024  Date of Discharge:  1/18/2024  Primary Care Physician: Myron Vines MD    Presenting Problem/History of Present Illness: Right arm and leg " weakness    Final Discharge Diagnoses:  Active Hospital Problems    Diagnosis     **Acute focal neurological deficit     Acute cystitis without hematuria     Severe malnutrition     CVA (cerebral vascular accident)     Type 2 diabetes mellitus, without long-term current use of insulin     Metastatic disease     Acute blood loss anemia     Vaginal bleeding     DVT (deep venous thrombosis)        Consults: Neurology, OB/GYN, hematology oncology, gastroenterology    Procedures Performed: D&C under general anesthesia    Pertinent Test Results:   Results for orders placed during the hospital encounter of 01/12/24    Adult Transthoracic Echo Complete W/ Cont if Necessary Per Protocol (With Agitated Saline)    Interpretation Summary    Left ventricular systolic function is normal. Left ventricular ejection fraction appears to be 56 - 60%.    Normal right ventricular cavity size and systolic function noted.    No evidence of a patent foramen ovale.    No significant valvular abnormalities identified on this study.      Imaging Results (All)       Procedure Component Value Units Date/Time    CT Head Without Contrast [[id number]] Collected: 01/15/24 1436     Updated: 01/15/24 1444    Narrative:      EXAMINATION:  CT HEAD WO CONTRAST-  1/15/2024 1:29 PM     HISTORY: Stroke, follow up; I82.619-Acute embolism and thrombosis of  superficial veins of unspecified upper extremity; N30.90-Cystitis,  unspecified without hematuria; W19.XXXA-Unspecified fall, initial  encounter; R53.1-Weakness; Z74.09-Other reduced mobility.     TECHNIQUE: Multiple axial images were obtained through the brain without  contrast infusion. Multiplanar images were reconstructed.     DLP: 605.11 mGy.cm. Automated dosage reduction technique was utilized to  reduce patient dosage.     COMPARISON: 1/13/2024.     FINDINGS: There is no hemorrhage. There is low-density in the lateral  left basal ganglia region predominantly involving the posterior aspect  of the  external capsule. There is minimal low density elsewhere within  the hemispheric white matter likely related to small vessel disease.  There is minimal atrophy. The paranasal sinuses and mastoid air cells  are clear. No calvarial fracture is seen.          Impression:      1. Acute infarct in the lateral left basal ganglia region involving  predominantly the external capsule posteriorly and in the posterolateral  aspect of the putamen.  2. No hemorrhage.  3. Mild chronic small vessel ischemic white matter disease.        This report was signed and finalized on 1/15/2024 2:41 PM by Dr. Keegan Sousa MD.       MRI Angiogram Neck Without Contrast [[id number]] Collected: 01/14/24 1502     Updated: 01/14/24 1514    Narrative:      EXAMINATION: MRI ANGIOGRAM NECK WO CONTRAST-  1/14/2024 3:02 PM MR  angiogram of the neck without contrast 1/14/2024     HISTORY: Carotid artery dissection suspected.     FINDINGS: MR angiography was performed of the neck utilizing 3D  time-of-flight and MIP images. Today's study is significantly degraded  by motion. This significantly lowers the sensitivity of the exam.     The visualized aortic arch is normal in caliber. The origin of the great  vessels are widely patent. The right vertebral artery origin is widely  patent. The left vertebral artery is rather diminutive in size and its  origin and proximal segment is not well-defined. Distally it is  suspected that the left vertebral artery terminates in the posterior  inferior cerebellar artery. The right vertebral artery is dominant and  appears to be widely patent from its origin to the basilar artery.     Both common carotid arteries are patent at their origin. No focal  luminal stenosis appreciated proximal to the carotid bifurcations. Both  carotid bifurcations are unremarkable with no evidence of rate limiting  stenosis. The ICAs are patent to the skull base without evidence of rate  limiting stenosis.       Impression:      1. Study  is degraded by motion artifact. This is particularly noted on  the MIP sequences which are three-dimensional reconstructions based on  the raw source images.  2. The carotid arteries are unremarkable. The carotid bifurcations are  normal in appearance without evidence of rate limiting stenosis with  both ICAs patent to the skull base.  3. The right vertebral artery is widely patent from its origin to the  skull base. The left vertebral artery is rather diminutive in size and  is not well-defined in its proximal segment. Difficult to ascertain  whether there is some loss of definition of this vessel simply because  of its small size and adjacent pulsatility from the aortic arch and  proximal great vessels or whether it is diseased. I feel that this  vessel terminates in the posterior inferior cerebellar artery at the  level of the posterior cranial fossa.  4. If further evaluation of the carotid and vertebral arteries is felt  warranted then follow-up with CT angiography would be the study of  choice and is considered the definitive exam for evaluation of the  vessels of the head and neck. Motion artifact, although potentially  degrading the study is a much lesser factor during performance of CT  angiography as the acquisition only takes a few seconds.     This report was signed and finalized on 1/14/2024 3:11 PM by Dr. Romeo Arizmendi MD.       MRI Angiogram Head Without Contrast [[id number]] Collected: 01/14/24 1455     Updated: 01/14/24 1505    Narrative:      EXAMINATION: MRI ANGIOGRAM HEAD WO CONTRAST-  1/14/2024 2:55 PM     HISTORY: Stroke. Determine embolic source.     FINDINGS: MR angiography was performed of the [place] of [place] with 3D  time-of-flight and MIP images. Raw source images are also reviewed.     Today's study is limited secondary to rather extensive motion artifact.  This is particularly noted on the MIPS which are reproduction's based on  the raw source images.     The right vertebral artery  is dominant. The left vertebral artery is  rather diminutive in size. I feel the left vertebral artery terminates  in the posterior inferior cerebellar artery. The right vertebral artery  is patent to the basilar artery. The basilar artery is patent. The  superior cerebellar arteries appear to be grossly intact. The basilar  artery terminates into the posterior cerebral artery on the left. There  is a fetal origin of the right posterior cerebral artery.     The distal ICAs are patent. The petrous, cavernous and supraclinoid  segment of the ICAs are patent to the terminus. Grossly the anterior and  middle cerebral arteries are patent. I do not see any discrete aneurysm.  No gross evidence of high-grade stenosis but the study is very limited.  The more distal trifurcation vessels are attenuated secondary to motion  artifact.       Impression:      1. Very limited exam secondary to extensive motion artifact. The left  vertebral artery is rather diminutive and I feel terminates in the  posterior inferior cerebellar artery. The right vertebral artery is  dominant. The basilar artery is widely patent. The basilar artery  terminates in the left posterior cerebral artery with the right  posterior cerebral artery emanating from the anterior circulation via  suspected persistent fetal origin.  2. Both intracranial ICAs are patent to the terminus. Potential disease  involving the cavernous and supraclinoid segment of the ICAs cannot be  fully evaluated. The anterior and middle cerebral arteries are grossly  unremarkable. I do not see any definite aneurysm.  3. If further assessment of the [place] of [place] is felt warranted  follow-up with CT angiography would be the study of choice for further  evaluation. That is a rapid acquisition and will not be as affected by  potential motion.     This report was signed and finalized on 1/14/2024 3:02 PM by Dr. Romeo rAizmendi MD.       MRI Brain With & Without Contrast [[id number]]  Collected: 01/14/24 1438     Updated: 01/14/24 1454    Narrative:      MRI BRAIN W WO CONTRAST- 1/14/2024 10:55 AM     HISTORY: Stroke, follow up; I82.619-Acute embolism and thrombosis of  superficial veins of unspecified upper extremity; N30.90-Cystitis,  unspecified without hematuria; W19.XXXA-Unspecified fall, initial  encounter; R53.1-Weakness; Z74.09-Other reduced mobility     COMPARISON: None.       TECHNIQUE: Multiplanar imaging of the brain was performed in a routine  fashion before and after the intravenous injection of gadolinium  contrast. There is significant motion artifact on today's exam.     FINDINGS:  Diffusion: There is diffusion restriction within the mesial left  temporal lobe,, left external capsule, left insular cortex as well as  within the body of the caudate nucleus with associated ADC mapping  abnormalities consistent with acute ischemic infarction. These infarcts  are nonhemorrhagic. There is no associated mass effect. No additional  sites of diffusion restriction are present.     Midline structures: Nondisplaced.     Ventricles: Normal in configuration and symmetric in size.     Masses: No masses or mass effect.     Basilar cisterns: Maintained.     Extra axial space: No abnormal extra-axial fluid.     Gray-white matter signal: There are additional scattered foci of T2  abnormality involving the periventricular and higher white matter tract  suggesting small vessel ischemic disease.     Cerebellum: Normal.     Brainstem: Normal.     Enhancement: No abnormal enhancement.     Other: Proximal cervical spinal cord is normal. Bilateral globes and  orbits are normal in appearance. Normal cerebrovascular flow voids  noted. No abnormal signal in the mastoid air cells or paranasal sinuses.       Impression:      1. Diffusion restriction with associated ADC mapping abnormality  involving the mesial left temporal lobe, left external capsule, left  insula as well as the body of the caudate nucleus.  These infarcts are  nonhemorrhagic.  2. Mild atrophy. Mild small vessel ischemic changes are noted involving  the periventricular and higher white matter tracts.  3. Normal flow voids within the [place] of [place].  4. Today's study is degraded by motion. No abnormal contrast enhancement  is demonstrated.           This report was signed and finalized on 1/14/2024 2:50 PM by Dr. Romeo Arizmendi MD.       US Venous Doppler Upper Extremity Right (duplex) [[id number]] Collected: 01/14/24 1113     Updated: 01/14/24 1117    Narrative:      History: Pain and swelling       Impression:      Impression:  1. There is no evidence of deep venous thrombosis of the right upper  extremity.  2. There is evidence of superficial thrombus in the cephalic vein.     Comments: Right upper extremity venous duplex exam was performed using  color Doppler flow, Doppler wave form analysis, and grayscale imaging,  with and without compression. There is no evidence of deep venous  thrombosis of the internal jugular, subclavian, axillary, brachial,  radial, and ulnar veins. There is evidence of superficial thrombus  involving the cephalic vein.        This report was signed and finalized on 1/14/2024 11:14 AM by Dr. Joseph Mari MD.       CT Head Without Contrast [[id number]] Collected: 01/13/24 0746     Updated: 01/13/24 0750    Narrative:      CT BRAIN without contrast dated 1/12/2024 10:49 PM     HISTORY: Weakness. Brain fog.     COMPARISON: None      DOSE LENGTH PRODUCT: 686.84 mGy.cm . All CT scans are performed using  dose optimization techniques as appropriate to the performed exam and  including at least one of the following: Automated exposure control,  adjustment of the mA and/or kV according to size, and the use of the  iterative reconstruction technique.     In order to have a CT radiation dose as low as reasonably achievable,  Automated Exposure Control was utilized for adjustment of the mA and/or  KV according to patient  size.     TECHNIQUE: Serial axial tomographic images of the brain were obtained  without the use of intravenous contrast.     FINDINGS:  The midline structures are nondisplaced. The ventricles and basilar  cisterns are normal in size and configuration. There is no evidence of  intracranial hemorrhage or mass-effect. The gray-white matter  differentiation is maintained. The sulci have a normal configuration,  and there are no abnormal extra-axial fluid collections. The structures  of the posterior fossa are unremarkable.     The included orbits and their contents are unremarkable. The visualized  paranasal sinuses, mastoid air cells and middle ear cavities are clear.  The visualized osseous structures and overlying soft tissues of the  skull and face are unremarkable.       Impression:      1. No acute intracranial process.           This report was signed and finalized on 1/13/2024 7:47 AM by Dr. Romeo Arizmendi MD.       XR Chest 1 View [[id number]] Collected: 01/13/24 0708     Updated: 01/13/24 0712    Narrative:      EXAMINATION: XR CHEST 1 VW-  1/13/2024 7:08 AM     HISTORY: Generalized weakness     FINDINGS: Upright frontal projection of the chest demonstrates a nodule  within the right upper lobe warranting follow-up with CT imaging of the  chest. Lungs are otherwise clear with no consolidative pneumonia. No  effusion or free air is present.       Impression:      1.. Right upper lobe nodule. Follow-up with CT imaging of the chest  recommended.  2. Otherwise normal exam.     This report was signed and finalized on 1/13/2024 7:09 AM by Dr. Romeo Arizmendi MD.             LAB RESULTS:      Lab 01/18/24  0446 01/17/24  1825 01/17/24  1224 01/17/24  0628 01/16/24  1736 01/16/24  1013 01/15/24  1220 01/15/24  0515 01/14/24  1325 01/14/24  0359 01/13/24  1939 01/13/24  1637 01/13/24  0602 01/12/24  2311   WBC 11.06*  --   --  11.58*  --  10.22  --  14.86*  --  8.82  --   --    < > 9.23   HEMOGLOBIN 8.9*  --   --   8.5*  --  8.5*  --  7.8*  --  8.9*  --   --    < > 9.0*   HEMATOCRIT 30.9*  --   --  30.1*  --  29.6*  --  28.6*  --  31.8*  --   --    < > 31.8*   PLATELETS 638*  --   --  636*  --  606*  --  692*  --  605*  --   --    < > 592*   NEUTROS ABS 6.84  --   --  7.55*  --  7.14*  --   --   --  7.29*  --   --   --  6.56   IMMATURE GRANS (ABS)  --   --   --   --   --   --   --   --   --   --   --   --   --  0.06*   LYMPHS ABS 2.24  --   --  2.22  --  1.63  --   --   --   --   --   --   --  1.55   MONOS ABS 1.16*  --   --  0.93*  --  0.83  --   --   --   --   --   --   --  0.81   EOS ABS 0.26  --   --  0.23  --  0.27  --   --   --  0.09  --   --   --  0.21   MCV 70.9*  --   --  73.4*  --  72.0*  --  73.7*  --  72.3*  --   --    < > 72.3*   PROTIME  --   --   --   --   --   --   --   --   --   --   --  16.8*  --  16.1*   APTT  --  48.9* 125.8* 84.9* 143.4* 192.8*   < > 82.4*   < > 44.5*   < >  --   --  39.8*    < > = values in this interval not displayed.         Lab 01/15/24  0515 01/14/24  0359 01/13/24  0602 01/12/24  2311   SODIUM 138 136 136 137   POTASSIUM 4.0 4.5 4.2 4.4   CHLORIDE 101 100 100 99   CO2 26.0 25.0 23.0 27.0   ANION GAP 11.0 11.0 13.0 11.0   BUN 16 10 11 12   CREATININE 0.54* 0.47* 0.50* 0.55*   EGFR 103.6 107.1 105.5 103.1   GLUCOSE 139* 191* 126* 160*   CALCIUM 8.9 8.8 8.7 9.0   MAGNESIUM 2.2  --   --  2.2   HEMOGLOBIN A1C  --   --   --  6.50*         Lab 01/15/24  0515 01/14/24  0359 01/13/24  0602 01/12/24  2311   TOTAL PROTEIN 7.2 7.9 7.3 7.8   ALBUMIN 3.1* 3.2* 2.8* 3.2*   GLOBULIN 4.1 4.7 4.5 4.6   ALT (SGPT) 7 10 11 13   AST (SGOT) 9 14 17 22   BILIRUBIN 0.2 0.2 0.2 0.2   ALK PHOS 163* 229* 208* 227*         Lab 01/13/24  1637 01/12/24  2311   PROTIME 16.8* 16.1*   INR 1.35* 1.27*         Lab 01/13/24  0602   CHOLESTEROL 149   LDL CHOL 89   HDL CHOL 30*   TRIGLYCERIDES 169*         Lab 01/13/24  0602   IRON 17*   IRON SATURATION (TSAT) 8*   TIBC 216*   TRANSFERRIN 145*   FERRITIN 440.90*        "  Brief Urine Lab Results  (Last result in the past 365 days)        Color   Clarity   Blood   Leuk Est   Nitrite   Protein   CREAT   Urine HCG        01/16/24 0909 Straw   Clear   Moderate (2+)   Moderate (2+)   Negative   30 mg/dL (1+)                 Microbiology Results (last 10 days)       Procedure Component Value - Date/Time    Urine Culture - Urine, Urine, Clean Catch [[id number]]  (Normal) Collected: 01/16/24 0909    Lab Status: Final result Specimen: Urine, Clean Catch Updated: 01/17/24 0907     Urine Culture No growth    Urine Culture - Urine, Urine, Clean Catch [[id number]] Collected: 01/13/24 0120    Lab Status: Final result Specimen: Urine, Clean Catch Updated: 01/14/24 1215     Urine Culture >100,000 CFU/mL Mixed Karo Isolated    Narrative:      Specimen contains mixed organisms of questionable pathogenicity suggestive of contamination. If symptoms persist, suggest recollection.  Colonization of the urinary tract without infection is common. Treatment is discouraged unless the patient is symptomatic, pregnant, or undergoing an invasive urologic procedure.            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 She also complained of RUE pain and doppler in ER showed cephalic vein thrombosis. She was evaluated by neurology and recommended for admission but not a candidate for TPA due to vaginal bleeding and Eliquis use.     Regarding GYN malignancy she was noted to have an abnormal CT abd/pelvis last September:   \"1. Somewhat bulky retroperitoneal lymphadenopathy and pelvic  lymphadenopathy worrisome for metastatic lymphadenopathy or lymphoma.  2. There is also some stranding and nodularity in the greater omental  region anteriorly suggesting possible peritoneal metastatic disease.  3. The uterus is " "prominent in size but a focal mass is not seen\"   She was evaluated by Dr De Los Santos for vaginal bleeding and referred for follow up D&C 1 week after discharge. She did not have this procedure. She then was evaluated by PCP and referred to Dr Grullon and after testing and imaging has been referred to Dr Miles in [place] for consultation.      Dr Grullon writes in his last progress note from 12/21/2023:  \"Given her history with weight loss, vaginal bleeding, elevated tumor markers, imaging abnormalities with the uterus, the 2.9 x 1.9 cm nodule in the anterior abdomen suspicious for carcinomatosis, abdominal and pelvic lymphadenopathy, prothrombotic state with DVTs and subcentimeter pulmonary nodules although indeterminate, in this setting is very concerning for an active malignant process and suspicious for a GYN origin.   Completion of workup however will also include a bone scan that was ordered at last visit and not done, GI evaluation to rule out the GI tract as a potential source in addition to the other recommendations below.     RECOMMENDATIONS  Bone scan  C-scope - Appt with Bertha Parrish [facility] scheduled 1/3/2024, malignancy process with iron deficiency anemia  GUADALUPE 2 and reflex panels as well as the rest of the prothrombotic panel not done at last visit  PET scan  Pelvic ultrasound to evaluate uterus and adnexa  Consult Dr. Frank Miles GYNOnc to evaluate the The uterus which is prominent in size measuring 11.7 x 6.9 x 8.7 cm but without visualization a focal mass. Follow-up nonemergent ultrasound is recommended to evaluate the uterus for a potential mass on CT abdomen 9/14/2023  Follow-up in 2 weeks.    The patient was admitted and treated for stroke with right arm and leg weakness.  It was found that patient was noncompliant with her Eliquis home medication.  However due to concerns for possible Eliquis failure, patient was started on anticoagulation with heparin drip after a hemorrhagic stroke " "was ruled out with negative CT of the brain.  She was reviewed by neurology and had an MRI of the brain with/without and MRA of the head and neck which demonstrated left temporal lobe, left external capsule, left insula, body of the left cardiac nucleus infarcts.  There was no abnormal contrast-enhancement.  It was thought that her stroke was caused by hypercoagulability from her suspected metastatic OB/GYN cancer.  She was reviewed by hematology oncology and had iron infusions for anemia of chronic disease.  She also had evaluation by OB/GYN and had D&C and biopsy of a uterine/cervical lesion under general anesthesia without any complications.  Patient was recommended to have inpatient rehab for her stroke and right arm and leg weakness.  She was discharged in stable condition to [facility] rehab for physical rehabilitation.  Patient is to follow-up with her oncologist Dr. Grullon for biopsy results and planning of treatment for her suspected metastatic gynecological cancer.  She is also to follow-up with OB/GYN and neurology.  Patient is to be on Lovenox for anticoagulation until after biopsy results are obtained to see if she can be safely transition back to oral Eliquis.  She had a urinary tract infection during the admission and was discharged on cefdinir.    Physical Exam on Discharge:  BP 95/41 (BP Location: Left arm, Patient Position: Sitting)   Pulse 92   Temp 98.4 °F (36.9 °C) (Oral)   Resp 18   Ht 165.1 cm (65\")   Wt 75.4 kg (166 lb 3.2 oz)   LMP  (LMP Unknown)   SpO2 98%   BMI 27.66 kg/m²   Physical Exam  Constitutional:       General: She is not in acute distress.     Appearance: She is not ill-appearing or diaphoretic.   HENT:      Head: Normocephalic and atraumatic.      Right Ear: External ear normal.      Left Ear: External ear normal.      Nose: No congestion or rhinorrhea.      Mouth/Throat:      Mouth: Mucous membranes are moist.      Pharynx: No oropharyngeal exudate or posterior " oropharyngeal erythema.   Eyes:      General: No scleral icterus.     Extraocular Movements: Extraocular movements intact.      Conjunctiva/sclera: Conjunctivae normal.   Cardiovascular:      Rate and Rhythm: Normal rate and regular rhythm.      Heart sounds: Normal heart sounds. No murmur heard.  Pulmonary:      Effort: Pulmonary effort is normal. No respiratory distress.      Breath sounds: Normal breath sounds. No wheezing, rhonchi or rales.   Abdominal:      General: Abdomen is flat. There is no distension.      Palpations: Abdomen is soft.      Tenderness: There is no abdominal tenderness. There is no guarding.   Musculoskeletal:         General: No swelling, tenderness or deformity.      Cervical back: Neck supple. No rigidity. No muscular tenderness.      Right lower leg: No edema.      Left lower leg: No edema.   Lymphadenopathy:      Cervical: No cervical adenopathy.   Skin:     General: Skin is warm and dry.   Neurological:      General: No focal deficit present.      Mental Status: She is alert and oriented to person, place, and time.      Cranial Nerves: No cranial nerve deficit.      Motor: Weakness present.      Comments: Right leg > right arm weakness and incoordination noted.   Psychiatric:         Mood and Affect: Mood normal.         Behavior: Behavior normal.     Condition on Discharge: Stable    Discharge Disposition:  Rehab Facility or Unit (DC - External)    Discharge Medications:     Discharge Medications        New Medications        Instructions Start Date   atorvastatin 80 MG tablet  Commonly known as: LIPITOR   80 mg, Oral, Nightly      cefdinir 300 MG capsule  Commonly known as: OMNICEF   300 mg, Oral, 2 Times Daily      Enoxaparin Sodium 80 MG/0.8ML solution prefilled syringe syringe  Commonly known as: LOVENOX   80 mg, Subcutaneous, Every 12 Hours Scheduled      oxyCODONE 5 MG immediate release tablet  Commonly known as: Roxicodone   5 mg, Oral, Every 4 Hours PRN      pantoprazole 40 MG  EC tablet  Commonly known as: PROTONIX   40 mg, Oral, Every Early Morning   Start Date: January 19, 2024     phenazopyridine 100 MG tablet  Commonly known as: PYRIDIUM   100 mg, Oral, 3 Times Daily With Meals      polyethylene glycol 17 g packet  Commonly known as: MIRALAX   17 g, Oral, Daily             Continue These Medications        Instructions Start Date   albuterol sulfate  (90 Base) MCG/ACT inhaler  Commonly known as: PROVENTIL HFA;VENTOLIN HFA;PROAIR HFA   2 puffs, Inhalation, Every 4 Hours PRN      freestyle lancets   1 each, Other, 2 Times Daily, Use as instructed      metFORMIN 500 MG tablet  Commonly known as: GLUCOPHAGE   500 mg, Oral, 2 Times Daily With Meals      promethazine 25 MG tablet  Commonly known as: PHENERGAN   25 mg, Oral, Every 6 Hours PRN             Stop These Medications      acetaminophen-codeine 300-30 MG per tablet  Commonly known as: TYLENOL with CODEINE #3     Eliquis DVT/PE Starter Pack tablet therapy pack  Generic drug: Apixaban Starter Pack     furosemide 20 MG tablet  Commonly known as: LASIX            This patient does not have current or prior documentation of an left ventricular ejection fraction (LVEF) of less than or equal to 40%.      Discharge Diet:   Diet Instructions       Diet: Cardiac Diets, Diabetic Diets; Healthy Heart (2-3 Na+); Thin (IDDSI 0); Consistent Carbohydrate      Discharge Diet:  Cardiac Diets  Diabetic Diets       Cardiac Diet: Healthy Heart (2-3 Na+)    Fluid Consistency: Thin (IDDSI 0)    Diabetic Diet: Consistent Carbohydrate            Activity at Discharge:   Activity Instructions       Activity as Tolerated              Follow-up Appointments:   No future appointments.    Test Results Pending at Discharge: None    Electronically signed by Jose Tang MD, 01/18/24, 10:28 CST.    Time: 38 minutes of time was spent evaluating patient and planning discharge.         Electronically signed by Jose Tang MD at 01/18/24 1030

## 2024-01-18 NOTE — CASE MANAGEMENT/SOCIAL WORK
Continued Stay Note   [facility]     Patient Name: Radha Wade  MRN: [id number]  Today's Date: 1/18/2024    Admit Date: 1/12/2024    Plan: Pending   Discharge Plan       Row Name 01/18/24 1007       Plan    Plan Comments Precert is complete for [facility] rehab and bed is ready. Pt will need a new covid test. Pt will be going to room 823    Final Discharge Disposition Code 62 - inpatient rehab facility    Final Note [phone]. Fax number is [phone]                   Discharge Codes    No documentation.                 Expected Discharge Date and Time       Expected Discharge Date Expected Discharge Time    Jan 18, 2024               HORTENCIA Biswas

## 2024-01-19 ENCOUNTER — TELEPHONE (OUTPATIENT)
Dept: HEMATOLOGY | Age: 64
End: 2024-01-19

## 2024-01-19 PROBLEM — I63.9 ACUTE STROKE DUE TO ISCHEMIA (HCC): Status: ACTIVE | Noted: 2024-01-19

## 2024-01-19 PROBLEM — R10.30 LOWER ABDOMINAL PAIN: Status: ACTIVE | Noted: 2024-01-19

## 2024-01-19 PROBLEM — Z51.5 PALLIATIVE CARE PATIENT: Status: ACTIVE | Noted: 2024-01-19

## 2024-01-19 PROBLEM — E43 SEVERE MALNUTRITION (HCC): Status: ACTIVE | Noted: 2024-01-19

## 2024-01-19 PROBLEM — J45.909 ASTHMA: Status: ACTIVE | Noted: 2024-01-19

## 2024-01-19 PROBLEM — C80.1 CANCER (HCC): Status: ACTIVE | Noted: 2024-01-19

## 2024-01-19 LAB
ALBUMIN SERPL-MCNC: 2.6 G/DL (ref 3.5–5.2)
ALP SERPL-CCNC: 290 U/L (ref 35–104)
ALT SERPL-CCNC: 12 U/L (ref 5–33)
ANION GAP SERPL CALCULATED.3IONS-SCNC: 12 MMOL/L (ref 7–19)
AST SERPL-CCNC: 14 U/L (ref 5–32)
BILIRUB SERPL-MCNC: <0.2 MG/DL (ref 0.2–1.2)
BUN SERPL-MCNC: 10 MG/DL (ref 8–23)
CALCIUM SERPL-MCNC: 8.7 MG/DL (ref 8.8–10.2)
CHLORIDE SERPL-SCNC: 99 MMOL/L (ref 98–111)
CO2 SERPL-SCNC: 25 MMOL/L (ref 22–29)
CREAT SERPL-MCNC: 0.5 MG/DL (ref 0.5–0.9)
CYTO UR: NORMAL
ERYTHROCYTE [DISTWIDTH] IN BLOOD BY AUTOMATED COUNT: 18.5 % (ref 11.5–14.5)
GLUCOSE BLD-MCNC: 108 MG/DL (ref 70–99)
GLUCOSE BLD-MCNC: 129 MG/DL (ref 70–99)
GLUCOSE BLD-MCNC: 136 MG/DL (ref 70–99)
GLUCOSE BLD-MCNC: 167 MG/DL (ref 70–99)
GLUCOSE SERPL-MCNC: 125 MG/DL (ref 74–109)
HCT VFR BLD AUTO: 30.4 % (ref 37–47)
HGB BLD-MCNC: 8.9 G/DL (ref 12–16)
LAB AP CASE REPORT: NORMAL
LAB AP DIAGNOSIS COMMENT: NORMAL
Lab: NORMAL
MCH RBC QN AUTO: 21.1 PG (ref 27–31)
MCHC RBC AUTO-ENTMCNC: 29.3 G/DL (ref 33–37)
MCV RBC AUTO: 72.2 FL (ref 81–99)
PATH REPORT.FINAL DX SPEC: NORMAL
PATH REPORT.GROSS SPEC: NORMAL
PERFORMED ON: ABNORMAL
PLATELET # BLD AUTO: 590 K/UL (ref 130–400)
PMV BLD AUTO: 9.2 FL (ref 9.4–12.3)
POTASSIUM SERPL-SCNC: 4 MMOL/L (ref 3.5–5)
PREALB SERPL-MCNC: 10 MG/DL (ref 20–40)
PROT SERPL-MCNC: 6.8 G/DL (ref 6.6–8.7)
RBC # BLD AUTO: 4.21 M/UL (ref 4.2–5.4)
SODIUM SERPL-SCNC: 136 MMOL/L (ref 136–145)
WBC # BLD AUTO: 9.6 K/UL (ref 4.8–10.8)

## 2024-01-19 PROCEDURE — 97535 SELF CARE MNGMENT TRAINING: CPT

## 2024-01-19 PROCEDURE — 36415 COLL VENOUS BLD VENIPUNCTURE: CPT

## 2024-01-19 PROCEDURE — 6370000000 HC RX 637 (ALT 250 FOR IP): Performed by: PSYCHIATRY & NEUROLOGY

## 2024-01-19 PROCEDURE — 80053 COMPREHEN METABOLIC PANEL: CPT

## 2024-01-19 PROCEDURE — 1180000000 HC REHAB R&B

## 2024-01-19 PROCEDURE — 92610 EVALUATE SWALLOWING FUNCTION: CPT

## 2024-01-19 PROCEDURE — 97161 PT EVAL LOW COMPLEX 20 MIN: CPT

## 2024-01-19 PROCEDURE — 99223 1ST HOSP IP/OBS HIGH 75: CPT | Performed by: PSYCHIATRY & NEUROLOGY

## 2024-01-19 PROCEDURE — 85027 COMPLETE CBC AUTOMATED: CPT

## 2024-01-19 PROCEDURE — 99223 1ST HOSP IP/OBS HIGH 75: CPT | Performed by: PHYSICIAN ASSISTANT

## 2024-01-19 PROCEDURE — 84134 ASSAY OF PREALBUMIN: CPT

## 2024-01-19 PROCEDURE — 92522 EVALUATE SPEECH PRODUCTION: CPT

## 2024-01-19 PROCEDURE — 97165 OT EVAL LOW COMPLEX 30 MIN: CPT

## 2024-01-19 PROCEDURE — 82962 GLUCOSE BLOOD TEST: CPT

## 2024-01-19 PROCEDURE — 97116 GAIT TRAINING THERAPY: CPT

## 2024-01-19 PROCEDURE — 97110 THERAPEUTIC EXERCISES: CPT

## 2024-01-19 PROCEDURE — 6360000002 HC RX W HCPCS: Performed by: PSYCHIATRY & NEUROLOGY

## 2024-01-19 RX ORDER — BISACODYL 10 MG
10 SUPPOSITORY, RECTAL RECTAL DAILY PRN
Status: DISCONTINUED | OUTPATIENT
Start: 2024-01-19 | End: 2024-01-26 | Stop reason: HOSPADM

## 2024-01-19 RX ORDER — OXYCODONE HYDROCHLORIDE 10 MG/1
10 TABLET ORAL EVERY 4 HOURS PRN
Status: DISCONTINUED | OUTPATIENT
Start: 2024-01-19 | End: 2024-01-19

## 2024-01-19 RX ORDER — HYDROMORPHONE HYDROCHLORIDE 2 MG/1
4 TABLET ORAL EVERY 4 HOURS PRN
Status: DISCONTINUED | OUTPATIENT
Start: 2024-01-19 | End: 2024-01-23

## 2024-01-19 RX ORDER — CEFDINIR 300 MG/1
300 CAPSULE ORAL 2 TIMES DAILY
Status: COMPLETED | OUTPATIENT
Start: 2024-01-19 | End: 2024-01-22

## 2024-01-19 RX ORDER — LANOLIN ALCOHOL/MO/W.PET/CERES
CREAM (GRAM) TOPICAL 3 TIMES DAILY
Status: DISCONTINUED | OUTPATIENT
Start: 2024-01-19 | End: 2024-01-26 | Stop reason: HOSPADM

## 2024-01-19 RX ADMIN — CEFDINIR 300 MG: 300 CAPSULE ORAL at 20:48

## 2024-01-19 RX ADMIN — METFORMIN HYDROCHLORIDE 500 MG: 500 TABLET ORAL at 09:20

## 2024-01-19 RX ADMIN — OXYCODONE HYDROCHLORIDE 5 MG: 5 TABLET ORAL at 05:58

## 2024-01-19 RX ADMIN — PHENAZOPYRIDINE 100 MG: 100 TABLET ORAL at 18:09

## 2024-01-19 RX ADMIN — ACETAMINOPHEN 650 MG: 325 TABLET ORAL at 09:20

## 2024-01-19 RX ADMIN — ENOXAPARIN SODIUM 80 MG: 100 INJECTION SUBCUTANEOUS at 20:48

## 2024-01-19 RX ADMIN — ATORVASTATIN CALCIUM 80 MG: 80 TABLET, FILM COATED ORAL at 20:48

## 2024-01-19 RX ADMIN — HYDROMORPHONE HYDROCHLORIDE 4 MG: 2 TABLET ORAL at 18:27

## 2024-01-19 RX ADMIN — Medication: at 20:48

## 2024-01-19 RX ADMIN — PANTOPRAZOLE SODIUM 40 MG: 40 TABLET, DELAYED RELEASE ORAL at 05:55

## 2024-01-19 RX ADMIN — PHENAZOPYRIDINE 100 MG: 100 TABLET ORAL at 12:58

## 2024-01-19 RX ADMIN — ENOXAPARIN SODIUM 80 MG: 100 INJECTION SUBCUTANEOUS at 09:20

## 2024-01-19 RX ADMIN — METFORMIN HYDROCHLORIDE 500 MG: 500 TABLET ORAL at 18:09

## 2024-01-19 RX ADMIN — CEFDINIR 300 MG: 300 CAPSULE ORAL at 09:20

## 2024-01-19 RX ADMIN — OXYCODONE HYDROCHLORIDE 10 MG: 10 TABLET ORAL at 12:58

## 2024-01-19 RX ADMIN — ACETAMINOPHEN 650 MG: 325 TABLET ORAL at 18:09

## 2024-01-19 RX ADMIN — PHENAZOPYRIDINE 100 MG: 100 TABLET ORAL at 09:20

## 2024-01-19 ASSESSMENT — PAIN - FUNCTIONAL ASSESSMENT: PAIN_FUNCTIONAL_ASSESSMENT: PREVENTS OR INTERFERES SOME ACTIVE ACTIVITIES AND ADLS

## 2024-01-19 ASSESSMENT — PAIN SCALES - GENERAL
PAINLEVEL_OUTOF10: 10
PAINLEVEL_OUTOF10: 8
PAINLEVEL_OUTOF10: 8
PAINLEVEL_OUTOF10: 5
PAINLEVEL_OUTOF10: 10

## 2024-01-19 ASSESSMENT — PAIN DESCRIPTION - LOCATION
LOCATION: ABDOMEN;GENERALIZED
LOCATION: ABDOMEN;BACK
LOCATION: ABDOMEN;PERINEUM

## 2024-01-19 ASSESSMENT — PAIN DESCRIPTION - DESCRIPTORS
DESCRIPTORS: CRAMPING
DESCRIPTORS: ACHING;DISCOMFORT
DESCRIPTORS: DISCOMFORT;SPASM;PRESSURE

## 2024-01-19 ASSESSMENT — PAIN DESCRIPTION - ORIENTATION: ORIENTATION: LOWER

## 2024-01-19 NOTE — H&P
[facility]   History and Physical        Patient:   Pat Sky  MR#:    [id number]  Account Number:                   [id number]      Room:    [address] [place] [address]   YOB: 1960  Date of Progress Note: 1/19/2024  Time of Note                           7:40 AM  Attending Physician:  Inocente Bauer MD        Admitting diagnosis:Cerebral infarction, unspecified    Secondary diagnoses:Hemiplegia and hemiparesis following cerebral infarction affecting right dominant side,Aphasia following other cerebrovascular disease,Type 2 diabetes mellitus with hyperglycemia,Abnormal uterine and vaginal bleeding, unspecified,Acute posthemorrhagic anemia,Iron deficiency anemia, unspecified,Urinary tract infection, site not specified,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 Yeboah, 55 lb weight loss in the past 2 months, asthma, bronchitis, depression, heart murmur, MV prolapse, and Lyme disease 5 months ago. She presented to [facility] ER with c/o RUE pain, RUE & RLE weakness leading to a fall, and right face a bit tingly. Patient reports about 3 days prior, she started to have progressive swelling of her right upper extremity with pain and erythema.  Doppler done in ER showed a cephalic vein thrombosis. She was evaluated by neurology and not felt to be a candidate for TNK d/t vaginal bleeding and Eliquis use. Patient does report she may have occasionally missed a few doses but has been taking it consistently for the last month.  She was admitted to the Hospitalist service with consults for neurology, oncology and obstetrics. ASA & Eliquis were stopped, and patient was placed on Heparin drip with plans to change

## 2024-01-19 NOTE — TELEPHONE ENCOUNTER
ESTABLISHED WITH DR ASTORGA    [facility] CONSULT    PT NAME: Pat Sky    : 1960    DX: Malignancy/DVT    ROOM: [id number]    PROVIDER: Dr. Bauer    NURSE: Trini [phone]

## 2024-01-19 NOTE — PAYOR COMM NOTE
"REF:   [id number]     [facility]  FAX  [phone]     Radha Wade (63 y.o. Female)       Date of Birth   1960    Social Security Number       Address   [address] [place] [address]    Home Phone   [phone]    MRN   [id number]       P[employer]ism   Other    Marital Status                               Admission Date   1/12/24    Admission Type   Emergency    Admitting Provider   Jose Tang MD    Attending Provider       Department, Room/Bed   [facility] 3A, 327/1       Discharge Date   1/18/2024    Discharge Disposition   Rehab Facility or Unit (DC - External)    Discharge Destination                                 Attending Provider: (none)   Allergies: No Known Allergies    Isolation: None   Infection: None   Code Status: Prior    Ht: 165.1 cm (65\")   Wt: 75.4 kg (166 lb 3.2 oz)    Admission Cmt: None   Principal Problem: Acute focal neurological deficit [R29.818]                   Active Insurance as of 1/12/2024       Primary Coverage       Payor Plan Insurance Group Employer/Plan Group    [employer] ANTH PATHW[employer] HMO [id number]       Payor Plan Address Payor Plan Phone Number Payor Plan Fax Number Effective Dates    [address] [phone]  10/1/2023 - None Entered    [place] [address]         Subscriber Name Subscriber Birth Date Member ID       RADHA WADE 1960 [id number]                     Emergency Contacts        (Rel.) Home Phone Work Phone Mobile Phone    Romeo Wade (Spouse) [phone] -- --                 Discharge Summary        Jose Tang MD at 01/18/24 1008                [facility] Medicine Services  DISCHARGE SUMMARY       Date of Admission: 1/12/2024  Date of Discharge:  1/18/2024  Primary Care Physician: Myron Vines MD    Presenting Problem/History of Present Illness: Right arm and leg weakness    Final Discharge Diagnoses:  Active " Hospital Problems    Diagnosis     **Acute focal neurological deficit     Acute cystitis without hematuria     Severe malnutrition     CVA (cerebral vascular accident)     Type 2 diabetes mellitus, without long-term current use of insulin     Metastatic disease     Acute blood loss anemia     Vaginal bleeding     DVT (deep venous thrombosis)        Consults: Neurology, OB/GYN, hematology oncology, gastroenterology    Procedures Performed: D&C under general anesthesia    Pertinent Test Results:   Results for orders placed during the hospital encounter of 01/12/24    Adult Transthoracic Echo Complete W/ Cont if Necessary Per Protocol (With Agitated Saline)    Interpretation Summary    Left ventricular systolic function is normal. Left ventricular ejection fraction appears to be 56 - 60%.    Normal right ventricular cavity size and systolic function noted.    No evidence of a patent foramen ovale.    No significant valvular abnormalities identified on this study.      Imaging Results (All)       Procedure Component Value Units Date/Time    CT Head Without Contrast [[id number]] Collected: 01/15/24 1436     Updated: 01/15/24 1444    Narrative:      EXAMINATION:  CT HEAD WO CONTRAST-  1/15/2024 1:29 PM     HISTORY: Stroke, follow up; I82.619-Acute embolism and thrombosis of  superficial veins of unspecified upper extremity; N30.90-Cystitis,  unspecified without hematuria; W19.XXXA-Unspecified fall, initial  encounter; R53.1-Weakness; Z74.09-Other reduced mobility.     TECHNIQUE: Multiple axial images were obtained through the brain without  contrast infusion. Multiplanar images were reconstructed.     DLP: 605.11 mGy.cm. Automated dosage reduction technique was utilized to  reduce patient dosage.     COMPARISON: 1/13/2024.     FINDINGS: There is no hemorrhage. There is low-density in the lateral  left basal ganglia region predominantly involving the posterior aspect  of the external capsule. There is minimal low density  elsewhere within  the hemispheric white matter likely related to small vessel disease.  There is minimal atrophy. The paranasal sinuses and mastoid air cells  are clear. No calvarial fracture is seen.          Impression:      1. Acute infarct in the lateral left basal ganglia region involving  predominantly the external capsule posteriorly and in the posterolateral  aspect of the putamen.  2. No hemorrhage.  3. Mild chronic small vessel ischemic white matter disease.        This report was signed and finalized on 1/15/2024 2:41 PM by Dr. Keegan Sousa MD.       MRI Angiogram Neck Without Contrast [[id number]] Collected: 01/14/24 1502     Updated: 01/14/24 1514    Narrative:      EXAMINATION: MRI ANGIOGRAM NECK WO CONTRAST-  1/14/2024 3:02 PM MR  angiogram of the neck without contrast 1/14/2024     HISTORY: Carotid artery dissection suspected.     FINDINGS: MR angiography was performed of the neck utilizing 3D  time-of-flight and MIP images. Today's study is significantly degraded  by motion. This significantly lowers the sensitivity of the exam.     The visualized aortic arch is normal in caliber. The origin of the great  vessels are widely patent. The right vertebral artery origin is widely  patent. The left vertebral artery is rather diminutive in size and its  origin and proximal segment is not well-defined. Distally it is  suspected that the left vertebral artery terminates in the posterior  inferior cerebellar artery. The right vertebral artery is dominant and  appears to be widely patent from its origin to the basilar artery.     Both common carotid arteries are patent at their origin. No focal  luminal stenosis appreciated proximal to the carotid bifurcations. Both  carotid bifurcations are unremarkable with no evidence of rate limiting  stenosis. The ICAs are patent to the skull base without evidence of rate  limiting stenosis.       Impression:      1. Study is degraded by motion artifact. This is  particularly noted on  the MIP sequences which are three-dimensional reconstructions based on  the raw source images.  2. The carotid arteries are unremarkable. The carotid bifurcations are  normal in appearance without evidence of rate limiting stenosis with  both ICAs patent to the skull base.  3. The right vertebral artery is widely patent from its origin to the  skull base. The left vertebral artery is rather diminutive in size and  is not well-defined in its proximal segment. Difficult to ascertain  whether there is some loss of definition of this vessel simply because  of its small size and adjacent pulsatility from the aortic arch and  proximal great vessels or whether it is diseased. I feel that this  vessel terminates in the posterior inferior cerebellar artery at the  level of the posterior cranial fossa.  4. If further evaluation of the carotid and vertebral arteries is felt  warranted then follow-up with CT angiography would be the study of  choice and is considered the definitive exam for evaluation of the  vessels of the head and neck. Motion artifact, although potentially  degrading the study is a much lesser factor during performance of CT  angiography as the acquisition only takes a few seconds.     This report was signed and finalized on 1/14/2024 3:11 PM by Dr. Romeo Arizmendi MD.       MRI Angiogram Head Without Contrast [[id number]] Collected: 01/14/24 1455     Updated: 01/14/24 1505    Narrative:      EXAMINATION: MRI ANGIOGRAM HEAD WO CONTRAST-  1/14/2024 2:55 PM     HISTORY: Stroke. Determine embolic source.     FINDINGS: MR angiography was performed of the [place] with 3D  time-of-flight and MIP images. Raw source images are also reviewed.     Today's study is limited secondary to rather extensive motion artifact.  This is particularly noted on the MIPS which are reproduction's based on  the raw source images.     The right vertebral artery is dominant. The left vertebral artery  is  rather diminutive in size. I feel the left vertebral artery terminates  in the posterior inferior cerebellar artery. The right vertebral artery  is patent to the basilar artery. The basilar artery is patent. The  superior cerebellar arteries appear to be grossly intact. The basilar  artery terminates into the posterior cerebral artery on the left. There  is a fetal origin of the right posterior cerebral artery.     The distal ICAs are patent. The petrous, cavernous and supraclinoid  segment of the ICAs are patent to the terminus. Grossly the anterior and  middle cerebral arteries are patent. I do not see any discrete aneurysm.  No gross evidence of high-grade stenosis but the study is very limited.  The more distal trifurcation vessels are attenuated secondary to motion  artifact.       Impression:      1. Very limited exam secondary to extensive motion artifact. The left  vertebral artery is rather diminutive and I feel terminates in the  posterior inferior cerebellar artery. The right vertebral artery is  dominant. The basilar artery is widely patent. The basilar artery  terminates in the left posterior cerebral artery with the right  posterior cerebral artery emanating from the anterior circulation via  suspected persistent fetal origin.  2. Both intracranial ICAs are patent to the terminus. Potential disease  involving the cavernous and supraclinoid segment of the ICAs cannot be  fully evaluated. The anterior and middle cerebral arteries are grossly  unremarkable. I do not see any definite aneurysm.  3. If further assessment of the [place] is felt warranted  follow-up with CT angiography would be the study of choice for further  evaluation. That is a rapid acquisition and will not be as affected by  potential motion.     This report was signed and finalized on 1/14/2024 3:02 PM by Dr. Romeo Arizmendi MD.       MRI Brain With & Without Contrast [[id number]] Collected: 01/14/24 9165     Updated:  01/14/24 1454    Narrative:      MRI BRAIN W WO CONTRAST- 1/14/2024 10:55 AM     HISTORY: Stroke, follow up; I82.619-Acute embolism and thrombosis of  superficial veins of unspecified upper extremity; N30.90-Cystitis,  unspecified without hematuria; W19.XXXA-Unspecified fall, initial  encounter; R53.1-Weakness; Z74.09-Other reduced mobility     COMPARISON: None.       TECHNIQUE: Multiplanar imaging of the brain was performed in a routine  fashion before and after the intravenous injection of gadolinium  contrast. There is significant motion artifact on today's exam.     FINDINGS:  Diffusion: There is diffusion restriction within the mesial left  temporal lobe,, left external capsule, left insular cortex as well as  within the body of the caudate nucleus with associated ADC mapping  abnormalities consistent with acute ischemic infarction. These infarcts  are nonhemorrhagic. There is no associated mass effect. No additional  sites of diffusion restriction are present.     Midline structures: Nondisplaced.     Ventricles: Normal in configuration and symmetric in size.     Masses: No masses or mass effect.     Basilar cisterns: Maintained.     Extra axial space: No abnormal extra-axial fluid.     Gray-white matter signal: There are additional scattered foci of T2  abnormality involving the periventricular and higher white matter tract  suggesting small vessel ischemic disease.     Cerebellum: Normal.     Brainstem: Normal.     Enhancement: No abnormal enhancement.     Other: Proximal cervical spinal cord is normal. Bilateral globes and  orbits are normal in appearance. Normal cerebrovascular flow voids  noted. No abnormal signal in the mastoid air cells or paranasal sinuses.       Impression:      1. Diffusion restriction with associated ADC mapping abnormality  involving the mesial left temporal lobe, left external capsule, left  insula as well as the body of the caudate nucleus. These infarcts are  nonhemorrhagic.  2.  Mild atrophy. Mild small vessel ischemic changes are noted involving  the periventricular and higher white matter tracts.  3. Normal flow voids within the [place].  4. Today's study is degraded by motion. No abnormal contrast enhancement  is demonstrated.           This report was signed and finalized on 1/14/2024 2:50 PM by Dr. Romeo Arizmendi MD.       US Venous Doppler Upper Extremity Right (duplex) [[id number]] Collected: 01/14/24 1113     Updated: 01/14/24 1117    Narrative:      History: Pain and swelling       Impression:      Impression:  1. There is no evidence of deep venous thrombosis of the right upper  extremity.  2. There is evidence of superficial thrombus in the cephalic vein.     Comments: Right upper extremity venous duplex exam was performed using  color Doppler flow, Doppler wave form analysis, and grayscale imaging,  with and without compression. There is no evidence of deep venous  thrombosis of the internal jugular, subclavian, axillary, brachial,  radial, and ulnar veins. There is evidence of superficial thrombus  involving the cephalic vein.        This report was signed and finalized on 1/14/2024 11:14 AM by Dr. Joseph Mari MD.       CT Head Without Contrast [[id number]] Collected: 01/13/24 0746     Updated: 01/13/24 0750    Narrative:      CT BRAIN without contrast dated 1/12/2024 10:49 PM     HISTORY: Weakness. Brain fog.     COMPARISON: None      DOSE LENGTH PRODUCT: 686.84 mGy.cm . All CT scans are performed using  dose optimization techniques as appropriate to the performed exam and  including at least one of the following: Automated exposure control,  adjustment of the mA and/or kV according to size, and the use of the  iterative reconstruction technique.     In order to have a CT radiation dose as low as reasonably achievable,  Automated Exposure Control was utilized for adjustment of the mA and/or  KV according to patient size.     TECHNIQUE: Serial axial tomographic  images of the brain were obtained  without the use of intravenous contrast.     FINDINGS:  The midline structures are nondisplaced. The ventricles and basilar  cisterns are normal in size and configuration. There is no evidence of  intracranial hemorrhage or mass-effect. The gray-white matter  differentiation is maintained. The sulci have a normal configuration,  and there are no abnormal extra-axial fluid collections. The structures  of the posterior fossa are unremarkable.     The included orbits and their contents are unremarkable. The visualized  paranasal sinuses, mastoid air cells and middle ear cavities are clear.  The visualized osseous structures and overlying soft tissues of the  skull and face are unremarkable.       Impression:      1. No acute intracranial process.           This report was signed and finalized on 1/13/2024 7:47 AM by Dr. Romeo Arizmendi MD.       XR Chest 1 View [[id number]] Collected: 01/13/24 0708     Updated: 01/13/24 0712    Narrative:      EXAMINATION: XR CHEST 1 VW-  1/13/2024 7:08 AM     HISTORY: Generalized weakness     FINDINGS: Upright frontal projection of the chest demonstrates a nodule  within the right upper lobe warranting follow-up with CT imaging of the  chest. Lungs are otherwise clear with no consolidative pneumonia. No  effusion or free air is present.       Impression:      1.. Right upper lobe nodule. Follow-up with CT imaging of the chest  recommended.  2. Otherwise normal exam.     This report was signed and finalized on 1/13/2024 7:09 AM by Dr. Romeo Arizmendi MD.             LAB RESULTS:      Lab 01/18/24  0446 01/17/24  1825 01/17/24  1224 01/17/24  0628 01/16/24  1736 01/16/24  1013 01/15/24  1220 01/15/24  0515 01/14/24  1325 01/14/24  0359 01/13/24  1939 01/13/24  1637 01/13/24  0602 01/12/24  2311   WBC 11.06*  --   --  11.58*  --  10.22  --  14.86*  --  8.82  --   --    < > 9.23   HEMOGLOBIN 8.9*  --   --  8.5*  --  8.5*  --  7.8*  --  8.9*  --   --     < > 9.0*   HEMATOCRIT 30.9*  --   --  30.1*  --  29.6*  --  28.6*  --  31.8*  --   --    < > 31.8*   PLATELETS 638*  --   --  636*  --  606*  --  692*  --  605*  --   --    < > 592*   NEUTROS ABS 6.84  --   --  7.55*  --  7.14*  --   --   --  7.29*  --   --   --  6.56   IMMATURE GRANS (ABS)  --   --   --   --   --   --   --   --   --   --   --   --   --  0.06*   LYMPHS ABS 2.24  --   --  2.22  --  1.63  --   --   --   --   --   --   --  1.55   MONOS ABS 1.16*  --   --  0.93*  --  0.83  --   --   --   --   --   --   --  0.81   EOS ABS 0.26  --   --  0.23  --  0.27  --   --   --  0.09  --   --   --  0.21   MCV 70.9*  --   --  73.4*  --  72.0*  --  73.7*  --  72.3*  --   --    < > 72.3*   PROTIME  --   --   --   --   --   --   --   --   --   --   --  16.8*  --  16.1*   APTT  --  48.9* 125.8* 84.9* 143.4* 192.8*   < > 82.4*   < > 44.5*   < >  --   --  39.8*    < > = values in this interval not displayed.         Lab 01/15/24  0515 01/14/24  0359 01/13/24  0602 01/12/24  2311   SODIUM 138 136 136 137   POTASSIUM 4.0 4.5 4.2 4.4   CHLORIDE 101 100 100 99   CO2 26.0 25.0 23.0 27.0   ANION GAP 11.0 11.0 13.0 11.0   BUN 16 10 11 12   CREATININE 0.54* 0.47* 0.50* 0.55*   EGFR 103.6 107.1 105.5 103.1   GLUCOSE 139* 191* 126* 160*   CALCIUM 8.9 8.8 8.7 9.0   MAGNESIUM 2.2  --   --  2.2   HEMOGLOBIN A1C  --   --   --  6.50*         Lab 01/15/24  0515 01/14/24  0359 01/13/24  0602 01/12/24  2311   TOTAL PROTEIN 7.2 7.9 7.3 7.8   ALBUMIN 3.1* 3.2* 2.8* 3.2*   GLOBULIN 4.1 4.7 4.5 4.6   ALT (SGPT) 7 10 11 13   AST (SGOT) 9 14 17 22   BILIRUBIN 0.2 0.2 0.2 0.2   ALK PHOS 163* 229* 208* 227*         Lab 01/13/24  1637 01/12/24  2311   PROTIME 16.8* 16.1*   INR 1.35* 1.27*         Lab 01/13/24  0602   CHOLESTEROL 149   LDL CHOL 89   HDL CHOL 30*   TRIGLYCERIDES 169*         Lab 01/13/24  0602   IRON 17*   IRON SATURATION (TSAT) 8*   TIBC 216*   TRANSFERRIN 145*   FERRITIN 440.90*         Brief Urine Lab Results  (Last result in the  "past 365 days)        Color   Clarity   Blood   Leuk Est   Nitrite   Protein   CREAT   Urine HCG        01/16/24 0909 Straw   Clear   Moderate (2+)   Moderate (2+)   Negative   30 mg/dL (1+)                 Microbiology Results (last 10 days)       Procedure Component Value - Date/Time    Urine Culture - Urine, Urine, Clean Catch [[id number]]  (Normal) Collected: 01/16/24 0909    Lab Status: Final result Specimen: Urine, Clean Catch Updated: 01/17/24 0907     Urine Culture No growth    Urine Culture - Urine, Urine, Clean Catch [[id number]] Collected: 01/13/24 0120    Lab Status: Final result Specimen: Urine, Clean Catch Updated: 01/14/24 1215     Urine Culture >100,000 CFU/mL Mixed [place] Isolated    Narrative:      Specimen contains mixed organisms of questionable pathogenicity suggestive of contamination. If symptoms persist, suggest recollection.  Colonization of the urinary tract without infection is common. Treatment is discouraged unless the patient is symptomatic, pregnant, or undergoing an invasive urologic procedure.            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 She also complained of RUE pain and doppler in ER showed cephalic vein thrombosis. She was evaluated by neurology and recommended for admission but not a candidate for TPA due to vaginal bleeding and Eliquis use.     Regarding GYN malignancy she was noted to have an abnormal CT abd/pelvis last September:   \"1. Somewhat bulky retroperitoneal lymphadenopathy and pelvic  lymphadenopathy worrisome for metastatic lymphadenopathy or lymphoma.  2. There is also some stranding and nodularity in the greater omental  region anteriorly suggesting possible peritoneal metastatic disease.  3. The uterus is prominent in size but a focal mass is not seen\" " "  She was evaluated by Dr De Los Santos for vaginal bleeding and referred for follow up D&C 1 week after discharge. She did not have this procedure. She then was evaluated by PCP and referred to Dr Grullon and after testing and imaging has been referred to Dr Miles in [place] for consultation.      Dr Grullon writes in his last progress note from 12/21/2023:  \"Given her history with weight loss, vaginal bleeding, elevated tumor markers, imaging abnormalities with the uterus, the 2.9 x 1.9 cm nodule in the anterior abdomen suspicious for carcinomatosis, abdominal and pelvic lymphadenopathy, prothrombotic state with DVTs and subcentimeter pulmonary nodules although indeterminate, in this setting is very concerning for an active malignant process and suspicious for a GYN origin.   Completion of workup however will also include a bone scan that was ordered at last visit and not done, GI evaluation to rule out the GI tract as a potential source in addition to the other recommendations below.     RECOMMENDATIONS  Bone scan  C-scope - Appt with [facility] scheduled 1/3/2024, malignancy process with iron deficiency anemia  GUADALUPE 2 and reflex panels as well as the rest of the prothrombotic panel not done at last visit  PET scan  Pelvic ultrasound to evaluate uterus and adnexa  Consult Dr. Frank Miles GYNOnc to evaluate the The uterus which is prominent in size measuring 11.7 x 6.9 x 8.7 cm but without visualization a focal mass. Follow-up nonemergent ultrasound is recommended to evaluate the uterus for a potential mass on CT abdomen 9/14/2023  Follow-up in 2 weeks.    The patient was admitted and treated for stroke with right arm and leg weakness.  It was found that patient was noncompliant with her Eliquis home medication.  However due to concerns for possible Eliquis failure, patient was started on anticoagulation with heparin drip after a hemorrhagic stroke was ruled out with negative CT of the brain.  She " "was reviewed by neurology and had an MRI of the brain with/without and MRA of the head and neck which demonstrated left temporal lobe, left external capsule, left insula, body of the left cardiac nucleus infarcts.  There was no abnormal contrast-enhancement.  It was thought that her stroke was caused by hypercoagulability from her suspected metastatic OB/GYN cancer.  She was reviewed by hematology oncology and had iron infusions for anemia of chronic disease.  She also had evaluation by OB/GYN and had D&C and biopsy of a uterine/cervical lesion under general anesthesia without any complications.  Patient was recommended to have inpatient rehab for her stroke and right arm and leg weakness.  [facility].  Patient is to follow-up with her oncologist Dr. Grullon for biopsy results and planning of treatment for her suspected metastatic gynecological cancer.  She is also to follow-up with OB/GYN and neurology.  Patient is to be on Lovenox for anticoagulation until after biopsy results are obtained to see if she can be safely transition back to oral Eliquis.  She had a urinary tract infection during the admission and was discharged on cefdinir.    Physical Exam on Discharge:  BP 95/41 (BP Location: Left arm, Patient Position: Sitting)   Pulse 92   Temp 98.4 °F (36.9 °C) (Oral)   Resp 18   Ht 165.1 cm (65\")   Wt 75.4 kg (166 lb 3.2 oz)   LMP  (LMP Unknown)   SpO2 98%   BMI 27.66 kg/m²   Physical Exam  Constitutional:       General: She is not in acute distress.     Appearance: She is not ill-appearing or diaphoretic.   HENT:      Head: Normocephalic and atraumatic.      Right Ear: External ear normal.      Left Ear: External ear normal.      Nose: No congestion or rhinorrhea.      Mouth/Throat:      Mouth: Mucous membranes are moist.      Pharynx: No oropharyngeal exudate or posterior oropharyngeal erythema.   Eyes:      General: No scleral " icterus.     Extraocular Movements: Extraocular movements intact.      Conjunctiva/sclera: Conjunctivae normal.   Cardiovascular:      Rate and Rhythm: Normal rate and regular rhythm.      Heart sounds: Normal heart sounds. No murmur heard.  Pulmonary:      Effort: Pulmonary effort is normal. No respiratory distress.      Breath sounds: Normal breath sounds. No wheezing, rhonchi or rales.   Abdominal:      General: Abdomen is flat. There is no distension.      Palpations: Abdomen is soft.      Tenderness: There is no abdominal tenderness. There is no guarding.   Musculoskeletal:         General: No swelling, tenderness or deformity.      Cervical back: Neck supple. No rigidity. No muscular tenderness.      Right lower leg: No edema.      Left lower leg: No edema.   Lymphadenopathy:      Cervical: No cervical adenopathy.   Skin:     General: Skin is warm and dry.   Neurological:      General: No focal deficit present.      Mental Status: She is alert and oriented to person, place, and time.      Cranial Nerves: No cranial nerve deficit.      Motor: Weakness present.      Comments: Right leg > right arm weakness and incoordination noted.   Psychiatric:         Mood and Affect: Mood normal.         Behavior: Behavior normal.     Condition on Discharge: Stable    Discharge Disposition:  Rehab Facility or Unit (DC - External)    Discharge Medications:     Discharge Medications        New Medications        Instructions Start Date   atorvastatin 80 MG tablet  Commonly known as: LIPITOR   80 mg, Oral, Nightly      cefdinir 300 MG capsule  Commonly known as: OMNICEF   300 mg, Oral, 2 Times Daily      Enoxaparin Sodium 80 MG/0.8ML solution prefilled syringe syringe  Commonly known as: LOVENOX   80 mg, Subcutaneous, Every 12 Hours Scheduled      oxyCODONE 5 MG immediate release tablet  Commonly known as: Roxicodone   5 mg, Oral, Every 4 Hours PRN      pantoprazole 40 MG EC tablet  Commonly known as: PROTONIX   40 mg, Oral,  Every Early Morning   Start Date: January 19, 2024     phenazopyridine 100 MG tablet  Commonly known as: PYRIDIUM   100 mg, Oral, 3 Times Daily With Meals      polyethylene glycol 17 g packet  Commonly known as: MIRALAX   17 g, Oral, Daily             Continue These Medications        Instructions Start Date   albuterol sulfate  (90 Base) MCG/ACT inhaler  Commonly known as: PROVENTIL HFA;VENTOLIN HFA;PROAIR HFA   2 puffs, Inhalation, Every 4 Hours PRN      freestyle lancets   1 each, Other, 2 Times Daily, Use as instructed      metFORMIN 500 MG tablet  Commonly known as: GLUCOPHAGE   500 mg, Oral, 2 Times Daily With Meals      promethazine 25 MG tablet  Commonly known as: PHENERGAN   25 mg, Oral, Every 6 Hours PRN             Stop These Medications      acetaminophen-codeine 300-30 MG per tablet  Commonly known as: TYLENOL with CODEINE #3     Eliquis DVT/PE Starter Pack tablet therapy pack  Generic drug: Apixaban Starter Pack     furosemide 20 MG tablet  Commonly known as: LASIX            This patient does not have current or prior documentation of an left ventricular ejection fraction (LVEF) of less than or equal to 40%.      Discharge Diet:   Diet Instructions       Diet: Cardiac Diets, Diabetic Diets; Healthy Heart (2-3 Na+); Thin (IDDSI 0); Consistent Carbohydrate      Discharge Diet:  Cardiac Diets  Diabetic Diets       Cardiac Diet: Healthy Heart (2-3 Na+)    Fluid Consistency: Thin (IDDSI 0)    Diabetic Diet: Consistent Carbohydrate            Activity at Discharge:   Activity Instructions       Activity as Tolerated              Follow-up Appointments:   No future appointments.    Test Results Pending at Discharge: None    Electronically signed by Jose Tang MD, 01/18/24, 10:28 CST.    Time: 38 minutes of time was spent evaluating patient and planning discharge.         Electronically signed by Jose Tang MD at 01/18/24 1030       Discharge Order (From admission, onward)       Start      Ordered    01/18/24 1019  Discharge patient  Once        Expected Discharge Date: 01/18/24   Expected Discharge Time: Morning   Discharge Disposition: Rehab Facility or Unit (DC - External)   Physician of Record for Attribution - Please select from Treatment Team: JEAN GIBSON [[id number]]   Review needed by CMO to determine Physician of Record: No      Question Answer Comment   Physician of Record for Attribution - Please select from Treatment Team JEAN GIBSON    Review needed by CMO to determine Physician of Record No        01/18/24 1021

## 2024-01-19 NOTE — THERAPY EVALUATION
Plan  General Plan:  minutes of therapy at least 5 out of 7 days a week  Therapy Duration: 3 Weeks  Current Treatment Recommendations: Strengthening, Balance training, Functional mobility training, Transfer training, Endurance training, Wheelchair mobility training, Gait training, Stair training, Home exercise program, Safety education & training, Patient/Caregiver education & training, Equipment evaluation, education, & procurement, Modalities  Discharge Recommendations: Continue to assess pending progress  PATIENT REQUIRES AND IS REASONABLY EXPECTED TO ACTIVELY PARTICIPATE IN AT LEAST 3 HOURS OF INTENSIVE THERAPY PER DAY AT LEAST 5 DAYS PER WEEK, AND BE EXPECTED TO MAKE MEASURABLE IMPROVEMENT THAT WILL BE OF PRACTICAL VALUE TO IMPROVE THE PATIENT'S FUNCTIONAL CAPACITY OR ADAPTATION TO IMPAIRMENTS.   PATIENT GOAL FOR REHAB:  RETURN TO PRIOR LEVEL OF FUNCTION       IRF/ELEAZAR  Roll Left and Right  Assistance Needed: Supervision or touching assistance  Comment: W  CARE Score: 4  Discharge Goal: Independent    Sit to Lying  Assistance Needed: Supervision or touching assistance  CARE Score: 4  Discharge Goal: Independent    Lying to Sitting on Side of Bed  Assistance Needed: Supervision or touching assistance  CARE Score: 4  Discharge Goal: Independent    Sit to Stand  Assistance Needed: Supervision or touching assistance  CARE Score: 4  Discharge Goal: Independent    Chair/Bed-to-Chair Transfer  Assistance Needed: Supervision or touching assistance  CARE Score: 4  Discharge Goal: Independent    Car Transfer  Assistance Needed: Supervision or touching assistance  CARE Score: 4  Discharge Goal: Independent    Walk 10 Feet  Assistance Needed: Supervision or touching assistance  CARE Score: 4  Discharge Goal: Independent    Walk 50 Feet with Two Turns  Reason if not Attempted: Not attempted due to medical condition or safety concerns  CARE Score: 88  Discharge Goal: Independent    Walk 150 Feet  Reason if not

## 2024-01-19 NOTE — ACP (ADVANCE CARE PLANNING)
Advance Care Planning     Advance Care Planning (ACP) Physician/NP/PA (Provider) Conversation      Date of ACP Conversation: 01/19/2024    Conversation Conducted with:   Patient with Decision Making Capacity    Health Care Decision Maker:    Current Designated Health Care Decision Maker:    Primary Decision Maker: Jerrod Sky  - Spouse - [phone]    Care Preferences:      Ventilation:  Yes    Resuscitation:  Yes    Length of Voluntary ACP Conversation in minutes:  20 minutes included w/ total consult time     Caryn Aguilar PA-C

## 2024-01-20 PROBLEM — N85.8 UTERINE MASS: Status: ACTIVE | Noted: 2024-01-20

## 2024-01-20 PROBLEM — D64.9 NORMOCYTIC ANEMIA: Status: ACTIVE | Noted: 2024-01-20

## 2024-01-20 PROBLEM — Z79.01 ANTICOAGULATION MANAGEMENT ENCOUNTER: Status: ACTIVE | Noted: 2024-01-20

## 2024-01-20 PROBLEM — I82.401 ACUTE DEEP VEIN THROMBOSIS (DVT) OF RIGHT LOWER EXTREMITY (HCC): Status: ACTIVE | Noted: 2024-01-20

## 2024-01-20 PROBLEM — Z51.81 ANTICOAGULATION MANAGEMENT ENCOUNTER: Status: ACTIVE | Noted: 2024-01-20

## 2024-01-20 LAB
GLUCOSE BLD-MCNC: 114 MG/DL (ref 70–99)
GLUCOSE BLD-MCNC: 138 MG/DL (ref 70–99)
GLUCOSE BLD-MCNC: 144 MG/DL (ref 70–99)
GLUCOSE BLD-MCNC: 174 MG/DL (ref 70–99)
PERFORMED ON: ABNORMAL

## 2024-01-20 PROCEDURE — 1180000000 HC REHAB R&B

## 2024-01-20 PROCEDURE — 6370000000 HC RX 637 (ALT 250 FOR IP): Performed by: PSYCHIATRY & NEUROLOGY

## 2024-01-20 PROCEDURE — 94760 N-INVAS EAR/PLS OXIMETRY 1: CPT

## 2024-01-20 PROCEDURE — 82962 GLUCOSE BLOOD TEST: CPT

## 2024-01-20 PROCEDURE — 99222 1ST HOSP IP/OBS MODERATE 55: CPT | Performed by: INTERNAL MEDICINE

## 2024-01-20 PROCEDURE — 6360000002 HC RX W HCPCS: Performed by: PSYCHIATRY & NEUROLOGY

## 2024-01-20 PROCEDURE — 97130 THER IVNTJ EA ADDL 15 MIN: CPT

## 2024-01-20 PROCEDURE — 97129 THER IVNTJ 1ST 15 MIN: CPT

## 2024-01-20 PROCEDURE — 99233 SBSQ HOSP IP/OBS HIGH 50: CPT | Performed by: PSYCHIATRY & NEUROLOGY

## 2024-01-20 PROCEDURE — 92526 ORAL FUNCTION THERAPY: CPT

## 2024-01-20 RX ADMIN — PHENAZOPYRIDINE 100 MG: 100 TABLET ORAL at 11:59

## 2024-01-20 RX ADMIN — ATORVASTATIN CALCIUM 80 MG: 80 TABLET, FILM COATED ORAL at 21:35

## 2024-01-20 RX ADMIN — METFORMIN HYDROCHLORIDE 500 MG: 500 TABLET ORAL at 17:35

## 2024-01-20 RX ADMIN — CEFDINIR 300 MG: 300 CAPSULE ORAL at 07:53

## 2024-01-20 RX ADMIN — ENOXAPARIN SODIUM 80 MG: 100 INJECTION SUBCUTANEOUS at 07:53

## 2024-01-20 RX ADMIN — HYDROMORPHONE HYDROCHLORIDE 4 MG: 2 TABLET ORAL at 17:34

## 2024-01-20 RX ADMIN — ENOXAPARIN SODIUM 80 MG: 100 INJECTION SUBCUTANEOUS at 21:35

## 2024-01-20 RX ADMIN — HYDROMORPHONE HYDROCHLORIDE 4 MG: 2 TABLET ORAL at 07:52

## 2024-01-20 RX ADMIN — CEFDINIR 300 MG: 300 CAPSULE ORAL at 21:35

## 2024-01-20 RX ADMIN — PANTOPRAZOLE SODIUM 40 MG: 40 TABLET, DELAYED RELEASE ORAL at 05:37

## 2024-01-20 RX ADMIN — METFORMIN HYDROCHLORIDE 500 MG: 500 TABLET ORAL at 07:53

## 2024-01-20 RX ADMIN — PHENAZOPYRIDINE 100 MG: 100 TABLET ORAL at 17:35

## 2024-01-20 RX ADMIN — PROMETHAZINE HYDROCHLORIDE 25 MG: 25 TABLET ORAL at 09:32

## 2024-01-20 RX ADMIN — PHENAZOPYRIDINE 100 MG: 100 TABLET ORAL at 07:53

## 2024-01-20 RX ADMIN — Medication: at 21:39

## 2024-01-20 ASSESSMENT — PAIN DESCRIPTION - DIRECTION: RADIATING_TOWARDS: ABDOMEN

## 2024-01-20 ASSESSMENT — PAIN DESCRIPTION - DESCRIPTORS
DESCRIPTORS: ACHING;THROBBING
DESCRIPTORS: ACHING;STABBING
DESCRIPTORS: SPASM;STABBING;SHOOTING

## 2024-01-20 ASSESSMENT — PAIN DESCRIPTION - FREQUENCY: FREQUENCY: INTERMITTENT

## 2024-01-20 ASSESSMENT — PAIN SCALES - GENERAL
PAINLEVEL_OUTOF10: 9
PAINLEVEL_OUTOF10: 10
PAINLEVEL_OUTOF10: 6
PAINLEVEL_OUTOF10: 8
PAINLEVEL_OUTOF10: 0
PAINLEVEL_OUTOF10: 9
PAINLEVEL_OUTOF10: 7

## 2024-01-20 ASSESSMENT — PAIN - FUNCTIONAL ASSESSMENT
PAIN_FUNCTIONAL_ASSESSMENT: ACTIVITIES ARE NOT PREVENTED
PAIN_FUNCTIONAL_ASSESSMENT: PREVENTS OR INTERFERES WITH MANY ACTIVE NOT PASSIVE ACTIVITIES
PAIN_FUNCTIONAL_ASSESSMENT: PREVENTS OR INTERFERES SOME ACTIVE ACTIVITIES AND ADLS

## 2024-01-20 ASSESSMENT — PAIN DESCRIPTION - LOCATION
LOCATION: BACK;ABDOMEN
LOCATION: BACK
LOCATION: BACK

## 2024-01-20 ASSESSMENT — PAIN DESCRIPTION - ORIENTATION
ORIENTATION: LOWER

## 2024-01-20 ASSESSMENT — PAIN DESCRIPTION - ONSET: ONSET: SUDDEN

## 2024-01-20 NOTE — CONSULTS
Comprehensive Nutrition Assessment    Type and Reason for Visit:  Initial, Consult, Positive Nutrition Screen    Nutrition Recommendations/Plan:   Dx Severe Malnutrition.   Modify diet to CHO control (4).   Start Ensure High Protein TID.      Malnutrition Assessment:  Malnutrition Status:  Severe malnutrition (01/19/24 0822)    Context:  Chronic Illness     Findings of the 6 clinical characteristics of malnutrition:  Energy Intake:  75% or less estimated energy requirements for 1 month or longer  Weight Loss:  Greater than 20% over 1 year     Body Fat Loss:  Mild body fat loss Buccal region, Orbital   Muscle Mass Loss:  Mild muscle mass loss Clavicles (pectoralis & deltoids), Temples (temporalis)  Fluid Accumulation:  Severe Extremities, Generalized   Strength:  Not Performed    Nutrition Assessment:    Consult received for unintentional wt loss. Pt presents severely malnourished AEB wt loss >20% in 1 year, fluid accumulation, inadequate energy intake, and muscle/fat loss. Pt reports decreased intake over the last few months due to poor appetite, pain, and altered taste sensation. Pt states she has difficulty eating many protein sources because she doesn't eat much meat. Pt is at risk for further nutritional decline r/t decreased appetite and suspected catabolic illness. Pt has hx DM with most recent CdxL8g=3.5% (1/13/24). Modifying diet to CHO 4 to increase options available to pt. Modifying ONS to Ensure High Protein. Preferences obtained and menu provided. Will follow nutrition progression and modify intervention as needed.    Nutrition Related Findings:    +2 BLE edema, mild muscle wasting, mild fat loss, UizR3s=5.5% (1/13/24) Wound Type: Skin Tears       Current Nutrition Intake & Therapies:    Average Meal Intake: Unable to assess  Average Supplements Intake: Unable to assess  ADULT ORAL NUTRITION SUPPLEMENT; Breakfast, Lunch, Dinner; Low Calorie/High Protein Oral Supplement  ADULT DIET; Regular; 4 carb 
reason for consultation.  Pat is sitting up in her wheelchair eating lunch and is interactive.  She reports an overall decrease in appetite with significant weight loss over the last few months.  She tells me that her appetite is somewhat improved here but she has an aversion to meat.  She knows she needs to get her protein intake up and is trying to drink protein supplements but is having difficulty do this.  She states that her diet is more vegetarian base even though she is not a strict vegetarian and would like to consider a change in her current diet ordered.  We had a very lengthy review over her ongoing concerns for the last several months.  She gives history that typically she is an extremely active person and would usually only get 3 to 4 hours of sleep at night.  She cares for her 90-year-old mother and has a daughter who is active in dance and theater and she attends her performances frequently.  She has grandchildren that she cares for in her home with autism.  She states that she is typically very verbal and talks \"not being enough and all the time\".  This was one of the clues that her family had that something and happen just prior to her stroke when the patient was unable to convey her thoughts appropriately.  She describes expressive aphasia which is since improved.  During our conversation her spouse presented in the room as well.  The patient gives history of having regular menstrual cycles in her 60s and states that her grandmother did this as well and thought that nothing was wrong initially.  She had ongoing follow-up with gynecology and workup was initiated with concern for an underlying metastatic process.  During her outpatient workup she reports she had a fall at home that caused severe pain to her right leg and looking back she feels like she probably should have gone to the ER at that time but she did not she describes herself as a very \"stubborn person\".  During one of her follow-up 
Hypermetabolic activity is present throughout the enlarged cervix with extension into the endometrial canal which is diffusely hypermetabolic. Max SUV is 14. The uterus is enlarged. There is a hypermetabolic bone lesion in the left superior acetabulum/left iliac wing. Non-FDG avid findings: No acute orbital finding. Parapharyngeal fat planes are maintained. Visualized portion of the paranasal sinuses are clear. Central airways are clear. No consolidation or pleural effusion. Thoracic aorta is nonaneurysmal. No pericardial effusion. No gallstone or biliary ductal dilatation. Pancreas, spleen, and adrenal glands are unremarkable. Mild right-sided hydronephrosis is present. Diffuse urinary bladder wall thickening. No bowel obstruction. No ascites. Nonaneurysmal abdominal aorta.    1.  Widespread metastatic disease. Findings include hypermetabolic enlarged left supraclavicular lymph node, multiple hypermetabolic pulmonary nodules, enlarged hypermetabolic epicardial fat lymph node, multiple hypermetabolic liver lesions, diffuse hypermetabolic lymphadenopathy in the abdomen or pelvis, hypermetabolic mass in the omentum with diffuse omental fat stranding and lymphadenopathy consistent with omental/peritoneal carcinomatosis, diffuse hypermetabolic activity and masslike thickening of the uterine cervix and endometrial canal, and hypermetabolic left pelvic bone lesion. 2.  Masslike hypermetabolic thickening of the uterine cervix and endometrial canal is suspicious for the site of primary neoplasm, with differential including cervical cancer and endometrial cancer. Also in the differential for site of primary neoplasm, omental and peritoneal carcinomatosis raises suspicion for an ovarian primary. Management options include pelvic exam with biopsy of the cervix and endometrium versus CT-guided biopsy of the anterior omental/peritoneal nodule. This report was signed and finalized on 12/26/2023 11:31 AM by Dr. Brian Gregorio,

## 2024-01-21 LAB
GLUCOSE BLD-MCNC: 108 MG/DL (ref 70–99)
GLUCOSE BLD-MCNC: 131 MG/DL (ref 70–99)
GLUCOSE BLD-MCNC: 137 MG/DL (ref 70–99)
GLUCOSE BLD-MCNC: 138 MG/DL (ref 70–99)
PERFORMED ON: ABNORMAL

## 2024-01-21 PROCEDURE — 6360000002 HC RX W HCPCS: Performed by: PSYCHIATRY & NEUROLOGY

## 2024-01-21 PROCEDURE — 99232 SBSQ HOSP IP/OBS MODERATE 35: CPT | Performed by: PSYCHIATRY & NEUROLOGY

## 2024-01-21 PROCEDURE — 94760 N-INVAS EAR/PLS OXIMETRY 1: CPT

## 2024-01-21 PROCEDURE — 82962 GLUCOSE BLOOD TEST: CPT

## 2024-01-21 PROCEDURE — 6370000000 HC RX 637 (ALT 250 FOR IP): Performed by: PSYCHIATRY & NEUROLOGY

## 2024-01-21 PROCEDURE — 1180000000 HC REHAB R&B

## 2024-01-21 PROCEDURE — 6370000000 HC RX 637 (ALT 250 FOR IP): Performed by: PHYSICIAN ASSISTANT

## 2024-01-21 RX ADMIN — Medication: at 08:44

## 2024-01-21 RX ADMIN — BISACODYL 10 MG: 10 SUPPOSITORY RECTAL at 10:27

## 2024-01-21 RX ADMIN — CEFDINIR 300 MG: 300 CAPSULE ORAL at 22:30

## 2024-01-21 RX ADMIN — PHENAZOPYRIDINE 100 MG: 100 TABLET ORAL at 10:27

## 2024-01-21 RX ADMIN — PANTOPRAZOLE SODIUM 40 MG: 40 TABLET, DELAYED RELEASE ORAL at 05:50

## 2024-01-21 RX ADMIN — ATORVASTATIN CALCIUM 80 MG: 80 TABLET, FILM COATED ORAL at 22:30

## 2024-01-21 RX ADMIN — PROMETHAZINE HYDROCHLORIDE 25 MG: 25 TABLET ORAL at 15:59

## 2024-01-21 RX ADMIN — METFORMIN HYDROCHLORIDE 500 MG: 500 TABLET ORAL at 15:59

## 2024-01-21 RX ADMIN — Medication: at 22:31

## 2024-01-21 RX ADMIN — METFORMIN HYDROCHLORIDE 500 MG: 500 TABLET ORAL at 08:48

## 2024-01-21 RX ADMIN — ACETAMINOPHEN 650 MG: 325 TABLET ORAL at 15:59

## 2024-01-21 RX ADMIN — CEFDINIR 300 MG: 300 CAPSULE ORAL at 08:42

## 2024-01-21 RX ADMIN — PHENAZOPYRIDINE 100 MG: 100 TABLET ORAL at 08:42

## 2024-01-21 RX ADMIN — HYDROMORPHONE HYDROCHLORIDE 4 MG: 2 TABLET ORAL at 10:27

## 2024-01-21 RX ADMIN — PHENAZOPYRIDINE 100 MG: 100 TABLET ORAL at 16:00

## 2024-01-21 RX ADMIN — ENOXAPARIN SODIUM 80 MG: 100 INJECTION SUBCUTANEOUS at 22:30

## 2024-01-21 RX ADMIN — ENOXAPARIN SODIUM 80 MG: 100 INJECTION SUBCUTANEOUS at 08:42

## 2024-01-21 RX ADMIN — HYDROMORPHONE HYDROCHLORIDE 4 MG: 2 TABLET ORAL at 22:41

## 2024-01-21 ASSESSMENT — PAIN DESCRIPTION - LOCATION: LOCATION: ABDOMEN;BACK;LEG;ARM

## 2024-01-21 ASSESSMENT — PAIN DESCRIPTION - DESCRIPTORS: DESCRIPTORS: ACHING

## 2024-01-21 ASSESSMENT — PAIN DESCRIPTION - ORIENTATION: ORIENTATION: RIGHT;LEFT;LOWER

## 2024-01-21 ASSESSMENT — PAIN SCALES - GENERAL
PAINLEVEL_OUTOF10: 0
PAINLEVEL_OUTOF10: 6

## 2024-01-22 LAB
ALBUMIN SERPL-MCNC: 2.4 G/DL (ref 3.5–5.2)
ALP SERPL-CCNC: 222 U/L (ref 35–104)
ALT SERPL-CCNC: 9 U/L (ref 5–33)
ANION GAP SERPL CALCULATED.3IONS-SCNC: 14 MMOL/L (ref 7–19)
ANISOCYTOSIS BLD QL SMEAR: ABNORMAL
AST SERPL-CCNC: 14 U/L (ref 5–32)
BASOPHILS # BLD: 0 K/UL (ref 0–0.2)
BASOPHILS NFR BLD: 0.3 % (ref 0–1)
BILIRUB SERPL-MCNC: 0.3 MG/DL (ref 0.2–1.2)
BUN SERPL-MCNC: 12 MG/DL (ref 8–23)
CALCIUM SERPL-MCNC: 8.8 MG/DL (ref 8.8–10.2)
CHLORIDE SERPL-SCNC: 95 MMOL/L (ref 98–111)
CO2 SERPL-SCNC: 25 MMOL/L (ref 22–29)
CREAT SERPL-MCNC: 0.5 MG/DL (ref 0.5–0.9)
EOSINOPHIL # BLD: 0.19 K/UL (ref 0–0.6)
EOSINOPHIL NFR BLD: 2 % (ref 0–5)
ERYTHROCYTE [DISTWIDTH] IN BLOOD BY AUTOMATED COUNT: 19.9 % (ref 11.5–14.5)
GLUCOSE BLD-MCNC: 120 MG/DL (ref 70–99)
GLUCOSE BLD-MCNC: 126 MG/DL (ref 70–99)
GLUCOSE BLD-MCNC: 130 MG/DL (ref 70–99)
GLUCOSE BLD-MCNC: 132 MG/DL (ref 70–99)
GLUCOSE SERPL-MCNC: 115 MG/DL (ref 74–109)
HCT VFR BLD AUTO: 32.6 % (ref 37–47)
HGB BLD-MCNC: 9.4 G/DL (ref 12–16)
HYPOCHROMIA BLD QL SMEAR: ABNORMAL
IMM GRANULOCYTES # BLD: 0.4 K/UL
LYMPHOCYTES # BLD: 2.3 K/UL (ref 1.1–4.5)
LYMPHOCYTES NFR BLD: 20 % (ref 20–40)
MCH RBC QN AUTO: 21.8 PG (ref 27–31)
MCHC RBC AUTO-ENTMCNC: 28.8 G/DL (ref 33–37)
MCV RBC AUTO: 75.5 FL (ref 81–99)
METAMYELOCYTES NFR BLD MANUAL: 1 %
MICROCYTES BLD QL SMEAR: ABNORMAL
MONOCYTES # BLD: 0.8 K/UL (ref 0–0.9)
MONOCYTES NFR BLD: 8 % (ref 0–10)
NEUTROPHILS # BLD: 6.2 K/UL (ref 1.5–7.5)
NEUTS SEG NFR BLD: 65 % (ref 50–65)
OVALOCYTES BLD QL SMEAR: ABNORMAL
PERFORMED ON: ABNORMAL
PLATELET # BLD AUTO: 557 K/UL (ref 130–400)
PLATELET SLIDE REVIEW: ABNORMAL
PMV BLD AUTO: 9.4 FL (ref 9.4–12.3)
POLYCHROMASIA BLD QL SMEAR: ABNORMAL
POTASSIUM SERPL-SCNC: 4.2 MMOL/L (ref 3.5–5)
PROT SERPL-MCNC: 6.9 G/DL (ref 6.6–8.7)
RBC # BLD AUTO: 4.32 M/UL (ref 4.2–5.4)
SODIUM SERPL-SCNC: 134 MMOL/L (ref 136–145)
VARIANT LYMPHS NFR BLD: 4 % (ref 0–8)
WBC # BLD AUTO: 9.4 K/UL (ref 4.8–10.8)

## 2024-01-22 PROCEDURE — 97110 THERAPEUTIC EXERCISES: CPT

## 2024-01-22 PROCEDURE — 6360000002 HC RX W HCPCS: Performed by: PSYCHIATRY & NEUROLOGY

## 2024-01-22 PROCEDURE — 97130 THER IVNTJ EA ADDL 15 MIN: CPT

## 2024-01-22 PROCEDURE — 97535 SELF CARE MNGMENT TRAINING: CPT

## 2024-01-22 PROCEDURE — 97530 THERAPEUTIC ACTIVITIES: CPT

## 2024-01-22 PROCEDURE — 80053 COMPREHEN METABOLIC PANEL: CPT

## 2024-01-22 PROCEDURE — 99232 SBSQ HOSP IP/OBS MODERATE 35: CPT | Performed by: PSYCHIATRY & NEUROLOGY

## 2024-01-22 PROCEDURE — 6370000000 HC RX 637 (ALT 250 FOR IP): Performed by: PSYCHIATRY & NEUROLOGY

## 2024-01-22 PROCEDURE — 94760 N-INVAS EAR/PLS OXIMETRY 1: CPT

## 2024-01-22 PROCEDURE — 36415 COLL VENOUS BLD VENIPUNCTURE: CPT

## 2024-01-22 PROCEDURE — 85025 COMPLETE CBC W/AUTO DIFF WBC: CPT

## 2024-01-22 PROCEDURE — 97116 GAIT TRAINING THERAPY: CPT

## 2024-01-22 PROCEDURE — 82962 GLUCOSE BLOOD TEST: CPT

## 2024-01-22 PROCEDURE — 1180000000 HC REHAB R&B

## 2024-01-22 PROCEDURE — 97129 THER IVNTJ 1ST 15 MIN: CPT

## 2024-01-22 PROCEDURE — 6370000000 HC RX 637 (ALT 250 FOR IP): Performed by: PHYSICIAN ASSISTANT

## 2024-01-22 RX ADMIN — ENOXAPARIN SODIUM 80 MG: 100 INJECTION SUBCUTANEOUS at 09:02

## 2024-01-22 RX ADMIN — ENOXAPARIN SODIUM 80 MG: 100 INJECTION SUBCUTANEOUS at 22:16

## 2024-01-22 RX ADMIN — Medication: at 14:08

## 2024-01-22 RX ADMIN — PHENAZOPYRIDINE 100 MG: 100 TABLET ORAL at 09:01

## 2024-01-22 RX ADMIN — METFORMIN HYDROCHLORIDE 500 MG: 500 TABLET ORAL at 09:02

## 2024-01-22 RX ADMIN — BISACODYL 10 MG: 10 SUPPOSITORY RECTAL at 18:31

## 2024-01-22 RX ADMIN — ACETAMINOPHEN 650 MG: 325 TABLET ORAL at 14:16

## 2024-01-22 RX ADMIN — ACETAMINOPHEN 650 MG: 325 TABLET ORAL at 09:02

## 2024-01-22 RX ADMIN — PANTOPRAZOLE SODIUM 40 MG: 40 TABLET, DELAYED RELEASE ORAL at 05:52

## 2024-01-22 RX ADMIN — PHENAZOPYRIDINE 100 MG: 100 TABLET ORAL at 12:16

## 2024-01-22 RX ADMIN — HYDROMORPHONE HYDROCHLORIDE 4 MG: 2 TABLET ORAL at 20:16

## 2024-01-22 RX ADMIN — CEFDINIR 300 MG: 300 CAPSULE ORAL at 09:01

## 2024-01-22 RX ADMIN — Medication: at 09:07

## 2024-01-22 ASSESSMENT — PAIN - FUNCTIONAL ASSESSMENT
PAIN_FUNCTIONAL_ASSESSMENT: ACTIVITIES ARE NOT PREVENTED
PAIN_FUNCTIONAL_ASSESSMENT: ACTIVITIES ARE NOT PREVENTED

## 2024-01-22 ASSESSMENT — PAIN DESCRIPTION - DESCRIPTORS
DESCRIPTORS: ACHING
DESCRIPTORS: CRAMPING
DESCRIPTORS: ACHING

## 2024-01-22 ASSESSMENT — PAIN DESCRIPTION - ORIENTATION
ORIENTATION: ANTERIOR
ORIENTATION: RIGHT;LEFT

## 2024-01-22 ASSESSMENT — PAIN SCALES - GENERAL
PAINLEVEL_OUTOF10: 10
PAINLEVEL_OUTOF10: 7
PAINLEVEL_OUTOF10: 8

## 2024-01-22 ASSESSMENT — PAIN DESCRIPTION - LOCATION
LOCATION: BACK
LOCATION: ABDOMEN
LOCATION: BACK;KNEE

## 2024-01-22 NOTE — PATIENT CARE CONFERENCE
WEEKS  Long Term Goal 1: INDEP BED MOB WITHOUT RAILS  Long Term Goal 2: TF SURFACE TO SURFACE INDEP WITH A.D.  Long Term Goal 3: AMBULATE 150 FT INDEP WITH RW WITH PROPER RECIPROCAL GAIT PATTERN  Long Term Goal 4: UP/DOWN 12 STEPS 1-2 RAILS SBA  Long Term Goal 5: PROPEL  FT SBA    ASSESSMENT:  Assessment: pt limited by c/o dizziness that increased with standing. slight impulsive behaviors with attempt to continue AMB despite signs of fatigue/slight instability with dizziness with cuing for sitting rest breaks. no true LOB or posterior lean noted throughtout all AMB trials and STSs. left in WC in room with alarm on and all needs in reach.      SPEECH THERAPY        Patient denies difficulties swallowing at this time.     The CLQT was completed on this date. The Cognitive Linguistic Quick Test (CLQT) was developed for use with adults with acquired neurological dysfunction. This individually administered test is designed to gain information about cognitive-linguistic domains, a total composite severity rating and a clock drawing severity rating. The CLQT consists of ten tasks: Personal facts, Symbol cancellation, Confrontation naming, Clock drawing, Story retelling, Symbol trails, Generative naming, Design memory, Mazes, and Design generation.     CLQT  Cognitive Domain Scoring Range Score Severity   Attention 124-50 87 moderate   Memory 154-141 146 mild   Executive Function 40-24 27 WNL   Language 28-25 28 mild   Visuospatial 81-52 68 mild   Composite 4.0-1.0 3.0 mild      Patient scored a 3.0 out of a total 4.0 possible points which is considered mild cognitive deficits. Moderate cognitive deficits noted within the following areas: attention/processing. Mild cognitive deficits noted within the following areas: recall, language, and visuo spatial skills. Executive functioning skills noted to be within normal limits at this time. Additional deficits noted from initial evaluation on 1/19/24: pragmatics, expressive

## 2024-01-23 LAB
GLUCOSE BLD-MCNC: 128 MG/DL (ref 70–99)
GLUCOSE BLD-MCNC: 129 MG/DL (ref 70–99)
PERFORMED ON: ABNORMAL
PERFORMED ON: ABNORMAL

## 2024-01-23 PROCEDURE — 92526 ORAL FUNCTION THERAPY: CPT

## 2024-01-23 PROCEDURE — 97129 THER IVNTJ 1ST 15 MIN: CPT

## 2024-01-23 PROCEDURE — 94760 N-INVAS EAR/PLS OXIMETRY 1: CPT

## 2024-01-23 PROCEDURE — 6360000002 HC RX W HCPCS: Performed by: PSYCHIATRY & NEUROLOGY

## 2024-01-23 PROCEDURE — 99232 SBSQ HOSP IP/OBS MODERATE 35: CPT | Performed by: PHYSICIAN ASSISTANT

## 2024-01-23 PROCEDURE — 97130 THER IVNTJ EA ADDL 15 MIN: CPT

## 2024-01-23 PROCEDURE — 97116 GAIT TRAINING THERAPY: CPT

## 2024-01-23 PROCEDURE — 97535 SELF CARE MNGMENT TRAINING: CPT

## 2024-01-23 PROCEDURE — 97110 THERAPEUTIC EXERCISES: CPT

## 2024-01-23 PROCEDURE — 82962 GLUCOSE BLOOD TEST: CPT

## 2024-01-23 PROCEDURE — 99233 SBSQ HOSP IP/OBS HIGH 50: CPT | Performed by: PSYCHIATRY & NEUROLOGY

## 2024-01-23 PROCEDURE — 6370000000 HC RX 637 (ALT 250 FOR IP): Performed by: PSYCHIATRY & NEUROLOGY

## 2024-01-23 PROCEDURE — 1180000000 HC REHAB R&B

## 2024-01-23 PROCEDURE — 97530 THERAPEUTIC ACTIVITIES: CPT

## 2024-01-23 RX ORDER — INSULIN LISPRO 100 [IU]/ML
0-4 INJECTION, SOLUTION INTRAVENOUS; SUBCUTANEOUS 2 TIMES DAILY WITH MEALS
Status: DISCONTINUED | OUTPATIENT
Start: 2024-01-23 | End: 2024-01-26 | Stop reason: HOSPADM

## 2024-01-23 RX ORDER — HYDROMORPHONE HYDROCHLORIDE 2 MG/1
2 TABLET ORAL EVERY 4 HOURS PRN
Status: DISCONTINUED | OUTPATIENT
Start: 2024-01-23 | End: 2024-01-26 | Stop reason: HOSPADM

## 2024-01-23 RX ORDER — HYDROMORPHONE HYDROCHLORIDE 2 MG/1
4 TABLET ORAL EVERY 4 HOURS PRN
Status: DISCONTINUED | OUTPATIENT
Start: 2024-01-23 | End: 2024-01-26 | Stop reason: HOSPADM

## 2024-01-23 RX ORDER — LACTULOSE 10 G/15ML
10 SOLUTION ORAL
Status: DISCONTINUED | OUTPATIENT
Start: 2024-01-23 | End: 2024-01-23

## 2024-01-23 RX ADMIN — METFORMIN HYDROCHLORIDE 500 MG: 500 TABLET ORAL at 08:20

## 2024-01-23 RX ADMIN — METFORMIN HYDROCHLORIDE 500 MG: 500 TABLET ORAL at 17:13

## 2024-01-23 RX ADMIN — ENOXAPARIN SODIUM 80 MG: 100 INJECTION SUBCUTANEOUS at 21:42

## 2024-01-23 RX ADMIN — PANTOPRAZOLE SODIUM 40 MG: 40 TABLET, DELAYED RELEASE ORAL at 05:32

## 2024-01-23 RX ADMIN — PHENAZOPYRIDINE 100 MG: 100 TABLET ORAL at 17:13

## 2024-01-23 RX ADMIN — ENOXAPARIN SODIUM 80 MG: 100 INJECTION SUBCUTANEOUS at 08:21

## 2024-01-23 RX ADMIN — Medication: at 12:49

## 2024-01-23 RX ADMIN — Medication: at 08:28

## 2024-01-23 RX ADMIN — ATORVASTATIN CALCIUM 80 MG: 80 TABLET, FILM COATED ORAL at 21:42

## 2024-01-23 RX ADMIN — ACETAMINOPHEN 650 MG: 325 TABLET ORAL at 17:13

## 2024-01-23 RX ADMIN — PHENAZOPYRIDINE 100 MG: 100 TABLET ORAL at 08:21

## 2024-01-23 RX ADMIN — ACETAMINOPHEN 650 MG: 325 TABLET ORAL at 08:20

## 2024-01-23 RX ADMIN — PHENAZOPYRIDINE 100 MG: 100 TABLET ORAL at 12:47

## 2024-01-23 ASSESSMENT — PAIN DESCRIPTION - DESCRIPTORS
DESCRIPTORS: ACHING
DESCRIPTORS: CRAMPING;NAGGING
DESCRIPTORS: CRAMPING

## 2024-01-23 ASSESSMENT — PAIN DESCRIPTION - ORIENTATION
ORIENTATION: LOWER
ORIENTATION: POSTERIOR;LOWER
ORIENTATION: LOWER

## 2024-01-23 ASSESSMENT — PAIN DESCRIPTION - LOCATION
LOCATION: ABDOMEN
LOCATION: BACK;ABDOMEN
LOCATION: ABDOMEN

## 2024-01-23 ASSESSMENT — PAIN SCALES - GENERAL
PAINLEVEL_OUTOF10: 8
PAINLEVEL_OUTOF10: 4
PAINLEVEL_OUTOF10: 9
PAINLEVEL_OUTOF10: 6
PAINLEVEL_OUTOF10: 6

## 2024-01-23 ASSESSMENT — PAIN - FUNCTIONAL ASSESSMENT
PAIN_FUNCTIONAL_ASSESSMENT: ACTIVITIES ARE NOT PREVENTED

## 2024-01-24 ENCOUNTER — TELEPHONE (OUTPATIENT)
Dept: HEMATOLOGY | Age: 64
End: 2024-01-24

## 2024-01-24 LAB
GLUCOSE BLD-MCNC: 119 MG/DL (ref 70–99)
GLUCOSE BLD-MCNC: 128 MG/DL (ref 70–99)
PERFORMED ON: ABNORMAL
PERFORMED ON: ABNORMAL

## 2024-01-24 PROCEDURE — 97130 THER IVNTJ EA ADDL 15 MIN: CPT

## 2024-01-24 PROCEDURE — 1180000000 HC REHAB R&B

## 2024-01-24 PROCEDURE — 82962 GLUCOSE BLOOD TEST: CPT

## 2024-01-24 PROCEDURE — 99232 SBSQ HOSP IP/OBS MODERATE 35: CPT | Performed by: PSYCHIATRY & NEUROLOGY

## 2024-01-24 PROCEDURE — 97530 THERAPEUTIC ACTIVITIES: CPT

## 2024-01-24 PROCEDURE — 6360000002 HC RX W HCPCS: Performed by: PSYCHIATRY & NEUROLOGY

## 2024-01-24 PROCEDURE — 6370000000 HC RX 637 (ALT 250 FOR IP): Performed by: PSYCHIATRY & NEUROLOGY

## 2024-01-24 PROCEDURE — 97116 GAIT TRAINING THERAPY: CPT

## 2024-01-24 PROCEDURE — 97110 THERAPEUTIC EXERCISES: CPT

## 2024-01-24 PROCEDURE — 97535 SELF CARE MNGMENT TRAINING: CPT

## 2024-01-24 PROCEDURE — 97129 THER IVNTJ 1ST 15 MIN: CPT

## 2024-01-24 PROCEDURE — 94760 N-INVAS EAR/PLS OXIMETRY 1: CPT

## 2024-01-24 RX ORDER — HYDROCODONE BITARTRATE AND ACETAMINOPHEN 5; 325 MG/1; MG/1
1 TABLET ORAL EVERY 4 HOURS PRN
Status: DISCONTINUED | OUTPATIENT
Start: 2024-01-24 | End: 2024-01-26 | Stop reason: HOSPADM

## 2024-01-24 RX ADMIN — ENOXAPARIN SODIUM 80 MG: 100 INJECTION SUBCUTANEOUS at 20:57

## 2024-01-24 RX ADMIN — Medication: at 08:48

## 2024-01-24 RX ADMIN — PHENAZOPYRIDINE 100 MG: 100 TABLET ORAL at 08:47

## 2024-01-24 RX ADMIN — PANTOPRAZOLE SODIUM 40 MG: 40 TABLET, DELAYED RELEASE ORAL at 05:59

## 2024-01-24 RX ADMIN — ACETAMINOPHEN 650 MG: 325 TABLET ORAL at 05:58

## 2024-01-24 RX ADMIN — HYDROCODONE BITARTRATE AND ACETAMINOPHEN 0.5 TABLET: 5; 325 TABLET ORAL at 08:47

## 2024-01-24 RX ADMIN — ENOXAPARIN SODIUM 80 MG: 100 INJECTION SUBCUTANEOUS at 08:47

## 2024-01-24 RX ADMIN — PROMETHAZINE HYDROCHLORIDE 25 MG: 25 TABLET ORAL at 16:17

## 2024-01-24 RX ADMIN — METFORMIN HYDROCHLORIDE 500 MG: 500 TABLET ORAL at 08:47

## 2024-01-24 RX ADMIN — PHENAZOPYRIDINE 100 MG: 100 TABLET ORAL at 12:08

## 2024-01-24 RX ADMIN — ATORVASTATIN CALCIUM 80 MG: 80 TABLET, FILM COATED ORAL at 20:58

## 2024-01-24 ASSESSMENT — PAIN SCALES - GENERAL
PAINLEVEL_OUTOF10: 3
PAINLEVEL_OUTOF10: 8
PAINLEVEL_OUTOF10: 7
PAINLEVEL_OUTOF10: 0

## 2024-01-24 ASSESSMENT — PAIN DESCRIPTION - ORIENTATION
ORIENTATION: LOWER
ORIENTATION: RIGHT;LEFT

## 2024-01-24 ASSESSMENT — PAIN DESCRIPTION - LOCATION
LOCATION: ABDOMEN
LOCATION: ABDOMEN

## 2024-01-24 ASSESSMENT — PAIN DESCRIPTION - DESCRIPTORS
DESCRIPTORS: ACHING;DISCOMFORT
DESCRIPTORS: CRAMPING

## 2024-01-24 NOTE — TELEPHONE ENCOUNTER
Please call [facility] ext [phone]. Pat would like someone to talk to her about her bx results. She also does not want to be on  Levonox and would like to start Eliquis instead.

## 2024-01-25 ENCOUNTER — TELEPHONE (OUTPATIENT)
Dept: PRIMARY CARE CLINIC | Age: 64
End: 2024-01-25

## 2024-01-25 DIAGNOSIS — C54.1 ENDOMETRIAL ADENOCARCINOMA (HCC): Primary | ICD-10-CM

## 2024-01-25 PROBLEM — R47.01 APHASIA: Status: ACTIVE | Noted: 2024-01-25

## 2024-01-25 LAB
ALBUMIN SERPL-MCNC: 2.5 G/DL (ref 3.5–5.2)
ALP SERPL-CCNC: 180 U/L (ref 35–104)
ALT SERPL-CCNC: 6 U/L (ref 5–33)
ANION GAP SERPL CALCULATED.3IONS-SCNC: 14 MMOL/L (ref 7–19)
ANISOCYTOSIS BLD QL SMEAR: ABNORMAL
AST SERPL-CCNC: 12 U/L (ref 5–32)
BASOPHILS # BLD: 0.1 K/UL (ref 0–0.2)
BASOPHILS NFR BLD: 0.5 % (ref 0–1)
BILIRUB SERPL-MCNC: 0.3 MG/DL (ref 0.2–1.2)
BUN SERPL-MCNC: 9 MG/DL (ref 8–23)
CALCIUM SERPL-MCNC: 8.5 MG/DL (ref 8.8–10.2)
CHLORIDE SERPL-SCNC: 97 MMOL/L (ref 98–111)
CO2 SERPL-SCNC: 24 MMOL/L (ref 22–29)
CREAT SERPL-MCNC: 0.5 MG/DL (ref 0.5–0.9)
EOSINOPHIL # BLD: 0.1 K/UL (ref 0–0.6)
EOSINOPHIL NFR BLD: 1.4 % (ref 0–5)
ERYTHROCYTE [DISTWIDTH] IN BLOOD BY AUTOMATED COUNT: 20.6 % (ref 11.5–14.5)
GLUCOSE BLD-MCNC: 104 MG/DL (ref 70–99)
GLUCOSE BLD-MCNC: 144 MG/DL (ref 70–99)
GLUCOSE BLD-MCNC: 162 MG/DL (ref 70–99)
GLUCOSE SERPL-MCNC: 98 MG/DL (ref 74–109)
HCT VFR BLD AUTO: 33.3 % (ref 37–47)
HGB BLD-MCNC: 9.3 G/DL (ref 12–16)
HYPOCHROMIA BLD QL SMEAR: ABNORMAL
IMM GRANULOCYTES # BLD: 0.1 K/UL
LYMPHOCYTES # BLD: 1.8 K/UL (ref 1.1–4.5)
LYMPHOCYTES NFR BLD: 19.4 % (ref 20–40)
MCH RBC QN AUTO: 21.5 PG (ref 27–31)
MCHC RBC AUTO-ENTMCNC: 27.9 G/DL (ref 33–37)
MCV RBC AUTO: 77.1 FL (ref 81–99)
MICROCYTES BLD QL SMEAR: ABNORMAL
MONOCYTES # BLD: 0.9 K/UL (ref 0–0.9)
MONOCYTES NFR BLD: 9.7 % (ref 0–10)
NEUTROPHILS # BLD: 6.4 K/UL (ref 1.5–7.5)
NEUTS SEG NFR BLD: 67.8 % (ref 50–65)
OVALOCYTES BLD QL SMEAR: ABNORMAL
PERFORMED ON: ABNORMAL
PLATELET # BLD AUTO: 517 K/UL (ref 130–400)
PLATELET SLIDE REVIEW: ABNORMAL
PMV BLD AUTO: 10.3 FL (ref 9.4–12.3)
POLYCHROMASIA BLD QL SMEAR: ABNORMAL
POTASSIUM SERPL-SCNC: 3.9 MMOL/L (ref 3.5–5)
PROT SERPL-MCNC: 6.7 G/DL (ref 6.6–8.7)
RBC # BLD AUTO: 4.32 M/UL (ref 4.2–5.4)
SODIUM SERPL-SCNC: 135 MMOL/L (ref 136–145)
TARGETS BLD QL SMEAR: ABNORMAL
WBC # BLD AUTO: 9.5 K/UL (ref 4.8–10.8)

## 2024-01-25 PROCEDURE — 6370000000 HC RX 637 (ALT 250 FOR IP): Performed by: PHYSICIAN ASSISTANT

## 2024-01-25 PROCEDURE — 1180000000 HC REHAB R&B

## 2024-01-25 PROCEDURE — 85025 COMPLETE CBC W/AUTO DIFF WBC: CPT

## 2024-01-25 PROCEDURE — 99233 SBSQ HOSP IP/OBS HIGH 50: CPT | Performed by: INTERNAL MEDICINE

## 2024-01-25 PROCEDURE — 97116 GAIT TRAINING THERAPY: CPT

## 2024-01-25 PROCEDURE — 97130 THER IVNTJ EA ADDL 15 MIN: CPT

## 2024-01-25 PROCEDURE — 80053 COMPREHEN METABOLIC PANEL: CPT

## 2024-01-25 PROCEDURE — 97110 THERAPEUTIC EXERCISES: CPT

## 2024-01-25 PROCEDURE — 6370000000 HC RX 637 (ALT 250 FOR IP): Performed by: PSYCHIATRY & NEUROLOGY

## 2024-01-25 PROCEDURE — 97129 THER IVNTJ 1ST 15 MIN: CPT

## 2024-01-25 PROCEDURE — 97530 THERAPEUTIC ACTIVITIES: CPT

## 2024-01-25 PROCEDURE — 82962 GLUCOSE BLOOD TEST: CPT

## 2024-01-25 PROCEDURE — 6370000000 HC RX 637 (ALT 250 FOR IP): Performed by: INTERNAL MEDICINE

## 2024-01-25 PROCEDURE — 99233 SBSQ HOSP IP/OBS HIGH 50: CPT | Performed by: PSYCHIATRY & NEUROLOGY

## 2024-01-25 PROCEDURE — 36415 COLL VENOUS BLD VENIPUNCTURE: CPT

## 2024-01-25 PROCEDURE — 94760 N-INVAS EAR/PLS OXIMETRY 1: CPT

## 2024-01-25 RX ORDER — MORPHINE SULFATE 15 MG/1
15 TABLET, FILM COATED, EXTENDED RELEASE ORAL EVERY 12 HOURS SCHEDULED
Status: DISCONTINUED | OUTPATIENT
Start: 2024-01-25 | End: 2024-01-26 | Stop reason: HOSPADM

## 2024-01-25 RX ORDER — ONDANSETRON 4 MG/1
4 TABLET, ORALLY DISINTEGRATING ORAL EVERY 4 HOURS PRN
Status: DISCONTINUED | OUTPATIENT
Start: 2024-01-25 | End: 2024-01-26 | Stop reason: HOSPADM

## 2024-01-25 RX ADMIN — PANTOPRAZOLE SODIUM 40 MG: 40 TABLET, DELAYED RELEASE ORAL at 05:54

## 2024-01-25 RX ADMIN — APIXABAN 10 MG: 5 TABLET, FILM COATED ORAL at 08:51

## 2024-01-25 RX ADMIN — APIXABAN 10 MG: 5 TABLET, FILM COATED ORAL at 20:39

## 2024-01-25 RX ADMIN — HYDROMORPHONE HYDROCHLORIDE 2 MG: 2 TABLET ORAL at 05:54

## 2024-01-25 RX ADMIN — ATORVASTATIN CALCIUM 80 MG: 80 TABLET, FILM COATED ORAL at 20:39

## 2024-01-25 RX ADMIN — PHENAZOPYRIDINE 100 MG: 100 TABLET ORAL at 13:01

## 2024-01-25 RX ADMIN — METFORMIN HYDROCHLORIDE 500 MG: 500 TABLET ORAL at 08:51

## 2024-01-25 RX ADMIN — HYDROMORPHONE HYDROCHLORIDE 2 MG: 2 TABLET ORAL at 00:51

## 2024-01-25 RX ADMIN — METFORMIN HYDROCHLORIDE 500 MG: 500 TABLET ORAL at 17:33

## 2024-01-25 RX ADMIN — PHENAZOPYRIDINE 100 MG: 100 TABLET ORAL at 08:51

## 2024-01-25 RX ADMIN — ACETAMINOPHEN 650 MG: 325 TABLET ORAL at 17:35

## 2024-01-25 ASSESSMENT — PAIN DESCRIPTION - DESCRIPTORS
DESCRIPTORS: ACHING;DISCOMFORT
DESCRIPTORS: ACHING;DISCOMFORT

## 2024-01-25 ASSESSMENT — PAIN DESCRIPTION - ORIENTATION
ORIENTATION: ANTERIOR;LOWER
ORIENTATION: ANTERIOR;LOWER

## 2024-01-25 ASSESSMENT — PAIN DESCRIPTION - LOCATION
LOCATION: ABDOMEN;BACK
LOCATION: BACK;ABDOMEN

## 2024-01-25 ASSESSMENT — PAIN SCALES - GENERAL
PAINLEVEL_OUTOF10: 4
PAINLEVEL_OUTOF10: 8
PAINLEVEL_OUTOF10: 10

## 2024-01-25 NOTE — PROGRESS NOTES
Tissue from 1/17/24 pathology at [facility] (vagina) to [facility] NGS

## 2024-01-25 NOTE — TELEPHONE ENCOUNTER
[facility] called stating Pt is in [facility]. Pt is wanting Dr. Harris to be aware and to look at her records.

## 2024-01-26 VITALS
TEMPERATURE: 98.2 F | DIASTOLIC BLOOD PRESSURE: 61 MMHG | RESPIRATION RATE: 16 BRPM | BODY MASS INDEX: 28.34 KG/M2 | OXYGEN SATURATION: 97 % | WEIGHT: 170.1 LBS | SYSTOLIC BLOOD PRESSURE: 133 MMHG | HEIGHT: 65 IN | HEART RATE: 93 BPM

## 2024-01-26 DIAGNOSIS — C54.1 ENDOMETRIAL ADENOCARCINOMA (HCC): ICD-10-CM

## 2024-01-26 DIAGNOSIS — I82.4Y1 ACUTE DEEP VEIN THROMBOSIS (DVT) OF PROXIMAL VEIN OF RIGHT LOWER EXTREMITY (HCC): ICD-10-CM

## 2024-01-26 DIAGNOSIS — I63.9 ACUTE STROKE DUE TO ISCHEMIA (HCC): ICD-10-CM

## 2024-01-26 DIAGNOSIS — N85.8 UTERINE MASS: ICD-10-CM

## 2024-01-26 DIAGNOSIS — R10.30 LOWER ABDOMINAL PAIN: ICD-10-CM

## 2024-01-26 LAB
GLUCOSE BLD-MCNC: 119 MG/DL (ref 70–99)
PERFORMED ON: ABNORMAL

## 2024-01-26 PROCEDURE — 97110 THERAPEUTIC EXERCISES: CPT

## 2024-01-26 PROCEDURE — 6370000000 HC RX 637 (ALT 250 FOR IP): Performed by: INTERNAL MEDICINE

## 2024-01-26 PROCEDURE — 99239 HOSP IP/OBS DSCHRG MGMT >30: CPT | Performed by: PSYCHIATRY & NEUROLOGY

## 2024-01-26 PROCEDURE — 6370000000 HC RX 637 (ALT 250 FOR IP): Performed by: PHYSICIAN ASSISTANT

## 2024-01-26 PROCEDURE — 82962 GLUCOSE BLOOD TEST: CPT

## 2024-01-26 PROCEDURE — 97530 THERAPEUTIC ACTIVITIES: CPT

## 2024-01-26 PROCEDURE — 99232 SBSQ HOSP IP/OBS MODERATE 35: CPT | Performed by: INTERNAL MEDICINE

## 2024-01-26 PROCEDURE — 97129 THER IVNTJ 1ST 15 MIN: CPT

## 2024-01-26 PROCEDURE — 97130 THER IVNTJ EA ADDL 15 MIN: CPT

## 2024-01-26 PROCEDURE — 6370000000 HC RX 637 (ALT 250 FOR IP): Performed by: PSYCHIATRY & NEUROLOGY

## 2024-01-26 PROCEDURE — 97116 GAIT TRAINING THERAPY: CPT

## 2024-01-26 RX ORDER — MORPHINE SULFATE 15 MG/1
15 TABLET, FILM COATED, EXTENDED RELEASE ORAL EVERY 12 HOURS SCHEDULED
Qty: 60 TABLET | Refills: 0 | Status: SHIPPED | OUTPATIENT
Start: 2024-01-26 | End: 2024-01-26 | Stop reason: SDUPTHER

## 2024-01-26 RX ORDER — HYDROMORPHONE HYDROCHLORIDE 2 MG/1
2 TABLET ORAL EVERY 6 HOURS PRN
Qty: 120 TABLET | Refills: 0 | Status: SHIPPED | OUTPATIENT
Start: 2024-01-26 | End: 2024-02-25

## 2024-01-26 RX ORDER — ATORVASTATIN CALCIUM 80 MG/1
80 TABLET, FILM COATED ORAL NIGHTLY
Qty: 30 TABLET | Refills: 0 | Status: SHIPPED | OUTPATIENT
Start: 2024-01-26

## 2024-01-26 RX ORDER — ONDANSETRON 4 MG/1
4 TABLET, ORALLY DISINTEGRATING ORAL EVERY 4 HOURS PRN
Qty: 30 TABLET | Refills: 0 | Status: SHIPPED | OUTPATIENT
Start: 2024-01-26

## 2024-01-26 RX ORDER — MORPHINE SULFATE 15 MG/1
15 TABLET, FILM COATED, EXTENDED RELEASE ORAL EVERY 12 HOURS SCHEDULED
Qty: 60 TABLET | Refills: 0 | Status: SHIPPED | OUTPATIENT
Start: 2024-01-26 | End: 2024-02-25

## 2024-01-26 RX ORDER — PANTOPRAZOLE SODIUM 40 MG/1
40 TABLET, DELAYED RELEASE ORAL
Qty: 30 TABLET | Refills: 0 | Status: SHIPPED | OUTPATIENT
Start: 2024-01-27

## 2024-01-26 RX ORDER — LANOLIN ALCOHOL/MO/W.PET/CERES
CREAM (GRAM) TOPICAL 3 TIMES DAILY
Qty: 1 EACH | Refills: 0 | Status: SHIPPED | OUTPATIENT
Start: 2024-01-26

## 2024-01-26 RX ORDER — HYDROMORPHONE HYDROCHLORIDE 2 MG/1
2 TABLET ORAL EVERY 6 HOURS PRN
Qty: 120 TABLET | Refills: 0 | Status: SHIPPED | OUTPATIENT
Start: 2024-01-26 | End: 2024-01-26 | Stop reason: SDUPTHER

## 2024-01-26 RX ADMIN — HYDROMORPHONE HYDROCHLORIDE 2 MG: 2 TABLET ORAL at 03:37

## 2024-01-26 RX ADMIN — HYDROMORPHONE HYDROCHLORIDE 2 MG: 2 TABLET ORAL at 07:48

## 2024-01-26 RX ADMIN — METFORMIN HYDROCHLORIDE 500 MG: 500 TABLET ORAL at 07:43

## 2024-01-26 RX ADMIN — APIXABAN 10 MG: 5 TABLET, FILM COATED ORAL at 07:43

## 2024-01-26 RX ADMIN — Medication: at 07:44

## 2024-01-26 RX ADMIN — ACETAMINOPHEN 650 MG: 325 TABLET ORAL at 13:22

## 2024-01-26 ASSESSMENT — PAIN DESCRIPTION - DESCRIPTORS
DESCRIPTORS: ACHING
DESCRIPTORS: CRAMPING

## 2024-01-26 ASSESSMENT — PAIN DESCRIPTION - ORIENTATION: ORIENTATION: LOWER

## 2024-01-26 ASSESSMENT — PAIN SCALES - GENERAL
PAINLEVEL_OUTOF10: 1
PAINLEVEL_OUTOF10: 0

## 2024-01-26 ASSESSMENT — PAIN - FUNCTIONAL ASSESSMENT: PAIN_FUNCTIONAL_ASSESSMENT: ACTIVITIES ARE NOT PREVENTED

## 2024-01-26 ASSESSMENT — PAIN DESCRIPTION - LOCATION
LOCATION: BACK
LOCATION: BACK;ABDOMEN

## 2024-01-26 NOTE — CARE COORDINATION
Ms. Sky, expected discharge tomorrow 1/27, however [facility]- for [facility] requested peer to peer to avoid adverse decision- As planning was complete for discharge in one day and Ms. Sky very excited about going home, discharge expedited to today.  [facility]eding frequent [facility] and physical therapy.  We dicussed [facility]- outpatient palliative care- she declined at this time.  present in the room when discharge discussed.

## 2024-01-26 NOTE — CARE COORDINATION
[facility] Inpatient Rehabilitation Unit  Test for Patient [place] in the Areas of Transfers/Ambulation    Ambulation/Transfers   Independent ambulation in room with assistive device:  Yes     -Device Type:  Rolling walker  Independent transfers from wheelchair to surface in room:  Yes     Bathroom Transfers  Independent transfers to toilet: Yes   Independent transfers for shower:  No     Ambulation in hallway  Patient able to ambulate in hallway with device:   No          Inpatient Rehabilitation Nursing and Therapies feel as though the patient qualifies for an acute rehabilitation test for independence in the areas of transfers and ambulation prior to discharging from Inpatient Rehabilitation Unit at [facility].  This test for independence in the areas of transfers and ambulation must be agreed upon by the patient's physician, nurse, and therapists.        Nurse Approval:  Electronically signed by Meera Pickering RN on 1/26/24 at 10:00 AM CST     Physical Therapist Approval:  Electronically signed by Dunia Joya PTA on 1/26/2024 at 9:45 AM     Occupational Therapist Approval: Electronically signed by SIVAKUMAR Cyr on 1/26/2024 at 8:08 AM

## 2024-01-26 NOTE — PROGRESS NOTES
[facility]  INPATIENT SPEECH THERAPY  [facility] 8 REHAB UNIT  TIME   1100  1200        [x]Daily Note  []Progress Note    Date: 2024  Patient Name: Pat Sky        MRN: [id number]    Account #: [id number]  : 1960  (63 y.o.)  Gender: female   Primary Provider: Inocente Bauer MD  Swallowing Status/Diet:  Liquid Consistency Recommendation: Thin  Diet Solids Recommendation: Regular        PATIENT DIAGNOSIS(ES):    Diagnosis: LEFT MULTIFOCAL CVA W/RIGHT WEAKNESS (SACRAL SKIN TEAR, RIGHT UE SUPERFICIAL THROMBOSIS)     Additional Pertinent Hx: DEPRESSION, MV PROLAPSE, LYME DISEASE, SUSPECTED OVARIAN CA, 55LBS WEIGHT LOSS PAST 2 MONTHS     RESTRICTIONS/PRECAUTIONS:    Restrictions/Precautions  Restrictions/Precautions: Fall Risk     Additional Hx for SLP:  No MBSS Hx. CXR on 24 revealed RUL nodule.  She is right handed. She does not wear glasses. She does not wear partials or dentures. She does not wear hearing aids.     She was able to provide her personal history and prior level of function.  She reports she manages the finances at home. She uses some auto payments. She also uses checks and credit cards to pay her bills. She has all of her bill due dates memorized. She also manages her mothers and daughters bills.  She used to do bookeeping for ten years. Her grandson has been cooking for her at home and encourages her to eat. Her  does not cook much. The patient states she does not cook at home often. She states she also takes care of her mother (cleaning, getting groceries, etc).        Patient complaints:   She states she is texting better now. She had difficulty with this immediately after her CVA. She reports brain fog since her stroke. She denies any difficulty with cognition prior to her CVA. She reports a recent diagnosis of diabetes and Lyme. She reports unintentional 60 lb weight loss in two and a half months. She reports poor appetite since last summer. She also reports 
   01/19/24 1430   Bed mobility   Rolling to Left Stand by assistance   Rolling to Right Stand by assistance   Supine to Sit Stand by assistance   Sit to Supine Stand by assistance   Transfers   Bed to Chair Minimal assistance;Contact guard assistance  (STAND PIVOT WITH RW)   Ambulation   Device Rolling Walker   Assistance Minimal assistance;Contact guard assistance   Quality of Gait IMPROVED HEEL-TOE GAIT.  CUES TO KEEP RW CLOSE, UPRIGHT POSTURE.  DECREASED RIGHT CIRCUMDUCTION.   Distance 150 FT   PT Exercises   Exercise Treatment SITTING BILAT LE HIP FLEX, EXT, ABD, ADD; KNEE EXT; PF/DF X10 EA   Assessment   Assessment TOLERATED WELL.  BTB POST THERAPY WITH ALARM ON.  FOOT OF BED ELEVATED TO IMPROVE BILAT LE SWELLING.       
  Pat Sky  [id number]       01/26/24 0911 01/26/24 0912 01/26/24 0913   Restrictions/Precautions   Restrictions/Precautions Fall Risk  --   --    Position Activity Restriction   Other position/activity restrictions NO BP or sticks in R UE  --   --    General   Additional Pertinent Hx DEPRESSION, MV PROLAPSE, LYME DISEASE, SUSPECTED OVARIAN CA, 55LBS WEIGHT LOSS PAST 2 MONTHS  --   --    Diagnosis LEFT MULTIFOCAL CVA W/RIGHT WEAKNESS  --   --    Subjective   Subjective Pt. agreeable to therapy  States she is going home today.  --   --    Vitals   Pulse  --  93  --    Subjective   Pain  --  low back 5/10  --    Bed mobility   Rolling to Left  --  Independent  --    Rolling to Right  --  Independent  --    Supine to Sit  --  Independent  --    Sit to Supine  --  Independent  --    Scooting  --  Independent  --    Transfers   Sit to Stand  --  Independent  --    Stand to Sit  --  Independent  --    Bed to Chair  --  Independent  --    Car Transfer  --  Modified independent  --    Ambulation   Surface  --   --  Level tile   Device  --   --  Rolling Walker   Assistance  --   --  Modified Independent   Quality of Gait  --   --  improved heel strike and not circumducting, occasional excessive advancement of RW   Gait Deviations  --   --  Decreased step height   Distance  --   --  200'   Comments  --   --  Incorporated turns.   Ambulation 2   Surface - 2  --   --  level tile   Device 2  --   --  Rolling Walker   Assistance 2  --   --  Modified Independent   Distance  --   --  50'   Comments  --   --  Practiced and pt safe to be up ad michelle in room using RW.   Stairs/Curb   Stairs?  --   --  Yes   Stairs   # Steps   --   --  8   Stairs Height  --   --  4\"   Rails  --   --  Bilateral   Assistance  --   --  Modified independent    Comment  --   --  Alternated between step-to and reciprocal patterns.   PT Exercises   Exercise Treatment  --   --   --    Activity Tolerance   Activity Tolerance  --   --   --    Assessment 
4 Eyes Skin Assessment     NAME:  Pat Sky  YOB: 1960  MEDICAL RECORD NUMBER:  [id number]    The patient is being assessed for  Admission    I agree that at least one RN has performed a thorough Head to Toe Skin Assessment on the patient. ALL assessment sites listed below have been assessed.      Areas assessed by both nurses:    Head, Face, Ears, Shoulders, Back, Chest, Arms, Elbows, Hands, Sacrum. Buttock, Coccyx, Ischium, and Legs. Feet and Heels        Does the Patient have a Wound? Yes wound(s) were present on assessment. LDA wound assessment was Initiated and completed by RN    Raji Prevention initiated by RN: Yes  Wound Care Orders initiated by RN: Yes    Pressure Injury (Stage 3,4, Unstageable, DTI, NWPT, and Complex wounds) if present, place Wound referral order by RN under : No    New Ostomies, if present place, Ostomy referral order under : No     Nurse 1 eSignature: Electronically signed by Trini Harris RN on 1/18/2024 at 3:22 PM      Nurse 2 eSignature: Electronically signed by Smita Toney RN on 1/18/24 at 3:42 PM CST    
[facility]  INPATIENT SPEECH THERAPY  [facility] 8 REHAB UNIT     [x]Daily Note  []Progress Note    1000  1100  60 MINUTES    Date: 2024  Patient Name: Pat Sky        MRN: [id number]    Account #: [id number]  : 1960  (63 y.o.)  Gender: female   Time: 6:55 AM  Primary Provider: Inocente Bauer MD  Swallowing Status/Diet:  Liquid Consistency Recommendation: Thin  Diet Solids Recommendation: Regular    Subjective: Patient lethargic and upright in bed this morning. She states pain medications make her feel this way.     Objective:     Patient denies difficulties swallowing at this time.    The CLQT was completed on this date. The Cognitive Linguistic Quick Test (CLQT) was developed for use with adults with acquired neurological dysfunction. This individually administered test is designed to gain information about cognitive-linguistic domains, a total composite severity rating and a clock drawing severity rating. The CLQT consists of ten tasks: Personal facts, Symbol cancellation, Confrontation naming, Clock drawing, Story retelling, Symbol trails, Generative naming, Design memory, Mazes, and Design generation.     CLQT  Cognitive Domain Scoring Range Score Severity   Attention 124-50 87 moderate   Memory 154-141 146 mild   Executive Function 40-24 27 WNL   Language 28-25 28 mild   Visuospatial 81-52 68 mild   Composite 4.0-1.0 3.0 mild      Patient scored a 3.0 out of a total 4.0 possible points which is considered mild cognitive deficits. Moderate cognitive deficits noted within the following areas: attention/processing. Mild cognitive deficits noted within the following areas: recall, language, and visuo spatial skills. Executive functioning skills noted to be within normal limits at this time. Additional deficits noted from initial evaluation on 24: pragmatics, expressive language, attention/processing, problem solving, safety awareness, executive functioning, recall, and verbal reasoning. 
Caryn Aguilar, Palliative care PA, asked this  to visit with pt and assist her with completing an AD/LW. Pt was talkative and shared part of her healthcare journey. She also shared some of her nguyen story. Pt began having severe symptoms months ago. She is currently having a work up for metastatic disease. After she began having the work up, she had a stroke and is doing therapy and rehab for the stroke. Pt had a LW but wanted to change the decision makers. She named her , also her NOK, as primary decision maker, and her sister Dayanara Kiser, as primary decision maker. She also added special instructions. Please see the document. Pt made advance care planning decisions. She wants treatment and wants to be given a chance. Pt initialed and signed the document and this  witnessed and then notarized the document. Also provided sustaining presence, support, and prayer. Pt expressed gratitude for spiritual care. Original and copies were given to pt, and a copy was emailed to Lexy Gonzalez to be scanned into pt's EMR. Pt expressed gratitude for all spiritual care.       Electronically signed by Mary Behrens on 1/24/2024 at 2:09 PM    
Facility/Department: [facility] 8 REHAB UNIT  Occupational Therapy     Name: Pat Sky  : 1960  MRN: [id number]  Date of Service: 2024    Discharge Recommendations:  Home with Home health OT, Home with assist PRN  OT Equipment Recommendations  Equipment Needed: Yes  Other: RW and BSC has been ordered. DME Rx sent to [facility].       Patient Diagnosis(es): The primary encounter diagnosis was Palliative care patient. A diagnosis of Cancer (HCC) was also pertinent to this visit.  Past Medical History:  has a past medical history of Anemia, Anxiety, Asthma, Cerebral artery occlusion with cerebral infarction (HCC), Depression, Diabetes mellitus (HCC), DVT (deep venous thrombosis) (HCC), Iron deficiency anemia, Lyme disease, and Palliative care patient.  Past Surgical History:  has a past surgical history that includes  section.    Treatment Diagnosis: CVA with right hemiparesis      Assessment   Performance deficits / Impairments: Decreased functional mobility ;Decreased ADL status;Decreased strength;Decreased high-level IADLs;Decreased balance;Decreased endurance  Assessment: Pt is highly motivated to d/c. Pt appeared to tolerate tx well with additional rest time as needed. Pt verbalized her concerns with her new dx but appeared to show positivity and hope for what is to come for upcoming txs.  Treatment Diagnosis: CVA with right hemiparesis  Prognosis: Good  Activity Tolerance  Activity Tolerance: Patient Tolerated treatment well          Plan   Occupational Therapy Plan  Specific Instructions for Next Treatment: Pt would benefit from additional energy conservation techniques d/t upcoming chemotherapy.  Current Treatment Recommendations: Strengthening, Balance training, Functional mobility training, Endurance training, Equipment evaluation, education, & procurement, Patient/Caregiver education & training, Safety education & training, Self-Care / ADL, Home management training 
Facility/Department: [facility] REHAB UNIT   CLINICAL BEDSIDE SWALLOW EVALUATION    NAME: Pat Sky  : 1960  MRN: [id number]    ADMISSION DATE: 2024  ADMITTING DIAGNOSIS: has Iron deficiency anemia; Iron deficiency anemia due to chronic blood loss; Acute ischemic stroke (HCC); Dysphagia due to recent cerebral infarction; Weakness; Other hyperlipidemia; Type 2 diabetes mellitus with complication, without long-term current use of insulin (HCC); Acute cystitis without hematuria; Lower abdominal pain; Asthma; Severe malnutrition (HCC); and Palliative care patient on their problem list.    Date of Eval: 2024  Evaluating Therapist: NORA Miranda    Clinical Impression  Speech assessment completed on this date. No dysarthria present at this time. Clinician ranks speech intelligibility approximately 100% intelligible with context known and background noise present.    Clinical Bedside Swallow Evaluation completed on this date. Oral prep reveals adequate rotary jaw movement observed with ice chips and regular solids. Adequate labial seal observed. Oral transit timing ranges from 1-2 seconds with ice chips, puree consistencies, regular solids, and thin liquids via consecutive sips side of cup. Minimum residue noted with regular solids and cleared with liquid wash. Liquid wash was effective in clearing minimal oral residue. Laryngeal movement observed to be consistently sluggish and mildly decreased for swallow airway protection. Even so, no overt s/s of aspiration/penetration observed with ice chips, puree consistencies, regular solids, and thin liquids via consecutive sips side of cup.    Recommend continue least restrictive diet of regular solids with thin liquids. Medications administered whole with H2O. Please monitor/notify SLP for the following: changes in lung sounds, spikes in temperature, and/or difficulties swallowing.     SLP will continue to follow and treat.    Recent Chest Xray: ( Date 
Facility/Department: [facility] 8 REHAB UNIT  Occupational Therapy Treatment Note    Name: Pat Sky  : 1960  MRN: [id number]  Date of Service: 2024    Discharge Recommendations:  Home with Home health OT, Home with assist PRN          Patient Diagnosis(es): The primary encounter diagnosis was Palliative care patient. A diagnosis of Cancer (HCC) was also pertinent to this visit.  Past Medical History:  has a past medical history of Anemia, Anxiety, Asthma, Cerebral artery occlusion with cerebral infarction (HCC), Depression, Diabetes mellitus (HCC), DVT (deep venous thrombosis) (HCC), Iron deficiency anemia, Lyme disease, and Palliative care patient.  Past Surgical History:  has a past surgical history that includes  section.    Treatment Diagnosis: CVA with right hemiparesis      Assessment   Performance deficits / Impairments: Decreased functional mobility ;Decreased ADL status;Decreased strength;Decreased high-level IADLs;Decreased balance;Decreased endurance  Assessment: limited by pain in abdomen and back..               Plan   Occupational Therapy Plan  Specific Instructions for Next Treatment: endurance training, home management  Current Treatment Recommendations: Strengthening, Balance training, Functional mobility training, Endurance training, Equipment evaluation, education, & procurement, Patient/Caregiver education & training, Safety education & training, Self-Care / ADL, Home management training     Restrictions  Restrictions/Precautions  Restrictions/Precautions: Fall Risk  Position Activity Restriction  Other position/activity restrictions: NO BP or stics in R UE   24 0830   General   Family / Caregiver Present No   Pre Treatment Pain Screening   Pain at present 8   Scale Used Numeric Score   Intervention List Patient able to continue with treatment   Comments / Details stomach     Objective   Functional Mobility  Functional - Mobility Device: Rolling Walker  Activity: To/from 
Facility/Department: [facility] 8 REHAB UNIT  Occupational Therapy Treatment Note    Name: Pat Sky  : 1960  MRN: [id number]  Date of Service: 2024    Discharge Recommendations:  Home with Home health OT, Home with assist PRN          Patient Diagnosis(es): The primary encounter diagnosis was Palliative care patient. A diagnosis of Cancer (HCC) was also pertinent to this visit.  Past Medical History:  has a past medical history of Anemia, Anxiety, Asthma, Cerebral artery occlusion with cerebral infarction (HCC), Depression, Diabetes mellitus (HCC), DVT (deep venous thrombosis) (HCC), Iron deficiency anemia, Lyme disease, and Palliative care patient.  Past Surgical History:  has a past surgical history that includes  section.    Treatment Diagnosis: CVA with right hemiparesis               Plan   Occupational Therapy Plan  Specific Instructions for Next Treatment: endurance training, home management  Current Treatment Recommendations: Strengthening, Balance training, Functional mobility training, Endurance training, Equipment evaluation, education, & procurement, Patient/Caregiver education & training, Safety education & training, Self-Care / ADL, Home management training     Restrictions  Restrictions/Precautions  Restrictions/Precautions: Fall Risk  Position Activity Restriction  Other position/activity restrictions: NO BP or stics in R UE    Subjective   General  Chart Reviewed: Yes, Orders  Patient assessed for rehabilitation services?: Yes  Additional Pertinent Hx: DM, Anish LE DVTs on Eliquis, suspected ovarian CA with lymph node invasion followed by Dr. Yeboah, 55 lb weight loss in the past 2 months, asthma, bronchitis, depression, heart murmur, MV prolapse, and Lyme disease 5 months ago  Family / Caregiver Present: No  Diagnosis: CVA with right hemiparesis         Objective   Functional Mobility  Functional - Mobility Device: Rolling Walker  Activity: To/from bathroom, To/From therapy 
Facility/Department: [facility] 8 REHAB UNIT  Occupational Therapy Treatment Note    Name: Pat Sky  : 1960  MRN: [id number]  Date of Service: 2024    Discharge Recommendations:  Home with Home health OT, Home with assist PRN          Patient Diagnosis(es): The primary encounter diagnosis was Palliative care patient. A diagnosis of Cancer (HCC) was also pertinent to this visit.  Past Medical History:  has a past medical history of Anemia, Anxiety, Asthma, Cerebral artery occlusion with cerebral infarction (HCC), Depression, Diabetes mellitus (HCC), DVT (deep venous thrombosis) (HCC), Iron deficiency anemia, Lyme disease, and Palliative care patient.  Past Surgical History:  has a past surgical history that includes  section.    Treatment Diagnosis: CVA with right hemiparesis          Plan   Occupational Therapy Plan  Specific Instructions for Next Treatment: endurance training, home management  Current Treatment Recommendations: Strengthening, Balance training, Functional mobility training, Endurance training, Equipment evaluation, education, & procurement, Patient/Caregiver education & training, Safety education & training, Self-Care / ADL, Home management training     Restrictions  Restrictions/Precautions  Restrictions/Precautions: Fall Risk  Position Activity Restriction  Other position/activity restrictions: NO BP or stics in R UE     24 1500   General   Family / Caregiver Present No   Pre Treatment Pain Screening   Pain at present 5   Scale Used Numeric Score   Intervention List Patient able to continue with treatment   Comments / Details stomach         Objective     Transfers  Stand Step Transfers: Contact guard assistance  Sit to stand: Contact guard assistance  Stand to sit: Contact guard assistance        Exercise Treatment: 2# dowel--all planes, 2 sets, 15reps       Goals  Short Term Goals  Time Frame for Short Term Goals: 1 week  Short Term Goal 1: pt will complete LB dressing 
Facility/Department: [facility] 8 REHAB UNIT  Occupational Therapy Treatment Note    Name: Pat Sky  : 1960  MRN: [id number]  Date of Service: 2024    Discharge Recommendations:  Home with Home health OT, Home with assist PRN          Patient Diagnosis(es): The primary encounter diagnosis was Palliative care patient. A diagnosis of Cancer (HCC) was also pertinent to this visit.  Past Medical History:  has a past medical history of Anemia, Anxiety, Asthma, Cerebral artery occlusion with cerebral infarction (HCC), Depression, Diabetes mellitus (HCC), DVT (deep venous thrombosis) (HCC), Iron deficiency anemia, Lyme disease, and Palliative care patient.  Past Surgical History:  has a past surgical history that includes  section.    Treatment Diagnosis: CVA with right hemiparesis      Assessment   Performance deficits / Impairments: Decreased functional mobility ;Decreased ADL status;Decreased strength;Decreased high-level IADLs;Decreased balance;Decreased endurance  Activity Tolerance  Activity Tolerance: Patient Tolerated treatment well            Plan   Occupational Therapy Plan  Specific Instructions for Next Treatment: endurance training, home management  Current Treatment Recommendations: Strengthening, Balance training, Functional mobility training, Endurance training, Equipment evaluation, education, & procurement, Patient/Caregiver education & training, Safety education & training, Self-Care / ADL, Home management training     Restrictions  Restrictions/Precautions  Restrictions/Precautions: Fall Risk  Position Activity Restriction  Other position/activity restrictions: NO BP or sticks in R UE       24 0915   General   Family / Caregiver Present No   Pre Treatment Pain Screening   Pain at present 0   Scale Used Numeric Score       Objective   Functional Mobility  Functional - Mobility Device: Rolling Walker  Activity: To/from bathroom  Assist Level: Stand by assistance       Safety 
Facility/Department: [facility] 8 REHAB UNIT  Occupational Therapy Treatment Note    Name: Pat Sky  : 1960  MRN: [id number]  Date of Service: 2024    Discharge Recommendations:  Home with Home health OT, Home with assist PRN  OT Equipment Recommendations  Equipment Needed: Yes  Other: has built in seat in shower on main floor, will order RW and BSC       Patient Diagnosis(es): The primary encounter diagnosis was Palliative care patient. A diagnosis of Cancer (HCC) was also pertinent to this visit.  Past Medical History:  has a past medical history of Anemia, Anxiety, Asthma, Cerebral artery occlusion with cerebral infarction (HCC), Depression, Diabetes mellitus (HCC), DVT (deep venous thrombosis) (HCC), Iron deficiency anemia, Lyme disease, and Palliative care patient.  Past Surgical History:  has a past surgical history that includes  section.    Treatment Diagnosis: CVA with right hemiparesis      Assessment   Performance deficits / Impairments: Decreased functional mobility ;Decreased ADL status;Decreased strength;Decreased high-level IADLs;Decreased balance;Decreased endurance  Activity Tolerance  Activity Tolerance: Patient limited by fatigue            Plan   Occupational Therapy Plan  Specific Instructions for Next Treatment: endurance training, home management  Current Treatment Recommendations: Strengthening, Balance training, Functional mobility training, Endurance training, Equipment evaluation, education, & procurement, Patient/Caregiver education & training, Safety education & training, Self-Care / ADL, Home management training     Restrictions  Restrictions/Precautions  Restrictions/Precautions: Fall Risk  Position Activity Restriction  Other position/activity restrictions: NO BP or sticks in R UE   24 1550   Pre Treatment Pain Screening   Pain at present 7   Scale Used Numeric Score   Intervention List Patient able to continue with treatment   Comments / Details stomach, L LE 
Nutrition Assessment     Type and Reason for Visit: Consult    Nutrition Recommendations/Plan:   Modify ONS to Glucerna.   Monitor for BM.   Review alternate menu selection. Encourage small, frequent snacks.      Malnutrition Assessment:  Malnutrition Status: Severe malnutrition    Nutrition Assessment:  Consult received for pt wishing to discuss vegetarian protein options. We reviewed topics discussed during previous visit including hospital menu options for high-protein sources. Updated pt's preferences. PO intake has been variable since admission. Pt reports pain and early satiety are contributing to inadequate intake. She is drinking Ensure periodically but agreeable to trying Glucerna instead. Reviewed menu with pt and encouraged her to use alternate selection as needed. BG <200mg/dL on Metformin only. Last BM 1/15, Dulcolax suppository added 1/21. Will continue to follow.    Estimated Daily Nutrient Needs:  Energy (kcal):  1478-9982 (20-25kcal/kg) Weight Used for Energy Requirements: Current     Protein (g):   (1.3-2.0g/kg) Weight Used for Protein Requirements: Ideal        Fluid (ml/day):  4200-2047 Method Used for Fluid Requirements: 1 ml/kcal    Nutrition Related Findings:   BM 1/15, -174mg/dL on Metformin only Wound Type: Skin Tears    Current Nutrition Therapies:    ADULT DIET; Regular; 4 carb choices (60 gm/meal)  ADULT ORAL NUTRITION SUPPLEMENT; Breakfast, Dinner; Diabetic Oral Supplement    Anthropometric Measures:  Height: 165.1 cm (5' 5\")  Current Body Wt: 77.2 kg (170 lb 1.6 oz)   BMI: 28.3    Nutrition Diagnosis:   Severe malnutrition, In context of chronic illness related to catabolic illness, increase demand for energy/nutrients as evidenced by Criteria as identified in malnutrition assessment    Nutrition Interventions:   Food and/or Nutrient Delivery: Continue Current Diet, Modify Oral Nutrition Supplement  Nutrition Education/Counseling: No recommendation at this time  Coordination 
Occupational Therapy     01/20/24 1000   OT Individual Minutes   Time In 1000   Time Out 1000   Minutes 0   Minute Variance   Variance 60   Reason Med hold  (Med hold d/t nausea.)   Time Code Minutes    Timed Code Treatment Minutes 0 Minutes       
Pat Sky arrived to room #823.   Presented with: CVA  Mental Status: Patient is oriented and alert.   Vitals:    01/18/24 1502   SpO2: 98%     Patient safety contract and falls prevention contract reviewed with patient Yes.  Oriented Patient to room.  Call light within reach. Yes.  Needs, issues or concerns expressed at this time: no.    Electronically signed by Trini Harris RN on 1/18/2024 at 3:20 PM    
Patient:   Pat Sky  MR#:    [id number]   Room:    [facility]823-   YOB: 1960  Date of Progress Note: 1/20/2024  Time of Note                           8:01 AM  Consulting Physician:   Inocente Bauer M.D.  Attending Physician:  Inocente Bauer MD     Chief complaint Acute ischemic stroke     S:This 63 y.o. 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 Yeboah, 55 lb weight loss in the past 2 months, asthma, bronchitis, depression, heart murmur, MV prolapse, and Lyme disease 5 months ago. She presented to [facility] ER with c/o RUE pain, RUE & RLE weakness leading to a fall, and right face a bit tingly. Patient reports about 3 days prior, she started to have progressive swelling of her right upper extremity with pain and erythema. Doppler done in ER showed a cephalic vein thrombosis. She was evaluated by neurology and not felt to be a candidate for TNK d/t vaginal bleeding and Eliquis use. Patient does report she may have occasionally missed a few doses but has been taking it consistently for the last month. She was admitted to the Hospitalist service with consults for neurology, oncology and obstetrics. ASA & Eliquis were stopped, and patient was placed on Heparin drip with plans to change to Lovenox. MRI & MRA of head/neck demonstrating left temporal love, left external capsule, left insula, body of the left cardiac nucleus infarcts. Urinalysis on 1/13/2024 reported 2+ bacteria and she received Rocephin. Patient also with Microcytic iron anemia requiring iron transfusion. Patient developed a stage 3 pressure injury to sacrum while in the hospital and was placed on a dolphin mattress. Patient was seen by OB/GYN Dr. Vera, who recommended a D&C with biopsy. Patient agreed and went to surgery on 1/17/24. She tolerated the procedure well. Findings: external genitalia is normal, rectal exam is normal, there is a mass anterior 
Per [facility]'s Discharge Summary -     \"Patient is to be on Lovenox for anticoagulation until after biopsy results are obtained to see if she can be safely transitioned back to oral Eliquis.\"    Dr. Bauer was made aware of this information.     Electronically signed by Trini Harris RN on 1/18/2024 at 7:41 PM    
Physical Therapy     01/22/24 1100   Restrictions/Precautions   Restrictions/Precautions Fall Risk   Position Activity Restriction   Other position/activity restrictions NO BP or stics in R UE   General   Additional Pertinent Hx DEPRESSION, MV PROLAPSE, LYME DISEASE, SUSPECTED OVARIAN CA, 55LBS WEIGHT LOSS PAST 2 MONTHS   Diagnosis LEFT MULTIFOCAL CVA W/RIGHT WEAKNESS   Vitals   BP (!) 131/53   MAP (Calculated) 79   Comment 131/53 in sitting at WC post AMB; 123/53 in standing   Subjective   Pain 7/10   Bed mobility   Supine to Sit Stand by assistance   Sit to Supine Stand by assistance   Bed Mobility Comments with HOB elevated   Transfers   Sit to Stand Contact guard assistance   Stand to Sit Contact guard assistance   Comment STS from EOB/WC/ toilet with cuing for hand placement for all transfers due to poor safety awareness.   Ambulation   Surface Level tile   Device Rolling Walker   Assistance Contact guard assistance   Gait Deviations Decreased step height   Distance 15' 50' 20' 20   Comments 3 sitting rest break secondary to c/o dizziness   Propulsion 1   Method RUE;LUE   Level of Assistance Minimal assistance;Moderate assistance   Description/ Details VEERS RIGHT   Distance 30' x 2   PT Exercises   Exercise Treatment RLE ex in sitting with red Tband and min resistance to include hip/knee flex, ext, and ABD. fatigues after 8/10 reps x 3   Activity Tolerance   Activity Tolerance Treatment limited secondary to medical complications   Assessment   Assessment pt limited by c/o dizziness that increased with standing. slight impulsive behaviors with attempt to continue AMB despite signs of fatigue/slight instability with dizziness with cuing for sitting rest breaks. no true LOB or posterior lean noted throughtout all AMB trials and STSs. left in WC in room with alarm on and all needs in reach.   Safety Devices   Type of Devices Gait belt;Chair alarm in place;Left in chair   PT Individual Minutes   Time In 1100   Time 
Physical Therapy  Name: Pat Sky  MRN:  [id number]  Date of service:  1/24/2024 01/24/24 1000   Restrictions/Precautions   Restrictions/Precautions Fall Risk   Position Activity Restriction   Other position/activity restrictions NO BP or sticks in R UE   General   Diagnosis LEFT MULTIFOCAL CVA W/RIGHT WEAKNESS   General Comment   Comments in BR finishing up personal care post bathing/dressing   Subjective   Subjective Pt eager to return home and interested in OP therapy. Pt expresses confidence in support at home and short distance with amb.   Subjective   Subjective pain stomach and back   Pain 7/10   Transfers   Sit to Stand Stand by assistance   Stand to Sit Stand by assistance   Bed to Chair Stand by assistance  (stand step with RW)   Car Transfer Supervision  (simulator seat set to suv/small truck ht (29\" from ground), backed to seat and able to hike hips up and scoot back self assisting R LE into vehicle using UEs)   Ambulation   Surface Level tile   Device Rolling Walker   Assistance Modified Independent   Distance 20'   Comments amb in room from BR to WC   Ambulation 2   Surface - 2 level tile   Device 2 Rolling Walker   Assistance 2 Stand by assistance   Quality of Gait 2 improved heel strike and not circumducting, occasional excessive advancement of RW   Distance 200' and 125'   Stairs/Curb   Stairs? Yes   Stairs   # Steps  8   Stairs Height 4\"   Rails Bilateral   Assistance Stand by assistance   Comment step to pattern with a couple of missteps though pt recognized and subsequently corrected   Assessment   Assessment pt showing improving independence and good motivation; eager to return home as she would like to be with family as much as possible due to her expectations of CA diagnosis/prognosis   Physical Therapy Plan   Specific Instructions for Next Treatment up ad michelle check   Safety Devices   Type of Devices Call light within reach;Left in chair  (SLP present for session)   PT Individual 
Physical Therapy  Name: Pat Sky  MRN:  [id number]  Date of service:  1/23/2024 01/23/24 1000   General Comment   Comments sitting in WC post OT   Subjective   Subjective Pt very vocal about meds and dislike of number and type of meds she is taking. Wants to get away from blood thinner injection. Very talkative.   Subjective   Subjective pain stomach and back   Pain 9/10   Transfers   Sit to Stand Stand by assistance   Stand to Sit Stand by assistance   Bed to Chair Stand by assistance;Contact guard assistance  (stand step with RW)   Ambulation   Surface Level tile   Device Rolling Walker   Assistance Contact guard assistance;Stand by assistance   Quality of Gait improved heel strike and not circumducting, occasional excessive advancement of RW   Gait Deviations Decreased step height   Distance 100', 100', 125'   Comments seated rest breaks between ea distance, but rather quick recovery   Stairs/Curb   Stairs? Yes   Stairs   # Steps  4   Stairs Height 4\"   Rails Bilateral   Assistance Contact guard assistance   Comment ascended with reciprocal pattern but EDU on step to pattern for greater stability/control and pt used this on descent   PT Exercises   A/AROM Exercises R LAQ slow controlled with hold at end range and slow eccentric motion x 20, R hip flex x 2/10   Resistive Exercises man resist x 20: light DF/PF R ankle, L hip flex, B hip abd, B hip add, B hip ext   Safety Devices   Type of Devices Call light within reach;Left in chair   PT Individual Minutes   Time In 1000   Time Out 1100   Minutes 60         Electronically signed by Philomena Cabrera PTA on 1/23/2024 at 11:08 AM    
This  visited with pt to follow up after pt . Pt's nurse said this morning pt was told cancer is Stage 4. Pt engaged in conversation about possibilities of treatment and about her nguyen. This  provided a listening ear, support, and a prayer. Pt expressed gratitude for spiritual care.       Electronically signed by Mary Behrens on 1/25/2024 at 1:57 PM    
  --    Ambulation   Surface Level tile  --   --    Device Rolling Walker  --   --    Assistance Modified Independent;Stand by assistance  --   --    Quality of Gait improved heel strike and not circumducting, occasional excessive advancement of RW  --   --    Gait Deviations Decreased step height  --   --    Distance 200', 150'  --   --    Comments [employer] turns.  --   --    Ambulation 2   Surface - 2 level tile  --   --    Device 2 Rolling Walker  --   --    Assistance 2 Stand by assistance  --   --    Distance 20' x2  --   --    Comments Assisted pt to bathroom  Ind with hygiene and clothing mgmt.  --   --    PT Exercises   Exercise Treatment  --   --  Sitting BLE exercises  x15 reps.   Activity Tolerance   Activity Tolerance  --  Patient tolerated treatment well;Patient limited by endurance;Patient limited by pain  --    Assessment   Assessment  --   --   --    PT Individual Minutes   Time In  --  0900  --    Time Out  --  1000  --    Minutes  --  60  --       01/25/24 1000   Restrictions/Precautions   Restrictions/Precautions  --    Position Activity Restriction   Other position/activity restrictions  --    General   Additional Pertinent Hx  --    Diagnosis  --    Subjective   Subjective  --    Subjective   Pain  --    Bed mobility   Rolling to Right  --    Supine to Sit  --    Transfers   Sit to Stand  --    Stand to Sit  --    Ambulation   Surface  --    Device  --    Assistance  --    Quality of Gait  --    Gait Deviations  --    Distance  --    Comments  --    Ambulation 2   Surface - 2  --    Device 2  --    Assistance 2  --    Distance  --    Comments  --    PT Exercises   Exercise Treatment  --    Activity Tolerance   Activity Tolerance  --    Assessment   Assessment Pt. crispin session with rests.   PT Individual Minutes   Time In  --    Time Out  --    Minutes  --      Electronically signed by Dunia Joya PTA on 1/25/2024 at 12:10 PM   
  Output --   Net 360 ml       General appearance: somnolent, appears stated age, cooperative and no distress, sitting up comfortably in bedside chair  Head: Normocephalic, without obvious abnormality, atraumatic  Eyes: conjunctivae/corneas clear. PERRL, EOM's intact.   Ears/Nose: normal external ears and nose  Neck/Throat:  supple, symmetrical, trachea midline   Pulmonary: clear to auscultation bilaterally,no rales or wheezes   Cardiovascular: regular rate and rhythm, no murmur  Gastrointestinal:soft, non-tender; non-distended, bowel sounds present    Musculoskeletal:No lower extremity edema,  No erythema, no tenderness to palpation  Skin: warm, dry  Neurologic: somnolent but easily awakened, talkative, speech fluent  Psychiatric: mildly pressured speech     Medications:      dextrose        insulin lispro  0-4 Units SubCUTAneous BID WC    Hydrocerin   Topical TID    atorvastatin  80 mg Oral Nightly    enoxaparin  1 mg/kg SubCUTAneous BID    metFORMIN  500 mg Oral BID WC    pantoprazole  40 mg Oral QAM AC    phenazopyridine  100 mg Oral TID WC     HYDROmorphone **OR** HYDROmorphone, bisacodyl, promethazine, acetaminophen, polyethylene glycol, glucose, dextrose bolus **OR** dextrose bolus, glucagon (rDNA), dextrose, albuterol  ADULT DIET; Regular; 4 carb choices (60 gm/meal)  ADULT ORAL NUTRITION SUPPLEMENT; Breakfast, Dinner; Diabetic Oral Supplement     Lab and other Data:     Recent Labs     01/22/24 0527   WBC 9.4   HGB 9.4*   *     Recent Labs     01/22/24 0527   *   K 4.2   CL 95*   CO2 25   BUN 12   CREATININE 0.5   GLUCOSE 115*     Recent Labs     01/22/24 0527   AST 14   ALT 9   BILITOT 0.3   ALKPHOS 222*     Palliative Performance Scale:  [] 80% Full ambulation  Some disease: Normal activity w/ some effort  Full self-care  Normal/reduced intake  Full LOC  [] 70% Reduced ambulation  Some disease: Can't do normal job or work  Full self-care  Normal/reduced intake  Full LOC  [] 60% 
[id number]    Nutrition Diagnosis:   Severe malnutrition, In context of chronic illness related to catabolic illness, increase demand for energy/nutrients as evidenced by Criteria as identified in malnutrition assessment    Nutrition Interventions:   Food and/or Nutrient Delivery: Continue Current Diet, Continue Oral Nutrition Supplement  Nutrition Education/Counseling: No recommendation at this time  Coordination of Nutrition Care: Continue to monitor while inpatient  Plan of Care discussed with: patient    Goals:  Previous Goal Met: Progressing toward Goal(s)  Goals: PO intake 50% or greater       Nutrition Monitoring and Evaluation:   Behavioral-Environmental Outcomes: None Identified  Food/Nutrient Intake Outcomes: Food and Nutrient Intake, Supplement Intake  Physical Signs/Symptoms Outcomes: Biochemical Data, Weight, Skin, Nutrition Focused Physical Findings, Fluid Status or Edema    Discharge Planning:    Continue Oral Nutrition Supplement, Continue current diet     Iraida Cardenas, MS, RD, LD, CDCES  Contact: [phone]      
Stand by assistance  Stand to sit: Supervision  Transfer Comments: cues for hand placement       Education  Education Given To: Patient  Education Provided: ADL Function, Transfer Training  Education Method: Demonstration, Verbal  Barriers to Learning: None  Education Outcome: Verbalized understanding, Demonstrated understanding       Toileting Hygiene  Assistance needed: Supervision or touching assistance  Comment: CGA for balance  CARE Score: 4  Discharge Goal: Independent      Toilet Transfer  Assistance needed: Supervision or touching assistance  Comment: CGA with RW  CARE Score: 4  Discharge Goal: Independent    Balance  Sitting Balance: Independent  Standing Balance: Stand by assistance         Oral Hygiene  Assistance Needed: Setup or clean-up assistance  CARE Score: 5  Discharge Goal: Independent      Shower/Bathe Self  Assistance Needed: Supervision or touching assistance  Comment: min A  CARE Score: 4  Discharge Goal: Independent    Upper Body Dressing  Assistance Needed: Setup or clean-up assistance  CARE Score: 5  Discharge Goal: Independent      Lower Body Dressing  Assistance Needed: Supervision or touching assistance  Comment: min A  CARE Score: 4  Discharge Goal: Independent    Putting On/Taking Off Footwear  Assistance Needed: Supervision or touching assistance  Comment: shoes only, min A  CARE Score: 4  Discharge Goal: Independent      Goals  Short Term Goals  Time Frame for Short Term Goals: 1 week  Short Term Goal 1: pt will complete LB dressing with supervision  Short Term Goal 2: pt will complete overall toileting with supervision  Short Term Goal 3: pt will complete 1-2 handed standing level task for 3 mins with supervision  Short Term Goal 4: pt will complete overall bathing with supervision  Short Term Goal 5: pt will complete simple ambulatory home making task with supervision  Additional Goals?: Yes  Short Term Goal 6: pt will complete HEP x 5 occasions to improve strength in R UE for 
[x]Demonstration          []Handout          []Other  Evaluation of Education:   []Verbalized understanding                           []Demonstrates without assistance  []Demonstrates with assistance                    [x]Needs further instruction                               []No evidence of learning                              [x]No family present                    Plan:   [x]Continue with current plan of care                []Modify current plan of care as follows:               []Discharge patient                          Discharge Location:                          Services/Supervision Recommended:       [x]Patient continues to require treatment by a licensed therapist to address functional deficits as outlined in the established plan of care.    Electronically signed by NORA Toth on 1/24/2024 at 11:49 AM     
Conservation, Home Exercise Program, Safety  Education Method: Verbal, Demonstration  Barriers to Learning: None  Education Outcome: Verbalized understanding, Demonstrated understanding    Goals  Short Term Goals  Time Frame for Short Term Goals: 1 week  Short Term Goal 1: pt will complete LB dressing with supervision  Short Term Goal 2: pt will complete overall toileting with supervision  Short Term Goal 3: pt will complete 1-2 handed standing level task for 3 mins with supervision  Short Term Goal 4: pt will complete overall bathing with supervision  Short Term Goal 5: pt will complete simple ambulatory home making task with supervision  Additional Goals?: Yes  Short Term Goal 6: pt will complete HEP x 5 occasions to improve strength in R UE for ADL/IADLs  Long Term Goals  Time Frame for Long Term Goals : 10-14 daysf  Long Term Goal 1: pt will complete overall dressing with modified independence  Long Term Goal 2: pt will complete overall toileting with modified independence  Long Term Goal 3: pt will complete overall bathing with modified independence  Long Term Goal 4: pt will simple ambulatory home making task with modified independence  Long Term Goal 5: pt will complete HEP with independence  Additional Goals?: Yes  Long Term Goal 6: verbalize DME for home     Therapy Time   Individual Concurrent Group Co-treatment   Time In 1015         Time Out 1030         Minutes 15         Timed Code Treatment Minutes: 15 Minutes     Electronically signed by WILLIAM Galdamez student on 1/26/2024 at 11:27 AM  
Q4H PRN, Inocente Bauer MD, 4 mg at 01/21/24 2241    Hydrocerin (EUCERIN) cream CREA, , Topical, TID, Inocente Bauer MD, Given at 01/22/24 0907    atorvastatin (LIPITOR) tablet 80 mg, 80 mg, Oral, Nightly, Inocente Bauer MD, 80 mg at 01/21/24 2230    enoxaparin (LOVENOX) injection 80 mg, 1 mg/kg, SubCUTAneous, BID, Inocente Bauer MD, 80 mg at 01/22/24 0902    metFORMIN (GLUCOPHAGE) tablet 500 mg, 500 mg, Oral, BID WC, Inocente Bauer MD, 500 mg at 01/22/24 0902    pantoprazole (PROTONIX) tablet 40 mg, 40 mg, Oral, QAM AC, Inocente Bauer MD, 40 mg at 01/22/24 0552    phenazopyridine (PYRIDIUM) tablet 100 mg, 100 mg, Oral, TID Juancarlos GO Niranjan, MD, 100 mg at 01/22/24 0901    promethazine (PHENERGAN) tablet 25 mg, 25 mg, Oral, Q6H PRN, Inocente Bauer MD, 25 mg at 01/21/24 1559    acetaminophen (TYLENOL) tablet 650 mg, 650 mg, Oral, Q4H PRN, Inocente Bauer MD, 650 mg at 01/22/24 0902    polyethylene glycol (GLYCOLAX) packet 17 g, 17 g, Oral, Daily PRN, Inocente Bauer MD    insulin lispro (HUMALOG) injection vial 0-4 Units, 0-4 Units, SubCUTAneous, TID , Inocente Bauer MD    insulin lispro (HUMALOG) injection vial 0-4 Units, 0-4 Units, SubCUTAneous, Nightly, Inocente Bauer MD    glucose chewable tablet 16 g, 4 tablet, Oral, PRN, Inocente Bauer MD    dextrose bolus 10% 125 mL, 125 mL, IntraVENous, PRN **OR** dextrose bolus 10% 250 mL, 250 mL, IntraVENous, PRNJuancarlos Niranjan, MD    glucagon injection 1 mg, 1 mg, SubCUTAneous, PRN, Inocente Bauer MD    dextrose 10 % infusion, , IntraVENous, Continuous PRN, Inocente Bauer MD    albuterol (PROVENTIL) (2.5 MG/3ML) 0.083% nebulizer solution 2.5 mg, 2.5 mg, Nebulization, Q4H PRN, Inocente Bauer MD    Allergies:  Patient has no known allergies.    Social History:   TOBACCO:   reports that she has never smoked. She has never used smokeless tobacco.  ETOH:   reports no history of alcohol use.    Family History:       Problem Relation Age of Onset    Other Mother  
Strategies:  Compensatory Swallowing Strategies : Alternate solids and liquids, Eat/Feed slowly, No straws, Upright as possible for all oral intake, Remain upright for 30-45 minutes after meals, Small bites/sips      SHORT TERM GOAL #1:  Goal 1: Patient will complete the Cognitive-Linguistic Quick test for further speech/language/cognitive testing. MET    SHORT TERM GOAL #2:  Goal 2: Patient will complete recall (short term, long term, immediate, working memory) tasks with 90% accuracy and minimal cues.    SHORT TERM GOAL #3:  Goal 3: Patient will complete attention/processing tasks with 90% accuracy and minimal cues.    SHORT TERM GOAL #4:  Goal 4: Patient will complete verbal reasoning tasks with 90% accuracy and minimal cues.    SHORT TERM GOAL #5:  Goal 5: Patient will complete problem solving/safety awareness tasks with 90% accuracy and minimal cues. Goal 6: Patient will complete executive functioning tasks with 90% accuracy and minimal cues.    Swallowing Short Term Goals  Short-term Goals  Goal 1: Patient will tolerate regular solids with thin liquids with minimal overt s/s of aspiration/penetration during hospitalization.  Goal 2: Patient will demonstrate awareness of general aspiration precautions.  Goal 3: Patient will participate in swallowing reassessments to ensure safest diet consistencies.         ASSESSMENT:  Assessment: [x]Progressing towards goals          []Not Progressing towards goals    Patient Tolerance of Treatment:   [x]Tolerated well []Tolerated fair []Required rest breaks []Fatigued    Education:  Learner:  [x]Patient          []Significant Other          []Other  Education provided regarding:  [x]Goals and POC   []Diet and swallowing precautions    []Home Exercise Program  []Progress and/or discharge information  Method of Education:  [x]Discussion          []Demonstration          []Handout          []Other  Evaluation of Education:   [x]Verbalized understanding   []Demonstrates without 
drip with plans to change to Lovenox. MRI & MRA of head/neck demonstrating left temporal love, left external capsule, left insula, body of the left cardiac nucleus infarcts.  Urinalysis on 1/13/2024 reported 2+ bacteria and she received Rocephin. Patient also with Microcytic iron anemia requiring iron transfusion. Patient developed a stage 3 pressure injury to sacrum while in the hospital and was placed on a dolphin mattress. Patient was seen by OB/GYN Dr. Vera, who recommended a D&C with biopsy. Patient agreed and went to surgery on 1/17/24. She tolerated the procedure well. Findings: external genitalia is normal, rectal exam is normal, there is a mass anterior to the rectum, mobile, Urethra external os is clear, below the urethra a mass coming from the uterus reaches to 1 cms  to the external urethral meatus.  The mass it made impossible to vis cervix. No dilation and curettage done.  The mass is mobile about 14 cms in length and width. Biopsy of the lesion done under urethra, results pending. Oncologist Dr. Yeboah following for plans once results obtained. Heparin drip discontinued and Lovenox started on 1/17/24. Patient has c/o of bladder spasms and dysuria. Patient continues to have right sided weakness and is participating in both PT/OT. Patient also reports delayed thought processing and some difficulty getting the right word out. Will have SPT evaluate here on Rehab. Patient is felt to need a stay on Rehab to work towards her goal of returning home with assist of her . She is now felt ready to start the Rehab program.  _______________ _____________________ ________________   Physician Signature      Physician Name (print)   Physician NPI          Date      
in the established plan of care.     Electronically signed by NORA Toth on 1/20/2024 at 8:46 AM   
current plan of care as follows:    []Discharge patient    Discharge Location:    Services/Supervision Recommended:      [x]Patient continues to require treatment by a licensed therapist to address functional deficits as outlined in the established plan of care.            Electronically Signed By:  Ermelinda Ellis M.S., CCC-SLP  1/25/2024,11:20 AM.   
will complete simple ambulatory home making task with supervision  Additional Goals?: Yes  Short Term Goal 6: pt will complete HEP x 5 occasions to improve strength in R UE for ADL/IADLs  Long Term Goals  Time Frame for Long Term Goals : 10-14 daysf  Long Term Goal 1: pt will complete overall dressing with modified independence  Long Term Goal 2: pt will complete overall toileting with modified independence  Long Term Goal 3: pt will complete overall bathing with modified independence  Long Term Goal 4: pt will simple ambulatory home making task with modified independence  Long Term Goal 5: pt will complete HEP with independence  Additional Goals?: Yes  Long Term Goal 6: verbalize DME for home       Therapy Time   Individual Concurrent Group Co-treatment   Time In 1330         Time Out 1430         Minutes 60         Timed Code Treatment Minutes: 45 Minutes       Electronically signed by Noa Nagy OT on 1/19/2024 at 3:34 PM  
and finances.       SLP will continue to follow and treat.      Electronically signed by NORA Miranda on 1/19/2024 at 2:26 PM    
and/or speech notes reviewed    Lab Results   Component Value Date    WBC 9.6 01/19/2024    HGB 8.9 (L) 01/19/2024    HCT 30.4 (L) 01/19/2024    MCV 72.2 (L) 01/19/2024     (H) 01/19/2024     Lab Results   Component Value Date     01/19/2024    K 4.0 01/19/2024    CL 99 01/19/2024    CO2 25 01/19/2024    BUN 10 01/19/2024    CREATININE 0.5 01/19/2024    GLUCOSE 125 (H) 01/19/2024    CALCIUM 8.7 (L) 01/19/2024    PROT 6.8 01/19/2024    LABALBU 2.6 (L) 01/19/2024    BILITOT <0.2 01/19/2024    ALKPHOS 290 (H) 01/19/2024    AST 14 01/19/2024    ALT 12 01/19/2024    LABGLOM >60 01/19/2024    GLOB 4.4 12/21/2023   No results found for: \"INR\", \"PROTIME\"    Jacky Farr, PT  Physical Therapist  Physical Therapy     Progress Notes      Signed     Date of Service: 1/19/2024  3:30 PM     Signed              01/19/24 1430   Bed mobility   Rolling to Left Stand by assistance   Rolling to Right Stand by assistance   Supine to Sit Stand by assistance   Sit to Supine Stand by assistance   Transfers   Bed to Chair Minimal assistance;Contact guard assistance  (STAND PIVOT WITH RW)   Ambulation   Device Rolling Walker   Assistance Minimal assistance;Contact guard assistance   Quality of Gait IMPROVED HEEL-TOE GAIT.  CUES TO KEEP RW CLOSE, UPRIGHT POSTURE.  DECREASED RIGHT CIRCUMDUCTION.   Distance 150 FT   PT Exercises   Exercise Treatment SITTING BILAT LE HIP FLEX, EXT, ABD, ADD; KNEE EXT; PF/DF X10 EA   Assessment   Assessment TOLERATED WELL.  BTB POST THERAPY WITH ALARM ON.  FOOT OF BED ELEVATED TO IMPROVE BILAT LE SWELLING.                            RECORD REVIEW: Previous medical records, medications were reviewed at today's visit    IMPRESSION:   1.  Acute ischemic stroke-Lovenox/Lipitor   2.  DVT-Lovenox  3.  Hyperlipidemia-on statin  4.  UTI-completed antibiotics/Pyridium  5.  Diabetes-on metformin monitor blood sugar   6.  GI-PPI/bowel regimen   7.  Suspected ovarian cancer status post D&C-biopsy results 
  Minutes  --  60  --         01/25/24 1000   Restrictions/Precautions   Restrictions/Precautions  --    Position Activity Restriction   Other position/activity restrictions  --    General   Additional Pertinent Hx  --    Diagnosis  --    Subjective   Subjective  --    Subjective   Pain  --    Bed mobility   Rolling to Right  --    Supine to Sit  --    Transfers   Sit to Stand  --    Stand to Sit  --    Ambulation   Surface  --    Device  --    Assistance  --    Quality of Gait  --    Gait Deviations  --    Distance  --    Comments  --    Ambulation 2   Surface - 2  --    Device 2  --    Assistance 2  --    Distance  --    Comments  --    PT Exercises   Exercise Treatment  --    Activity Tolerance   Activity Tolerance  --    Assessment   Assessment Pt. crispin session with rests.   PT Individual Minutes   Time In  --    Time Out  --    Minutes  --                       RECORD REVIEW: Previous medical records, medications were reviewed at today's visit    IMPRESSION:   1.  Acute ischemic stroke-Lovenox/Lipitor   2.  DVT-changed to Eliquis  3.  Hyperlipidemia-on statin  4.  UTI-completed antibiotics/Pyridium  5.  Diabetes-on metformin monitor blood sugar   6.  GI-PPI/bowel regimen   7.  Suspected ovarian cancer status post D&C-biopsy adenocarcinoma  8.  Pain control-pain medicines changed to Dilaudid as needed add MS Contin  9.  Asthma-nebulizers as needed  10.  History of Lyme disease-monitor  11.  PT/OT/speech    Adenocarcinoma noted  Will start MS Contin low-dose for sustained pain control  Lovenox changed to Eliquis    Continue present care as noted    Can try low-dose Norco as needed for pain    Appreciate Dr. Spencer's help    ELOS Saturday     Expected duration and frequency therapy: 180 minutes per day, 5 days per week    CALL WITH ANY QUESTIONS  [phone] CELL  Dr Inocente Bauer  
improve strength in R UE for ADL/IADLs  Long Term Goals  Time Frame for Long Term Goals : 10-14 daysf  Long Term Goal 1: pt will complete overall dressing with modified independence  Long Term Goal 2: pt will complete overall toileting with modified independence  Long Term Goal 3: pt will complete overall bathing with modified independence  Long Term Goal 4: pt will simple ambulatory home making task with modified independence  Long Term Goal 5: pt will complete HEP with independence  Additional Goals?: Yes  Long Term Goal 6: verbalize DME for home     Therapy Time    Individual Concurrent Group Co-treatment   Time In 1535      Time Out 1610      Minutes 35      Timed Code Treatment Minutes: 35 Minutes     Electronically signed by Noa Nagy OT on 1/23/2024 at 4:18 PM                     RECORD REVIEW: Previous medical records, medications were reviewed at today's visit    IMPRESSION:   1.  Acute ischemic stroke-Lovenox/Lipitor   2.  DVT-Lovenox  3.  Hyperlipidemia-on statin  4.  UTI-completed antibiotics/Pyridium  5.  Diabetes-on metformin monitor blood sugar   6.  GI-PPI/bowel regimen   7.  Suspected ovarian cancer status post D&C-biopsy results pending  8.  Pain control-pain medicines changed to Dilaudid as needed  9.  Asthma-nebulizers as needed  10.  History of Lyme disease-monitor  11.  PT/OT/speech    Continue present care    Can try low-dose Norco as needed for pain        ELOS 1 week +    Expected duration and frequency therapy: 180 minutes per day, 5 days per week    CALL WITH ANY QUESTIONS  [phone] CELL  Dr Inocente Bauer  
supervision  Short Term Goal 5: pt will complete simple ambulatory home making task with supervision  Additional Goals?: Yes  Short Term Goal 6: pt will complete HEP x 5 occasions to improve strength in R UE for ADL/IADLs  Long Term Goals  Time Frame for Long Term Goals : 10-14 daysf  Long Term Goal 1: pt will complete overall dressing with modified independence  Long Term Goal 2: pt will complete overall toileting with modified independence  Long Term Goal 3: pt will complete overall bathing with modified independence  Long Term Goal 4: pt will simple ambulatory home making task with modified independence  Long Term Goal 5: pt will complete HEP with independence  Additional Goals?: Yes  Long Term Goal 6: verbalize DME for home     Therapy Time    Individual Concurrent Group Co-treatment   Time In 1535      Time Out 1610      Minutes 35      Timed Code Treatment Minutes: 35 Minutes     Electronically signed by Noa Nayg OT on 1/23/2024 at 4:18 PM                     RECORD REVIEW: Previous medical records, medications were reviewed at today's visit    IMPRESSION:   1.  Acute ischemic stroke-Lovenox/Lipitor   2.  DVT-changed to Eliquis  3.  Hyperlipidemia-on statin  4.  UTI-completed antibiotics/Pyridium  5.  Diabetes-on metformin monitor blood sugar   6.  GI-PPI/bowel regimen   7.  Suspected ovarian cancer status post D&C-biopsy adenocarcinoma  8.  Pain control-pain medicines changed to Dilaudid as needed add MS Contin  9.  Asthma-nebulizers as needed  10.  History of Lyme disease-monitor  11.  PT/OT/speech    Adenocarcinoma noted  Will start MS Contin low-dose for sustained pain control  Lovenox changed to Eliquis    Continue present care    Can try low-dose Norco as needed for pain    Appreciate Dr. Spencer's help    ELOS Saturday     Expected duration and frequency therapy: 180 minutes per day, 5 days per week    CALL WITH ANY QUESTIONS  [phone] CELL  Dr Inocente Baure  
stroke-Lovenox/Lipitor   2.  DVT-Lovenox  3.  Hyperlipidemia-on statin  4.  UTI-completed antibiotics/Pyridium  5.  Diabetes-on metformin monitor blood sugar   6.  GI-PPI/bowel regimen   7.  Suspected ovarian cancer status post D&C-biopsy results pending  8.  Pain control-pain medicines changed to Dilaudid as needed  9.  Asthma-nebulizers as needed  10.  History of Lyme disease-monitor  11.  PT/OT/speech    Continue current care as noted      Team conference with PT/OT/speech/nursing/Care coordinator to review in depth patients care and discharge planning. At least 35 minutes spent coordinating care for patient >50% of time spent in coordination of care.      WC 30 ft min to mod assist  Ambulation 50 Ft and under CGA RW   4 steps min mod     ELOS 1 week +    Expected duration and frequency therapy: 180 minutes per day, 5 days per week    CALL WITH ANY QUESTIONS  [phone] CELL  Dr Inocente Bauer    
01/25/2024    ALT 6 01/25/2024    LABGLOM >60 01/25/2024    GLOB 4.4 12/21/2023         RADIOLOGY STUDIES REPORT/REVIEWED AND INTERPRETED BY ME:  CT Head Without Contrast    Result Date: 1/15/2024  EXAMINATION:  CT HEAD WO CONTRAST-  1/15/2024 1:29 PM HISTORY: Stroke, follow up; I82.619-Acute embolism and thrombosis of superficial veins of unspecified upper extremity; N30.90-Cystitis, unspecified without hematuria; W19.XXXA-Unspecified fall, initial encounter; R53.1-Weakness; Z74.09-Other reduced mobility. TECHNIQUE: Multiple axial images were obtained through the brain without contrast infusion. Multiplanar images were reconstructed. DLP: 605.11 mGy.cm. Automated dosage reduction technique was utilized to reduce patient dosage. COMPARISON: 1/13/2024. FINDINGS: There is no hemorrhage. There is low-density in the lateral left basal ganglia region predominantly involving the posterior aspect of the external capsule. There is minimal low density elsewhere within the hemispheric white matter likely related to small vessel disease. There is minimal atrophy. The paranasal sinuses and mastoid air cells are clear. No calvarial fracture is seen.    1. Acute infarct in the lateral left basal ganglia region involving predominantly the external capsule posteriorly and in the posterolateral aspect of the putamen. 2. No hemorrhage. 3. Mild chronic small vessel ischemic white matter disease. This report was signed and finalized on 1/15/2024 2:41 PM by Dr. Rigoberto Mireles MD.    Adult Transthoracic Echo Complete W/ Cont if Necessary Per Protocol (With Agitated Saline)    Result Date: 1/15/2024  This result has an attachment that is not available.   Left ventricular systolic function is normal. Left ventricular ejection fraction appears to be 56 - 60%.   Normal right ventricular cavity size and systolic function noted.   No evidence of a patent foramen ovale.   No significant valvular abnormalities identified on this study. Left 
arteries are grossly unremarkable. I do not see any definite aneurysm. 3. If further assessment of the Elim IRA of [place] is felt warranted follow-up with CT angiography would be the study of choice for further evaluation. That is a rapid acquisition and will not be as affected by potential motion. This report was signed and finalized on 1/14/2024 3:02 PM by Dr. Jerrod Winkler MD.    MRI BRAIN W WO CONTRAST    Result Date: 1/14/2024  MRI BRAIN W WO CONTRAST- 1/14/2024 10:55 AM HISTORY: Stroke, follow up; I82.619-Acute embolism and thrombosis of superficial veins of unspecified upper extremity; N30.90-Cystitis, unspecified without hematuria; W19.XXXA-Unspecified fall, initial encounter; R53.1-Weakness; Z74.09-Other reduced mobility COMPARISON: None.   TECHNIQUE: Multiplanar imaging of the brain was performed in a routine fashion before and after the intravenous injection of gadolinium contrast. There is significant motion artifact on today's exam. FINDINGS: Diffusion: There is diffusion restriction within the mesial left temporal lobe,, left external capsule, left insular cortex as well as within the body of the caudate nucleus with associated ADC mapping abnormalities consistent with acute ischemic infarction. These infarcts are nonhemorrhagic. There is no associated mass effect. No additional sites of diffusion restriction are present. Midline structures: Nondisplaced. Ventricles: Normal in configuration and symmetric in size. Masses: No masses or mass effect. Basilar cisterns: Maintained. Extra axial space: No abnormal extra-axial fluid. Gray-white matter signal: There are additional scattered foci of T2 abnormality involving the periventricular and higher white matter tract suggesting small vessel ischemic disease. Cerebellum: Normal. Brainstem: Normal. Enhancement: No abnormal enhancement. Other: Proximal cervical spinal cord is normal. Bilateral globes and orbits are normal in appearance. Normal cerebrovascular

## 2024-01-26 NOTE — PLAN OF CARE
Problem: Discharge Planning  Goal: Discharge to home or other facility with appropriate resources  1/22/2024 1447 by Smita Toney RN  Outcome: Progressing  1/22/2024 0143 by Elise Calix RN  Outcome: Progressing     Problem: Safety - Adult  Goal: Free from fall injury  1/22/2024 1447 by Smita Toney RN  Outcome: Progressing  1/22/2024 0143 by Elise Calix, RN  Outcome: Progressing     Problem: ABCDS Injury Assessment  Goal: Absence of physical injury  1/22/2024 1447 by Smita Toney, RN  Outcome: Progressing  1/22/2024 0143 by Elise Calix, RN  Outcome: Progressing     Problem: Skin/Tissue Integrity  Goal: Absence of new skin breakdown  Description: 1.  Monitor for areas of redness and/or skin breakdown  2.  Assess vascular access sites hourly  3.  Every 4-6 hours minimum:  Change oxygen saturation probe site  4.  Every 4-6 hours:  If on nasal continuous positive airway pressure, respiratory therapy assess nares and determine need for appliance change or resting period.  1/22/2024 1447 by Smita Toney RN  Outcome: Progressing  1/22/2024 0143 by Elise Calix, RN  Outcome: Progressing     Problem: Neurosensory - Adult  Goal: Achieves stable or improved neurological status  1/22/2024 1447 by Smita Toney RN  Outcome: Progressing  1/22/2024 0143 by Elise Calix, RN  Outcome: Progressing  Goal: Achieves maximal functionality and self care  1/22/2024 1447 by Smita Toney RN  Outcome: Progressing  1/22/2024 0143 by Elise Calix, RN  Outcome: Progressing     Problem: Respiratory - Adult  Goal: Achieves optimal ventilation and oxygenation  1/22/2024 1447 by Smita Toney RN  Outcome: Progressing  1/22/2024 0143 by Elise Calix, RN  Outcome: Progressing     Problem: Cardiovascular - Adult  Goal: Maintains optimal cardiac output and hemodynamic stability  1/22/2024 1447 by Smita Toney RN  Outcome: Progressing  1/22/2024 0143 by Elise Calix, RN  Outcome: 
  Problem: Discharge Planning  Goal: Discharge to home or other facility with appropriate resources  1/23/2024 0036 by Elise Calix, RN  Outcome: Progressing  1/22/2024 1447 by Smita Toney, RN  Outcome: Progressing     Problem: Safety - Adult  Goal: Free from fall injury  1/23/2024 0036 by Elise Calix, RN  Outcome: Progressing  1/22/2024 1447 by Smita Toney, RN  Outcome: Progressing     Problem: ABCDS Injury Assessment  Goal: Absence of physical injury  1/23/2024 0036 by Elise Calix, RN  Outcome: Progressing  1/22/2024 1447 by Smita Toney, RN  Outcome: Progressing     Problem: Skin/Tissue Integrity  Goal: Absence of new skin breakdown  1/23/2024 0036 by Elise Calix, RN  Outcome: Progressing  1/22/2024 1447 by Smita Toney RN  Outcome: Progressing     Problem: Neurosensory - Adult  Goal: Achieves stable or improved neurological status  1/23/2024 0036 by Elise Calix, RN  Outcome: Progressing  1/22/2024 1447 by Smita Toney, RN  Outcome: Progressing  Goal: Achieves maximal functionality and self care  1/23/2024 0036 by Elise Calix, RN  Outcome: Progressing  1/22/2024 1447 by Smita Toney RN  Outcome: Progressing     Problem: Respiratory - Adult  Goal: Achieves optimal ventilation and oxygenation  1/23/2024 0036 by Elise Calix, RN  Outcome: Progressing  1/22/2024 1447 by Smita Toney, RN  Outcome: Progressing     Problem: Cardiovascular - Adult  Goal: Maintains optimal cardiac output and hemodynamic stability  1/23/2024 0036 by Elise Calix, RN  Outcome: Progressing  1/22/2024 1447 by Smita Toney, RN  Outcome: Progressing     Problem: Skin/Tissue Integrity - Adult  Goal: Skin integrity remains intact  1/23/2024 0036 by Elise Calix, RN  Outcome: Progressing  Flowsheets (Taken 1/22/2024 2032)  Skin Integrity Remains Intact: Monitor for areas of redness and/or skin breakdown  1/22/2024 1447 by Smita Toney, RN  Outcome: Progressing  Flowsheets 
  Problem: Discharge Planning  Goal: Discharge to home or other facility with appropriate resources  1/25/2024 0941 by Melanie Knight RN  Outcome: Progressing  Flowsheets (Taken 1/25/2024 0800)  Discharge to home or other facility with appropriate resources: Refer to discharge planning if patient needs post-hospital services based on physician order or complex needs related to functional status, cognitive ability or social support system  1/24/2024 2216 by Elise Rico RN  Outcome: Progressing  Flowsheets (Taken 1/24/2024 2100)  Discharge to home or other facility with appropriate resources: Refer to discharge planning if patient needs post-hospital services based on physician order or complex needs related to functional status, cognitive ability or social support system     Problem: Safety - Adult  Goal: Free from fall injury  1/25/2024 0941 by Melanie Knight RN  Outcome: Progressing  1/24/2024 2216 by Elise Rico RN  Outcome: Progressing     Problem: ABCDS Injury Assessment  Goal: Absence of physical injury  1/25/2024 0941 by Melanie Knight RN  Outcome: Progressing  1/24/2024 2216 by Elise Rico RN  Outcome: Progressing     Problem: Skin/Tissue Integrity  Goal: Absence of new skin breakdown  Description: 1.  Monitor for areas of redness and/or skin breakdown  2.  Assess vascular access sites hourly  3.  Every 4-6 hours minimum:  Change oxygen saturation probe site  4.  Every 4-6 hours:  If on nasal continuous positive airway pressure, respiratory therapy assess nares and determine need for appliance change or resting period.  1/25/2024 0941 by Melanie Knight RN  Outcome: Progressing  1/24/2024 2216 by Elise Rico RN  Outcome: Progressing     Problem: Neurosensory - Adult  Goal: Achieves stable or improved neurological status  1/25/2024 0941 by Melanie Knight RN  Outcome: Progressing  Flowsheets (Taken 1/25/2024 0800)  Achieves stable or improved neurological status: Assess for and report changes in 
  Problem: Discharge Planning  Goal: Discharge to home or other facility with appropriate resources  1/25/2024 2107 by Laura Sheth RN  Outcome: Progressing  1/25/2024 0941 by Melanie Knight RN  Outcome: Progressing  Flowsheets (Taken 1/25/2024 0800)  Discharge to home or other facility with appropriate resources: Refer to discharge planning if patient needs post-hospital services based on physician order or complex needs related to functional status, cognitive ability or social support system     Problem: Safety - Adult  Goal: Free from fall injury  1/25/2024 2107 by Laura Sheth, RN  Outcome: Progressing  1/25/2024 0941 by Melanie Knight RN  Outcome: Progressing     Problem: ABCDS Injury Assessment  Goal: Absence of physical injury  1/25/2024 2107 by Laura Sheth RN  Outcome: Progressing  1/25/2024 0941 by Melanie Knight RN  Outcome: Progressing     Problem: Skin/Tissue Integrity  Goal: Absence of new skin breakdown  Description: 1.  Monitor for areas of redness and/or skin breakdown  2.  Assess vascular access sites hourly  3.  Every 4-6 hours minimum:  Change oxygen saturation probe site  4.  Every 4-6 hours:  If on nasal continuous positive airway pressure, respiratory therapy assess nares and determine need for appliance change or resting period.  1/25/2024 2107 by Laura Sheth, RN  Outcome: Progressing  1/25/2024 0941 by Melanie Knight RN  Outcome: Progressing     
  Problem: Discharge Planning  Goal: Discharge to home or other facility with appropriate resources  Outcome: Progressing  Flowsheets (Taken 1/18/2024 2115)  Discharge to home or other facility with appropriate resources: Refer to discharge planning if patient needs post-hospital services based on physician order or complex needs related to functional status, cognitive ability or social support system     Problem: Safety - Adult  Goal: Free from fall injury  Outcome: Progressing     Problem: ABCDS Injury Assessment  Goal: Absence of physical injury  Outcome: Progressing     Problem: Skin/Tissue Integrity  Goal: Absence of new skin breakdown  Description: 1.  Monitor for areas of redness and/or skin breakdown  2.  Assess vascular access sites hourly  3.  Every 4-6 hours minimum:  Change oxygen saturation probe site  4.  Every 4-6 hours:  If on nasal continuous positive airway pressure, respiratory therapy assess nares and determine need for appliance change or resting period.  Outcome: Progressing     Problem: Neurosensory - Adult  Goal: Achieves stable or improved neurological status  Outcome: Progressing  Flowsheets (Taken 1/18/2024 2115)  Achieves stable or improved neurological status: Assess for and report changes in neurological status  Goal: Achieves maximal functionality and self care  Outcome: Progressing  Flowsheets (Taken 1/18/2024 2115)  Achieves maximal functionality and self care: Monitor swallowing and airway patency with patient fatigue and changes in neurological status     Problem: Respiratory - Adult  Goal: Achieves optimal ventilation and oxygenation  Outcome: Progressing  Flowsheets (Taken 1/18/2024 2115)  Achieves optimal ventilation and oxygenation:   Assess for changes in respiratory status   Assess for changes in mentation and behavior   Position to facilitate oxygenation and minimize respiratory effort     Problem: Cardiovascular - Adult  Goal: Maintains optimal cardiac output and 
  Problem: Discharge Planning  Goal: Discharge to home or other facility with appropriate resources  Outcome: Progressing  Flowsheets (Taken 1/20/2024 2135 by Vanna Magana LPN)  Discharge to home or other facility with appropriate resources: Refer to discharge planning if patient needs post-hospital services based on physician order or complex needs related to functional status, cognitive ability or social support system     Problem: Safety - Adult  Goal: Free from fall injury  Outcome: Progressing     Problem: ABCDS Injury Assessment  Goal: Absence of physical injury  Outcome: Progressing     Problem: Skin/Tissue Integrity  Goal: Absence of new skin breakdown  Description: 1.  Monitor for areas of redness and/or skin breakdown  2.  Assess vascular access sites hourly  3.  Every 4-6 hours minimum:  Change oxygen saturation probe site  4.  Every 4-6 hours:  If on nasal continuous positive airway pressure, respiratory therapy assess nares and determine need for appliance change or resting period.  Outcome: Progressing     Problem: Neurosensory - Adult  Goal: Achieves stable or improved neurological status  Outcome: Progressing  Flowsheets (Taken 1/20/2024 2135 by Vanna Magana LPN)  Achieves stable or improved neurological status: Assess for and report changes in neurological status  Goal: Achieves maximal functionality and self care  Outcome: Progressing  Flowsheets (Taken 1/20/2024 2135 by Vanna Magana LPN)  Achieves maximal functionality and self care: Monitor swallowing and airway patency with patient fatigue and changes in neurological status     Problem: Respiratory - Adult  Goal: Achieves optimal ventilation and oxygenation  Outcome: Progressing  Flowsheets (Taken 1/20/2024 2135 by Vanna Magana LPN)  Achieves optimal ventilation and oxygenation:   Assess for changes in respiratory status   Assess for changes in mentation and behavior   Position to facilitate oxygenation and minimize 
Achieves optimal ventilation and oxygenation  1/23/2024 2216 by Elise Rico RN  Outcome: Progressing  Flowsheets (Taken 1/23/2024 2145)  Achieves optimal ventilation and oxygenation: Assess for changes in respiratory status  1/23/2024 1104 by Lexy Cao RN  Outcome: Progressing     Problem: Cardiovascular - Adult  Goal: Maintains optimal cardiac output and hemodynamic stability  1/23/2024 2216 by Elise Rico RN  Outcome: Progressing  Flowsheets (Taken 1/23/2024 2145)  Maintains optimal cardiac output and hemodynamic stability: Monitor blood pressure and heart rate  1/23/2024 1104 by Lexy Cao RN  Outcome: Progressing     Problem: Skin/Tissue Integrity - Adult  Goal: Skin integrity remains intact  1/23/2024 2216 by Elise Rico RN  Outcome: Progressing  Flowsheets (Taken 1/23/2024 2145)  Skin Integrity Remains Intact: Monitor for areas of redness and/or skin breakdown  1/23/2024 1104 by Lexy Cao RN  Outcome: Progressing  Goal: Incisions, wounds, or drain sites healing without S/S of infection  1/23/2024 2216 by Elise Rico RN  Outcome: Progressing  Flowsheets (Taken 1/23/2024 2145)  Incisions, Wounds, or Drain Sites Healing Without Sign and Symptoms of Infection: TWICE DAILY: Assess and document skin integrity  1/23/2024 1104 by Lexy Cao RN  Outcome: Progressing  Goal: Oral mucous membranes remain intact  1/23/2024 2216 by Elise Rico RN  Outcome: Progressing  1/23/2024 1104 by Lexy Cao RN  Outcome: Progressing     Problem: Musculoskeletal - Adult  Goal: Return mobility to safest level of function  1/23/2024 2216 by Elise Rico RN  Outcome: Progressing  Flowsheets (Taken 1/23/2024 2145)  Return Mobility to Safest Level of Function: Assess patient stability and activity tolerance for standing, transferring and ambulating with or without assistive devices  1/23/2024 1104 by Lexy Cao RN  Outcome: Progressing  Goal: Maintain proper alignment of affected body part  1/23/2024 
function maintained  Outcome: Progressing  Goal: Glucose maintained within prescribed range  Outcome: Progressing     Problem: Hematologic - Adult  Goal: Maintains hematologic stability  Outcome: Progressing     Problem: Anxiety  Goal: Will report anxiety at manageable levels  Outcome: Progressing  Flowsheets (Taken 1/21/2024 2226)  Will report anxiety at manageable levels: Administer medication as ordered     Problem: Coping  Goal: Pt/Family able to verbalize concerns and demonstrate effective coping strategies  Outcome: Progressing     Problem: Death & Dying  Goal: Pt/Family communicate acceptance of impending death and feel psychological comfort and peace  Outcome: Progressing     Problem: Change in Body Image  Goal: Pt/Family communicate acceptance of loss or change in body image and feel psychological comfort and peace  Outcome: Progressing     Problem: Decision Making  Goal: Pt/Family able to effectively weigh alternatives and participate in decision making related to treatment and care  Outcome: Progressing     Problem: Nutrition Deficit:  Goal: Optimize nutritional status  Outcome: Progressing     Problem: Chronic Conditions and Co-morbidities  Goal: Patient's chronic conditions and co-morbidity symptoms are monitored and maintained or improved  Outcome: Progressing     Problem: Pain  Goal: Verbalizes/displays adequate comfort level or baseline comfort level  Outcome: Progressing     
of electrolyte imbalances  Goal: Hemodynamic stability and optimal renal function maintained  Outcome: Progressing  Goal: Glucose maintained within prescribed range  Outcome: Progressing     Problem: Hematologic - Adult  Goal: Maintains hematologic stability  Outcome: Progressing  Flowsheets (Taken 1/24/2024 2100)  Maintains hematologic stability: Assess for signs and symptoms of bleeding or hemorrhage     Problem: Anxiety  Goal: Will report anxiety at manageable levels  Description: INTERVENTIONS:  1. Administer medication as ordered  2. Teach and rehearse alternative coping skills  3. Provide emotional support with 1:1 interaction with staff  Outcome: Progressing     Problem: Coping  Goal: Pt/Family able to verbalize concerns and demonstrate effective coping strategies  Description: INTERVENTIONS:  1. Assist patient/family to identify coping skills, available support systems and cultural and spiritual values  2. Provide emotional support, including active listening and acknowledgement of concerns of patient and caregivers  3. Reduce environmental stimuli, as able  4. Instruct patient/family in relaxation techniques, as appropriate  5. Assess for spiritual pain/suffering and initiate Spiritual Care, Psychosocial Clinical Specialist consults as needed  Outcome: Progressing     Problem: Death & Dying  Goal: Pt/Family communicate acceptance of impending death and feel psychological comfort and peace  Description: INTERVENTIONS:  1. Assess patient/family anxiety and grief process related to end of life issues  2. Provide emotional and spiritual support  3. Provide information about the patient's health status with consideration of family and cultural values  4. Communicate willingness to discuss death and facilitate grief process  with patient/family as appropriate  5. Emphasize sustaining relationships within family system and community, or nguyen/spiritual traditions  6. Initiate Spiritual Care, Psychosocial Clinical 
brief synthesis: Acute inpatient rehabilitation with occupational therapy,  physical therapy, and speech therapy, 180 minutes, 5 every 7   days will address basic and advancing mobility with self-care   instruction and adaptive equipment training. Caregiver education will   be offered. Expected length of stay prior to the supervised level of   functional discharge to home with home care is 16 days.    Assessment and Plan:  1.  Acute ischemic stroke-Lovenox/Lipitor  2.  DVT-Lovenox  3.  Hyperlipidemia-on statin  4.  UTI-completed antibiotics/Pyridium  5.  Diabetes-on metformin monitor blood sugar  6.  GI-PPI/bowel regimen  7.  Suspected ovarian cancer status post D&C-biopsy results pending  8.  Pain control-Roxicodone as needed  9.  Asthma-nebulizers as needed  10.  History of Lyme disease-monitor  11.  PT/OT/speech             Case Mgmt: Electronically signed by TRACEY Cuevas on 1/19/2024 at 2:45 PM      OT: Electronically signed by Noa Nagy OT on 1/19/2024 at 3:39 PM     PT:Electronically signed by Jacky Farr PT on 1/19/24 at 2:27 PM CST      RN: Electronically signed by Trini Harris RN on 1/18/2024 at 3:21 PM      ST: Electronically signed by Lindsey Saxena, NORA on 1/19/2024 at 9:58 AM

## 2024-01-27 NOTE — DISCHARGE SUMMARY
Neurology Discharge Summary     Patient Identification:  Pat Sky is a 63 y.o. female.  :  1960  Admit Date:  2024  Discharge date : 2024   Attending Provider: Inocente Bauer MD     Account Number: [id number]                                   Admission Diagnoses:   Right sided weakness [R53.1]  Aphasia [R47.01]  Cognitive deficits [R41.89]  DVT (deep venous thrombosis) (HCC) [I82.409]  Ovarian cancer (HCC) [C56.9]  Acute ischemic stroke (HCC) [I63.9]    Discharge Diagnoses:  Principal Problem:    Acute ischemic stroke (HCC)  Active Problems:    Dysphagia due to recent cerebral infarction    Weakness    Other hyperlipidemia    Type 2 diabetes mellitus with complication, without long-term current use of insulin (HCC)    Acute cystitis without hematuria    Lower abdominal pain    Asthma    Severe malnutrition (HCC)    Palliative care patient    Cancer (HCC)    Acute stroke due to ischemia (HCC)    Uterine mass    Normocytic anemia    Acute deep vein thrombosis (DVT) of right lower extremity (HCC)    Anticoagulation management encounter    Aphasia  Resolved Problems:    * No resolved hospital problems. *      Discharge Medications:    Current Discharge Medication List             Details   HYDROmorphone (DILAUDID) 2 MG tablet Take 1 tablet by mouth every 6 hours as needed for Pain for up to 30 days. Max Daily Amount: 8 mg  Qty: 120 tablet, Refills: 0    Comments: Reduce doses taken as pain becomes manageable  Associated Diagnoses: Endometrial adenocarcinoma (HCC); Acute deep vein thrombosis (DVT) of proximal vein of right lower extremity (HCC); Uterine mass; Acute stroke due to ischemia (HCC); Lower abdominal pain      morphine (MS CONTIN) 15 MG extended release tablet Take 1 tablet by mouth every 12 hours for 30 days. Max Daily Amount: 30 mg  Qty: 60 tablet, Refills: 0    Comments: Reduce doses taken as pain becomes manageable  Associated Diagnoses: Endometrial adenocarcinoma (HCC); 
Surfaces  Reason if not Attempted: Not attempted due to medical condition or safety concerns  CARE Score: 88  Discharge Goal: Not Attempted    1 Step (Curb)  Assistance Needed: Independent  CARE Score: 6  Discharge Goal: Supervision or touching assistance    4 Steps  Assistance Needed: Independent  CARE Score: 6  Discharge Goal: Supervision or touching assistance    12 Steps  Reason if not Attempted: Not attempted due to medical condition or safety concerns  CARE Score: 88  Discharge Goal: Supervision or touching assistance    Wheel 50 Feet with Two Turns  Assistance Needed: Independent  Reason if not Attempted: Not attempted due to medical condition or safety concerns  CARE Score: 6  Discharge Goal: Supervision or touching assistance    Wheel 150 Feet  Assistance Needed: Independent  Reason if not Attempted: Not attempted due to medical condition or safety concerns  CARE Score: 6  Discharge Goal: Supervision or touching assistance      LAST TREATMENT TIME  PT Individual Minutes  Time In: 0900  Time Out: 0945  Minutes: 45

## 2024-01-29 ENCOUNTER — TELEPHONE (OUTPATIENT)
Dept: HEMATOLOGY | Age: 64
End: 2024-01-29

## 2024-01-29 NOTE — TELEPHONE ENCOUNTER
Called pt. to remind them of appointment on 1/31/2024 and had to leave a detailed voicemail with appointment date and time.

## 2024-01-30 ENCOUNTER — TELEPHONE (OUTPATIENT)
Dept: PRIMARY CARE CLINIC | Age: 64
End: 2024-01-30

## 2024-01-30 ENCOUNTER — TELEPHONE (OUTPATIENT)
Dept: INFUSION THERAPY | Age: 64
End: 2024-01-30

## 2024-01-30 ENCOUNTER — CLINICAL DOCUMENTATION (OUTPATIENT)
Dept: HEMATOLOGY | Age: 64
End: 2024-01-30

## 2024-01-30 NOTE — PROGRESS NOTES
Spoke with Surjit at [facility] pathology department who reports that 1/17/24 specimen (vagina) was not sent to [facility] as requested due to insufficient tissue per Dr josé Chase.

## 2024-01-30 NOTE — TELEPHONE ENCOUNTER
[facility]  states Pt canceled HH admission again. Will try tomorrow. Pt states she is not feeling well. Has Diarrhea.

## 2024-01-30 NOTE — PROGRESS NOTES
572, IgM 73  M-spike- not applicable  Ferritin-474.6  Iron level-25  Iron Saturation-11%  TIBC-231    Folic acid-2.6  TSH-3.41   CEA-1.7  CA 19-9-355  -455     Serology collected 12/21/2023  Ferritin     443.6  Iron            19  Iron sat%     9         343  CA 19-9     240  CEA           1.7  B2M           3.9  T protein    6.8  Kappa light chain      92.43  Lambda light chain   74.51  K/L ratio                     1.24  M protein                   negative  IgG                            1398  IgA                               542  IgM                                62           Prothrombotic panel 11/9/2023  Factor V Leiden - negative  Homocysteine - 11  Lupus Anticoag - not detected  Protein C -138  Protein S - 85  Prothrombotic panel 12/21/2023  Lupus anticoagulant                                  not detected  Anticardiolipin Ab IgG                               <10  Anticardiolipin Ab IgM                               <10  Prothrombin gene mutation [id number]      Negative  MTHFR mutation C677T                          Negative  MTHFR mutation [id number]                       Heterozygous     ADIEL 2 and reflex panels                           PENDING     CT SOFT TISSUE NECK WO CONTRAST on 12/5/2023 at [facility]  No suspicious cervical adenopathy.  9 mm right upper lobe pulmonary nodule.   Moderate cervical spine osteoarthritis change      CT CHEST WO CONTRAST DIAGNOSTIC on 12/5/2023 at [facility]  Scattered bilateral pulmonary nodules   8 mm RUL pulmonary nodule  6 mm RUL pulmonary nodule  no mediastinal or hilar lymphadenopathy   No acute or suspicious bony finding.   partially visualized confluent retroperitoneal adenopathy.   Dome of the liver with a 10 mm hypodensity, incompletely characterized   on this exam         CT ABDOMEN PELVIS WO CONTRAST on 12/5/2023 at [facility], compared to 9/14/23  A vague, poorly defined, low-density nodule located posteriorly and superiorly in the dome of the liver was noted in the

## 2024-01-30 NOTE — TELEPHONE ENCOUNTER
Patient called to cancel iron infusion apt for today. She has a stomach bug and watery diarrhea. She knows that she has an apt with Dr. Yeboah tomorrow if she is not better she will call and R/S that apt. Concepcion

## 2024-01-31 ENCOUNTER — TELEPHONE (OUTPATIENT)
Dept: PRIMARY CARE CLINIC | Age: 64
End: 2024-01-31

## 2024-01-31 ENCOUNTER — TELEPHONE (OUTPATIENT)
Dept: HEMATOLOGY | Age: 64
End: 2024-01-31

## 2024-01-31 DIAGNOSIS — C54.1 ENDOMETRIAL ADENOCARCINOMA (HCC): ICD-10-CM

## 2024-01-31 DIAGNOSIS — G89.3 CANCER RELATED PAIN: Primary | ICD-10-CM

## 2024-01-31 NOTE — TELEPHONE ENCOUNTER
Called to reschedule Pat's appointment today with Dr Yeboah. Patient stated she has a stomach bug and is not able to keep appt. I have scheduled her for 2/8 at 8:30. She stated she will call [facility] later in the week to reschedule her injection.

## 2024-02-01 ENCOUNTER — TELEPHONE (OUTPATIENT)
Dept: PALLATIVE CARE | Age: 64
End: 2024-02-01

## 2024-02-01 NOTE — TELEPHONE ENCOUNTER
I spoke the patient about Supportive Care. She declined to be seen. She said she is \"feeling pretty good\". I told her the APRN could help her manage her UTI symptoms but she said she would just follow up with her PCP when she gets them. I can be reached at [phone] if she changes her mind.

## 2024-02-06 ENCOUNTER — TELEPHONE (OUTPATIENT)
Dept: HEMATOLOGY | Age: 64
End: 2024-02-06

## 2024-02-06 NOTE — TELEPHONE ENCOUNTER
Called patient and reminded patient of their appointment on 02/08/2024 and patient confirmed they would be here.

## 2024-02-08 ENCOUNTER — OFFICE VISIT (OUTPATIENT)
Dept: HEMATOLOGY | Age: 64
End: 2024-02-08
Payer: COMMERCIAL

## 2024-02-08 ENCOUNTER — HOSPITAL ENCOUNTER (OUTPATIENT)
Dept: INFUSION THERAPY | Age: 64
Discharge: HOME | End: 2024-02-08
Payer: COMMERCIAL

## 2024-02-08 VITALS
BODY MASS INDEX: 25.99 KG/M2 | SYSTOLIC BLOOD PRESSURE: 128 MMHG | DIASTOLIC BLOOD PRESSURE: 66 MMHG | HEIGHT: 65 IN | TEMPERATURE: 98.8 F | OXYGEN SATURATION: 96 % | WEIGHT: 156 LBS | HEART RATE: 91 BPM

## 2024-02-08 DIAGNOSIS — C54.1 ENDOMETRIAL ADENOCARCINOMA (HCC): ICD-10-CM

## 2024-02-08 DIAGNOSIS — N13.39 OTHER HYDRONEPHROSIS: ICD-10-CM

## 2024-02-08 DIAGNOSIS — R97.8 ABNORMAL TUMOR MARKERS: Primary | ICD-10-CM

## 2024-02-08 DIAGNOSIS — R97.8 ELEVATED TUMOR MARKERS: ICD-10-CM

## 2024-02-08 DIAGNOSIS — D75.839 THROMBOCYTOSIS: ICD-10-CM

## 2024-02-08 DIAGNOSIS — R97.8 ABNORMAL TUMOR MARKERS: ICD-10-CM

## 2024-02-08 DIAGNOSIS — Z71.89 ENCOUNTER TO DISCUSS TREATMENT OPTIONS: ICD-10-CM

## 2024-02-08 LAB
ALBUMIN SERPL-MCNC: 3.4 G/DL (ref 3.5–5.2)
ALP SERPL-CCNC: 327 U/L (ref 35–104)
ALT SERPL-CCNC: 22 U/L (ref 9–52)
ANION GAP SERPL CALCULATED.3IONS-SCNC: 8 MMOL/L (ref 7–19)
AST SERPL-CCNC: 85 U/L (ref 14–36)
BASOPHILS # BLD: 0.07 K/UL (ref 0.01–0.08)
BASOPHILS NFR BLD: 0.8 % (ref 0.1–1.2)
BILIRUB SERPL-MCNC: 0.4 MG/DL (ref 0.2–1.3)
BUN SERPL-MCNC: 15 MG/DL (ref 7–17)
CALCIUM SERPL-MCNC: 9 MG/DL (ref 8.4–10.2)
CANCER AG125 SERPL-ACNC: 571 U/ML (ref 0–38)
CANCER AG19-9 SERPL-ACNC: 385 U/ML (ref 0–35)
CEA SERPL-MCNC: 2.1 NG/ML (ref 0–4.7)
CHLORIDE SERPL-SCNC: 100 MMOL/L (ref 98–111)
CO2 SERPL-SCNC: 28 MMOL/L (ref 22–29)
CREAT SERPL-MCNC: 0.6 MG/DL (ref 0.5–1)
EOSINOPHIL # BLD: 0.1 K/UL (ref 0.04–0.54)
EOSINOPHIL NFR BLD: 1.1 % (ref 0.7–7)
ERYTHROCYTE [DISTWIDTH] IN BLOOD BY AUTOMATED COUNT: 19.6 % (ref 11.7–14.4)
GLOBULIN: 4.4 G/DL
GLUCOSE SERPL-MCNC: 164 MG/DL (ref 74–106)
HCT VFR BLD AUTO: 32.2 % (ref 34.1–44.9)
HGB BLD-MCNC: 9.4 G/DL (ref 11.2–15.7)
LYMPHOCYTES # BLD: 1.89 K/UL (ref 1.18–3.74)
LYMPHOCYTES NFR BLD: 21.1 % (ref 19.3–53.1)
MCH RBC QN AUTO: 21.2 PG (ref 25.6–32.2)
MCHC RBC AUTO-ENTMCNC: 29.2 G/DL (ref 32.3–35.5)
MCV RBC AUTO: 72.7 FL (ref 79.4–94.8)
MONOCYTES # BLD: 0.84 K/UL (ref 0.24–0.82)
MONOCYTES NFR BLD: 9.4 % (ref 4.7–12.5)
NEUTROPHILS # BLD: 5.97 K/UL (ref 1.56–6.13)
NEUTS SEG NFR BLD: 66.6 % (ref 34–71.1)
PLATELET # BLD AUTO: 590 K/UL (ref 182–369)
PMV BLD AUTO: 8.3 FL (ref 7.4–10.4)
POTASSIUM SERPL-SCNC: 4.2 MMOL/L (ref 3.5–5.1)
PROT SERPL-MCNC: 7.8 G/DL (ref 6.3–8.2)
RBC # BLD AUTO: 4.43 M/UL (ref 3.93–5.22)
SODIUM SERPL-SCNC: 136 MMOL/L (ref 137–145)
WBC # BLD AUTO: 8.96 K/UL (ref 3.98–10.04)

## 2024-02-08 PROCEDURE — 85025 COMPLETE CBC W/AUTO DIFF WBC: CPT

## 2024-02-08 PROCEDURE — 80053 COMPREHEN METABOLIC PANEL: CPT

## 2024-02-08 PROCEDURE — 99212 OFFICE O/P EST SF 10 MIN: CPT

## 2024-02-08 PROCEDURE — 99215 OFFICE O/P EST HI 40 MIN: CPT | Performed by: INTERNAL MEDICINE

## 2024-02-08 PROCEDURE — 36415 COLL VENOUS BLD VENIPUNCTURE: CPT

## 2024-02-12 ENCOUNTER — TELEPHONE (OUTPATIENT)
Dept: HEMATOLOGY | Age: 64
End: 2024-02-12

## 2024-02-12 NOTE — TELEPHONE ENCOUNTER
Called patient to remind them of their upcoming appointment on 02/15/2024 unable to speak to patient so I left detailed voicemail with appt. date and time.

## 2024-02-14 NOTE — PROGRESS NOTES
finding (not visualized on  this study but exam is performed without IV contrast)         US PELVIS COMPLETE- 12/26/2023  at [facility], compared to PET/CT on the same day. CT abdomen and pelvis 12/5/2023.   The uterus is enlarged and heterogeneous, measuring 13.6 x 6.1 x 8.1 cm   6.2 x 6.6 x 3.8 cm mass within the endometrial cavity   6.9 x 4.5 x 5.1 cm hypoechoic mass at the lower uterine segment, likely involving the cervix  18 mm solid-appearing nodule in the right ovary (Right ovary measures 3.1 x 2.8 x 2.7 cm )  The left ovary appears within normal limits and measures 2.9 x 2.1 x 2.2 cm.      Laura Campos RN spoke with Pat on 12/29/2023 who reported that she received a message from the office of Dr Lopez GYN-ONC in [place], but had not returned call yet ot make appointment.  Gave instructions to promptly return call to get appointment for evaluation as soon as possible.  Explained results of imaging and Dr Yeboah's recommendation to follow through with ordered referral (order placed 12/21/23) for GYN ONC evaluation.     Pat states that she will call office Tuesday morning and will communicate with this clinic with date of appointment with Dr Lopez so that f/u with Dr Yeboah can be scheduled accordingly (following Dr Lopez's evaluation).   Pat verbalized understanding, then returned to call stating that she had an appointment at Dr Lopez's office on 1/9/2024.       Request to [facility] radiology Dept was made for images (CT Neck,Chest, Abd& Pelvis on 12/5/23 and PET, US pelvis on 12/26/23) to be forwarded to Dr Lopez at [place].      Pat canceled her GI appointment with RIVERA Hooker for colonoscopy evaluation on 1/3/2023   Pat canceled her GYN/Onc appointment with Dr Lopez on 1/9/2023 and states it has been rescheduled for 1/16/2023  Several attempts were made to schedule parenteral iron infusions in our office but she declined to schedule these.      I consulted Dr. Long who is

## 2024-02-16 ENCOUNTER — TELEPHONE (OUTPATIENT)
Dept: HEMATOLOGY | Age: 64
End: 2024-02-16

## 2024-02-16 LAB
CALR EXON 9 MUT ANL BLD/T: NORMAL
CITATION REF LAB TEST: NORMAL
JAK2 GENE MUT ANL BLD/T: NORMAL
JAK2 P.V617F BLD/T QL: NORMAL
LAB DIRECTOR NAME PROVIDER: NORMAL
MPL GENE MUT TESTED MAR: NORMAL
REF LAB TEST METHOD: NORMAL
REFLEX: NORMAL
TEST PERFORMANCE INFO SPEC: NORMAL

## 2024-02-16 NOTE — TELEPHONE ENCOUNTER
Called patient to remind them of their upcoming appointment on 02/19/2024 unable to speak to patient so I left detailed voicemail with appt. date and time.

## 2024-02-19 ENCOUNTER — HOSPITAL ENCOUNTER (OUTPATIENT)
Dept: INFUSION THERAPY | Age: 64
Discharge: HOME | End: 2024-02-19
Payer: COMMERCIAL

## 2024-02-19 ENCOUNTER — OFFICE VISIT (OUTPATIENT)
Dept: HEMATOLOGY | Age: 64
End: 2024-02-19
Payer: COMMERCIAL

## 2024-02-19 VITALS
DIASTOLIC BLOOD PRESSURE: 68 MMHG | SYSTOLIC BLOOD PRESSURE: 108 MMHG | TEMPERATURE: 97.8 F | WEIGHT: 155 LBS | HEIGHT: 65 IN | BODY MASS INDEX: 25.83 KG/M2 | OXYGEN SATURATION: 96 % | HEART RATE: 101 BPM

## 2024-02-19 DIAGNOSIS — C54.1 ENDOMETRIAL ADENOCARCINOMA (HCC): ICD-10-CM

## 2024-02-19 DIAGNOSIS — R97.8 ABNORMAL TUMOR MARKERS: ICD-10-CM

## 2024-02-19 DIAGNOSIS — C53.0 ENDOCERVICAL ADENOCARCINOMA (HCC): Primary | ICD-10-CM

## 2024-02-19 DIAGNOSIS — Z71.89 ENCOUNTER TO DISCUSS TREATMENT OPTIONS: ICD-10-CM

## 2024-02-19 DIAGNOSIS — G89.3 CANCER-RELATED BREAKTHROUGH PAIN: Primary | ICD-10-CM

## 2024-02-19 DIAGNOSIS — C54.1 ENDOMETRIAL ADENOCARCINOMA (HCC): Primary | ICD-10-CM

## 2024-02-19 DIAGNOSIS — I87.8 POOR VENOUS ACCESS: ICD-10-CM

## 2024-02-19 LAB
ALBUMIN SERPL-MCNC: 3.1 G/DL (ref 3.5–5.2)
ALP SERPL-CCNC: 355 U/L (ref 35–104)
ALT SERPL-CCNC: 11 U/L (ref 9–52)
ANION GAP SERPL CALCULATED.3IONS-SCNC: 11 MMOL/L (ref 7–19)
AST SERPL-CCNC: 19 U/L (ref 14–36)
BASOPHILS # BLD: 0.06 K/UL (ref 0.01–0.08)
BASOPHILS NFR BLD: 0.6 % (ref 0.1–1.2)
BILIRUB SERPL-MCNC: 0.4 MG/DL (ref 0.2–1.3)
BUN SERPL-MCNC: 14 MG/DL (ref 7–17)
CALCIUM SERPL-MCNC: 8.9 MG/DL (ref 8.4–10.2)
CANCER AG125 SERPL-ACNC: 601 U/ML (ref 0–38)
CANCER AG19-9 SERPL-ACNC: 405 U/ML (ref 0–35)
CEA SERPL-MCNC: 2.3 NG/ML (ref 0–4.7)
CHLORIDE SERPL-SCNC: 99 MMOL/L (ref 98–111)
CO2 SERPL-SCNC: 26 MMOL/L (ref 22–29)
CREAT SERPL-MCNC: 0.5 MG/DL (ref 0.5–1)
EOSINOPHIL # BLD: 0.1 K/UL (ref 0.04–0.54)
EOSINOPHIL NFR BLD: 1 % (ref 0.7–7)
ERYTHROCYTE [DISTWIDTH] IN BLOOD BY AUTOMATED COUNT: 19.6 % (ref 11.7–14.4)
GLOBULIN: 4.3 G/DL
GLUCOSE SERPL-MCNC: 180 MG/DL (ref 74–106)
HCT VFR BLD AUTO: 31.6 % (ref 34.1–44.9)
HGB BLD-MCNC: 9.1 G/DL (ref 11.2–15.7)
LYMPHOCYTES # BLD: 1.91 K/UL (ref 1.18–3.74)
LYMPHOCYTES NFR BLD: 20 % (ref 19.3–53.1)
MCH RBC QN AUTO: 20.7 PG (ref 25.6–32.2)
MCHC RBC AUTO-ENTMCNC: 28.8 G/DL (ref 32.3–35.5)
MCV RBC AUTO: 72 FL (ref 79.4–94.8)
MONOCYTES # BLD: 0.77 K/UL (ref 0.24–0.82)
MONOCYTES NFR BLD: 8.1 % (ref 4.7–12.5)
NEUTROPHILS # BLD: 6.61 K/UL (ref 1.56–6.13)
NEUTS SEG NFR BLD: 69.4 % (ref 34–71.1)
PLATELET # BLD AUTO: 641 K/UL (ref 182–369)
PMV BLD AUTO: 8.6 FL (ref 7.4–10.4)
POTASSIUM SERPL-SCNC: 3.9 MMOL/L (ref 3.5–5.1)
PROT SERPL-MCNC: 7.4 G/DL (ref 6.3–8.2)
RBC # BLD AUTO: 4.39 M/UL (ref 3.93–5.22)
SODIUM SERPL-SCNC: 136 MMOL/L (ref 137–145)
WBC # BLD AUTO: 9.54 K/UL (ref 3.98–10.04)

## 2024-02-19 PROCEDURE — 99214 OFFICE O/P EST MOD 30 MIN: CPT | Performed by: INTERNAL MEDICINE

## 2024-02-19 PROCEDURE — 36415 COLL VENOUS BLD VENIPUNCTURE: CPT

## 2024-02-19 PROCEDURE — 80053 COMPREHEN METABOLIC PANEL: CPT

## 2024-02-19 PROCEDURE — 85025 COMPLETE CBC W/AUTO DIFF WBC: CPT

## 2024-02-19 PROCEDURE — 99212 OFFICE O/P EST SF 10 MIN: CPT

## 2024-02-19 RX ORDER — OXYCODONE HYDROCHLORIDE AND ACETAMINOPHEN 5; 325 MG/1; MG/1
1 TABLET ORAL EVERY 6 HOURS PRN
Qty: 28 TABLET | Refills: 0 | Status: SHIPPED | OUTPATIENT
Start: 2024-02-19 | End: 2024-03-20

## 2024-02-21 ENCOUNTER — OFFICE VISIT (OUTPATIENT)
Dept: SURGERY | Facility: CLINIC | Age: 64
End: 2024-02-21
Payer: COMMERCIAL

## 2024-02-21 VITALS
WEIGHT: 165 LBS | SYSTOLIC BLOOD PRESSURE: 116 MMHG | BODY MASS INDEX: 27.49 KG/M2 | DIASTOLIC BLOOD PRESSURE: 70 MMHG | HEIGHT: 65 IN

## 2024-02-21 DIAGNOSIS — Z79.01 CHRONIC ANTICOAGULATION: ICD-10-CM

## 2024-02-21 DIAGNOSIS — E66.3 OVERWEIGHT WITH BODY MASS INDEX (BMI) OF 27 TO 27.9 IN ADULT: ICD-10-CM

## 2024-02-21 DIAGNOSIS — C54.1 ENDOMETRIAL ADENOCARCINOMA: Primary | ICD-10-CM

## 2024-02-21 RX ORDER — HYDROMORPHONE HYDROCHLORIDE 2 MG/1
TABLET ORAL
COMMUNITY
Start: 2024-01-26

## 2024-02-21 RX ORDER — LANOLIN ALCOHOL/MO/W.PET/CERES
CREAM (GRAM) TOPICAL
COMMUNITY
Start: 2024-01-26

## 2024-02-21 RX ORDER — MORPHINE SULFATE 15 MG/1
15 TABLET, FILM COATED, EXTENDED RELEASE ORAL
COMMUNITY
Start: 2024-01-26 | End: 2024-02-26

## 2024-02-21 RX ORDER — OXYCODONE HYDROCHLORIDE AND ACETAMINOPHEN 5; 325 MG/1; MG/1
1 TABLET ORAL
COMMUNITY
Start: 2024-02-19 | End: 2024-03-21

## 2024-02-21 RX ORDER — ONDANSETRON 4 MG/1
4 TABLET, ORALLY DISINTEGRATING ORAL
COMMUNITY
Start: 2024-01-26

## 2024-02-21 NOTE — PATIENT INSTRUCTIONS
"Surgery Scheduled: 02/28/24; Arrive @ 8:00  Pre-work: 02/22/24 @ 10:30 (Eat and drink like normal on this day)  NPO after midnight the night before surgery  Check in at the main registration for pre-work and surgery    BENOIT WILL CALL YOU ABOUT THE ELIQUIS AND IF WE NEED TO STOP IT    BMI for Adults  What is BMI?  Body mass index (BMI) is a number that is calculated from a person's weight and height. BMI can help estimate how much of a person's weight is composed of fat. BMI does not measure body fat directly. Rather, it is an alternative to procedures that directly measure body fat, which can be difficult and expensive.  BMI can help identify people who may be at higher risk for certain medical problems.  What are BMI measurements used for?  BMI is used as a screening tool to identify possible weight problems. It helps determine whether a person is obese, overweight, a healthy weight, or underweight.  BMI is useful for:  Identifying a weight problem that may be related to a medical condition or may increase the risk for medical problems.  Promoting changes, such as changes in diet and exercise, to help reach a healthy weight. BMI screening can be repeated to see if these changes are working.  How is BMI calculated?  BMI involves measuring your weight in relation to your height. Both height and weight are measured, and the BMI is calculated from those numbers. This can be done either in English (U.S.) or metric measurements. Note that charts and online BMI calculators are available to help you find your BMI quickly and easily without having to do these calculations yourself.  To calculate your BMI in English (U.S.) measurements:    Measure your weight in pounds (lb).  Multiply the number of pounds by 703.  For example, for a person who weighs 180 lb, multiply that number by 703, which equals 126,540.  Measure your height in inches. Then multiply that number by itself to get a measurement called \"inches " "squared.\"  For example, for a person who is 70 inches tall, the \"inches squared\" measurement is 70 inches x 70 inches, which equals 4,900 inches squared.  Divide the total from step 2 (number of lb x 703) by the total from step 3 (inches squared): 126,540 ÷ 4,900 = 25.8. This is your BMI.    To calculate your BMI in metric measurements:  Measure your weight in kilograms (kg).  Measure your height in meters (m). Then multiply that number by itself to get a measurement called \"meters squared.\"  For example, for a person who is 1.75 m tall, the \"meters squared\" measurement is 1.75 m x 1.75 m, which is equal to 3.1 meters squared.  Divide the number of kilograms (your weight) by the meters squared number. In this example: 70 ÷ 3.1 = 22.6. This is your BMI.  What do the results mean?  BMI charts are used to identify whether you are underweight, normal weight, overweight, or obese. The following guidelines will be used:  Underweight: BMI less than 18.5.  Normal weight: BMI between 18.5 and 24.9.  Overweight: BMI between 25 and 29.9.  Obese: BMI of 30 or above.  Keep these notes in mind:  Weight includes both fat and muscle, so someone with a muscular build, such as an athlete, may have a BMI that is higher than 24.9. In cases like these, BMI is not an accurate measure of body fat.  To determine if excess body fat is the cause of a BMI of 25 or higher, further assessments may need to be done by a health care provider.  BMI is usually interpreted in the same way for men and women.  Where to find more information  For more information about BMI, including tools to quickly calculate your BMI, go to these websites:  Centers for Disease Control and Prevention: www.cdc.gov  American Heart Association: www.heart.org  National Heart, Lung, and Blood [place]: www.nhlbi.nih.gov  Summary  Body mass index (BMI) is a number that is calculated from a person's weight and height.  BMI may help estimate how much of a person's weight is " composed of fat. BMI can help identify those who may be at higher risk for certain medical problems.  BMI can be measured using English measurements or metric measurements.  BMI charts are used to identify whether you are underweight, normal weight, overweight, or obese.  This information is not intended to replace advice given to you by your health care provider. Make sure you discuss any questions you have with your health care provider.  Document Revised: 09/09/2020 Document Reviewed: 07/17/2020  Elsevier Patient Education © 2021 [employer].

## 2024-02-21 NOTE — PROGRESS NOTES
Office New Patient History and Physical:     Referring Provider: John Grullon*    Chief Complaint   Patient presents with    Follow-up     Patient is here to discuss getting a port placed       Subjective .     History of present illness:  Radha Wade is a 63 y.o. female who presents to discuss port for metastatic endometrial adenocarcinoma. She has never had a port before.     She is a non-smoker. BMI is 27. She is on eliquis for history of DVT and stroke. She has never stopped her Eliquis.     Review of Systems    Review of Systems - General ROS: positive for  - fatigue and weight loss  ENT ROS: negative  Respiratory ROS: no cough, shortness of breath, or wheezing  Cardiovascular ROS: no chest pain or dyspnea on exertion  Gastrointestinal ROS: positive for - abdominal pain, appetite loss, and nausea/vomiting  Genito-Urinary ROS: no dysuria, trouble voiding, or hematuria  Dermatological ROS: negative   Breast ROS: negative for breast lumps  Hematological and Lymphatic ROS: negative  Musculoskeletal ROS: negative   Neurological ROS: no TIA or stroke symptoms    Psychological ROS: negative  Endocrine ROS: negative    History  Past Medical History:   Diagnosis Date    Asthma     Bronchitis     Depression     Diabetes mellitus     DVT (deep venous thrombosis)     Heart murmur     Lyme disease     Mitral valve prolapse    ,   Past Surgical History:   Procedure Laterality Date     SECTION      D & C HYSTEROSCOPY ENDOMETRIAL ABLATION N/A 2024    Procedure: DILATATION AND CURETTAGE;  Surgeon: Radu De Los Santos MD;  Location: [facility] OR;  Service: Obstetrics/Gynecology;  Laterality: N/A;   ,   Family History   Problem Relation Age of Onset    Heart disease Father     Diabetes Father     Diabetes Brother     Heart disease Brother     Cancer Maternal Grandmother     Cancer Maternal Grandfather     Heart disease Paternal Grandfather    ,   Social History     Tobacco Use    Smoking status: Never     Smokeless tobacco: Never   Vaping Use    Vaping Use: Never used   Substance Use Topics    Alcohol use: No    Drug use: No   , (Not in a hospital admission)   and Allergies:  Patient has no known allergies.    Current Outpatient Medications:     albuterol sulfate  (90 Base) MCG/ACT inhaler, Inhale 2 puffs Every 4 (Four) Hours As Needed for Wheezing or Shortness of Air., Disp: 1 inhaler, Rfl: 0    apixaban (ELIQUIS) 5 MG tablet tablet, Take 2 tablets by mouth., Disp: , Rfl:     hydrocerin (EUCERIN) cream cream, Apply  topically to the appropriate area as directed., Disp: , Rfl:     HYDROmorphone (DILAUDID) 2 MG tablet, TAKE 1 TABLET BY MOUTH EVERY 6 HOURS AS NEEDED FOR PAIN FOR UP TO 30 DAYS. MAX DAILY AMOUNT: 8 MG, Disp: , Rfl:     Lancets (freestyle) lancets, 1 each by Other route 2 (Two) Times a Day. Use as instructed, Disp: 100 each, Rfl: 0    metFORMIN (GLUCOPHAGE) 500 MG tablet, Take 1 tablet by mouth 2 (Two) Times a Day With Meals., Disp: , Rfl:     ondansetron ODT (ZOFRAN-ODT) 4 MG disintegrating tablet, Take 1 tablet by mouth., Disp: , Rfl:     oxyCODONE-acetaminophen (PERCOCET) 5-325 MG per tablet, Take 1 tablet by mouth., Disp: , Rfl:     polyethylene glycol 17 g packet, Take 17 g by mouth Daily., Disp: , Rfl:     promethazine (PHENERGAN) 25 MG tablet, Take 1 tablet by mouth Every 6 (Six) Hours As Needed for Nausea or Vomiting., Disp: , Rfl:     atorvastatin (LIPITOR) 80 MG tablet, Take 1 tablet by mouth Every Night. (Patient not taking: Reported on 2/21/2024), Disp: , Rfl:     Morphine (MS CONTIN) 15 MG 12 hr tablet, Take 1 tablet by mouth. (Patient not taking: Reported on 2/21/2024), Disp: , Rfl:     oxyCODONE (Roxicodone) 5 MG immediate release tablet, Take 1 tablet by mouth Every 4 (Four) Hours As Needed for Moderate Pain or Severe Pain for up to 18 doses. (Patient not taking: Reported on 2/21/2024), Disp: , Rfl:     pantoprazole (PROTONIX) 40 MG EC tablet, Take 1 tablet by mouth Every Morning.  "(Patient not taking: Reported on 2/21/2024), Disp: , Rfl:     Objective     Vital Signs   /70 (BP Location: Left arm, Patient Position: Sitting, Cuff Size: Adult)   Ht 165.1 cm (65\")   Wt 74.8 kg (165 lb)   LMP  (LMP Unknown)   BMI 27.46 kg/m²      Physical Exam:  General appearance - alert, well appearing, and in no distress  Mental status - alert, oriented to person, place, and time  Eyes - pupils equal and reactive, extraocular eye movements intact  Neck - supple, no significant adenopathy  Chest - no tachypnea, retractions or cyanosis  Heart - normal rate and regular rhythm  Abdomen - soft, nontender, nondistended, no masses or organomegaly  Neurological - alert, oriented, normal speech, no focal findings or movement disorder noted    Results Review:     The following data was reviewed by: Lizy Wade MD on 02/21/2024:  NM PET/CT Skull Base to Mid Thigh (12/26/2023 10:55)   Office note from Dr. Grullon 2/9/24.       Assessment & Plan       Diagnoses and all orders for this visit:    1. Endometrial adenocarcinoma (Primary)  -     Case Request; Standing  -     MRSA Screen Culture (Outpatient) - Swab, Nares; Future  -     XR chest 1 vw; Future  -     ECG 12 Lead; Future  -     ceFAZolin (ANCEF) 2,000 mg in sodium chloride 0.9 % 100 mL IVPB  -     CBC & Differential; Future  -     Comprehensive Metabolic Panel; Future  -     Case Request    2. Overweight with body mass index (BMI) of 27 to 27.9 in adult    3. Chronic anticoagulation    Other orders  -     Follow Anesthesia Guidelines / Protocol; Future  -     Follow Anesthesia Guidelines / Protocol; Standing  -     Verify / Perform Chlorhexidine Skin Prep; Standing  -     Verify / Perform Chlorhexidine Skin Prep if Indicated (If Not Already Completed); Standing  -     Obtain Informed Consent; Future  -     Provide NPO Instructions to Patient; Future  -     Chlorhexidine Skin Prep; Future  -     Notify physician (specify); Standing  -     " Instructions on coughing, deep breathing, and incentive spirometry.; Standing  -     Oxygen Therapy-; Standing         Radha Wade is a 63 y.o. female with a need for port placement for metastatic endometrial cancer. After a discussion of risks (including bleeding, port infection with need for removal, damage to surrounding structures including the arteries, pneumothorax and possible port malfunction) and benefits, the patient wishes to proceed with single lumen port placement with fluoroscopy. The patient is currently scheduled for this procedure on 3/5/24. She is on Eliquis for history of DVT. My office will reach out Dr. Grullon's office to ask for advice on whether she can hold her eliquis x 48 hours or if she needs to bridge with lovenox.     This is a life threatening diagnosis. I have reviewed the note and PET above. I have ordered cbc, cmp, cxr and EKG. She is at increased risk of perioperative complications 2/2 her elevated BMI, chronic anticoagulation, and cancer diagnosis.     I also discussed with the patient post-operative pain management including multimodal pain control utilizing Tylenol, ibuprofen, and tramadol for breakthrough pain. I will plan to given the patient 5 tabs of 50mg Ultram post-operatively for break through pain.       Lizy Wade MD  02/21/24  13:53 CST

## 2024-02-21 NOTE — H&P (VIEW-ONLY)
Office New Patient History and Physical:     Referring Provider: John Grullon*    Chief Complaint   Patient presents with    Follow-up     Patient is here to discuss getting a port placed       Subjective .     History of present illness:  Radha Wade is a 63 y.o. female who presents to discuss port for metastatic endometrial adenocarcinoma. She has never had a port before.     She is a non-smoker. BMI is 27. She is on eliquis for history of DVT and stroke. She has never stopped her Eliquis.     Review of Systems    Review of Systems - General ROS: positive for  - fatigue and weight loss  ENT ROS: negative  Respiratory ROS: no cough, shortness of breath, or wheezing  Cardiovascular ROS: no chest pain or dyspnea on exertion  Gastrointestinal ROS: positive for - abdominal pain, appetite loss, and nausea/vomiting  Genito-Urinary ROS: no dysuria, trouble voiding, or hematuria  Dermatological ROS: negative   Breast ROS: negative for breast lumps  Hematological and Lymphatic ROS: negative  Musculoskeletal ROS: negative   Neurological ROS: no TIA or stroke symptoms    Psychological ROS: negative  Endocrine ROS: negative    History  Past Medical History:   Diagnosis Date    Asthma     Bronchitis     Depression     Diabetes mellitus     DVT (deep venous thrombosis)     Heart murmur     Lyme disease     Mitral valve prolapse    ,   Past Surgical History:   Procedure Laterality Date     SECTION      D & C HYSTEROSCOPY ENDOMETRIAL ABLATION N/A 2024    Procedure: DILATATION AND CURETTAGE;  Surgeon: Radu De Los Santos MD;  Location: [facility] OR;  Service: Obstetrics/Gynecology;  Laterality: N/A;   ,   Family History   Problem Relation Age of Onset    Heart disease Father     Diabetes Father     Diabetes Brother     Heart disease Brother     Cancer Maternal Grandmother     Cancer Maternal Grandfather     Heart disease Paternal Grandfather    ,   Social History     Tobacco Use    Smoking status: Never     Smokeless tobacco: Never   Vaping Use    Vaping Use: Never used   Substance Use Topics    Alcohol use: No    Drug use: No   , (Not in a hospital admission)   and Allergies:  Patient has no known allergies.    Current Outpatient Medications:     albuterol sulfate  (90 Base) MCG/ACT inhaler, Inhale 2 puffs Every 4 (Four) Hours As Needed for Wheezing or Shortness of Air., Disp: 1 inhaler, Rfl: 0    apixaban (ELIQUIS) 5 MG tablet tablet, Take 2 tablets by mouth., Disp: , Rfl:     hydrocerin (EUCERIN) cream cream, Apply  topically to the appropriate area as directed., Disp: , Rfl:     HYDROmorphone (DILAUDID) 2 MG tablet, TAKE 1 TABLET BY MOUTH EVERY 6 HOURS AS NEEDED FOR PAIN FOR UP TO 30 DAYS. MAX DAILY AMOUNT: 8 MG, Disp: , Rfl:     Lancets (freestyle) lancets, 1 each by Other route 2 (Two) Times a Day. Use as instructed, Disp: 100 each, Rfl: 0    metFORMIN (GLUCOPHAGE) 500 MG tablet, Take 1 tablet by mouth 2 (Two) Times a Day With Meals., Disp: , Rfl:     ondansetron ODT (ZOFRAN-ODT) 4 MG disintegrating tablet, Take 1 tablet by mouth., Disp: , Rfl:     oxyCODONE-acetaminophen (PERCOCET) 5-325 MG per tablet, Take 1 tablet by mouth., Disp: , Rfl:     polyethylene glycol 17 g packet, Take 17 g by mouth Daily., Disp: , Rfl:     promethazine (PHENERGAN) 25 MG tablet, Take 1 tablet by mouth Every 6 (Six) Hours As Needed for Nausea or Vomiting., Disp: , Rfl:     atorvastatin (LIPITOR) 80 MG tablet, Take 1 tablet by mouth Every Night. (Patient not taking: Reported on 2/21/2024), Disp: , Rfl:     Morphine (MS CONTIN) 15 MG 12 hr tablet, Take 1 tablet by mouth. (Patient not taking: Reported on 2/21/2024), Disp: , Rfl:     oxyCODONE (Roxicodone) 5 MG immediate release tablet, Take 1 tablet by mouth Every 4 (Four) Hours As Needed for Moderate Pain or Severe Pain for up to 18 doses. (Patient not taking: Reported on 2/21/2024), Disp: , Rfl:     pantoprazole (PROTONIX) 40 MG EC tablet, Take 1 tablet by mouth Every Morning.  "(Patient not taking: Reported on 2/21/2024), Disp: , Rfl:     Objective     Vital Signs   /70 (BP Location: Left arm, Patient Position: Sitting, Cuff Size: Adult)   Ht 165.1 cm (65\")   Wt 74.8 kg (165 lb)   LMP  (LMP Unknown)   BMI 27.46 kg/m²      Physical Exam:  General appearance - alert, well appearing, and in no distress  Mental status - alert, oriented to person, place, and time  Eyes - pupils equal and reactive, extraocular eye movements intact  Neck - supple, no significant adenopathy  Chest - no tachypnea, retractions or cyanosis  Heart - normal rate and regular rhythm  Abdomen - soft, nontender, nondistended, no masses or organomegaly  Neurological - alert, oriented, normal speech, no focal findings or movement disorder noted    Results Review:     The following data was reviewed by: Lizy Wade MD on 02/21/2024:  NM PET/CT Skull Base to Mid Thigh (12/26/2023 10:55)   Office note from Dr. Grullon 2/9/24.       Assessment & Plan       Diagnoses and all orders for this visit:    1. Endometrial adenocarcinoma (Primary)  -     Case Request; Standing  -     MRSA Screen Culture (Outpatient) - Swab, Nares; Future  -     XR chest 1 vw; Future  -     ECG 12 Lead; Future  -     ceFAZolin (ANCEF) 2,000 mg in sodium chloride 0.9 % 100 mL IVPB  -     CBC & Differential; Future  -     Comprehensive Metabolic Panel; Future  -     Case Request    2. Overweight with body mass index (BMI) of 27 to 27.9 in adult    3. Chronic anticoagulation    Other orders  -     Follow Anesthesia Guidelines / Protocol; Future  -     Follow Anesthesia Guidelines / Protocol; Standing  -     Verify / Perform Chlorhexidine Skin Prep; Standing  -     Verify / Perform Chlorhexidine Skin Prep if Indicated (If Not Already Completed); Standing  -     Obtain Informed Consent; Future  -     Provide NPO Instructions to Patient; Future  -     Chlorhexidine Skin Prep; Future  -     Notify physician (specify); Standing  -     " Instructions on coughing, deep breathing, and incentive spirometry.; Standing  -     Oxygen Therapy-; Standing         Radha Wade is a 63 y.o. female with a need for port placement for metastatic endometrial cancer. After a discussion of risks (including bleeding, port infection with need for removal, damage to surrounding structures including the arteries, pneumothorax and possible port malfunction) and benefits, the patient wishes to proceed with single lumen port placement with fluoroscopy. The patient is currently scheduled for this procedure on 3/5/24. She is on Eliquis for history of DVT. My office will reach out Dr. Grullon's office to ask for advice on whether she can hold her eliquis x 48 hours or if she needs to bridge with lovenox.     This is a life threatening diagnosis. I have reviewed the note and PET above. I have ordered cbc, cmp, cxr and EKG. She is at increased risk of perioperative complications 2/2 her elevated BMI, chronic anticoagulation, and cancer diagnosis.     I also discussed with the patient post-operative pain management including multimodal pain control utilizing Tylenol, ibuprofen, and tramadol for breakthrough pain. I will plan to given the patient 5 tabs of 50mg Ultram post-operatively for break through pain.       Lizy Wade MD  02/21/24  13:53 CST

## 2024-02-22 ENCOUNTER — HOSPITAL ENCOUNTER (OUTPATIENT)
Dept: GENERAL RADIOLOGY | Facility: HOSPITAL | Age: 64
Discharge: HOME | End: 2024-02-22
Payer: COMMERCIAL

## 2024-02-22 ENCOUNTER — CLINICAL DOCUMENTATION (OUTPATIENT)
Dept: HEMATOLOGY | Age: 64
End: 2024-02-22

## 2024-02-22 ENCOUNTER — PRE-ADMISSION TESTING (OUTPATIENT)
Dept: PREADMISSION TESTING | Facility: HOSPITAL | Age: 64
End: 2024-02-22
Payer: COMMERCIAL

## 2024-02-22 VITALS
OXYGEN SATURATION: 96 % | BODY MASS INDEX: 32.18 KG/M2 | RESPIRATION RATE: 18 BRPM | SYSTOLIC BLOOD PRESSURE: 114 MMHG | WEIGHT: 159.61 LBS | DIASTOLIC BLOOD PRESSURE: 44 MMHG | HEART RATE: 95 BPM | HEIGHT: 59 IN

## 2024-02-22 DIAGNOSIS — Z01.818 PREOP EXAMINATION: Primary | ICD-10-CM

## 2024-02-22 DIAGNOSIS — C54.1 ENDOMETRIAL ADENOCARCINOMA: ICD-10-CM

## 2024-02-22 DIAGNOSIS — Z79.01 ENCOUNTER FOR MONITORING BRIDGING ANTICOAGULATION THERAPY: ICD-10-CM

## 2024-02-22 DIAGNOSIS — Z51.81 ENCOUNTER FOR MONITORING BRIDGING ANTICOAGULATION THERAPY: ICD-10-CM

## 2024-02-22 LAB
ALBUMIN SERPL-MCNC: 2.8 G/DL (ref 3.5–5.2)
ALBUMIN/GLOB SERPL: 0.6 G/DL
ALP SERPL-CCNC: 328 U/L (ref 39–117)
ALT SERPL W P-5'-P-CCNC: 8 U/L (ref 1–33)
ANION GAP SERPL CALCULATED.3IONS-SCNC: 10 MMOL/L (ref 5–15)
AST SERPL-CCNC: 11 U/L (ref 1–32)
BASOPHILS # BLD AUTO: 0.05 10*3/MM3 (ref 0–0.2)
BASOPHILS NFR BLD AUTO: 0.5 % (ref 0–1.5)
BILIRUB SERPL-MCNC: 0.3 MG/DL (ref 0–1.2)
BUN SERPL-MCNC: 13 MG/DL (ref 8–23)
BUN/CREAT SERPL: 21.7 (ref 7–25)
CALCIUM SPEC-SCNC: 9 MG/DL (ref 8.6–10.5)
CHLORIDE SERPL-SCNC: 96 MMOL/L (ref 98–107)
CO2 SERPL-SCNC: 28 MMOL/L (ref 22–29)
CREAT SERPL-MCNC: 0.6 MG/DL (ref 0.57–1)
DEPRECATED RDW RBC AUTO: 53.5 FL (ref 37–54)
EGFRCR SERPLBLD CKD-EPI 2021: 101 ML/MIN/1.73
EOSINOPHIL # BLD AUTO: 0.1 10*3/MM3 (ref 0–0.4)
EOSINOPHIL NFR BLD AUTO: 1 % (ref 0.3–6.2)
ERYTHROCYTE [DISTWIDTH] IN BLOOD BY AUTOMATED COUNT: 20.2 % (ref 12.3–15.4)
GLOBULIN UR ELPH-MCNC: 4.7 GM/DL
GLUCOSE SERPL-MCNC: 179 MG/DL (ref 65–99)
HCT VFR BLD AUTO: 33.4 % (ref 34–46.6)
HGB BLD-MCNC: 9.5 G/DL (ref 12–15.9)
LYMPHOCYTES # BLD AUTO: 1.66 10*3/MM3 (ref 0.7–3.1)
LYMPHOCYTES NFR BLD AUTO: 15.9 % (ref 19.6–45.3)
MCH RBC QN AUTO: 21 PG (ref 26.6–33)
MCHC RBC AUTO-ENTMCNC: 28.4 G/DL (ref 31.5–35.7)
MCV RBC AUTO: 73.7 FL (ref 79–97)
MONOCYTES # BLD AUTO: 0.89 10*3/MM3 (ref 0.1–0.9)
MONOCYTES NFR BLD AUTO: 8.5 % (ref 5–12)
NEUTROPHILS NFR BLD AUTO: 7.61 10*3/MM3 (ref 1.7–7)
NEUTROPHILS NFR BLD AUTO: 72.8 % (ref 42.7–76)
PLATELET # BLD AUTO: 673 10*3/MM3 (ref 140–450)
PMV BLD AUTO: 8.8 FL (ref 6–12)
POTASSIUM SERPL-SCNC: 4.4 MMOL/L (ref 3.5–5.2)
PROT SERPL-MCNC: 7.5 G/DL (ref 6–8.5)
RBC # BLD AUTO: 4.53 10*6/MM3 (ref 3.77–5.28)
SODIUM SERPL-SCNC: 134 MMOL/L (ref 136–145)
WBC NRBC COR # BLD AUTO: 10.45 10*3/MM3 (ref 3.4–10.8)

## 2024-02-22 PROCEDURE — 71045 X-RAY EXAM CHEST 1 VIEW: CPT

## 2024-02-22 PROCEDURE — 80053 COMPREHEN METABOLIC PANEL: CPT

## 2024-02-22 PROCEDURE — 36415 COLL VENOUS BLD VENIPUNCTURE: CPT

## 2024-02-22 PROCEDURE — 85025 COMPLETE CBC W/AUTO DIFF WBC: CPT

## 2024-02-22 PROCEDURE — 87081 CULTURE SCREEN ONLY: CPT

## 2024-02-22 PROCEDURE — 93005 ELECTROCARDIOGRAM TRACING: CPT

## 2024-02-22 RX ORDER — ENOXAPARIN SODIUM 100 MG/ML
60 INJECTION SUBCUTANEOUS SEE ADMIN INSTRUCTIONS
Qty: 3 EACH | Refills: 0 | Status: SHIPPED | OUTPATIENT
Start: 2024-02-22

## 2024-02-22 NOTE — DISCHARGE INSTRUCTIONS
Before you come to the hospital        Arrival time: AS DIRECTED BY OFFICE     YOU MAY TAKE THE FOLLOWING MEDICATION(S) THE MORNING OF SURGERY WITH A SIP OF WATER: Dilaudid           ALL OTHER HOME MEDICATION CHECK WITH YOUR PHYSICIAN (especially if   you are taking diabetes medicines or blood thinners)    Do not take any Erectile Dysfunction medications (EX: CIALIS, VIAGRA) 24 hours prior to surgery.      If you were given and instructed to use a germ- killing soap, use as directed the night before surgery and again the morning of surgery or as directed by your surgeon. (Use one-half of the bottle with each shower.)   See attached information for How to Use Chlorhexidine for Bathing if applicable.            Eating and drinking restrictions prior to scheduled arrival time    2 Hours before arrival time STOP   Drinking Clear liquids (water, black coffee-NO CREAM,  apple juice-no pulp)    Clear Liquids    Water and flavored water                                                                      Clear Fruit juices, such as cranberry juice and apple juice.  Black coffee (NO cream of any kind, including powdered).  Plain tea  Clear bouillon or broth.  Flavored gelatin.  Soda.  Gatorade or Powerade.    8 Hours before arrival time STOP   All food (includes candy, gum and mints), full liquids, and dairy products  Full liquid examples  Juices that have pulp.  Frozen ice pops that contain fruit pieces.  Coffee with creamer  Milk.  Yogurt.    (It is extremely important that you follow these guidelines to prevent delay or cancelation of your procedure)                       MANAGING PAIN AFTER SURGERY    We know you are probably wondering what your pain will be like after surgery.  Following surgery it is unrealistic to expect you will not have pain.   Pain is how our bodies let us know that something is wrong or cautions us to be careful.  That said, our goal is to make your pain tolerable.    Methods we may use to treat  your pain include (oral or IV medications, PCAs, epidurals, nerve blocks, etc.)   While some procedures require IV pain medications for a short time after surgery, transitioning to pain medications by mouth allows for better management of pain.   Your nurse will encourage you to take oral pain medications whenever possible.  IV medications work almost immediately, but only last a short while.  Taking medications by mouth allows for a more constant level of medication in your blood stream for a longer period of time.      Once your pain is out of control it is harder to get back under control.  It is important you are aware when your next dose of pain medication is due.  If you are admitted, your nurse may write the time of your next dose on the white board in your room to help you remember.      We are interested in your pain and encourage you to inform us about aggravating factors during your visit.   Many times a simple repositioning every few hours can make a big difference.    If your physician says it is okay, do not let your pain prevent you from getting out of bed. Be sure to call your nurse for assistance prior to getting up so you do not fall.      Before surgery, please decide your tolerable pain goal.  These faces help describe the pain ratings we use on a 0-10 scale.   Be prepared to tell us your goal and whether or not you take pain or anxiety medications at home.          Preparing for Surgery  Preparing for surgery is an important part of your care. It can make things go more smoothly and help you avoid complications. The steps leading up to surgery may vary among hospitals. Follow all instructions given to you by your health care providers. Ask questions if you do not understand something. Talk about any concerns that you have.  Here are some questions to consider asking before your surgery:  If my surgery is not an emergency (is elective), when would be the best time to have the surgery?  What  arrangements do I need to make for work, home, or school?  What will my recovery be like? How long will it be before I can return to normal activities?  Will I need to prepare my home? Will I need to arrange care for me or my children?  Should I expect to have pain after surgery? What are my pain management options? Are there nonmedical options that I can try for pain?  Tell a health care provider about:  Any allergies you have.  All medicines you are taking, including vitamins, herbs, eye drops, creams, and over-the-counter medicines.  Any problems you or family members have had with anesthetic medicines.  Any blood disorders you have.  Any surgeries you have had.  Any medical conditions you have.  Whether you are pregnant or may be pregnant.  What are the risks?  The risks and complications of surgery depend on the specific procedure that you have. Discuss all the risks with your health care providers before your surgery. Ask about common surgical complications, which may include:  Infection.  Bleeding or a need for blood replacement (transfusion).  Allergic reactions to medicines.  Damage to surrounding nerves, tissues, or structures.  A blood clot.  Scarring.  Failure of the surgery to correct the problem.  Follow these instructions before the procedure:  Several days or weeks before your procedure  You may have a physical exam by your primary health care provider to make sure it is safe for you to have surgery.  You may have testing. This may include a chest X-ray, blood and urine tests, electrocardiogram (ECG), or other testing.  Ask your health care provider about:  Changing or stopping your regular medicines. This is especially important if you are taking diabetes medicines or blood thinners.  Taking medicines such as aspirin and ibuprofen. These medicines can thin your blood. Do not take these medicines unless your health care provider tells you to take them.  Taking over-the-counter medicines, vitamins,  herbs, and supplements.  Do not use any products that contain nicotine or tobacco, such as cigarettes and e-cigarettes. If you need help quitting, ask your health care provider.  Avoid alcohol.  Ask your health care provider if there are exercises you can do to prepare for surgery.  Eat a healthy diet.   Plan to have someone 18 years of age or older to take you home from the hospital. We will need to verify your ride on the morning of surgery if you are being discharged home on the same day. Tell your ride to be expecting a call from the hospital prior to your procedure.   Plan to have a responsible adult care for you for at least 24 hours after you leave the hospital or clinic. This is important.  The day before your procedure  You may be given antibiotic medicine to take by mouth to help prevent infection. Take it as told by your health care provider.  You may be asked to shower with a germ-killing soap.  Follow instructions from your health care provider about eating and drinking restrictions.   Pack comfortable clothes according to your procedure.   The day of your procedure  You may need to take another shower with a germ-killing soap before you leave home in the morning.  With a small sip of water, take only the medicines that you are told to take.  Remove all jewelry including rings.   Leave anything you consider valuable at home except hearing aids if needed.  You do not need to bring your home medications into the hospital.   Do not wear any makeup, nail polish, powder, deodorant, lotion, hair accessories, or anything on your skin or body except your clothes.  If you will be staying in the hospital, bring a case to hold your glasses, contacts, or dentures. You may also want to bring your robe and non-skid footwear.       (Do not use denture adhesives since you will be asked to remove them during  surgery).   If you wear oxygen at home, bring it with you the day of surgery.  If instructed by your health care  provider, bring your sleep apnea device with you on the day of your surgery (if this applies to you).  You may want to leave your suitcase and sleep apnea device in the car until after surgery.   Arrive at the hospital as scheduled.  Bring a friend or family member with you who can help to answer questions and be present while you meet with your health care provider.  At the hospital  When you arrive at the hospital:  Go to registration located at the main entrance of the hospital. You will be registered and given a beeper and a sticker sheet. Take the stickers to the Outpatient nurses desk and place in the black tray. This is to notify staff that you have arrived. Then return to the lobby to wait.   When your beeper lights up and vibrates proceed through the double doors, under the stairs, and a member of the Outpatient Surgery staff will escort you to your preoperative room.  You may have to wear compression sleeves. These help to prevent blood clots and reduce swelling in your legs.  An IV may be inserted into one of your veins.              In the operating room, you may be given one or more of the following:        A medicine to help you relax (sedative).        A medicine to numb the area (local anesthetic).        A medicine to make you fall asleep (general anesthetic).        A medicine that is injected into an area of your body to numb everything below the                      injection site (regional anesthetic).  You may be given an antibiotic through your IV to help prevent infection.  Your surgical site will be marked or identified.    Contact a health care provider if you:  Develop a fever of more than 100.4°F (38°C) or other feelings of illness during the 48 hours before your surgery.  Have symptoms that get worse.  Have questions or concerns about your surgery.  Summary  Preparing for surgery can make the procedure go more smoothly and lower your risk of complications.  Before surgery, make a list of  questions and concerns to discuss with your surgeon. Ask about the risks and possible complications.  In the days or weeks before your surgery, follow all instructions from your health care provider. You may need to stop smoking, avoid alcohol, follow eating restrictions, and change or stop your regular medicines.  Contact your surgeon if you develop a fever or other signs of illness during the few days before your surgery.  This information is not intended to replace advice given to you by your health care provider. Make sure you discuss any questions you have with your health care provider.  Document Revised: 12/21/2018 Document Reviewed: 10/23/2018  [email or web address] Patient Education © 2021 [email or web address]           How to Use Chlorhexidine Before Surgery  Chlorhexidine gluconate (CHG) is a germ-killing (antiseptic) solution that is used to clean the skin. It can get rid of the bacteria that normally live on the skin and can keep them away for about 24 hours. To clean your skin with CHG, you may be given:  A CHG solution to use in the shower or as part of a sponge bath.  A prepackaged cloth that contains CHG.  Cleaning your skin with CHG may help lower the risk for infection:  While you are staying in the intensive care unit of the hospital.  If you have a vascular access, such as a central line, to provide short-term or long-term access to your veins.  If you have a catheter to drain urine from your bladder.  If you are on a ventilator. A ventilator is a machine that helps you breathe by moving air in and out of your lungs.  After surgery.  What are the risks?  Risks of using CHG include:  A skin reaction.  Hearing loss, if CHG gets in your ears and you have a perforated eardrum.  Eye injury, if CHG gets in your eyes and is not rinsed out.  The CHG product catching fire.  Make sure that you avoid smoking and flames after applying CHG to your skin.  Do not use CHG:  If you have a chlorhexidine allergy or have previously  reacted to chlorhexidine.  On babies younger than 2 months of age.  How to use CHG solution  Use CHG only as told by your health care provider, and follow the instructions on the label.  Use the full amount of CHG as directed. Usually, this is one bottle.  During a shower    Follow these steps when using CHG solution during a shower (unless your health care provider gives you different instructions):  Start the shower.  Use your normal soap and shampoo to wash your face and hair.  Turn off the shower or move out of the shower stream.  Pour the CHG onto a clean washcloth. Do not use any type of brush or rough-edged sponge.  Starting at your neck, lather your body down to your toes. Make sure you follow these instructions:  If you will be having surgery, pay special attention to the part of your body where you will be having surgery. Scrub this area for at least 1 minute.  Do not use CHG on your head or face. If the solution gets into your ears or eyes, rinse them well with water.  Avoid your genital area.  Avoid any areas of skin that have broken skin, cuts, or scrapes.  Scrub your back and under your arms. Make sure to wash skin folds.  Let the lather sit on your skin for 1-2 minutes or as long as told by your health care provider.  Thoroughly rinse your entire body in the shower. Make sure that all body creases and crevices are rinsed well.  Dry off with a clean towel. Do not put any substances on your body afterward--such as powder, lotion, or perfume--unless you are told to do so by your health care provider. Only use lotions that are recommended by the .  Put on clean clothes or pajamas.  If it is the night before your surgery, sleep in clean sheets.     During a sponge bath  Follow these steps when using CHG solution during a sponge bath (unless your health care provider gives you different instructions):  Use your normal soap and shampoo to wash your face and hair.  Pour the CHG onto a clean  washcloth.  Starting at your neck, lather your body down to your toes. Make sure you follow these instructions:  If you will be having surgery, pay special attention to the part of your body where you will be having surgery. Scrub this area for at least 1 minute.  Do not use CHG on your head or face. If the solution gets into your ears or eyes, rinse them well with water.  Avoid your genital area.  Avoid any areas of skin that have broken skin, cuts, or scrapes.  Scrub your back and under your arms. Make sure to wash skin folds.  Let the lather sit on your skin for 1-2 minutes or as long as told by your health care provider.  Using a different clean, wet washcloth, thoroughly rinse your entire body. Make sure that all body creases and crevices are rinsed well.  Dry off with a clean towel. Do not put any substances on your body afterward--such as powder, lotion, or perfume--unless you are told to do so by your health care provider. Only use lotions that are recommended by the .  Put on clean clothes or pajamas.  If it is the night before your surgery, sleep in clean sheets.  How to use CHG prepackaged cloths  Only use CHG cloths as told by your health care provider, and follow the instructions on the label.  Use the CHG cloth on clean, dry skin.  Do not use the CHG cloth on your head or face unless your health care provider tells you to.  When washing with the CHG cloth:  Avoid your genital area.  Avoid any areas of skin that have broken skin, cuts, or scrapes.  Before surgery    Follow these steps when using a CHG cloth to clean before surgery (unless your health care provider gives you different instructions):  Using the CHG cloth, vigorously scrub the part of your body where you will be having surgery. Scrub using a back-and-forth motion for 3 minutes. The area on your body should be completely wet with CHG when you are done scrubbing.  Do not rinse. Discard the cloth and let the area air-dry. Do not  put any substances on the area afterward, such as powder, lotion, or perfume.  Put on clean clothes or pajamas.  If it is the night before your surgery, sleep in clean sheets.     For general bathing  Follow these steps when using CHG cloths for general bathing (unless your health care provider gives you different instructions).  Use a separate CHG cloth for each area of your body. Make sure you wash between any folds of skin and between your fingers and toes. Wash your body in the following order, switching to a new cloth after each step:  The front of your neck, shoulders, and chest.  Both of your arms, under your arms, and your hands.  Your stomach and groin area, avoiding the genitals.  Your right leg and foot.  Your left leg and foot.  The back of your neck, your back, and your buttocks.  Do not rinse. Discard the cloth and let the area air-dry. Do not put any substances on your body afterward--such as powder, lotion, or perfume--unless you are told to do so by your health care provider. Only use lotions that are recommended by the .  Put on clean clothes or pajamas.  Contact a health care provider if:  Your skin gets irritated after scrubbing.  You have questions about using your solution or cloth.  You swallow any chlorhexidine. Call your local poison control center (1-715.768.6136 in the [place]  Get help right away if:  Your eyes itch badly, or they become very red or swollen.  Your skin itches badly and is red or swollen.  Your hearing changes.  You have trouble seeing.  You have swelling or tingling in your mouth or throat.  You have trouble breathing.  These symptoms may represent a serious problem that is an emergency. Do not wait to see if the symptoms will go away. Get medical help right away. Call your local emergency services (510 in the [place] Do not drive yourself to the hospital.  Summary  Chlorhexidine gluconate (CHG) is a germ-killing (antiseptic) solution that is used to clean the  skin. Cleaning your skin with CHG may help to lower your risk for infection.  You may be given CHG to use for bathing. It may be in a bottle or in a prepackaged cloth to use on your skin. Carefully follow your health care provider's instructions and the instructions on the product label.  Do not use CHG if you have a chlorhexidine allergy.  Contact your health care provider if your skin gets irritated after scrubbing.  This information is not intended to replace advice given to you by your health care provider. Make sure you discuss any questions you have with your health care provider.  Document Revised: 04/17/2023 Document Reviewed: 02/28/2022  [employer] Patient Education © 2023 [employer]

## 2024-02-22 NOTE — TELEPHONE ENCOUNTER
Medial clearance provided to Dr Trini Sky  (see attached) and instructions provided to patient for Lovenox bridging prior to planned port placement scheduled 2/26/2024.    Pat understands to HOLD ELIQUIS 2/25/24 PM dose; START LOVENOX 60mg BID on 2/26/24 AM & PM through 2/27/24 AM ; Resume Eliquis BID 2/29/24 AM.    Rx Lovenox 60mg SQ #3 sent to [facility].

## 2024-02-23 LAB — MRSA SPEC QL CULT: NORMAL

## 2024-02-25 LAB
QT INTERVAL: 364 MS
QTC INTERVAL: 438 MS

## 2024-02-28 ENCOUNTER — APPOINTMENT (OUTPATIENT)
Dept: GENERAL RADIOLOGY | Facility: HOSPITAL | Age: 64
End: 2024-02-28
Payer: COMMERCIAL

## 2024-02-28 ENCOUNTER — APPOINTMENT (OUTPATIENT)
Dept: CT IMAGING | Facility: HOSPITAL | Age: 64
End: 2024-02-28
Payer: COMMERCIAL

## 2024-02-28 ENCOUNTER — APPOINTMENT (OUTPATIENT)
Dept: NUCLEAR MEDICINE | Facility: HOSPITAL | Age: 64
End: 2024-02-28
Payer: COMMERCIAL

## 2024-02-28 ENCOUNTER — ANESTHESIA (OUTPATIENT)
Dept: PERIOP | Facility: HOSPITAL | Age: 64
End: 2024-02-28
Payer: COMMERCIAL

## 2024-02-28 ENCOUNTER — HOSPITAL ENCOUNTER (OUTPATIENT)
Facility: HOSPITAL | Age: 64
Setting detail: OBSERVATION
Discharge: HOME | End: 2024-03-01
Attending: STUDENT IN AN ORGANIZED HEALTH CARE EDUCATION/TRAINING PROGRAM | Admitting: FAMILY MEDICINE
Payer: COMMERCIAL

## 2024-02-28 ENCOUNTER — ANESTHESIA EVENT (OUTPATIENT)
Dept: PERIOP | Facility: HOSPITAL | Age: 64
End: 2024-02-28
Payer: COMMERCIAL

## 2024-02-28 ENCOUNTER — APPOINTMENT (OUTPATIENT)
Dept: ULTRASOUND IMAGING | Facility: HOSPITAL | Age: 64
End: 2024-02-28
Payer: COMMERCIAL

## 2024-02-28 DIAGNOSIS — C54.1 ENDOMETRIAL ADENOCARCINOMA: ICD-10-CM

## 2024-02-28 DIAGNOSIS — M79.89 SWELLING OF RIGHT LOWER EXTREMITY: Primary | ICD-10-CM

## 2024-02-28 DIAGNOSIS — Z74.09 IMPAIRED MOBILITY: ICD-10-CM

## 2024-02-28 DIAGNOSIS — L89.313 PRESSURE INJURY OF RIGHT BUTTOCK, STAGE 3: Primary | ICD-10-CM

## 2024-02-28 PROBLEM — I26.99 PULMONARY EMBOLUS: Status: ACTIVE | Noted: 2024-02-28

## 2024-02-28 PROBLEM — I26.99 PE (PULMONARY THROMBOEMBOLISM): Status: ACTIVE | Noted: 2024-02-28

## 2024-02-28 LAB
BILIRUB UR QL STRIP: NEGATIVE
CLARITY UR: CLEAR
COLOR UR: ABNORMAL
GEN 5 2HR TROPONIN T REFLEX: 10 NG/L
GLUCOSE BLDC GLUCOMTR-MCNC: 124 MG/DL (ref 70–130)
GLUCOSE BLDC GLUCOMTR-MCNC: 130 MG/DL (ref 70–130)
GLUCOSE UR STRIP-MCNC: NEGATIVE MG/DL
HCG SERPL QL: NEGATIVE
HGB UR QL STRIP.AUTO: NEGATIVE
KETONES UR QL STRIP: ABNORMAL
LEUKOCYTE ESTERASE UR QL STRIP.AUTO: NEGATIVE
NITRITE UR QL STRIP: NEGATIVE
PH UR STRIP.AUTO: 5.5 [PH] (ref 5–8)
PROT UR QL STRIP: ABNORMAL
SP GR UR STRIP: 1.03 (ref 1–1.03)
TROPONIN T DELTA: 1 NG/L
TROPONIN T SERPL HS-MCNC: 9 NG/L
UROBILINOGEN UR QL STRIP: ABNORMAL

## 2024-02-28 PROCEDURE — 76000 FLUOROSCOPY <1 HR PHYS/QHP: CPT

## 2024-02-28 PROCEDURE — 0 TECHNETIUM ALBUMIN AGGREGATED: Performed by: STUDENT IN AN ORGANIZED HEALTH CARE EDUCATION/TRAINING PROGRAM

## 2024-02-28 PROCEDURE — 93010 ELECTROCARDIOGRAM REPORT: CPT | Performed by: EMERGENCY MEDICINE

## 2024-02-28 PROCEDURE — 25010000002 HEPARIN (PORCINE) PER 1000 UNITS: Performed by: ANESTHESIOLOGY

## 2024-02-28 PROCEDURE — 93971 EXTREMITY STUDY: CPT

## 2024-02-28 PROCEDURE — 36561 INSERT TUNNELED CV CATH: CPT | Performed by: STUDENT IN AN ORGANIZED HEALTH CARE EDUCATION/TRAINING PROGRAM

## 2024-02-28 PROCEDURE — 82948 REAGENT STRIP/BLOOD GLUCOSE: CPT

## 2024-02-28 PROCEDURE — 81003 URINALYSIS AUTO W/O SCOPE: CPT | Performed by: INTERNAL MEDICINE

## 2024-02-28 PROCEDURE — 93005 ELECTROCARDIOGRAM TRACING: CPT | Performed by: NURSE ANESTHETIST, CERTIFIED REGISTERED

## 2024-02-28 PROCEDURE — 25810000003 LACTATED RINGERS PER 1000 ML: Performed by: STUDENT IN AN ORGANIZED HEALTH CARE EDUCATION/TRAINING PROGRAM

## 2024-02-28 PROCEDURE — 25010000002 FENTANYL CITRATE (PF) 100 MCG/2ML SOLUTION

## 2024-02-28 PROCEDURE — 84484 ASSAY OF TROPONIN QUANT: CPT | Performed by: STUDENT IN AN ORGANIZED HEALTH CARE EDUCATION/TRAINING PROGRAM

## 2024-02-28 PROCEDURE — 25010000002 HEPARIN LOCK FLUSH PER 10 UNITS: Performed by: STUDENT IN AN ORGANIZED HEALTH CARE EDUCATION/TRAINING PROGRAM

## 2024-02-28 PROCEDURE — 93971 EXTREMITY STUDY: CPT | Performed by: SURGERY

## 2024-02-28 PROCEDURE — 25010000002 PROPOFOL 1000 MG/100ML EMULSION

## 2024-02-28 PROCEDURE — 77001 FLUOROGUIDE FOR VEIN DEVICE: CPT | Performed by: STUDENT IN AN ORGANIZED HEALTH CARE EDUCATION/TRAINING PROGRAM

## 2024-02-28 PROCEDURE — G0378 HOSPITAL OBSERVATION PER HR: HCPCS

## 2024-02-28 PROCEDURE — 25010000002 CEFAZOLIN PER 500 MG: Performed by: STUDENT IN AN ORGANIZED HEALTH CARE EDUCATION/TRAINING PROGRAM

## 2024-02-28 PROCEDURE — C1788 PORT, INDWELLING, IMP: HCPCS | Performed by: STUDENT IN AN ORGANIZED HEALTH CARE EDUCATION/TRAINING PROGRAM

## 2024-02-28 PROCEDURE — 78580 LUNG PERFUSION IMAGING: CPT

## 2024-02-28 PROCEDURE — A9540 TC99M MAA: HCPCS | Performed by: STUDENT IN AN ORGANIZED HEALTH CARE EDUCATION/TRAINING PROGRAM

## 2024-02-28 PROCEDURE — 84703 CHORIONIC GONADOTROPIN ASSAY: CPT | Performed by: STUDENT IN AN ORGANIZED HEALTH CARE EDUCATION/TRAINING PROGRAM

## 2024-02-28 DEVICE — PRT INTRO VASC/INTERV VORTEX FILL/HL DETACH/POLYURET/CATH 8F: Type: IMPLANTABLE DEVICE | Site: NECK | Status: FUNCTIONAL

## 2024-02-28 RX ORDER — DEXTROSE MONOHYDRATE 25 G/50ML
12.5 INJECTION, SOLUTION INTRAVENOUS AS NEEDED
Status: DISCONTINUED | OUTPATIENT
Start: 2024-02-28 | End: 2024-02-28 | Stop reason: HOSPADM

## 2024-02-28 RX ORDER — SODIUM CHLORIDE 9 MG/ML
40 INJECTION, SOLUTION INTRAVENOUS AS NEEDED
Status: DISCONTINUED | OUTPATIENT
Start: 2024-02-28 | End: 2024-03-01 | Stop reason: HOSPADM

## 2024-02-28 RX ORDER — SODIUM CHLORIDE 0.9 % (FLUSH) 0.9 %
10 SYRINGE (ML) INJECTION EVERY 12 HOURS SCHEDULED
Status: DISCONTINUED | OUTPATIENT
Start: 2024-02-28 | End: 2024-02-28 | Stop reason: HOSPADM

## 2024-02-28 RX ORDER — FENTANYL CITRATE 50 UG/ML
INJECTION, SOLUTION INTRAMUSCULAR; INTRAVENOUS AS NEEDED
Status: DISCONTINUED | OUTPATIENT
Start: 2024-02-28 | End: 2024-02-28 | Stop reason: SURG

## 2024-02-28 RX ORDER — POLYETHYLENE GLYCOL 3350 17 G/17G
17 POWDER, FOR SOLUTION ORAL DAILY PRN
Status: DISCONTINUED | OUTPATIENT
Start: 2024-02-28 | End: 2024-03-01 | Stop reason: HOSPADM

## 2024-02-28 RX ORDER — SODIUM CHLORIDE 0.9 % (FLUSH) 0.9 %
3 SYRINGE (ML) INJECTION AS NEEDED
Status: DISCONTINUED | OUTPATIENT
Start: 2024-02-28 | End: 2024-02-28 | Stop reason: HOSPADM

## 2024-02-28 RX ORDER — POLYETHYLENE GLYCOL 3350 17 G/17G
17 POWDER, FOR SOLUTION ORAL DAILY
Status: DISCONTINUED | OUTPATIENT
Start: 2024-02-28 | End: 2024-03-01 | Stop reason: HOSPADM

## 2024-02-28 RX ORDER — BISACODYL 10 MG
10 SUPPOSITORY, RECTAL RECTAL DAILY PRN
Status: DISCONTINUED | OUTPATIENT
Start: 2024-02-28 | End: 2024-03-01 | Stop reason: HOSPADM

## 2024-02-28 RX ORDER — DROPERIDOL 2.5 MG/ML
0.62 INJECTION, SOLUTION INTRAMUSCULAR; INTRAVENOUS ONCE AS NEEDED
Status: DISCONTINUED | OUTPATIENT
Start: 2024-02-28 | End: 2024-02-28 | Stop reason: HOSPADM

## 2024-02-28 RX ORDER — OXYCODONE HYDROCHLORIDE 5 MG/1
5 TABLET ORAL EVERY 4 HOURS PRN
Status: DISCONTINUED | OUTPATIENT
Start: 2024-02-28 | End: 2024-03-01 | Stop reason: HOSPADM

## 2024-02-28 RX ORDER — ACETAMINOPHEN 325 MG/1
975 TABLET ORAL EVERY 8 HOURS
Start: 2024-02-28 | End: 2025-02-27

## 2024-02-28 RX ORDER — ONDANSETRON 4 MG/1
4 TABLET, FILM COATED ORAL EVERY 8 HOURS PRN
Qty: 15 TABLET | Refills: 0 | Status: SHIPPED | OUTPATIENT
Start: 2024-02-28 | End: 2025-02-27

## 2024-02-28 RX ORDER — SODIUM CHLORIDE 0.9 % (FLUSH) 0.9 %
10 SYRINGE (ML) INJECTION EVERY 12 HOURS SCHEDULED
Status: DISCONTINUED | OUTPATIENT
Start: 2024-02-28 | End: 2024-03-01 | Stop reason: HOSPADM

## 2024-02-28 RX ORDER — ACETAMINOPHEN 500 MG
500 TABLET ORAL EVERY 6 HOURS PRN
COMMUNITY

## 2024-02-28 RX ORDER — NALOXONE HCL 0.4 MG/ML
0.4 VIAL (ML) INJECTION AS NEEDED
Status: DISCONTINUED | OUTPATIENT
Start: 2024-02-28 | End: 2024-02-28 | Stop reason: HOSPADM

## 2024-02-28 RX ORDER — PANTOPRAZOLE SODIUM 40 MG/1
40 TABLET, DELAYED RELEASE ORAL
Status: DISCONTINUED | OUTPATIENT
Start: 2024-02-29 | End: 2024-03-01 | Stop reason: HOSPADM

## 2024-02-28 RX ORDER — SODIUM CHLORIDE, SODIUM LACTATE, POTASSIUM CHLORIDE, CALCIUM CHLORIDE 600; 310; 30; 20 MG/100ML; MG/100ML; MG/100ML; MG/100ML
1000 INJECTION, SOLUTION INTRAVENOUS CONTINUOUS
Status: DISCONTINUED | OUTPATIENT
Start: 2024-02-28 | End: 2024-02-29

## 2024-02-28 RX ORDER — HYDROCODONE BITARTRATE AND ACETAMINOPHEN 5; 325 MG/1; MG/1
1 TABLET ORAL ONCE AS NEEDED
Status: DISCONTINUED | OUTPATIENT
Start: 2024-02-28 | End: 2024-02-28 | Stop reason: HOSPADM

## 2024-02-28 RX ORDER — SODIUM CHLORIDE, SODIUM LACTATE, POTASSIUM CHLORIDE, CALCIUM CHLORIDE 600; 310; 30; 20 MG/100ML; MG/100ML; MG/100ML; MG/100ML
9 INJECTION, SOLUTION INTRAVENOUS CONTINUOUS
Status: DISCONTINUED | OUTPATIENT
Start: 2024-02-28 | End: 2024-02-29

## 2024-02-28 RX ORDER — HEPARIN SODIUM (PORCINE) LOCK FLUSH IV SOLN 100 UNIT/ML 100 UNIT/ML
SOLUTION INTRAVENOUS AS NEEDED
Status: DISCONTINUED | OUTPATIENT
Start: 2024-02-28 | End: 2024-02-28 | Stop reason: HOSPADM

## 2024-02-28 RX ORDER — PROPOFOL 10 MG/ML
INJECTION, EMULSION INTRAVENOUS CONTINUOUS PRN
Status: DISCONTINUED | OUTPATIENT
Start: 2024-02-28 | End: 2024-02-28 | Stop reason: SURG

## 2024-02-28 RX ORDER — ACETAMINOPHEN 325 MG/1
650 TABLET ORAL EVERY 4 HOURS PRN
Status: DISCONTINUED | OUTPATIENT
Start: 2024-02-28 | End: 2024-03-01 | Stop reason: HOSPADM

## 2024-02-28 RX ORDER — LABETALOL HYDROCHLORIDE 5 MG/ML
5 INJECTION, SOLUTION INTRAVENOUS
Status: DISCONTINUED | OUTPATIENT
Start: 2024-02-28 | End: 2024-02-28 | Stop reason: HOSPADM

## 2024-02-28 RX ORDER — SODIUM CHLORIDE 0.9 % (FLUSH) 0.9 %
10 SYRINGE (ML) INJECTION AS NEEDED
Status: DISCONTINUED | OUTPATIENT
Start: 2024-02-28 | End: 2024-02-28 | Stop reason: HOSPADM

## 2024-02-28 RX ORDER — FLUMAZENIL 0.1 MG/ML
0.2 INJECTION INTRAVENOUS AS NEEDED
Status: DISCONTINUED | OUTPATIENT
Start: 2024-02-28 | End: 2024-02-28 | Stop reason: HOSPADM

## 2024-02-28 RX ORDER — FENTANYL CITRATE 50 UG/ML
50 INJECTION, SOLUTION INTRAMUSCULAR; INTRAVENOUS
Status: DISCONTINUED | OUTPATIENT
Start: 2024-02-28 | End: 2024-02-28 | Stop reason: HOSPADM

## 2024-02-28 RX ORDER — ONDANSETRON 2 MG/ML
4 INJECTION INTRAMUSCULAR; INTRAVENOUS EVERY 6 HOURS PRN
Status: DISCONTINUED | OUTPATIENT
Start: 2024-02-28 | End: 2024-03-01 | Stop reason: HOSPADM

## 2024-02-28 RX ORDER — FENTANYL CITRATE 50 UG/ML
25 INJECTION, SOLUTION INTRAMUSCULAR; INTRAVENOUS
Status: DISCONTINUED | OUTPATIENT
Start: 2024-02-28 | End: 2024-02-28 | Stop reason: HOSPADM

## 2024-02-28 RX ORDER — HYDROMORPHONE HYDROCHLORIDE 2 MG/1
2 TABLET ORAL EVERY 6 HOURS PRN
Status: DISCONTINUED | OUTPATIENT
Start: 2024-02-28 | End: 2024-03-01 | Stop reason: HOSPADM

## 2024-02-28 RX ORDER — HYDROCODONE BITARTRATE AND ACETAMINOPHEN 10; 325 MG/1; MG/1
1 TABLET ORAL EVERY 4 HOURS PRN
Status: DISCONTINUED | OUTPATIENT
Start: 2024-02-28 | End: 2024-02-28 | Stop reason: HOSPADM

## 2024-02-28 RX ORDER — MAGNESIUM HYDROXIDE 1200 MG/15ML
LIQUID ORAL AS NEEDED
Status: DISCONTINUED | OUTPATIENT
Start: 2024-02-28 | End: 2024-02-28 | Stop reason: HOSPADM

## 2024-02-28 RX ORDER — ALUMINA, MAGNESIA, AND SIMETHICONE 2400; 2400; 240 MG/30ML; MG/30ML; MG/30ML
15 SUSPENSION ORAL EVERY 6 HOURS PRN
Status: DISCONTINUED | OUTPATIENT
Start: 2024-02-28 | End: 2024-03-01 | Stop reason: HOSPADM

## 2024-02-28 RX ORDER — LIDOCAINE HYDROCHLORIDE 10 MG/ML
0.5 INJECTION, SOLUTION EPIDURAL; INFILTRATION; INTRACAUDAL; PERINEURAL ONCE AS NEEDED
Status: DISCONTINUED | OUTPATIENT
Start: 2024-02-28 | End: 2024-02-28 | Stop reason: HOSPADM

## 2024-02-28 RX ORDER — LIDOCAINE HYDROCHLORIDE 20 MG/ML
INJECTION, SOLUTION EPIDURAL; INFILTRATION; INTRACAUDAL; PERINEURAL AS NEEDED
Status: DISCONTINUED | OUTPATIENT
Start: 2024-02-28 | End: 2024-02-28 | Stop reason: SURG

## 2024-02-28 RX ORDER — IBUPROFEN 600 MG/1
600 TABLET ORAL ONCE AS NEEDED
Status: DISCONTINUED | OUTPATIENT
Start: 2024-02-28 | End: 2024-02-28 | Stop reason: HOSPADM

## 2024-02-28 RX ORDER — BISACODYL 5 MG/1
5 TABLET, DELAYED RELEASE ORAL DAILY PRN
Status: DISCONTINUED | OUTPATIENT
Start: 2024-02-28 | End: 2024-03-01 | Stop reason: HOSPADM

## 2024-02-28 RX ORDER — ONDANSETRON 2 MG/ML
4 INJECTION INTRAMUSCULAR; INTRAVENOUS ONCE AS NEEDED
Status: DISCONTINUED | OUTPATIENT
Start: 2024-02-28 | End: 2024-02-28 | Stop reason: HOSPADM

## 2024-02-28 RX ORDER — TRAMADOL HYDROCHLORIDE 50 MG/1
50 TABLET ORAL EVERY 8 HOURS PRN
Qty: 5 TABLET | Refills: 0 | Status: SHIPPED | OUTPATIENT
Start: 2024-02-28 | End: 2024-03-02

## 2024-02-28 RX ORDER — SODIUM CHLORIDE 0.9 % (FLUSH) 0.9 %
10 SYRINGE (ML) INJECTION AS NEEDED
Status: DISCONTINUED | OUTPATIENT
Start: 2024-02-28 | End: 2024-03-01 | Stop reason: HOSPADM

## 2024-02-28 RX ORDER — ENOXAPARIN SODIUM 100 MG/ML
60 INJECTION SUBCUTANEOUS TAKE AS DIRECTED
Status: ON HOLD | COMMUNITY
End: 2024-02-29

## 2024-02-28 RX ORDER — HEPARIN SODIUM 5000 [USP'U]/ML
5000 INJECTION, SOLUTION INTRAVENOUS; SUBCUTANEOUS ONCE
Status: COMPLETED | OUTPATIENT
Start: 2024-02-28 | End: 2024-02-28

## 2024-02-28 RX ORDER — AMOXICILLIN 250 MG
2 CAPSULE ORAL 2 TIMES DAILY PRN
Status: DISCONTINUED | OUTPATIENT
Start: 2024-02-28 | End: 2024-03-01 | Stop reason: HOSPADM

## 2024-02-28 RX ADMIN — CEFAZOLIN 2000 MG: 2 INJECTION, POWDER, FOR SOLUTION INTRAMUSCULAR; INTRAVENOUS at 10:31

## 2024-02-28 RX ADMIN — APIXABAN 10 MG: 5 TABLET, FILM COATED ORAL at 20:28

## 2024-02-28 RX ADMIN — HEPARIN SODIUM 5000 UNITS: 5000 INJECTION INTRAVENOUS; SUBCUTANEOUS at 10:13

## 2024-02-28 RX ADMIN — FENTANYL CITRATE 50 MCG: 50 INJECTION, SOLUTION INTRAMUSCULAR; INTRAVENOUS at 10:36

## 2024-02-28 RX ADMIN — KIT FOR THE PREPARATION OF TECHNETIUM TC 99M ALBUMIN AGGREGATED 1 DOSE: 2.5 INJECTION, POWDER, FOR SOLUTION INTRAVENOUS at 13:40

## 2024-02-28 RX ADMIN — LIDOCAINE HYDROCHLORIDE 60 MG: 20 INJECTION, SOLUTION EPIDURAL; INFILTRATION; INTRACAUDAL; PERINEURAL at 10:31

## 2024-02-28 RX ADMIN — Medication 10 ML: at 20:27

## 2024-02-28 RX ADMIN — POLYETHYLENE GLYCOL 3350 17 G: 17 POWDER, FOR SOLUTION ORAL at 18:23

## 2024-02-28 RX ADMIN — HYDROCODONE BITARTRATE AND ACETAMINOPHEN 1 TABLET: 10; 325 TABLET ORAL at 13:59

## 2024-02-28 RX ADMIN — SODIUM CHLORIDE, POTASSIUM CHLORIDE, SODIUM LACTATE AND CALCIUM CHLORIDE 1000 ML: 600; 310; 30; 20 INJECTION, SOLUTION INTRAVENOUS at 09:41

## 2024-02-28 RX ADMIN — ACETAMINOPHEN 650 MG: 325 TABLET, FILM COATED ORAL at 20:27

## 2024-02-28 RX ADMIN — METFORMIN HCL 500 MG: 500 TABLET ORAL at 18:14

## 2024-02-28 RX ADMIN — PROPOFOL 100 MCG/KG/MIN: 10 INJECTION, EMULSION INTRAVENOUS at 10:31

## 2024-02-28 NOTE — DISCHARGE INSTRUCTIONS
Wound:   - you have skin glue on your incisions. Okay to shower tomorrow.   - Leave skin glue in place, it should slowly fall off over 2 weeks   - No swimming/soaking/bathing x 2 weeks to allow incisions to heal.     Activity:   - Activity as tolerated.   - No driving or operating machinery on narcotic pain medication.     Pain medication:   - Take 1000mg of tylenol every 8 hours for 3 days. After three days, take it prn.   - You have a prescription for a narcotic. It will be ultram tabs. Take these only as needed after you have taken the tylenol. If you are taking the roxicodone, make sure to take a stool softener (colace) with it as it can cause constipation.   - The narcotic may make you nauseated, you will have a prescription for zofran in case of nausea.     Follow up:   - make an appointment to see me in 2 weeks  - If you have any concerns before then, call me office at [phone]

## 2024-02-28 NOTE — NURSING NOTE
Spoke with dr galicia, read her the report from [facility], waiting for additional orders for admission

## 2024-02-28 NOTE — ANESTHESIA PREPROCEDURE EVALUATION
Anesthesia Evaluation     Patient summary reviewed   no history of anesthetic complications:   NPO Solid Status: > 8 hours             Airway   Mallampati: II  Dental    (+) poor dentition    Pulmonary    (+) asthma,  (-) COPD, sleep apnea, not a smoker  Cardiovascular     (+) DVT, hyperlipidemia  (-) pacemaker, past MI, angina, cardiac stents      Neuro/Psych  (+) CVA residual symptoms  (-) seizures, TIA  GI/Hepatic/Renal/Endo    (+) obesity, GERD, diabetes mellitus  (-) liver disease, no renal disease    Musculoskeletal     (+) chronic pain  Abdominal    Substance History      OB/GYN          Other      history of cancer (uterine)                  Anesthesia Plan    ASA 3     MAC     (Pt did not take lovenox as instructed but did have it last night.  Presents to holding with right leg swelling worse than left.  D/w surgeon.  Will give SQ heparin and proceed with case as need for chemo is pressing.  Venous duplex at some point prior to dc.)  intravenous induction     Anesthetic plan, risks, benefits, and alternatives have been provided, discussed and informed consent has been obtained with: patient.    CODE STATUS:

## 2024-02-28 NOTE — H&P
[facility] Medicine Services  HISTORY AND PHYSICAL    Date of Admission: 2/28/2024  Primary Care Physician: Myron Vines MD    Subjective   Primary Historian: The patient    Chief Complaint: Mild dyspnea    History of Present Illness    This 63-year-old female presented this morning for insertion of a chemotherapy port which was performed by Dr. Wade.  Post procedurally, the patient experienced some shortness of breath.  She has a history of stroke and previous DVTs and is chronically anticoagulated with Eliquis.  Prior to surgery she held her Eliquis as instructed and was given bridging Lovenox which was supposed to be discontinued the evening prior to surgery.  Apparently, the patient became confused about Lovenox administration and held Eliquis for several days but did not use Lovenox.  She did not start Lovenox until the day before surgery and received only 2 doses.  She noted 2 days prior to the procedure that her right lower extremity was more swollen than typical.  The patient has chronic swelling of both lower extremity secondary to previous DVTs however swelling of the right lower extremity was far beyond what she had previously experienced.  Dr. Wade evaluated the patient post procedurally and ordered a VQ scan which was probability for bilateral pulmonary emboli.  Currently, the patient has no oxygen requirement.  A venous Doppler study has been ordered and has been performed and results are pending.  A chest x-ray post-CVA port placement shows no acute abnormality.  Laboratory evaluation reveals normal troponins on 2 separate studies.  Preoperative MRSA nasal swab was negative.  Preoperative CBC shows a normal white blood cell count with a hemoglobin of 9.5.  Preoperative CMP shows glucose 179, sodium 134 and albumin 2.8.  The patient is admitted for initiation of anticoagulation.  She has received 1 dose of therapeutic Lovenox while in the outpatient  surgical holding area.  She will be started on an Eliquis run and dose of 10 mg p.o. twice daily x 1 week to be reduced to 5 mg twice daily thereafter.    The patient has recently been diagnosed with metastatic endometrial carcinoma.  About 1 month ago she suffered a CVA and was fully anticoagulated at that time.    Review of Systems   Constitutional: Negative.    HENT: Negative.     Eyes: Negative.    Respiratory:  Positive for shortness of breath.    Cardiovascular:  Positive for leg swelling.   Gastrointestinal: Negative.    Endocrine: Negative.    Genitourinary: Negative.    Musculoskeletal: Negative.    Skin: Negative.    Allergic/Immunologic: Negative.    Neurological: Negative.    Hematological: Negative.    Psychiatric/Behavioral: Negative.        Otherwise complete ROS reviewed and negative except as mentioned in the HPI.    Past Medical History:   Past Medical History:   Diagnosis Date    Anemia     Asthma     Bronchitis     Depression     Diabetes mellitus     DVT (deep venous thrombosis)     right arm    Endometrial cancer     mets to lung    Heart murmur     Lyme disease     Mitral valve prolapse     Stroke     --right side weakness     Past Surgical History:  Past Surgical History:   Procedure Laterality Date     SECTION      D & C HYSTEROSCOPY ENDOMETRIAL ABLATION N/A 2024    Procedure: DILATATION AND CURETTAGE;  Surgeon: Radu De Los Santos MD;  Location: [facility];  Service: Obstetrics/Gynecology;  Laterality: N/A;     Social History:  reports that she has never smoked. She has never used smokeless tobacco. She reports that she does not drink alcohol and does not use drugs.    Family History: family history includes Cancer in her maternal grandfather and maternal grandmother; Diabetes in her brother and father; Heart disease in her brother, father, and paternal grandfather.       Allergies:  Allergies   Allergen Reactions    Iodinated Contrast Media Shortness Of Breath     Previous  reaction, was premedicated with solu-medrol and benadryl       Medications:  Prior to Admission medications    Medication Sig Start Date End Date Taking? Authorizing Provider   acetaminophen (TYLENOL) 500 MG tablet Take 1 tablet by mouth Every 6 (Six) Hours As Needed for Mild Pain.   Yes Andrea Bright MD   apixaban (ELIQUIS) 5 MG tablet tablet Take 2 tablets by mouth Every 12 (Twelve) Hours. 1/27/24  Yes Andrea Bright MD   Enoxaparin Sodium (LOVENOX) 60 MG/0.6ML solution prefilled syringe syringe Inject 0.6 mL under the skin into the appropriate area as directed Take As Directed.   Yes Andrea Bright MD   hydrocerin (EUCERIN) cream cream Apply  topically to the appropriate area as directed. 1/26/24  Yes Andrea Bright MD   HYDROmorphone (DILAUDID) 2 MG tablet TAKE 1 TABLET BY MOUTH EVERY 6 HOURS AS NEEDED FOR PAIN FOR UP TO 30 DAYS. MAX DAILY AMOUNT: 8 MG 1/26/24  Yes Andrea Bright MD   Lancets (freestyle) lancets 1 each by Other route 2 (Two) Times a Day. Use as instructed 9/16/23  Yes Meghan Choudhary APRN   metFORMIN (GLUCOPHAGE) 500 MG tablet Take 1 tablet by mouth 2 (Two) Times a Day With Meals.   Yes Andrea Bright MD   oxyCODONE-acetaminophen (PERCOCET) 5-325 MG per tablet Take 1 tablet by mouth. 2/19/24 3/21/24 Yes Andrea Bright MD   polyethylene glycol 17 g packet Take 17 g by mouth Daily. 1/18/24  Yes Jose Tang MD   acetaminophen (Tylenol) 325 MG tablet Take 3 tablets by mouth Every 8 (Eight) Hours. Take every 8 hours for 3 days then take prn as needed. 2/28/24 2/27/25  Lizy Wade MD   albuterol sulfate  (90 Base) MCG/ACT inhaler Inhale 2 puffs Every 4 (Four) Hours As Needed for Wheezing or Shortness of Air. 1/26/20   Shandra Dietrich APRN   atorvastatin (LIPITOR) 80 MG tablet Take 1 tablet by mouth Every Night.  Patient not taking: Reported on 2/21/2024 1/18/24   Jose Tang MD   ondansetron (Zofran) 4 MG tablet  "Take 1 tablet by mouth Every 8 (Eight) Hours As Needed for Nausea or Vomiting. 2/28/24 2/27/25  Lizy Wade MD   ondansetron ODT (ZOFRAN-ODT) 4 MG disintegrating tablet Take 1 tablet by mouth. 1/26/24   Andrea Bright MD   oxyCODONE (Roxicodone) 5 MG immediate release tablet Take 1 tablet by mouth Every 4 (Four) Hours As Needed for Moderate Pain or Severe Pain for up to 18 doses. 1/18/24   Jose Tang MD   pantoprazole (PROTONIX) 40 MG EC tablet Take 1 tablet by mouth Every Morning.  Patient not taking: Reported on 2/21/2024 1/19/24   Jose Tang MD   promethazine (PHENERGAN) 25 MG tablet Take 1 tablet by mouth Every 6 (Six) Hours As Needed for Nausea or Vomiting.  Patient not taking: Reported on 2/28/2024    ProviderAndrea MD   traMADol (Ultram) 50 MG tablet Take 1 tablet by mouth Every 8 (Eight) Hours As Needed for Severe Pain for up to 3 days. 2/28/24 3/2/24  Lizy Wade MD     I have utilized all available immediate resources to obtain, update, or review the patient's current medications (including all prescriptions, over-the-counter products, herbals, cannabis/cannabidiol products, and vitamin/mineral/dietary (nutritional) supplements).    Objective     Vital Signs: /90 (BP Location: Left arm, Patient Position: Sitting)   Pulse 108   Temp 98.9 °F (37.2 °C) (Oral)   Resp 18   Ht 151 cm (59.45\")   Wt 67.8 kg (149 lb 6.4 oz)   SpO2 99%   Breastfeeding No   BMI 29.72 kg/m²   Physical Exam  Constitutional:       Appearance: Normal appearance. She is normal weight.   HENT:      Head: Normocephalic and atraumatic.      Right Ear: External ear normal.      Left Ear: External ear normal.      Nose: Nose normal.      Mouth/Throat:      Mouth: Mucous membranes are moist.      Pharynx: Oropharynx is clear.   Eyes:      General: No scleral icterus.     Extraocular Movements: Extraocular movements intact.      Pupils: Pupils are equal, round, and reactive to " light.   Cardiovascular:      Rate and Rhythm: Normal rate and regular rhythm.      Pulses: Normal pulses.      Heart sounds: Normal heart sounds. No murmur heard.  Pulmonary:      Effort: Pulmonary effort is normal. No respiratory distress.      Breath sounds: Normal breath sounds.   Abdominal:      General: Abdomen is flat. Bowel sounds are normal.      Palpations: Abdomen is soft. There is no mass.      Tenderness: There is no abdominal tenderness.   Musculoskeletal:         General: Swelling present. Normal range of motion.      Right lower leg: Edema (3/4) present.      Left lower leg: Edema (1/4) present.   Skin:     General: Skin is warm and dry.      Coloration: Skin is not pale.      Comments: The patient has mild peripheral cyanosis of all 10 toes.   Neurological:      General: No focal deficit present.      Mental Status: She is alert and oriented to person, place, and time. Mental status is at baseline.      Cranial Nerves: No cranial nerve deficit.   Psychiatric:         Mood and Affect: Mood normal.         Judgment: Judgment normal.        Results Reviewed:  Lab Results (last 24 hours)       Procedure Component Value Units Date/Time    High Sensitivity Troponin T 2Hr [[id number]]  (Normal) Collected: 02/28/24 1559    Specimen: Blood Updated: 02/28/24 1629     HS Troponin T 10 ng/L      Troponin T Delta 1 ng/L     Narrative:      High Sensitive Troponin T Reference Range:  <14.0 ng/L- Negative Female for AMI  <22.0 ng/L- Negative Male for AMI  >=14 - Abnormal Female indicating possible myocardial injury.  >=22 - Abnormal Male indicating possible myocardial injury.   Clinicians would have to utilize clinical acumen, EKG, Troponin, and serial changes to determine if it is an Acute Myocardial Infarction or myocardial injury due to an underlying chronic condition.         High Sensitivity Troponin T [[id number]]  (Normal) Collected: 02/28/24 1243    Specimen: Blood Updated: 02/28/24 1319     HS Troponin T 9  ng/L     Narrative:      High Sensitive Troponin T Reference Range:  <14.0 ng/L- Negative Female for AMI  <22.0 ng/L- Negative Male for AMI  >=14 - Abnormal Female indicating possible myocardial injury.  >=22 - Abnormal Male indicating possible myocardial injury.   Clinicians would have to utilize clinical acumen, EKG, Troponin, and serial changes to determine if it is an Acute Myocardial Infarction or myocardial injury due to an underlying chronic condition.         POC Glucose Once [[id number]]  (Normal) Collected: 02/28/24 1113    Specimen: Blood Updated: 02/28/24 1123     Glucose 130 mg/dL      Comment: : [id number] Shayan JessicaMeter ID: [id number]       hCG, Serum, Qualitative [[id number]]  (Normal) Collected: 02/28/24 0927    Specimen: Blood Updated: 02/28/24 0955     HCG Qualitative Negative    POC Glucose Once [[id number]]  (Normal) Collected: 02/28/24 0926    Specimen: Blood Updated: 02/28/24 0938     Glucose 124 mg/dL      Comment: : [id number] Tiffanie Pendleton ID: [id number]             Imaging Results (Last 24 Hours)       Procedure Component Value Units Date/Time    NM Lung Scan Perfusion Particulate [[id number]] Collected: 02/28/24 1401     Updated: 02/28/24 1407    Narrative:      EXAMINATION: NM LUNG SCAN PERFUSION PARTICULATE-  2/28/2024 2:02 PM     HISTORY: Chest pain. Normal EKG.     FINDINGS: Following intravenous injection of 5.5 mCi of Tc 99m MAA  intravenously multiple planar images were obtained of the thorax.  Although there is a relatively normal perfusion distribution of the  radiopharmaceutical within the left lung there are noted to be multiple  segmental and subsegmental perfusion defects within the right lung. This  includes at least 3 discrete defects. FINDINGS would suggest a high  probability for pulmonary thromboembolic disease to the right lung.       Impression:      1. Multiple segmental and subsegmental perfusion defects to the right  lung. FINDINGS would fall into  a high probability for pulmonary  thromboembolic disease category. Relatively normal perfusion to the left  lung.     This report was signed and finalized on 2/28/2024 2:04 PM by Dr. Romeo Arizmendi MD.       US Venous Doppler Lower Extremity Right (duplex) [[id number]] Resulted: 02/28/24 1139     Updated: 02/28/24 1215    XR Chest Post CVA Port [[id number]] Collected: 02/28/24 1112     Updated: 02/28/24 1116    Narrative:      XR CHEST POST CVA PORT- 2/28/2024 9:45 AM     HISTORY: port; C54.1-Malignant neoplasm of endometrium     COMPARISON: None     FLUOROSCOPY TIME: 3 seconds     FLUOROSCOPY DOSE/ CUMULATIVE AIR KERMA: 0.3716 mGy     NUMBER OF IMAGES: 1       Impression:         Intraoperative fluoroscopic images during right subclavian port  placement. Tip projecting over the lower SVC.     Please refer to the operative note for more details.     This report was signed and finalized on 2/28/2024 11:13 AM by Dr. Columba Abbasi MD.       FL C Arm During Surgery [[id number]] Resulted: 02/28/24 1102     Updated: 02/28/24 1102    Narrative:      This procedure was auto-finalized with no dictation required.          I have personally reviewed and interpreted the radiology studies and ECG obtained at time of admission.     Assessment / Plan   Assessment:   Active Hospital Problems    Diagnosis     **Pulmonary embolus     Endometrial adenocarcinoma     Type 2 diabetes mellitus, without long-term current use of insulin        Treatment Plan  Admit to the medical surgical floor  Eliquis 10 mg p.o. twice daily x 7 days then decrease to 5 mg p.o. twice daily  Echocardiogram  Resume the bulk of home medications  Lower extremity Doppler studies, pending    Medical Decision Making  Number and Complexity of problems:   Acute bilateral pulmonary emboli acute, high complexity  Probable DVT RLE acute, moderate complexity  Type 2 diabetes, chronic, moderate complexity    Differential Diagnosis: None others  entertained    Conditions and Status        Condition is unchanged.     [facility] Data  External documents reviewed: Care Everywhere documentation  Cardiac tracing (EKG, telemetry) interpretation: See HPI  Radiology interpretation: See HPI  Labs reviewed: See HPI  Any tests that were considered but not ordered: None     Decision rules/scores evaluated (example [id number], Wells, etc): None     Discussed with: The patient     Care Planning  Shared decision making: The patient and general surgery  Code status and discussions: Full code    Disposition  Social Determinants of Health that impact treatment or disposition: None noted  Estimated length of stay is 1-2 days.     I confirmed that the patient's advanced care plan is present, code status is documented, and a surrogate decision maker is listed in the patient's medical record.     The patient's surrogate decision maker is her , Romeo.     The patient was seen and examined by me on 2/28/2024 at 1720.    Electronically signed by Mansoor Galvan DO, 02/28/24, 18:29 CST.

## 2024-02-28 NOTE — NURSING NOTE
Spoke with dr galicia, relayed info stating patient verbalized reaction to iv contrast in the past, stating it made her have shortness of breath and was premedicated with steroids and benadryl, received new order for V/Q lung scan

## 2024-02-28 NOTE — ANESTHESIA POSTPROCEDURE EVALUATION
Patient: Radha Wade    Procedure Summary       Date: 02/28/24 Room / Location:  [facility] OR  /  [facility] OR    Anesthesia Start: 1024 Anesthesia Stop: 1106    Procedure: Single Lumen Port-a-cath insertion with flouroscopy (Chin to Nipples) Diagnosis:       Endometrial adenocarcinoma      (Endometrial adenocarcinoma [C54.1])    Surgeons: Lizy Wade MD Provider: John Sales CRNA    Anesthesia Type: MAC ASA Status: 3            Anesthesia Type: MAC    Vitals  Vitals Value Taken Time   /49 02/28/24 1106   Temp 97.7 °F (36.5 °C) 02/28/24 1103   Pulse 101 02/28/24 1106   Resp 18 02/28/24 1103   SpO2 97 % 02/28/24 1105   Vitals shown include unfiled device data.        Post Anesthesia Care and Evaluation    Patient location during evaluation: PHASE II  Patient participation: complete - patient participated  Level of consciousness: awake  Pain management: adequate    Airway patency: patent  Anesthetic complications: No anesthetic complications    Cardiovascular status: acceptable  Respiratory status: acceptable  Hydration status: acceptable    Comments: /51 (BP Location: Left arm, Patient Position: Lying)   Pulse 111   Temp 97.7 °F (36.5 °C) (Temporal)   Resp 18   SpO2 97%   Breastfeeding No

## 2024-02-28 NOTE — OP NOTE
Port-A-Cath Placement with Fluoroscopy Operative Report:     Patient: Radha Wade  MRN: [id number]    YOB: 1960  Age: 63 y.o.  Sex: female  Unit:  [facility] OR Room/Bed: [facility] OR/MAIN OR Location: [facility]      Admitting Physician: ARLET WADE    Primary Care Physician: Myron Vines MD             INDICATIONS: This is a 63 y.o. female who presents with endometrial adenocarcinoma as indication for port placement.     DATE OF OPERATION: 2/28/2024     Surgeon(s) and Role:     * Arlet Wade MD - Primary    ANESTHESIA: Monitored Anesthesia Care     PREOPERATIVE DIAGNOSIS: Endometrial adenocarcinoma [C54.1]    POSTOPERATIVE DIAGNOSIS: Same    PROCEDURES PERFORMED:  Port-A-Cath placement in the right cephalic vein     PROCEDURE DETAILS:     After patient was placed on the table in a supine position the bilateral chest and neck were prepped with ChloraPrep and draped in the usual fashion.  Preoperative antibiotics were given.  A timeout was performed.    On the right, the skin overlying the deltopectoral groove was infiltrated with 1% lidocaine with epinephrine. An incision was made, and I dissected down to the deltopectoral groove with a combination of cautery and sharp dissection. The cephalic vein was identified. 2-0 silk ties were placed proximally and distally. The distal tie was tied down.  An 11 blade was used to create a venotomy. The yellow pic was used to open the vein and the catheter was inserted. Under live fluoroscopy, the catheter was noted to be in the SVC. The proximal tie was tied down, ensuring not to narrow the lumen of the catheter in the vein. A pocket was created in the subcutaneous tissue for the port.  The catheter was connected to the Port-A-Cath which was buried into the subcutaneous pocket and secured with 0-Vicryl sutures.  The pocket was closed with 3-0 Vicryl and then 4-0 Monocryl.  The port was accessed through the skin, blood was easily aspirated, and  it was flushed with a final heparin flush. Skin glue was placed over the incision.  The patient tolerated procedure well.  There were no complications. No x-ray needed in PACU     Findings: Port-a-cath placed in the right cephalic vein   Estimated Blood Loss:  15mL   Complications: none apparent            Specimens: None            Disposition: PACU - hemodynamically stable.           Condition: stable    Lizy Wade MD  02/21/2024

## 2024-02-29 ENCOUNTER — APPOINTMENT (OUTPATIENT)
Dept: CARDIOLOGY | Facility: HOSPITAL | Age: 64
End: 2024-02-29
Payer: COMMERCIAL

## 2024-02-29 LAB
ANION GAP SERPL CALCULATED.3IONS-SCNC: 11 MMOL/L (ref 5–15)
BASOPHILS # BLD AUTO: 0.04 10*3/MM3 (ref 0–0.2)
BASOPHILS NFR BLD AUTO: 0.4 % (ref 0–1.5)
BH CV ECHO MEAS - AO MAX PG: 12 MMHG
BH CV ECHO MEAS - AO MEAN PG: 6 MMHG
BH CV ECHO MEAS - AO V2 MAX: 173 CM/SEC
BH CV ECHO MEAS - AO V2 VTI: 26.2 CM
BH CV ECHO MEAS - AVA(I,D): 2.33 CM2
BH CV ECHO MEAS - EDV(CUBED): 85.2 ML
BH CV ECHO MEAS - EDV(MOD-SP4): 56 ML
BH CV ECHO MEAS - EF(MOD-SP4): 70.9 %
BH CV ECHO MEAS - ESV(CUBED): 24.4 ML
BH CV ECHO MEAS - ESV(MOD-SP4): 16.3 ML
BH CV ECHO MEAS - FS: 34.1 %
BH CV ECHO MEAS - IVS/LVPW: 1.17 CM
BH CV ECHO MEAS - IVSD: 0.7 CM
BH CV ECHO MEAS - LA DIMENSION: 2.6 CM
BH CV ECHO MEAS - LV DIASTOLIC VOL/BSA (35-75): 34.4 CM2
BH CV ECHO MEAS - LV MASS(C)D: 83.8 GRAMS
BH CV ECHO MEAS - LV MAX PG: 6.5 MMHG
BH CV ECHO MEAS - LV MEAN PG: 3 MMHG
BH CV ECHO MEAS - LV SYSTOLIC VOL/BSA (12-30): 10 CM2
BH CV ECHO MEAS - LV V1 MAX: 127 CM/SEC
BH CV ECHO MEAS - LV V1 VTI: 21.5 CM
BH CV ECHO MEAS - LVIDD: 4.4 CM
BH CV ECHO MEAS - LVIDS: 2.9 CM
BH CV ECHO MEAS - LVOT AREA: 2.8 CM2
BH CV ECHO MEAS - LVOT DIAM: 1.9 CM
BH CV ECHO MEAS - LVPWD: 0.6 CM
BH CV ECHO MEAS - MR MAX PG: 25 MMHG
BH CV ECHO MEAS - MR MAX VEL: 250 CM/SEC
BH CV ECHO MEAS - MV A MAX VEL: 115 CM/SEC
BH CV ECHO MEAS - MV DEC SLOPE: 491 CM/SEC2
BH CV ECHO MEAS - MV E MAX VEL: 87.8 CM/SEC
BH CV ECHO MEAS - MV E/A: 0.76
BH CV ECHO MEAS - MV P1/2T: 59.7 MSEC
BH CV ECHO MEAS - MVA(P1/2T): 3.7 CM2
BH CV ECHO MEAS - PA V2 MAX: 121 CM/SEC
BH CV ECHO MEAS - RAP SYSTOLE: 5 MMHG
BH CV ECHO MEAS - RV MAX PG: 1.13 MMHG
BH CV ECHO MEAS - RV V1 MAX: 53.1 CM/SEC
BH CV ECHO MEAS - RVSP: 31.4 MMHG
BH CV ECHO MEAS - SI(MOD-SP4): 24.4 ML/M2
BH CV ECHO MEAS - SV(LVOT): 61 ML
BH CV ECHO MEAS - SV(MOD-SP4): 39.7 ML
BH CV ECHO MEAS - TR MAX PG: 26.4 MMHG
BH CV ECHO MEAS - TR MAX VEL: 257 CM/SEC
BUN SERPL-MCNC: 11 MG/DL (ref 8–23)
BUN/CREAT SERPL: 25.6 (ref 7–25)
CALCIUM SPEC-SCNC: 7.8 MG/DL (ref 8.6–10.5)
CHLORIDE SERPL-SCNC: 96 MMOL/L (ref 98–107)
CO2 SERPL-SCNC: 26 MMOL/L (ref 22–29)
CREAT SERPL-MCNC: 0.43 MG/DL (ref 0.57–1)
DEPRECATED RDW RBC AUTO: 50.9 FL (ref 37–54)
EGFRCR SERPLBLD CKD-EPI 2021: 109.4 ML/MIN/1.73
EOSINOPHIL # BLD AUTO: 0.08 10*3/MM3 (ref 0–0.4)
EOSINOPHIL NFR BLD AUTO: 0.8 % (ref 0.3–6.2)
ERYTHROCYTE [DISTWIDTH] IN BLOOD BY AUTOMATED COUNT: 19.9 % (ref 12.3–15.4)
GLUCOSE BLDC GLUCOMTR-MCNC: 142 MG/DL (ref 70–130)
GLUCOSE SERPL-MCNC: 114 MG/DL (ref 65–99)
HCT VFR BLD AUTO: 32.6 % (ref 34–46.6)
HGB BLD-MCNC: 9.4 G/DL (ref 12–15.9)
LEFT ATRIUM VOLUME INDEX: 10.6 ML/M2
LEFT ATRIUM VOLUME: 17.2 ML
LYMPHOCYTES # BLD AUTO: 1.68 10*3/MM3 (ref 0.7–3.1)
LYMPHOCYTES NFR BLD AUTO: 17.4 % (ref 19.6–45.3)
MCH RBC QN AUTO: 20.9 PG (ref 26.6–33)
MCHC RBC AUTO-ENTMCNC: 28.8 G/DL (ref 31.5–35.7)
MCV RBC AUTO: 72.4 FL (ref 79–97)
MONOCYTES # BLD AUTO: 0.82 10*3/MM3 (ref 0.1–0.9)
MONOCYTES NFR BLD AUTO: 8.5 % (ref 5–12)
NEUTROPHILS NFR BLD AUTO: 6.9 10*3/MM3 (ref 1.7–7)
NEUTROPHILS NFR BLD AUTO: 71.8 % (ref 42.7–76)
PLATELET # BLD AUTO: 643 10*3/MM3 (ref 140–450)
PMV BLD AUTO: 8.6 FL (ref 6–12)
POTASSIUM SERPL-SCNC: 4.1 MMOL/L (ref 3.5–5.2)
QT INTERVAL: 336 MS
QTC INTERVAL: 441 MS
RBC # BLD AUTO: 4.5 10*6/MM3 (ref 3.77–5.28)
SODIUM SERPL-SCNC: 133 MMOL/L (ref 136–145)
WBC NRBC COR # BLD AUTO: 9.63 10*3/MM3 (ref 3.4–10.8)

## 2024-02-29 PROCEDURE — 85025 COMPLETE CBC W/AUTO DIFF WBC: CPT | Performed by: FAMILY MEDICINE

## 2024-02-29 PROCEDURE — G0378 HOSPITAL OBSERVATION PER HR: HCPCS

## 2024-02-29 PROCEDURE — 25510000001 PERFLUTREN 6.52 MG/ML SUSPENSION: Performed by: FAMILY MEDICINE

## 2024-02-29 PROCEDURE — 93306 TTE W/DOPPLER COMPLETE: CPT

## 2024-02-29 PROCEDURE — 80048 BASIC METABOLIC PNL TOTAL CA: CPT | Performed by: FAMILY MEDICINE

## 2024-02-29 PROCEDURE — 97166 OT EVAL MOD COMPLEX 45 MIN: CPT | Performed by: OCCUPATIONAL THERAPIST

## 2024-02-29 PROCEDURE — 82948 REAGENT STRIP/BLOOD GLUCOSE: CPT

## 2024-02-29 PROCEDURE — 93306 TTE W/DOPPLER COMPLETE: CPT | Performed by: INTERNAL MEDICINE

## 2024-02-29 PROCEDURE — 97162 PT EVAL MOD COMPLEX 30 MIN: CPT

## 2024-02-29 RX ORDER — HYDROMORPHONE HYDROCHLORIDE 2 MG/1
2 TABLET ORAL EVERY 6 HOURS PRN
COMMUNITY

## 2024-02-29 RX ADMIN — PANTOPRAZOLE SODIUM 40 MG: 40 TABLET, DELAYED RELEASE ORAL at 05:52

## 2024-02-29 RX ADMIN — APIXABAN 10 MG: 5 TABLET, FILM COATED ORAL at 08:33

## 2024-02-29 RX ADMIN — APIXABAN 10 MG: 5 TABLET, FILM COATED ORAL at 20:39

## 2024-02-29 RX ADMIN — Medication 10 ML: at 08:33

## 2024-02-29 RX ADMIN — OXYCODONE HYDROCHLORIDE 5 MG: 5 TABLET ORAL at 20:40

## 2024-02-29 RX ADMIN — OXYCODONE HYDROCHLORIDE 5 MG: 5 TABLET ORAL at 07:53

## 2024-02-29 RX ADMIN — METFORMIN HCL 500 MG: 500 TABLET ORAL at 08:33

## 2024-02-29 RX ADMIN — PERFLUTREN 13.04 MG: 6.52 INJECTION, SUSPENSION INTRAVENOUS at 13:30

## 2024-02-29 RX ADMIN — OXYCODONE HYDROCHLORIDE 5 MG: 5 TABLET ORAL at 13:51

## 2024-02-29 RX ADMIN — Medication 10 ML: at 20:40

## 2024-02-29 RX ADMIN — POLYETHYLENE GLYCOL 3350 17 G: 17 POWDER, FOR SOLUTION ORAL at 08:33

## 2024-02-29 NOTE — PLAN OF CARE
Goal Outcome Evaluation:  Plan of Care Reviewed With: patient        Progress: no change  Outcome Evaluation: PT eval complete. Pt in fowlers positioning uon entering. Pt A&Ox4. Pt complains of 4/10 pain located in back, on buttock (bed sore), and in abdominal area. Pt reports PLOF as ind/modified ind with decreased use of RW since her stroke, and reports she tries to do everything by herslef. She gets assistance with climbing stairs to get into the house and she may need assistance getting into and out of tub when she soaks. Pt recently DC with CVA affecting R body. Today, pt demos ability to sit at L EOB with HHA and CGA. BLE AROM is WNL . strength in RLE is 4-/5 and LLE 5/5. Pt performed sit>stand with SBA and ambulated 20 ft to the bathroom with HHA and CGA/SBA. Pt occassionally reched for wall and towel rack and states she has been wall/furniture walking to wean herself from RW. Pt ambulated back to bed and required CGA to perform supine to sit. Pt tangential in conversation throughout eval. Skilled therapy recommended to improve functional mobility, increase activity tolerance and endurance, improve strength, and imporve balance with ambulation and prevent need for furniture and wall walking. Recommended DC home w assist of family and home health.      Anticipated Discharge Disposition (PT): home with assist, home with home health

## 2024-02-29 NOTE — CONSULTS
[facility]  INPATIENT WOUND & OSTOMY CONSULTATION    Today's Date: 24    Patient Name: Radha Wade  MRN: [id number]  [facility]: [id number]  PCP: Myron Vines MD  Referring Provider:   Consulting Provider (From admission, onward)      Start Ordered     Status Ordering Provider    24 1845 24 1844  Inpatient Wound Care MD Consult Pressure Injury  Once        Specialty:  Wound Care  Provider:  Akua Melendez APRN Acknowledged ROBINSON, MAURICE S           Attending Provider: Mansoor Galvan DO  Length of Stay: 1    SUBJECTIVE   Chief Complaint: ***    HPI: Radha Wade, a 63 y.o.female, presents with a past medical history of ***.  A full past medical history as listed below.  ***    Inpatient wound care consulted due to ***      Visit Dx:    ICD-10-CM ICD-9-CM   1. Pressure injury of right buttock, stage 3  L89.313 707.05     707.23   2. Endometrial adenocarcinoma  C54.1 182.0       Hospital Problem List:     Pulmonary embolus    Type 2 diabetes mellitus, without long-term current use of insulin    Endometrial adenocarcinoma    PE (pulmonary thromboembolism)      History:   Past Medical History:   Diagnosis Date    Anemia     Asthma     Bronchitis     Depression     Diabetes mellitus     DVT (deep venous thrombosis)     right arm    Endometrial cancer     mets to lung    Heart murmur     Lyme disease     Mitral valve prolapse     Stroke     --right side weakness     Past Surgical History:   Procedure Laterality Date     SECTION      D & C HYSTEROSCOPY ENDOMETRIAL ABLATION N/A 2024    Procedure: DILATATION AND CURETTAGE;  Surgeon: Radu De Los Santos MD;  Location: [facility] OR;  Service: Obstetrics/Gynecology;  Laterality: N/A;    VENOUS ACCESS DEVICE (PORT) INSERTION N/A 2024    Procedure: Single Lumen Port-a-cath insertion with flouroscopy;  Surgeon: Lizy Wade MD;  Location:  [facility] OR;  Service: General;  Laterality: N/A;     Social  History     Socioeconomic History    Marital status:    Tobacco Use    Smoking status: Never    Smokeless tobacco: Never   Vaping Use    Vaping Use: Never used   Substance and Sexual Activity    Alcohol use: No    Drug use: No    Sexual activity: Defer     Partners: Male     Family History   Problem Relation Age of Onset    Heart disease Father     Diabetes Father     Diabetes Brother     Heart disease Brother     Cancer Maternal Grandmother     Cancer Maternal Grandfather     Heart disease Paternal Grandfather        Allergies:  Allergies   Allergen Reactions    Iodinated Contrast Media Shortness Of Breath     Previous reaction, was premedicated with solu-medrol and benadryl       Medications:    Current Facility-Administered Medications:     acetaminophen (TYLENOL) tablet 650 mg, 650 mg, Oral, Q4H PRN, Mansoor Galvan DO, 650 mg at 02/28/24 2027    aluminum-magnesium hydroxide-simethicone (MAALOX MAX) 400-400-40 MG/5ML suspension 15 mL, 15 mL, Oral, Q6H PRN, Mansoor Galvan DO    apixaban (ELIQUIS) tablet 10 mg, 10 mg, Oral, Q12H, 10 mg at 02/29/24 0833 **FOLLOWED BY** [START ON 3/6/2024] apixaban (ELIQUIS) tablet 5 mg, 5 mg, Oral, Q12H, Mansoor Galvan DO    sennosides-docusate (PERICOLACE) 8.6-50 MG per tablet 2 tablet, 2 tablet, Oral, BID PRN **AND** polyethylene glycol (MIRALAX) packet 17 g, 17 g, Oral, Daily PRN **AND** bisacodyl (DULCOLAX) EC tablet 5 mg, 5 mg, Oral, Daily PRN **AND** bisacodyl (DULCOLAX) suppository 10 mg, 10 mg, Rectal, Daily PRN, Mansoor Galvan DO    Calcium Replacement - Follow Nurse / BPA Driven Protocol, , Does not apply, PRN, Mansoor Galvan DO    HYDROmorphone (DILAUDID) tablet 2 mg, 2 mg, Oral, Q6H PRN, Mansoor Galvan DO    lactated ringers infusion 1,000 mL, 1,000 mL, Intravenous, Continuous, Mansoor Galvan DO, Stopped at 02/28/24 1814    lactated ringers infusion, 9 mL/hr, Intravenous, Continuous, Mansoor Galvan, DO    Magnesium  Standard Dose Replacement - Follow Nurse / BPA Driven Protocol, , Does not apply, PRN, Mansoor Galvan DO    metFORMIN (GLUCOPHAGE) tablet 500 mg, 500 mg, Oral, BID With Meals, Mansoor Galvan DO, 500 mg at 02/29/24 0833    ondansetron (ZOFRAN) injection 4 mg, 4 mg, Intravenous, Q6H PRN, Mansoor Galvan DO    oxyCODONE (ROXICODONE) immediate release tablet 5 mg, 5 mg, Oral, Q4H PRN, Mansoor Galvan DO, 5 mg at 02/29/24 0753    pantoprazole (PROTONIX) EC tablet 40 mg, 40 mg, Oral, Q AM, Mansoor Galvan DO, 40 mg at 02/29/24 0552    Phosphorus Replacement - Follow Nurse / BPA Driven Protocol, , Does not apply, PRN, Mansoor Galvan DO    polyethylene glycol (MIRALAX) packet 17 g, 17 g, Oral, Daily, Mansoor Galvan DO, 17 g at 02/29/24 0833    Potassium Replacement - Follow Nurse / BPA Driven Protocol, , Does not apply, PRN, Mansoor Galvan DO    sodium chloride 0.9 % flush 10 mL, 10 mL, Intravenous, Q12H, Mansoor Galvan DO, 10 mL at 02/29/24 0833    sodium chloride 0.9 % flush 10 mL, 10 mL, Intravenous, PRN, Mansoor Galvan DO    sodium chloride 0.9 % infusion 40 mL, 40 mL, Intravenous, PRN, Mansoor Galvan DO    Review of Systems: ***  Review of Systems      OBJECTIVE     Vitals:    02/29/24 1131   BP: 115/50   Pulse: 96   Resp: 16   Temp: 97.8 °F (36.6 °C)   SpO2: 93%       PHYSICAL EXAM: ***  Physical Exam       Results Review:  Lab Results (last 48 hours)       Procedure Component Value Units Date/Time    Basic Metabolic Panel [[id number]]  (Abnormal) Collected: 02/29/24 0552    Specimen: Blood Updated: 02/29/24 0633     Glucose 114 mg/dL      BUN 11 mg/dL      Creatinine 0.43 mg/dL      Sodium 133 mmol/L      Potassium 4.1 mmol/L      Chloride 96 mmol/L      CO2 26.0 mmol/L      Calcium 7.8 mg/dL      BUN/Creatinine Ratio 25.6     Anion Gap 11.0 mmol/L      eGFR 109.4 mL/min/1.73     Narrative:      GFR Normal >60  Chronic Kidney Disease <60  Kidney Failure  <15      CBC & Differential [[id number]]  (Abnormal) Collected: 02/29/24 0552    Specimen: Blood Updated: 02/29/24 0615    Narrative:      The following orders were created for panel order CBC & Differential.  Procedure                               Abnormality         Status                     ---------                               -----------         ------                     CBC Auto Differential[[id number]]        Abnormal            Final result                 Please view results for these tests on the individual orders.    CBC Auto Differential [[id number]]  (Abnormal) Collected: 02/29/24 0552    Specimen: Blood Updated: 02/29/24 0615     WBC 9.63 10*3/mm3      RBC 4.50 10*6/mm3      Hemoglobin 9.4 g/dL      Hematocrit 32.6 %      MCV 72.4 fL      MCH 20.9 pg      MCHC 28.8 g/dL      RDW 19.9 %      RDW-SD 50.9 fl      MPV 8.6 fL      Platelets 643 10*3/mm3      Neutrophil % 71.8 %      Lymphocyte % 17.4 %      Monocyte % 8.5 %      Eosinophil % 0.8 %      Basophil % 0.4 %      Neutrophils, Absolute 6.90 10*3/mm3      Lymphocytes, Absolute 1.68 10*3/mm3      Monocytes, Absolute 0.82 10*3/mm3      Eosinophils, Absolute 0.08 10*3/mm3      Basophils, Absolute 0.04 10*3/mm3     Urinalysis With Culture If Indicated - Straight Cath [[id number]]  (Abnormal) Collected: 02/28/24 2209    Specimen: Urine from Straight Cath Updated: 02/28/24 2216     Color, UA Dark Yellow     Appearance, UA Clear     pH, UA 5.5     Specific Gravity, UA 1.027     Glucose, UA Negative     Ketones, UA 15 mg/dL (1+)     Bilirubin, UA Negative     Blood, UA Negative     Protein, UA Trace     Leuk Esterase, UA Negative     Nitrite, UA Negative     Urobilinogen, UA 0.2 E.U./dL    Narrative:      In absence of clinical symptoms, the presence of pyuria, bacteria, and/or nitrites on the urinalysis result does not correlate with infection.  Urine microscopic not indicated.    High Sensitivity Troponin T 2Hr [[id number]]  (Normal) Collected: 02/28/24  1559    Specimen: Blood Updated: 02/28/24 1629     HS Troponin T 10 ng/L      Troponin T Delta 1 ng/L     Narrative:      High Sensitive Troponin T Reference Range:  <14.0 ng/L- Negative Female for AMI  <22.0 ng/L- Negative Male for AMI  >=14 - Abnormal Female indicating possible myocardial injury.  >=22 - Abnormal Male indicating possible myocardial injury.   Clinicians would have to utilize clinical acumen, EKG, Troponin, and serial changes to determine if it is an Acute Myocardial Infarction or myocardial injury due to an underlying chronic condition.         High Sensitivity Troponin T [[id number]]  (Normal) Collected: 02/28/24 1243    Specimen: Blood Updated: 02/28/24 1319     HS Troponin T 9 ng/L     Narrative:      High Sensitive Troponin T Reference Range:  <14.0 ng/L- Negative Female for AMI  <22.0 ng/L- Negative Male for AMI  >=14 - Abnormal Female indicating possible myocardial injury.  >=22 - Abnormal Male indicating possible myocardial injury.   Clinicians would have to utilize clinical acumen, EKG, Troponin, and serial changes to determine if it is an Acute Myocardial Infarction or myocardial injury due to an underlying chronic condition.         POC Glucose Once [[id number]]  (Normal) Collected: 02/28/24 1113    Specimen: Blood Updated: 02/28/24 1123     Glucose 130 mg/dL      Comment: : [id number] Downey JessicaMeter ID: [id number]       hCG, Serum, Qualitative [[id number]]  (Normal) Collected: 02/28/24 0927    Specimen: Blood Updated: 02/28/24 0955     HCG Qualitative Negative    POC Glucose Once [[id number]]  (Normal) Collected: 02/28/24 0926    Specimen: Blood Updated: 02/28/24 0938     Glucose 124 mg/dL      Comment: : [id number] Tiffanie Pendleton ID: [id number]             Imaging Results (Last 72 Hours)       Procedure Component Value Units Date/Time    NM Lung Scan Perfusion Particulate [[id number]] Collected: 02/28/24 1401     Updated: 02/28/24 1407    Narrative:      EXAMINATION: NM  LUNG SCAN PERFUSION PARTICULATE-  2/28/2024 2:02 PM     HISTORY: Chest pain. Normal EKG.     FINDINGS: Following intravenous injection of 5.5 mCi of Tc 99m MAA  intravenously multiple planar images were obtained of the thorax.  Although there is a relatively normal perfusion distribution of the  radiopharmaceutical within the left lung there are noted to be multiple  segmental and subsegmental perfusion defects within the right lung. This  includes at least 3 discrete defects. FINDINGS would suggest a high  probability for pulmonary thromboembolic disease to the right lung.       Impression:      1. Multiple segmental and subsegmental perfusion defects to the right  lung. FINDINGS would fall into a high probability for pulmonary  thromboembolic disease category. Relatively normal perfusion to the left  lung.     This report was signed and finalized on 2/28/2024 2:04 PM by Dr. Romeo Arizmendi MD.       US Venous Doppler Lower Extremity Right (duplex) [[id number]] Resulted: 02/28/24 1139     Updated: 02/28/24 1215    XR Chest Post CVA Port [[id number]] Collected: 02/28/24 1112     Updated: 02/28/24 1116    Narrative:      XR CHEST POST CVA PORT- 2/28/2024 9:45 AM     HISTORY: port; C54.1-Malignant neoplasm of endometrium     COMPARISON: None     FLUOROSCOPY TIME: 3 seconds     FLUOROSCOPY DOSE/ CUMULATIVE AIR KERMA: 0.3716 mGy     NUMBER OF IMAGES: 1       Impression:         Intraoperative fluoroscopic images during right subclavian port  placement. Tip projecting over the lower SVC.     Please refer to the operative note for more details.     This report was signed and finalized on 2/28/2024 11:13 AM by Dr. Columba Abbasi MD.       FL C Arm During Surgery [[id number]] Resulted: 02/28/24 1102     Updated: 02/28/24 1102    Narrative:      This procedure was auto-finalized with no dictation required.               ASSESSMENT/PLAN       Examination and evaluation of wound(s) was performed.    DIAGNOSIS:   ***    PLAN:    Orders placed for wound care and pressure/moisture management as listed below.       Start     Ordered    02/29/24 1204  Wound Care  Daily      Question Answer Comment   Wound Locations Right gluteal    Wound Care Instructions Clean with NS. Moisten puracol dressing with NS and apply to wound bed. Cover with silicone foam border dressing.    Cleanse Normal Saline    Intervention Other    Other Puracol    Moistened? Yes    Moisten With Normal Saline    Securement Silicone Border Dressing        02/29/24 1203    02/29/24 1204  Wound Care  Every 12 Hours        Comments: For management of left gluteal stage 2 pressure injury   Question:  Wound Care Instructions  Answer:  Apply Moisture Barrier After Any Incontinence and PRN. Zinc paste    02/29/24 1203    02/29/24 1203  Turn Patient  Now Then Every 2 Hours         02/29/24 1203    02/29/24 1203  Elevate Heels Off of Bed  Until Discontinued         02/29/24 1203    02/29/24 1203  Use Seat Cushion When Up In Chair  Continuous         02/29/24 1203    02/29/24 1203  Use Repositioning Wedge to Position Patient  Continuous        Comments: Use Comfort Glide repositioning sheet and wedges to position patient.    02/29/24 1203    02/29/24 0000  Ambulatory Referral to Wound Clinic         02/29/24 1204             Discussed findings and treatment plan including risks, benefits, and treatment options with *** in detail. Patient agreed with treatment plan.      This document has been electronically signed by GREYSON Patel on 2/29/2024 12:05 CST

## 2024-02-29 NOTE — PAYOR COMM NOTE
"2/29/24 [facility] [phone]  [phone]    INPATIENT ADMISSION ON 2/28/24. FAXING CLINICAL.              Ellen Wade (63 y.o. Female)       Date of Birth   1960    Social Security Number       Address   [address] [place] [address]    Home Phone   [phone]    MRN   [id number]       Church   Other    Marital Status                               Admission Date   2/28/24    Admission Type   Elective    Admitting Provider   Mansoor Galvan DO    Attending Provider   Mansoor Galvan DO    Department, Room/Bed   [address], 454/1       Discharge Date       Discharge Disposition       Discharge Destination                                 Attending Provider: Mansoor Galvan DO    Allergies: Iodinated Contrast Media    Isolation: None   Infection: None   Code Status: CPR    Ht: 151 cm (59.45\")   Wt: 67.8 kg (149 lb 6.4 oz)    Admission Cmt: None   Principal Problem: Pulmonary embolus [I26.99]                   Active Insurance as of 2/28/2024       Primary Coverage       Payor Plan Insurance Group Employer/Plan Group    [employer] AN[employer] PATHWAY HMO [id number]       Payor Plan Address Payor Plan Phone Number Payor Plan Fax Number Effective Dates    [address] [phone]  10/1/2023 - None Entered    [place] [address]         Subscriber Name Subscriber Birth Date Member ID       ELLEN WADE 1960 [id number]                     Emergency Contacts        (Rel.) Home Phone Work Phone Mobile Phone    Romeo Wade (Spouse) [phone] -- --             [facility] Encounter Date/Time: 2/28/2024 [facility]   Hospital Account: [id number]    MRN: [id number]   Patient:  Ellen Wade   Contact Serial #: [id number]   SSN:          ENCOUNTER             Patient Class: Inpatient   Unit: [address] Service: Medicine     Bed: 454/1   Admitting Provider: Mansoor Galvan" DO MASTER   Referring Physician: Lizy Wade   Attending Provider: Mansoor Galvan DO   Adm Diagnosis: Endometrial adenocarcino*               PATIENT             Name: Radha Wade : 1960 (63 yrs)   Address: [address] [place] [address] Sex: Female   City: [place] [address]   County: [facility]   Marital Status:  Ethnicity: NOT                                                                         Race: WHITE   Primary Care Provider: Myron Vines MD Patients Phone: Home Phone: [phone]     Mobile Phone: [phone]     EMERGENCY CONTACT   Contact Name Legal Guardian? Relationship to Patient Home Phone Work Phone Mobile Phone   1. Romeo Wade  2. *No Contact Specified*      Spouse    [phone]                GUARANTOR             Guarantor: Radha Wade     : 1960   Address: [address] [place] [address] Sex: [place] [address]     Relation to Patient: Self       Home Phone: [phone]   Guarantor ID: [id number]       Work Phone:     GUARANTOR EMPLOYER   Employer:           Status: RETIRED   COVERAGE          PRIMARY INSURANCE   Payor: [employer] Plan: [employer] PATH[employer]AY HMO   Group Number: [id number] Insurance Type: INDEMNITY   Subscriber Name: RADHA WADE Subscriber : 1960   Subscriber ID: [id number] Coverage Address: [place] [address]   Pat. Rel. to Subscriber: Self Coverage Phone: [phone]   SECONDARY INSURANCE   Payor: N/A Plan: N/A   Group Number:   Insurance Type:     Subscriber Name:   Subscriber :     Subscriber ID:   Coverage Address:     Pat. Rel. to Subscriber:   Coverage Phone:        Contact Serial # ([id number])         2024    Chart ID ([id number])            Lizy Wade MD   Physician  Surgery     Op Note     Signed     Date of Service: 24 1039  Creation Time: 24 110  Case Time: Procedures: Surgeons:   24 1039 Single Lumen Port-a-cath insertion with  flouroscopy    Arlet Wade MD               Signed         Port-A-Cath Placement with Fluoroscopy Operative Report:      Patient: Radha Wade          MRN: [id number]        YOB: 1960          Age: 63 y.o.     Sex: female  Unit:  PAD OR        Room/Bed: PAD OR/MAIN OR          Location: [facility]        Admitting Physician: ARLET WADE    Primary Care Physician: Myron Vines MD              INDICATIONS: This is a 63 y.o. female who presents with endometrial adenocarcinoma as indication for port placement.      DATE OF OPERATION: 2/28/2024      Surgeon(s) and Role:     * Arlet Wade MD - Primary     ANESTHESIA: Monitored Anesthesia Care      PREOPERATIVE DIAGNOSIS: Endometrial adenocarcinoma [C54.1]     POSTOPERATIVE DIAGNOSIS: Same     PROCEDURES PERFORMED:  Port-A-Cath placement in the right cephalic vein      PROCEDURE DETAILS:     After patient was placed on the table in a supine position the bilateral chest and neck were prepped with ChloraPrep and draped in the usual fashion.  Preoperative antibiotics were given.  A timeout was performed.    On the right, the skin overlying the deltopectoral groove was infiltrated with 1% lidocaine with epinephrine. An incision was made, and I dissected down to the deltopectoral groove with a combination of cautery and sharp dissection. The cephalic vein was identified. 2-0 silk ties were placed proximally and distally. The distal tie was tied down.  An 11 blade was used to create a venotomy. The yellow pic was used to open the vein and the catheter was inserted. Under live fluoroscopy, the catheter was noted to be in the SVC. The proximal tie was tied down, ensuring not to narrow the lumen of the catheter in the vein. A pocket was created in the subcutaneous tissue for the port.  The catheter was connected to the Port-A-Cath which was buried into the subcutaneous pocket and secured with 0-Vicryl sutures.  The pocket was  "closed with 3-0 Vicryl and then 4-0 Monocryl.  The port was accessed through the skin, blood was easily aspirated, and it was flushed with a final heparin flush. Skin glue was placed over the incision.  The patient tolerated procedure well.  There were no complications. No x-ray needed in PACU      Findings: Port-a-cath placed in the right cephalic vein   Estimated Blood Loss:  15mL   Complications: none apparent            Specimens: None               Disposition: PACU - hemodynamically stable.           Condition: stable     Lizy Wade MD  02/21/2024                           Traci Ruff, RN   Registered Nurse  Nursing     Plan of Care     Signed     Date of Service: 02/28/24 1831  Creation Time: 02/28/24 1831     Signed         Goal Outcome Evaluation:  Plan of Care Reviewed With: patient  Outcome Evaluation: Pt admitted from outpatient to room 454. Pt AAOx4, c/o of mild chest pressure that is improved since earlier today, c/o of chronic epigastri/abdominal pressure and fullness due to her cancer, she has hx of edometrial ca, states it is \"pushing on my bladder\", had mediport placed today for future chemo, incision glued and TOMAS, on RA, HR NS-ST. urinated 100 ml, stated she still felt full, bladder scan of 385 ml post void, safety maintained                     Mansoor Galvan DO   Physician  Hospitalist     H&P     Signed     Date of Service: 02/28/24 1753  Creation Time: 02/28/24 1753     Signed       Expand All [facility] Medicine Services  HISTORY AND PHYSICAL     Date of Admission: 2/28/2024  Primary Care Physician: Myron Vines MD     Subjective   Primary Historian: The patient     Chief Complaint: Mild dyspnea     History of Present Illness     This 63-year-old female presented this morning for insertion of a chemotherapy port which was performed by Dr. Wade.  Post procedurally, the patient experienced some shortness of " breath.  She has a history of stroke and previous DVTs and is chronically anticoagulated with Eliquis.  Prior to surgery she held her Eliquis as instructed and was given bridging Lovenox which was supposed to be discontinued the evening prior to surgery.  Apparently, the patient became confused about Lovenox administration and held Eliquis for several days but did not use Lovenox.  She did not start Lovenox until the day before surgery and received only 2 doses.  She noted 2 days prior to the procedure that her right lower extremity was more swollen than typical.  The patient has chronic swelling of both lower extremity secondary to previous DVTs however swelling of the right lower extremity was far beyond what she had previously experienced.  Dr. Wade evaluated the patient post procedurally and ordered a VQ scan which was probability for bilateral pulmonary emboli.  Currently, the patient has no oxygen requirement.  A venous Doppler study has been ordered and has been performed and results are pending.  A chest x-ray post-CVA port placement shows no acute abnormality.  Laboratory evaluation reveals normal troponins on 2 separate studies.  Preoperative MRSA nasal swab was negative.  Preoperative CBC shows a normal white blood cell count with a hemoglobin of 9.5.  Preoperative CMP shows glucose 179, sodium 134 and albumin 2.8.  The patient is admitted for initiation of anticoagulation.  She has received 1 dose of therapeutic Lovenox while in the outpatient surgical holding area.  She will be started on an Eliquis run and dose of 10 mg p.o. twice daily x 1 week to be reduced to 5 mg twice daily thereafter.     The patient has recently been diagnosed with metastatic endometrial carcinoma.  About 1 month ago she suffered a CVA and was fully anticoagulated at that time.     Review of Systems   Constitutional: Negative.    HENT: Negative.     Eyes: Negative.    Respiratory:  Positive for shortness of breath.     Cardiovascular:  Positive for leg swelling.   Gastrointestinal: Negative.    Endocrine: Negative.    Genitourinary: Negative.    Musculoskeletal: Negative.    Skin: Negative.    Allergic/Immunologic: Negative.    Neurological: Negative.    Hematological: Negative.    Psychiatric/Behavioral: Negative.        Otherwise complete ROS reviewed and negative except as mentioned in the HPI.     Past Medical History:   Medical History[]Expand by Default        Past Medical History:   Diagnosis Date    Anemia      Asthma      Bronchitis      Depression      Diabetes mellitus      DVT (deep venous thrombosis)       right arm    Endometrial cancer       mets to lung    Heart murmur      Lyme disease      Mitral valve prolapse      Stroke       --right side weakness         Past Surgical History:  Surgical History         Past Surgical History:   Procedure Laterality Date     SECTION        D & C HYSTEROSCOPY ENDOMETRIAL ABLATION N/A 2024     Procedure: DILATATION AND CURETTAGE;  Surgeon: Radu De Los Santos MD;  Location: [facility];  Service: Obstetrics/Gynecology;  Laterality: N/A;         Social History:  reports that she has never smoked. She has never used smokeless tobacco. She reports that she does not drink alcohol and does not use drugs.     Family History: family history includes Cancer in her maternal grandfather and maternal grandmother; Diabetes in her brother and father; Heart disease in her brother, father, and paternal grandfather.        Allergies:        Allergies   Allergen Reactions    Iodinated Contrast Media Shortness Of Breath       Previous reaction, was premedicated with solu-medrol and benadryl         Medications:          Prior to Admission medications    Medication Sig Start Date End Date Taking? Authorizing Provider   acetaminophen (TYLENOL) 500 MG tablet Take 1 tablet by mouth Every 6 (Six) Hours As Needed for Mild Pain.     Yes Provider, MD Andrea   apixaban (ELIQUIS) 5 MG  tablet tablet Take 2 tablets by mouth Every 12 (Twelve) Hours. 1/27/24   Yes Andrea Bright MD   Enoxaparin Sodium (LOVENOX) 60 MG/0.6ML solution prefilled syringe syringe Inject 0.6 mL under the skin into the appropriate area as directed Take As Directed.     Yes Andrea Bright MD   hydrocerin (EUCERIN) cream cream Apply  topically to the appropriate area as directed. 1/26/24   Yes Andrea Bright MD   HYDROmorphone (DILAUDID) 2 MG tablet TAKE 1 TABLET BY MOUTH EVERY 6 HOURS AS NEEDED FOR PAIN FOR UP TO 30 DAYS. MAX DAILY AMOUNT: 8 MG 1/26/24   Yes Andrea Bright MD   Lancets (freestyle) lancets 1 each by Other route 2 (Two) Times a Day. Use as instructed 9/16/23   Yes Meghan Choudhary APRN   metFORMIN (GLUCOPHAGE) 500 MG tablet Take 1 tablet by mouth 2 (Two) Times a Day With Meals.     Yes Andrea Bright MD   oxyCODONE-acetaminophen (PERCOCET) 5-325 MG per tablet Take 1 tablet by mouth. 2/19/24 3/21/24 Yes Andrea Bright MD   polyethylene glycol 17 g packet Take 17 g by mouth Daily. 1/18/24   Yes Jose Tang MD   acetaminophen (Tylenol) 325 MG tablet Take 3 tablets by mouth Every 8 (Eight) Hours. Take every 8 hours for 3 days then take prn as needed. 2/28/24 2/27/25   Lizy Wade MD   albuterol sulfate  (90 Base) MCG/ACT inhaler Inhale 2 puffs Every 4 (Four) Hours As Needed for Wheezing or Shortness of Air. 1/26/20     Shandra Dietrich APRN   atorvastatin (LIPITOR) 80 MG tablet Take 1 tablet by mouth Every Night.  Patient not taking: Reported on 2/21/2024 1/18/24     Jose Tang MD   ondansetron (Zofran) 4 MG tablet Take 1 tablet by mouth Every 8 (Eight) Hours As Needed for Nausea or Vomiting. 2/28/24 2/27/25   Lizy Wade MD   ondansetron ODT (ZOFRAN-ODT) 4 MG disintegrating tablet Take 1 tablet by mouth. 1/26/24     Provider, MD Andrea   oxyCODONE (Roxicodone) 5 MG immediate release tablet Take 1 tablet by mouth Every 4  "(Four) Hours As Needed for Moderate Pain or Severe Pain for up to 18 doses. 1/18/24     Jose Tang MD   pantoprazole (PROTONIX) 40 MG EC tablet Take 1 tablet by mouth Every Morning.  Patient not taking: Reported on 2/21/2024 1/19/24     Jose Tang MD   promethazine (PHENERGAN) 25 MG tablet Take 1 tablet by mouth Every 6 (Six) Hours As Needed for Nausea or Vomiting.  Patient not taking: Reported on 2/28/2024       Andrea Bright MD   traMADol (Ultram) 50 MG tablet Take 1 tablet by mouth Every 8 (Eight) Hours As Needed for Severe Pain for up to 3 days. 2/28/24 3/2/24   Lizy Wade MD      I have utilized all available immediate resources to obtain, update, or review the patient's current medications (including all prescriptions, over-the-counter products, herbals, cannabis/cannabidiol products, and vitamin/mineral/dietary (nutritional) supplements).     Objective      Vital Signs: /90 (BP Location: Left arm, Patient Position: Sitting)   Pulse 108   Temp 98.9 °F (37.2 °C) (Oral)   Resp 18   Ht 151 cm (59.45\")   Wt 67.8 kg (149 lb 6.4 oz)   SpO2 99%   Breastfeeding No   BMI 29.72 kg/m²   Physical Exam  Constitutional:       Appearance: Normal appearance. She is normal weight.   HENT:      Head: Normocephalic and atraumatic.      Right Ear: External ear normal.      Left Ear: External ear normal.      Nose: Nose normal.      Mouth/Throat:      Mouth: Mucous membranes are moist.      Pharynx: Oropharynx is clear.   Eyes:      General: No scleral icterus.     Extraocular Movements: Extraocular movements intact.      Pupils: Pupils are equal, round, and reactive to light.   Cardiovascular:      Rate and Rhythm: Normal rate and regular rhythm.      Pulses: Normal pulses.      Heart sounds: Normal heart sounds. No murmur heard.  Pulmonary:      Effort: Pulmonary effort is normal. No respiratory distress.      Breath sounds: Normal breath sounds.   Abdominal:      General: " Abdomen is flat. Bowel sounds are normal.      Palpations: Abdomen is soft. There is no mass.      Tenderness: There is no abdominal tenderness.   Musculoskeletal:         General: Swelling present. Normal range of motion.      Right lower leg: Edema (3/4) present.      Left lower leg: Edema (1/4) present.   Skin:     General: Skin is warm and dry.      Coloration: Skin is not pale.      Comments: The patient has mild peripheral cyanosis of all 10 toes.   Neurological:      General: No focal deficit present.      Mental Status: She is alert and oriented to person, place, and time. Mental status is at baseline.      Cranial Nerves: No cranial nerve deficit.   Psychiatric:         Mood and Affect: Mood normal.         Judgment: Judgment normal.         Results Reviewed:  Lab Results (last 24 hours)         Procedure Component Value Units Date/Time     High Sensitivity Troponin T 2Hr [[id number]]  (Normal) Collected: 02/28/24 1559     Specimen: Blood Updated: 02/28/24 1629       HS Troponin T 10 ng/L         Troponin T Delta 1 ng/L       Narrative:       High Sensitive Troponin T Reference Range:  <14.0 ng/L- Negative Female for AMI  <22.0 ng/L- Negative Male for AMI  >=14 - Abnormal Female indicating possible myocardial injury.  >=22 - Abnormal Male indicating possible myocardial injury.   Clinicians would have to utilize clinical acumen, EKG, Troponin, and serial changes to determine if it is an Acute Myocardial Infarction or myocardial injury due to an underlying chronic condition.           High Sensitivity Troponin T [[id number]]  (Normal) Collected: 02/28/24 1243     Specimen: Blood Updated: 02/28/24 1319       HS Troponin T 9 ng/L       Narrative:       High Sensitive Troponin T Reference Range:  <14.0 ng/L- Negative Female for AMI  <22.0 ng/L- Negative Male for AMI  >=14 - Abnormal Female indicating possible myocardial injury.  >=22 - Abnormal Male indicating possible myocardial injury.   Clinicians would have to  utilize clinical acumen, EKG, Troponin, and serial changes to determine if it is an Acute Myocardial Infarction or myocardial injury due to an underlying chronic condition.           POC Glucose Once [[id number]]  (Normal) Collected: 02/28/24 1113     Specimen: Blood Updated: 02/28/24 1123       Glucose 130 mg/dL         Comment: : [id number] Shayan NgMeter ID: [id number]        hCG, Serum, Qualitative [[id number]]  (Normal) Collected: 02/28/24 0927     Specimen: Blood Updated: 02/28/24 0955       HCG Qualitative Negative     POC Glucose Once [[id number]]  (Normal) Collected: 02/28/24 0926     Specimen: Blood Updated: 02/28/24 0938       Glucose 124 mg/dL         Comment: : [id number] Tiffanie Pendleton ID: [id number]                Imaging Results (Last 24 Hours)         Procedure Component Value Units Date/Time     NM Lung Scan Perfusion Particulate [[id number]] Collected: 02/28/24 1401       Updated: 02/28/24 1407     Narrative:       EXAMINATION: NM LUNG SCAN PERFUSION PARTICULATE-  2/28/2024 2:02 PM     HISTORY: Chest pain. Normal EKG.     FINDINGS: Following intravenous injection of 5.5 mCi of Tc 99m MAA  intravenously multiple planar images were obtained of the thorax.  Although there is a relatively normal perfusion distribution of the  radiopharmaceutical within the left lung there are noted to be multiple  segmental and subsegmental perfusion defects within the right lung. This  includes at least 3 discrete defects. FINDINGS would suggest a high  probability for pulmonary thromboembolic disease to the right lung.        Impression:       1. Multiple segmental and subsegmental perfusion defects to the right  lung. FINDINGS would fall into a high probability for pulmonary  thromboembolic disease category. Relatively normal perfusion to the left  lung.     This report was signed and finalized on 2/28/2024 2:04 PM by Dr. Romeo Arizmendi MD.        US Venous Doppler Lower Extremity Right  (duplex) [[id number]] Resulted: 02/28/24 1139       Updated: 02/28/24 1215     XR Chest Post CVA Port [[id number]] Collected: 02/28/24 1112       Updated: 02/28/24 1116     Narrative:       XR CHEST POST CVA PORT- 2/28/2024 9:45 AM     HISTORY: port; C54.1-Malignant neoplasm of endometrium     COMPARISON: None     FLUOROSCOPY TIME: 3 seconds     FLUOROSCOPY DOSE/ CUMULATIVE AIR KERMA: 0.3716 mGy     NUMBER OF IMAGES: 1        Impression:          Intraoperative fluoroscopic images during right subclavian port  placement. Tip projecting over the lower SVC.     Please refer to the operative note for more details.     This report was signed and finalized on 2/28/2024 11:13 AM by Dr. Columba Abbasi MD.        FL C Arm During Surgery [[id number]] Resulted: 02/28/24 1102       Updated: 02/28/24 1102     Narrative:       This procedure was auto-finalized with no dictation required.             I have personally reviewed and interpreted the radiology studies and ECG obtained at time of admission.      Assessment / Plan   Assessment:        Active Hospital Problems     Diagnosis      **Pulmonary embolus      Endometrial adenocarcinoma      Type 2 diabetes mellitus, without long-term current use of insulin           Treatment Plan  Admit to the medical surgical floor  Eliquis 10 mg p.o. twice daily x 7 days then decrease to 5 mg p.o. twice daily  Echocardiogram  Resume the bulk of home medications  Lower extremity Doppler studies, pending     Medical Decision Making  Number and Complexity of problems:   Acute bilateral pulmonary emboli acute, high complexity  Probable DVT RLE acute, moderate complexity  Type 2 diabetes, chronic, moderate complexity     Differential Diagnosis: None others entertained     Conditions and Status        Condition is unchanged.     [facility] Data  External documents reviewed: Care Everywhere documentation  Cardiac tracing (EKG, telemetry) interpretation: See HPI  Radiology interpretation: See HPI  Labs  reviewed: See HPI  Any tests that were considered but not ordered: None     Decision rules/scores evaluated (example [id number], Wells, etc): None     Discussed with: The patient     Care Planning  Shared decision making: The patient and general surgery  Code status and discussions: Full code     Disposition  Social Determinants of Health that impact treatment or disposition: None noted  Estimated length of stay is 1-2 days.      I confirmed that the patient's advanced care plan is present, code status is documented, and a surrogate decision maker is listed in the patient's medical record.      The patient's surrogate decision maker is her , Romeo.      The patient was seen and examined by me on 2/28/2024 at 1720.     Electronically signed by Mansoor Galvan DO, 02/28/24, 18:29 CST.                            Encounter Date    2/21/24    NM Lung Scan Perfusion Particulate [[id number]] (Order [id number])  Order  Status: Final result     Patient Location    Patient Class Location   Inpatient [facility] 4B, 454, 1     [phone]     Study Notes     Deshaun Mccarthy CNMT on 2/28/2024  1:20 PM CST   5.5 MCI TC99M MAA     Appointment Information    PACS Images     Radiology Images  Study Result    Narrative & Impression   EXAMINATION: NM LUNG SCAN PERFUSION PARTICULATE-  2/28/2024 2:02 PM     HISTORY: Chest pain. Normal EKG.     FINDINGS: Following intravenous injection of 5.5 mCi of Tc 99m MAA  intravenously multiple planar images were obtained of the thorax.  Although there is a relatively normal perfusion distribution of the  radiopharmaceutical within the left lung there are noted to be multiple  segmental and subsegmental perfusion defects within the right lung. This  includes at least 3 discrete defects. FINDINGS would suggest a high  probability for pulmonary thromboembolic disease to the right lung.     IMPRESSION:  1. Multiple segmental and subsegmental perfusion defects to the right  lung. FINDINGS would  fall into a high probability for pulmonary  thromboembolic disease category. Relatively normal perfusion to the left  lung.     This report was signed and finalized on 2/28/2024 2:04 PM by Dr. Romeo Arizmendi MD.            Basic Metabolic Panel [LAB15] (Order [id number])  Order  Date: 2/28/2024 Department: [address] Released By/Authorizing: Mansoor Galvan DO (auto-released)     Reprint Order Requisition    Basic Metabolic Panel (Order #[id number]) on 2/28/24         Contains abnormal data Basic Metabolic Panel  Order: [id number]  Status: Final result       Visible to patient: No (scheduled for 2/29/2024  7:33 AM)       Next appt: 03/13/2024 at 02:00 PM in General Surgery (Laquita Chambers PA-C)    Specimen Information: Blood   0 Result Notes            Component  Ref Range & Units 05:52  (2/29/24)  [place] 1 d ago  (2/28/24)  [place] 1 d ago  (2/28/24)  [place] 7 d ago  (2/22/24)  [place] 1 mo ago  (1/26/24)  [place] 1 mo ago  (1/25/24)  [place] 1 mo ago  (1/25/24)  [place]   Glucose  65 - 99 mg/dL 114 High  130 R,  R,  High  119 High  R 144 High  R 162 High  R   BUN  8 - 23 mg/dL 11   13      Creatinine  0.57 - 1.00 mg/dL 0.43 Low    0.60      Sodium  136 - 145 mmol/L 133 Low    134 Low       Potassium  3.5 - 5.2 mmol/L 4.1   4.4      Chloride  98 - 107 mmol/L 96 Low    96 Low       CO2  22.0 - 29.0 mmol/L 26.0   28.0      Calcium  8.6 - 10.5 mg/dL 7.8 Low    9.0      BUN/Creatinine Ratio  7.0 - 25.0 25.6 High    21.7      Anion Gap  5.0 - 15.0 mmol/L 11.0   10.0      eGFR  >60.0 mL/min/1.73 109.4              0 4.53 4.39 R 4.43 R 4.32 R 4.32 R 4.21 R    Hemoglobin  12.0 - 15.9 g/dL 9.4 Low  9.5 Low  9.1 Low  R 9.4 Low  R 9.3 Low  R 9.4 Low  R 8.9 Low  R   Hematocrit  34.0 - 46.6 % 32.6 Low  33.4 Low  31.6 Low  R 32.2 Low  R 33.3 Low  R 32.6 Low  R 30.4 Low  R   MCV  79.0 - 97.0 fL 72.4 Low  73.7 Low  72.0 Low  R 72.7 Low   R 77.1 Low  R 75.5 Low  R 72.2 Low  R   MCH  26.6 - 33.0 pg 20.9 Low  21.0 Low  20.7 Low  R 21.2 Low  R 21.5 Low  R 21.8 Low  R 21.1 Low  R   MCHC  31.5 - 35.7 g/dL 28.8 Low  28.4 Low  28.8 Low  R 29.2 Low  R 27.9 Low  R 28.8 Low  R 29.3 Low  R   RDW  12.3 - 15.4 % 19.9 High  20.2 High  19.6 High  R 19.6 High  R 20.6 High  R 19.9 High  R 18.5 High  R   RDW-SD  37.0 - 54.0 fl 50.9 53.5        MPV  6.0 - 12.0 fL 8.6 8.8 8.6 R 8.3 R 10.3 R 9.4 R 9.2 Low  R   Platelets  140 - 450 10*3/mm3 643 High  673 High  641 High Critical  R 590 High Critical  R 517 High  R 557 High  R 590 High  R   Neutrophil %  42.7 - 76.0 % 71.8 72.8 69.4 R 66.6 R 67.8 High  R 65.0 R, CM    Lymphocyte %  19.6 - 45.3 % 17.4 Low                  /Time Temp Pulse Resp BP Patient Position Device (Oxygen Therapy) SpO2   02/29/24 1131 97.8 (36.6) 96 16 115/50 Lying room air 93   02/29/24 0737 97.6 (36.4) 94 16 105/45  Lying room air 93   BP: Traci RN notified at 02/29/24 0737   02/29/24 0503 98.2 (36.8) 96 18 131/48 Lying room air 94   02/29/24 0000 97.9 (36.6) 90 16 114/46 Lying room air 95   02/28/24 2025 97.9 (36.6) 101 16 121/48 -- room air 100   02/2              Administration Information  heparin (porcine) 5000 UNIT/ML injection 5,000 Units   Action Reason Dose/Rate Route Site Linked Line Action Time Recorded Time    Given N/A 5,000 Units Subcutaneous Right Lower Abdomen  02/28/24 1013 02/28/24 1013                   Current Facility-Administered Medications   Medication Dose Route Frequency Provider Last Rate Last Admin    acetaminophen (TYLENOL) tablet 650 mg  650 mg Oral Q4H PRN Mansoor Galvan DO   650 mg at 02/28/24 2027    aluminum-magnesium hydroxide-simethicone (MAALOX MAX) 400-400-40 MG/5ML suspension 15 mL  15 mL Oral Q6H PRN Mansoor Galvan DO        apixaban (ELIQUIS) tablet 10 mg  10 mg Oral Q12H Mansoor Galvan DO   10 mg at 02/29/24 0833    Followed by    [START ON 3/6/2024] apixaban (ELIQUIS) tablet 5 mg  5 mg Oral  Q12H Mansoor Galvan DO        sennosides-docusate (PERICOLACE) 8.6-50 MG per tablet 2 tablet  2 tablet Oral BID PRN Mansoor Galvan DO        And    polyethylene glycol (MIRALAX) packet 17 g  17 g Oral Daily PRN Mansoor Galvan DO        And    bisacodyl (DULCOLAX) EC tablet 5 mg  5 mg Oral Daily PRN Mansoor Galvan DO        And    bisacodyl (DULCOLAX) suppository 10 mg  10 mg Rectal Daily PRN Mansoor Galvan DO        Calcium Replacement - Follow Nurse / BPA Driven Protocol   Does not apply PRN Mansoor Galvan DO        HYDROmorphone (DILAUDID) tablet 2 mg  2 mg Oral Q6H PRN Mansoor Galvan DO        Magnesium Standard Dose Replacement - Follow Nurse / BPA Driven Protocol   Does not apply PRN Mansoor Galvan DO        metFORMIN (GLUCOPHAGE) tablet 500 mg  500 mg Oral BID With Meals Mansoor Galvan DO   500 mg at 02/29/24 0833    ondansetron (ZOFRAN) injection 4 mg  4 mg Intravenous Q6H PRN Mansoor Galvan DO        oxyCODONE (ROXICODONE) immediate release tablet 5 mg  5 mg Oral Q4H PRN Mansoor Galvan DO   5 mg at 02/29/24 0753    pantoprazole (PROTONIX) EC tablet 40 mg  40 mg Oral Q AM Mansoor Galvan DO   40 mg at 02/29/24 0552    Phosphorus Replacement - Follow Nurse / BPA Driven Protocol   Does not apply PRN Mansoor Galvan DO        polyethylene glycol (MIRALAX) packet 17 g  17 g Oral Daily Mansoor Galvan DO   17 g at 02/29/24 0833    Potassium Replacement - Follow Nurse / BPA Driven Protocol   Does not apply PRN Mansoor Galvan DO        sodium chloride 0.9 % flush 10 mL  10 mL Intravenous Q12H Mansoor Galvan DO   10 mL at 02/29/24 0833    sodium chloride 0.9 % flush 10 mL  10 mL Intravenous PRN Mansoor Galvan DO        sodium chloride 0.9 % infusion 40 mL  40 mL Intravenous PRN Mansoor Galvan DO

## 2024-02-29 NOTE — THERAPY EVALUATION
Patient Name: Radha Wade  : 1960    MRN: [id number]                              Today's Date: 2024       Admit Date: 2024    Visit Dx:     ICD-10-CM ICD-9-CM   1. Pressure injury of right buttock, stage 3  L89.313 707.05     707.23   2. Endometrial adenocarcinoma  C54.1 182.0   3. Impaired mobility [Z74.09]  Z74.09 799.89     Patient Active Problem List   Diagnosis    DVT (deep venous thrombosis)    Vaginal bleeding    History of asthma    Abnormal CT of the abdomen    Type 2 diabetes mellitus, without long-term current use of insulin    Metastatic disease    Acute blood loss anemia    CVA (cerebral vascular accident)    Severe malnutrition    Acute cystitis without hematuria    Endometrial adenocarcinoma    Pulmonary embolus    PE (pulmonary thromboembolism)     Past Medical History:   Diagnosis Date    Anemia     Asthma     Bronchitis     Depression     Diabetes mellitus     DVT (deep venous thrombosis)     right arm    Endometrial cancer     mets to lung    Heart murmur     Lyme disease     Mitral valve prolapse     Stroke     --right side weakness     Past Surgical History:   Procedure Laterality Date     SECTION      D & C HYSTEROSCOPY ENDOMETRIAL ABLATION N/A 2024    Procedure: DILATATION AND CURETTAGE;  Surgeon: Radu De Los Santos MD;  Location:  [facility];  Service: Obstetrics/Gynecology;  Laterality: N/A;    VENOUS ACCESS DEVICE (PORT) INSERTION N/A 2024    Procedure: Single Lumen Port-a-cath insertion with flouroscopy;  Surgeon: Lizy Wade MD;  Location:  [facility];  Service: General;  Laterality: N/A;      General Information       Row Name 24 1118          Physical Therapy Time and Intention    Document Type evaluation  Pt sp chemoport insertion on . Pt admit for initiation of anticoagulant. Medical dx of endometrial adenocarcinoma and PE. PMH significant for stroke affecting R body ~1 mo ago, and hx of DVTs. Imaging shows Multiple segmental  and subsegmental ...  -EAGLE (r) YUE (t) EAGLE (c)     Mode of Treatment physical therapy  ...perfusion defects to the R lung. FINDINGS would fall into a high probability for pulmonary thromboembolic disease category. Relatively normal perfusion to the left lung.  -EAGLE (r) YUE (t) EAGLE (c)       Row Name 02/29/24 1118          General Information    Patient Profile Reviewed yes  -EAGLE (r) YUE (t) EAGLE (c)     Prior Level of Function independent:;gait;bed mobility;feeding;grooming;dressing;bathing;min assist:;transfer  Previously ind with all activities prior to stroke. Reports may receive help if getting in/out of bathtub. Tries to be very ind and has been weaning from RW  -EAGLE (r) YUE (t) EAGLE (c)     Existing Precautions/Restrictions other (see comments);fall  Port at R side of chest. No BP or sticks in R arm  -EAGLE (r) YUE (t) EAGLE (c)     Barriers to Rehab medically complex  -EAGLE (r) YUE (t) EAGLE (c)       Row Name 02/29/24 1118          Living Environment    People in Home spouse;child(carly), adult;grandchild(carly)  grandchild is adult  -EAGLE (r) YUE (t) EAGLE (c)       Row Name 02/29/24 1118          Home Main Entrance    Number of Stairs, Main Entrance four  -EAGLE (r) YUE (t) EAGLE (c)     Stair Railings, Main Entrance railings on both sides of stairs  -EAGLE (r) YUE (t) EAGLE (c)       Row Name 02/29/24 1118          Stairs Within Home, Primary    Number of Stairs, Within Home, Primary none;other (see comments)  Has flight of stairs; reports she does not use them 'unless necessary because i'm stubborn'  -EAGLE (r) YUE (t) EAGLE (c)       Row Name 02/29/24 1118          Cognition    Orientation Status (Cognition) oriented x 4  -EAGLE (r) YUE (t) EAGLE (c)       Row Name 02/29/24 1118          Safety Issues, Functional Mobility    Safety Issues Affecting Function (Mobility) friction/shear risk;safety precaution awareness;safety precautions follow-through/compliance  -EAGLE (r) YUE (t) EAGLE (c)     Impairments Affecting Function (Mobility) endurance/activity  tolerance;strength;pain;sensation/sensory awareness  -EAGLE (r) JT (t) EAGLE (c)               User Key  (r) = Recorded By, (t) = Taken By, (c) = Cosigned By      Initials Name Provider Type    Darrell Reeder, PT DPT Physical Therapist    Loulou Cervantes, PT Student PT Student                   Mobility       Row Name 02/29/24 1118          Bed Mobility    Bed Mobility supine-sit;sit-supine  -EAGLE (r) JT (t) EAGLE (c)     Supine-Sit Rose (Bed Mobility) contact guard  -EAGLE (r) JT (t) EAGLE (c)     Sit-Supine Rose (Bed Mobility) contact guard;other (see comments)  HHA  -EAGLE (r) JT (t) EAGLE (c)     Assistive Device (Bed Mobility) head of bed elevated;other (see comments)  HHA  -EAGLE (r) JT (t) EAGLE (c)       Row Name 02/29/24 1118          Sit-Stand Transfer    Sit-Stand Rose (Transfers) standby assist  -EAGLE (r) JT (t) EAGLE (c)       Row Name 02/29/24 1118          Gait/Stairs (Locomotion)    Rose Level (Gait) contact guard;standby assist  -EAGLE (r) JT (t) EAGLE (c)     Assistive Device (Gait) other (see comments)  HHA  -EAGLE (r) JT (t) EAGLE (c)     Distance in Feet (Gait) 20 ft , 20 ft  -EAGLE (r) JT (t) EAGLE (c)               User Key  (r) = Recorded By, (t) = Taken By, (c) = Cosigned By      Initials Name Provider Type    Darrell Reeder, PT DPT Physical Therapist    Loulou Cervantes, PT Student PT Student                   Obj/Interventions       Row Name 02/29/24 1118          Range of Motion Comprehensive    General Range of Motion bilateral lower extremity ROM WNL  -EAGLE (r) JT (t) EAGLE (c)       [facility] Name 02/29/24 1118          Strength Comprehensive (MMT)    Comment, General Manual Muscle Testing (MMT) Assessment RLE mmt grossly 4-/5. LLE grossly 5/5  -EAGLE (r) JT (t) EAGLE (c)       Row Name 02/29/24 1118          Balance    Balance Assessment sitting static balance;sitting dynamic balance;standing static balance;standing dynamic balance  -EAGLE (r) JAPOLINAR (t) EAGLE (c)     Static Sitting Balance independent  -EAGLE (r) JT  (t) EAGLE (c)     Dynamic Sitting Balance standby assist  -EAGLE (r) JT (t) EAGLE (c)     Position, Sitting Balance unsupported;sitting edge of bed;other (see comments)  sitting on toilet  -EAGLE (r) JT (t) EAGLE (c)     Static Standing Balance standby assist  -EAGLE (r) JT (t) EAGLE (c)     Dynamic Standing Balance contact guard  -EAGLE (r) JT (t) EAGLE (c)     Position/Device Used, Standing Balance supported;unsupported;other (see comments)  HHA  -EAGLE (r) JT (t) EAGLE (c)       Row Name 02/29/24 1118          Sensory Assessment (Somatosensory)    Sensory Assessment (Somatosensory) bilateral LE  -EAGLE (r) JT (t) EAGLE (c)     Bilateral LE Sensory Assessment general sensation;impaired;other (see comments)  RLE lateral inferior calf and foot less sensation v LLE  -EAGLE (r) JT (t) EAGLE (c)               User Key  (r) = Recorded By, (t) = Taken By, (c) = Cosigned By      Initials Name Provider Type    Darrell Reeder, PT DPT Physical Therapist    Loulou Cervantes, PT Student PT Student                   Goals/Plan       Row Name 02/29/24 1118          Bed Mobility Goal 1 (PT)    Activity/Assistive Device (Bed Mobility Goal 1, PT) sit to supine;supine to sit  -EAGLE (r) JT (t) EAGLE (c)     Gordon Level/Cues Needed (Bed Mobility Goal 1, PT) independent  -EAGLE (r) JT (t) EAGLE (c)     Time Frame (Bed Mobility Goal 1, PT) long term goal (LTG);10 days  -EAGLE (r) JT (t) EAGLE (c)     Progress/Outcomes (Bed Mobility Goal 1, PT) new goal  -EAGLE (r) JT (t) EAGLE (c)       Row Name 02/29/24 1118          Transfer Goal 1 (PT)    Activity/Assistive Device (Transfer Goal 1, PT) sit-to-stand/stand-to-sit;bed-to-chair/chair-to-bed  -EAGLE (r) JT (t) EAGLE (c)     Gordon Level/Cues Needed (Transfer Goal 1, PT) supervision required  -EAGLE (r) JT (t) EAGLE (c)     Time Frame (Transfer Goal 1, PT) long term goal (LTG);10 days  -EAGLE (deven) YUE (t) EAGLE (karen)     Progress/Outcome (Transfer Goal 1, PT) new goal  -EAGLE (deven) YUE (t) EAGLE (karen)       Row Name 02/29/24 1118          Gait Training Goal 1 (PT)     Activity/Assistive Device (Gait Training Goal 1, PT) gait (walking locomotion);decrease fall risk;improve balance and speed;increase endurance/gait distance;other (see comments)  AD use if continue to furniture walk  -EAGLE (r) JT (t) EAGLE (c)     Lane Level (Gait Training Goal 1, PT) supervision required  -EAGLE (r) JT (t) EAGLE (c)     Distance (Gait Training Goal 1, PT) 100ft  -EAGLE (r) JT (t) EAGLE (c)     Time Frame (Gait Training Goal 1, PT) long term goal (LTG);10 days  -EAGLE (r) JT (t) EAGLE (c)     Progress/Outcome (Gait Training Goal 1, PT) new goal  -EAGLE (r) JT (t) EAGLE (c)       Row Name 02/29/24 1118          Stairs Goal 1 (PT)    Activity/Assistive Device (Stairs Goal 1, PT) ascending stairs;descending stairs;decrease fall risk;improve balance and speed;using handrail, left;using handrail, right  -EAGLE (r) JT (t) EAGLE (c)     Lane Level/Cues Needed (Stairs Goal 1, PT) standby assist  -EAGLE (r) JT (t) EAGLE (c)     Number of Stairs (Stairs Goal 1, PT) 4  -EAGLE (r) JT (t) EAGLE (c)     Time Frame (Stairs Goal 1, PT) long term goal (LTG);10 days  -EAGLE (r) JT (t) EAGLE (c)     Progress/Outcome (Stairs Goal 1, PT) new goal  -EAGLE (r) JT (t) EAGLE (c)       Row Name 02/29/24 1118          Therapy Assessment/Plan (PT)    Planned Therapy Interventions (PT) balance training;bed mobility training;gait training;strengthening;stair training;patient/family education;transfer training  -EAGLE (r) JT (t) EAGLE (c)               User Key  (r) = Recorded By, (t) = Taken By, (c) = Cosigned By      Initials Name Provider Type    Darrell Reeder, PT DPT Physical Therapist    Loulou Cervantes, PT Student PT Student                   Clinical Impression       Row Name 02/29/24 1118          Pain    Pretreatment Pain Rating 4/10  -EAGLE (r) JT (t) EAGLE (c)     Posttreatment Pain Rating 4/10  -EAGLE (r) JT (t) EAGLE (c)     Pain Location generalized  -EAGLE (r) JT (t) EAGLE (c)     Pain Location - back;buttock;abdomen  -EAGLE (r) JT (t) EAGLE (c)     Pain Intervention(s)  Repositioned;Ambulation/increased activity  -EAGLE (r) YUE (jenny) EAGLE (c)       Row Name 02/29/24 1118          Plan of Care Review    Plan of Care Reviewed With patient  -EAGLE (deven) YUE (jenny) EAGLE (c)     Progress no change  -EAGLE (deven) YUE (jenny) EAGLE (c)     Outcome Evaluation PT eval complete. Pt in fowlers positioning uon entering. Pt A&Ox4. Pt complains of 4/10 pain located in back, on buttock (bed sore), and in abdominal area. Pt reports PLOF as ind/modified ind with decreased use of RW since her stroke, and reports she tries to do everything by herslef. She gets assistance with climbing stairs to get into the house and she may need assistance getting into and out of tub when she soaks. Pt recently DC with CVA affecting R body. Today, pt demos ability to sit at L EOB with HHA and CGA. BLE AROM is WNL . strength in RLE is 4-/5 and LLE 5/5. Pt performed sit>stand with SBA and ambulated 20 ft to the bathroom with HHA and CGA/SBA. Pt occassionally reched for wall and towel rack and states she has been wall/furniture walking to wean herself from RW. Pt ambulated back to bed and required CGA to perform supine to sit. Pt tangential in conversation throughout eval. Skilled therapy recommended to improve functional mobility, increase activity tolerance and endurance, improve strength, and imporve balance with ambulation and prevent need for furniture and wall walking. Recommended DC home w assist of family and home health.  -EAGLE (deven) YUE (jenny) EAGLE (c)       Row Name 02/29/24 1118          Therapy Assessment/Plan (PT)    Patient/Family Therapy Goals Statement (PT) none stated  -EAGLE (deven) YUE (t) EAGLE (c)     Rehab Potential (PT) good, to achieve stated therapy goals  -EAGLE (deven) YUE (jenny) EAGLE (karen)     Criteria for Skilled Interventions Met (PT) yes;meets criteria  -EAGLE     Therapy Frequency (PT) 2 times/day  -EAGLE (deven) YUE (jenny) EAGLE (c)     Predicted Duration of Therapy Intervention (PT) until DC  -EAGLE (deven) YUE (t) EAGLE (c)       Row Name 02/29/24 1118          Vital Signs    O2  Delivery Pre Treatment room air  -EAGLE (r) JT (t) EAGLE (c)     O2 Delivery Intra Treatment room air  -EAGLE (r) JT (t) EAGLE (c)     O2 Delivery Post Treatment room air  -EAGLE (r) JT (t) EAGLE (c)       Row Name 02/29/24 1118          Positioning and Restraints    Pre-Treatment Position in bed  -EAGLE (r) JT (t) EAGLE (c)     Post Treatment Position bed  -EAGLE (r) JT (t) EAGLE (c)     In Bed fowlers;call light within reach;encouraged to call for assist;side rails up x2  -EAGLE (r) JT (t) EAGLE (c)               User Key  (r) = Recorded By, (t) = Taken By, (c) = Cosigned By      Initials Name Provider Type    Darrell Reeder, PT DPT Physical Therapist    Loulou Cervantes, PT Student PT Student                   Outcome Measures       Row Name 02/29/24 1118 02/29/24 0800       How much help from another person do you currently need...    Turning from your back to your side while in flat bed without using bedrails? 3  -EAGLE (r) JT (t) EAGLE (c) 3  -AG    Moving from lying on back to sitting on the side of a flat bed without bedrails? 3  -EAGLE (r) JT (t) EAGLE (c) 3  -AG    Moving to and from a bed to a chair (including a wheelchair)? 3  -EAGLE (r) JT (t) EAGLE (c) 3  -AG    Standing up from a chair using your arms (e.g., wheelchair, bedside chair)? 3  -EAGLE (r) JT (t) EAGLE (c) 3  -AG    Climbing 3-5 steps with a railing? 3  -EAGLE (r) JT (t) EAGLE (c) 2  -AG    To walk in hospital room? 3  -EAGLE (r) JT (t) EAGLE (c) 3  -AG    AM-PAC 6 Clicks Score (PT) 18  -EAGLE (r) JT (t) 17  -AG    Highest Level of Mobility Goal 6 --> Walk 10 steps or more  -EAGLE (r) JT (t) 5 --> Static standing  -AG      Row Name 02/29/24 1118          Functional Assessment    Outcome Measure Options AM-PAC 6 Clicks Basic Mobility (PT)  -EAGLE (r) JAPOLINAR (t) EAGLE (c)               User Key  (r) = Recorded By, (t) = Taken By, (c) = Cosigned By      Initials Name Provider Type    Darrell Reeder, PT DPT Physical Therapist    Traci Thomason, RN Registered Nurse    Loulou Cervantes, PT Student PT Student                                  Physical Therapy Education       Title: PT OT SLP Therapies (In Progress)       Topic: Physical Therapy (In Progress)       Point: Mobility training (Done)       Learning Progress Summary             Patient Acceptance, E, VU by JT at 2/29/2024 1118    Comment: Pt educated on PT purpose and POC. Pt educated on fall risk and safety awareness when ambulating.                         Point: Home exercise program (Not Started)       Learner Progress:  Not documented in this visit.              Point: Body mechanics (Not Started)       Learner Progress:  Not documented in this visit.              Point: Precautions (Done)       Learning Progress Summary             Patient Acceptance, E, VU by JT at 2/29/2024 1118    Comment: Pt educated on PT purpose and POC. Pt educated on fall risk and safety awareness when ambulating.                                         User Key       Initials Effective Dates Name Provider Type Discipline    YUE 11/30/23 -  Loulou Holm PT Student PT Student PT                  PT Recommendation and Plan  Planned Therapy Interventions (PT): balance training, bed mobility training, gait training, strengthening, stair training, patient/family education, transfer training  Plan of Care Reviewed With: patient  Progress: no change  Outcome Evaluation: PT eval complete. Pt in fowlers positioning uon entering. Pt A&Ox4. Pt complains of 4/10 pain located in back, on buttock (bed sore), and in abdominal area. Pt reports PLOF as ind/modified ind with decreased use of RW since her stroke, and reports she tries to do everything by herslef. She gets assistance with climbing stairs to get into the house and she may need assistance getting into and out of tub when she soaks. Pt recently DC with CVA affecting R body. Today, pt demos ability to sit at L EOB with HHA and CGA. BLE AROM is WNL . strength in RLE is 4-/5 and LLE 5/5. Pt performed sit>stand with SBA and ambulated 20 ft to the  bathroom with HHA and CGA/SBA. Pt occassionally reched for wall and towel rack and states she has been wall/furniture walking to wean herself from RW. Pt ambulated back to bed and required CGA to perform supine to sit. Pt tangential in conversation throughout eval. Skilled therapy recommended to improve functional mobility, increase activity tolerance and endurance, improve strength, and imporve balance with ambulation and prevent need for furniture and wall walking. Recommended DC home w assist of family and home health.     Time Calculation:         PT Charges       Row Name 02/29/24 1118             Time Calculation    Start Time 1118  -EAGLE (r) JT (t) EAGLE (c)      Stop Time 1202  -EAGLE (r) JT (t) EAGLE (c)      Time Calculation (min) 44 min  -EAGLE (r) JT (t)      PT Received On 02/29/24  -EAGLE (r) JT (t) EAGLE (c)      PT Goal Re-Cert Due Date 03/10/24  -EAGLE (r) JT (t) EAGLE (c)                User Key  (r) = Recorded By, (t) = Taken By, (c) = Cosigned By      Initials Name Provider Type    Darrell Reeder, PT DPT Physical Therapist    Loulou Cervantes, PT Student PT Student                      PT G-Codes  Outcome Measure Options: AM-PAC 6 Clicks Basic Mobility (PT)  AM-PAC 6 Clicks Score (PT): 18  PT Discharge Summary  Anticipated Discharge Disposition (PT): home with assist, home with home health    Loulou Holm, PT Student  2/29/2024

## 2024-02-29 NOTE — PLAN OF CARE
Problem: Skin Injury Risk Increased  Goal: Skin Health and Integrity  Outcome: Ongoing, Progressing   Goal Outcome Evaluation:      NTN assessment complete. PO 75% of two meals but reports abdominal discomfort, N/V. Reports metformin crushed is difficult to tolerate. RD and pt reviewed adequate PO intake for wound healing; wound care note reviewed. Pt agreeable to yogurt and soy milk. Pt MSA completed; see note. MSA was also performed during previous admission (January 2024); continues with severe malnutrition. Pt reports swallowing difficulty with meds. Hx of CVA. No difficulty chewing/swallowing foods. Admit weight 149.4lb. NTN following per protocol.

## 2024-02-29 NOTE — PROGRESS NOTES
Malnutrition Severity Assessment  Patient Name:  Radha Wade  YOB: 1960  MRN: [id number]  Admit Date:  2/28/2024  Patient meets criteria for : Severe Malnutrition  Malnutrition Severity Assessment  Malnutrition Type: Chronic Disease - Related Malnutrition  Malnutrition Type (last 8 hours)       Malnutrition Severity Assessment       Row Name 02/29/24 1632       Malnutrition Severity Assessment    Malnutrition Type Chronic Disease - Related Malnutrition      Row Name 02/29/24 1632       Insufficient Energy Intake     Insufficient Energy Intake Findings Severe    Insufficient Energy Intake  <75% of est. energy requirement for > or equal to 3 months      Row Name 02/29/24 1632       Unintentional Weight Loss     Unintentional Weight Loss Findings Severe    Unintentional Weight Loss  Weight loss greater than 10% in six months      Row Name 02/29/24 1632       Muscle Loss    Loss of Muscle Mass Findings Severe    [place] Region Severe - deep hollowing/scooping, lack of muscle to touch, facial bones well defined    Clavicle Bone Region Severe - protruding prominent bone    Acromion Bone Region Severe - squared shoulders, bones, and acromion process protrusion prominent    Dorsal Hand Region Severe - prominent depression    Patellar Region Moderate - patella more prominent, less muscle definition around patella    Anterior Thigh Region Moderate - mild depression on inner thigh    Posterior Calf Region Moderate - some roundness, slight firmness      Row Name 02/29/24 1632       Fat Loss    Subcutaneous Fat Loss Findings Moderate    Orbital Region  Moderate -  somewhat hollowness, slightly dark circles    Upper Arm Region Moderate - some fat tissue, not ample      Row Name 02/29/24 1632       Fluid Accumulation (Edema)    Fluid Accumulation  --  edema from blood clotting and inactivity likely      Row Name 02/29/24 1632       Declining Functional Status    Declining Functional Status Findings Measurably  Reduced Stage 3 pressure injury to right gluteal and stage 2 pressure injury to left gluteal       Row Name 02/29/24 1632       Criteria Met (Must meet criteria for severity in at least 2 of these categories: M Wasting, Fat Loss, Fluid, Secondary Signs, Wt. Status, Intake)    Patient meets criteria for  Severe Malnutrition                    Electronically signed by:  Aurora Krishna RDN, LD  02/29/24 16:35 CST

## 2024-02-29 NOTE — PLAN OF CARE
"Goal Outcome Evaluation:  Plan of Care Reviewed With: patient           Outcome Evaluation: Pt admitted from outpatient to room 454. Pt AAOx4, c/o of mild chest pressure that is improved since earlier today, c/o of chronic epigastri/abdominal pressure and fullness due to her cancer, she has hx of edometrial ca, states it is \"pushing on my bladder\", had mediport placed today for future chemo, incision glued and TOMAS, on RA, HR NS-ST. urinated 100 ml, stated she still felt full, bladder scan of 385 ml post void, safety maintained                               "

## 2024-02-29 NOTE — PLAN OF CARE
Goal Outcome Evaluation:              Outcome Evaluation: OT eval complete. [facility] pain near recent placement of chemo port as well as in sacral region d/t pt reported wounds. Additionally she also c/o pain her ambdomen area. Previous deficits are pt reported from stroke approx 5 weeks ago.  She was noted to have increase edema in BLE. She came to sitting EOB and participated in ROM, MMT, and sensation screens. Deficits were noted in ROM, strength, and sensation. Pt stood and amb to BR where she voided and performed hand washing tasks of grooming. She req CGA and vcs for safety as she attempted to support herself with wall/towel rack despite HHA availability. She then returned to sitting EOB and then tf to [facility] for positioning to decrease pain in abdominal area. She was noted to be very fearful of medical intervention and is eager to cont to participate in therapies to maintain independence. Pt would benefit from skilled therapy during admit to increase independence and safety with ADLs, ax sanjuana, and strength as wel as to provide education on AE, DME that pt may req upon dc. Pt would benefit from home health therapy upon dc to home.      Anticipated Discharge Disposition (OT): home with home health

## 2024-02-29 NOTE — NURSING NOTE
[facility]  INPATIENT WOUND & OSTOMY CARE    Today's Date: 02/29/24    Patient Name: Radha Wade  MRN: [id number]  [facility]: [id number]  PCP: Myron Vines MD  Attending Provider: Mansoor Galvan DO  Length of Stay: 1    Wound care consulted for pressure injury to right gluteal area. This was present on admit. See Akua Melendez's note for details. Stage 3 pressure injury to right gluteal and stage 2 pressure injury to left gluteal area. Wound care orders placed. Pressure ulcer prevention measures ordered. Patient educated on importance of turning every 2 hours. She states she is usually unable to tolerate being turned due to pain.     Inpatient wound care will continue to follow during hospital stay.  Please contact if any issues or concerns arise.     This document has been electronically signed by Cande Moreno RN on 2/29/2024 12:05 CST

## 2024-02-29 NOTE — THERAPY EVALUATION
Patient Name: Radha Wade  : 1960    MRN: [id number]                              Today's Date: 2024       Admit Date: 2024    Visit Dx:     ICD-10-CM ICD-9-CM   1. Pressure injury of right buttock, stage 3  L89.313 707.05     707.23   2. Endometrial adenocarcinoma  C54.1 182.0   3. Impaired mobility [Z74.09]  Z74.09 799.89     Patient Active Problem List   Diagnosis    DVT (deep venous thrombosis)    Vaginal bleeding    History of asthma    Abnormal CT of the abdomen    Type 2 diabetes mellitus, without long-term current use of insulin    Metastatic disease    Acute blood loss anemia    CVA (cerebral vascular accident)    Severe malnutrition    Acute cystitis without hematuria    Endometrial adenocarcinoma    Pulmonary embolus    PE (pulmonary thromboembolism)     Past Medical History:   Diagnosis Date    Anemia     Asthma     Bronchitis     Depression     Diabetes mellitus     DVT (deep venous thrombosis)     right arm    Endometrial cancer     mets to lung    Heart murmur     Lyme disease     Mitral valve prolapse     Stroke     --right side weakness     Past Surgical History:   Procedure Laterality Date     SECTION      D & C HYSTEROSCOPY ENDOMETRIAL ABLATION N/A 2024    Procedure: DILATATION AND CURETTAGE;  Surgeon: Radu De Los Santos MD;  Location:  [facility] OR;  Service: Obstetrics/Gynecology;  Laterality: N/A;    VENOUS ACCESS DEVICE (PORT) INSERTION N/A 2024    Procedure: Single Lumen Port-a-cath insertion with flouroscopy;  Surgeon: Lizy Wade MD;  Location:  [facility] OR;  Service: General;  Laterality: N/A;      General Information       Row Name 24 1349          OT Time and Intention    Document Type evaluation  -JJ (r) KG (t) JJ (c)     Mode of Treatment occupational therapy  -JJ (r) KG (t) JJ (c)       Row Name 24 1340          General Information    Patient Profile Reviewed yes  Pt presented from OP sx s/p chemo port placement with SOA. Hx:  CVA, stroke, DVT, DM type II. Recent dx of endometrial carcinoma.  -JJ (r) KG (t) JJ (c)     Prior Level of Function independent:;feeding;grooming;dressing;bathing;home management;all household mobility;community mobility;cooking;cleaning;driving;shopping;using stairs;ADL's  Pt reports being independent in all tasks prior to previous stroke, however she notes that she currently req assist with all ADLs and uses mod independence for dressing.  -JJ (r) KG (t) JJ (c)     Existing Precautions/Restrictions fall  No BP/labs R arm  -JJ (r) KG (t) JJ (c)     Barriers to Rehab physical barrier;medically complex;previous functional deficit  -JJ (r) KG (t) JJ (c)       Row Name 02/29/24 1343          Occupational Profile    Environmental Supports and Barriers (Occupational Profile) Pt reports use of rwx for mobility and freq use of tub shower though walk in shower is available for use.  -JJ (r) KG (t) JJ (c)       Row Name 02/29/24 1345          Living Environment    People in Home child(carly), adult;spouse  -JJ (r) KG (t) JJ (c)       Row Name 02/29/24 1345          Home Main Entrance    Number of Stairs, Main Entrance four  -JJ (r) KG (t) JJ (c)     Stair Railings, Main Entrance railings not in good condition, need repair for safe use;railings on both sides of stairs  -JJ (r) KG (t) JJ (c)       Row Name 02/29/24 1343          Stairs Within Home, Primary    Stairs, Within Home, Primary Pt has several locations with stairs; all are equipped with handrails and pt does not need to utilize most of these.  -JJ (r) KG (t) JJ (c)       Row Name 02/29/24 1340          Cognition    Orientation Status (Cognition) oriented x 4  -JJ (r) KG (t) JJ (c)       Row Name 02/29/24 1345          Safety Issues, Functional Mobility    Safety Issues Affecting Function (Mobility) friction/shear risk;safety precaution awareness;safety precautions follow-through/compliance  -JJ (r) KG (t) JJ (c)     Impairments Affecting Function (Mobility)  endurance/activity tolerance;strength;sensation/sensory awareness;pain  -JJ (r) KG (t) JJ (c)               User Key  (r) = Recorded By, (t) = Taken By, (c) = Cosigned By      Initials Name Provider Type    Columba Hernandez, OTR/L, CSRS Occupational Therapist    KG Pasquale Neri, OT Student OT Student                     Mobility/ADL's       Row Name 02/29/24 1349          Bed Mobility    Bed Mobility scooting/bridging;supine-sit;sit-supine  -JJ (r) KG (t) JJ (c)     Scooting/Bridging [place] (Bed Mobility) standby assist  -JJ (r) KG (t) JJ (c)     Supine-Sit [place] (Bed Mobility) contact guard  -JJ (r) KG (t) JJ (c)     Sit-Supine [place] (Bed Mobility) contact guard  -JJ (r) KG (t) JJ (c)     Assistive Device (Bed Mobility) bed rails;head of bed elevated  -JJ (r) KG (t) JJ (c)       Row Name 02/29/24 1349          Transfers    Transfers sit-stand transfer;stand-sit transfer;toilet transfer  -JJ (r) KG (t) JJ (c)       Row Name 02/29/24 1349          Sit-Stand Transfer    Sit-Stand [place] (Transfers) contact guard  -JJ (r) KG (t) JJ (c)       Row Name 02/29/24 1349          Stand-Sit Transfer    Stand-Sit [place] (Transfers) contact guard  -JJ (r) KG (t) JJ (c)       Row Name 02/29/24 1349          Toilet Transfer    Type (Toilet Transfer) stand-sit;sit-stand  -JJ (r) KG (t) JJ (c)     [place] Level (Toilet Transfer) standby assist  -JJ (r) KG (t) JJ (c)       Row Name 02/29/24 1349          Activities of Daily Living    BADL Assessment/Intervention toileting;lower body dressing  -JJ (r) KG (t) JJ (c)       Row Name 02/29/24 1349          Toileting Assessment/Training    [place] Level (Toileting) adjust/manage clothing;perform perineal hygiene;standby assist  -JJ (r) KG (t) JJ (c)     Position (Toileting) unsupported sitting;unsupported standing  -JJ (r) KG (t) JJ (c)       Row Name 02/29/24 1341          Lower Body Dressing Assessment/Training    [place] Level  (Lower Body Dressing) don;socks;maximum assist (25% patient effort)  -JJ (r) KG (t) JJ (c)     Position (Lower Body Dressing) edge of bed sitting  -JJ (r) KG (t) JJ (c)               User Key  (r) = Recorded By, (t) = Taken By, (c) = Cosigned By      Initials Name Provider Type    Columba Hernandez OTR/L, CSRS Occupational Therapist    Pasquale Leonardo OT Student OT Student                   Obj/Interventions       Row Name 02/29/24 1349          Sensory Assessment (Somatosensory)    Sensory Assessment (Somatosensory) UE sensation intact  -JJ (r) KG (t) JJ (c)       Row Name 02/29/24 1349          Range of Motion Comprehensive    Comment, General Range of Motion RUE impaired 25% d/t recent chemo port placement/pain. All other BUE WFL  -JJ (r) KG (t) JJ (c)       Row Name 02/29/24 1349          Strength Comprehensive (MMT)    Comment, General Manual Muscle Testing (MMT) Assessment RUE not tested d/t recent chemo port placement. LUE grossly 4/5  -JJ (r) KG (t) JJ (c)       Row Name 02/29/24 1349          Balance    Balance Assessment sitting static balance;sitting dynamic balance;standing static balance;standing dynamic balance  -JJ (r) KG (t) JJ (c)     Static Sitting Balance independent  -JJ (r) KG (t) JJ (c)     Dynamic Sitting Balance supervision  -JJ (r) KG (t) JJ (c)     Position, Sitting Balance unsupported;sitting edge of bed  -JJ (r) KG (t) JJ (c)     Static Standing Balance standby assist  -JJ (r) KG (t) JJ (c)     Dynamic Standing Balance contact guard  -JJ (r) KG (t) JJ (c)     Position/Device Used, Standing Balance supported;unsupported  -JJ (r) KG (t) JJ (c)               User Key  (r) = Recorded By, (t) = Taken By, (c) = Cosigned By      Initials Name Provider Type    Columba Hernandez OTR/L, SORINS Occupational Therapist    Pasquale Leonardo OT Student OT Student                   Goals/Plan       Row Name 02/29/24 1349          Transfer Goal 1 (OT)    Activity/Assistive Device (Transfer  Goal 1, OT) bed-to-chair/chair-to-bed;toilet;tub;shower chair  -JJ (r) KG (t) JJ (c)     [place] Level/Cues Needed (Transfer Goal 1, OT) supervision required  -JJ (r) KG (t) JJ (c)     Time Frame (Transfer Goal 1, OT) long term goal (LTG);by discharge  -JJ (r) KG (t) JJ (c)     Progress/Outcome (Transfer Goal 1, OT) new goal  -JJ (r) KG (t) JJ (c)       Row Name 02/29/24 1345          Bathing Goal 1 (OT)    Activity/Device (Bathing Goal 1, OT) bathing skills, all;grab bar, tub/shower  -JJ (r) KG (t) JJ (c)     [place] Level/Cues Needed (Bathing Goal 1, OT) supervision required;set-up required  -JJ (r) KG (t) JJ (c)     Time Frame (Bathing Goal 1, OT) long term goal (LTG);by discharge  -JJ (r) KG (t) JJ (c)     Progress/Outcomes (Bathing Goal 1, OT) new goal  -JJ (r) KG (t) JJ (c)       Row Name 02/29/24 1341          Dressing Goal 1 (OT)    Activity/Device (Dressing Goal 1, OT) lower body dressing  -JJ (r) KG (t) JJ (c)     [place]/Cues Needed (Dressing Goal 1, OT) supervision required  -JJ (r) KG (t) JJ (c)     Time Frame (Dressing Goal 1, OT) long term goal (LTG);by discharge  -JJ (r) KG (t) JJ (c)     Progress/Outcome (Dressing Goal 1, OT) new goal  -JJ (r) KG (t) JJ (c)       Row Name 02/29/24 6817          Therapy Assessment/Plan (OT)    Planned Therapy Interventions (OT) activity tolerance training;adaptive equipment training;BADL retraining;edema control/reduction;functional balance retraining;IADL retraining;passive ROM/stretching;occupation/activity based interventions;neuromuscular control/coordination retraining;patient/caregiver education/training;ROM/therapeutic exercise;strengthening exercise;transfer/mobility retraining  -JJ (r) KG (t) JILDEFONSO (c)               User Key  (r) = Recorded By, (t) = Taken By, (c) = Cosigned By      Initials Name Provider Type    Columba Hernandez, OTR/L, CSRS Occupational Therapist    Pasquale Leonardo, OT Student OT Student                   Clinical  Impression       Row Name 02/29/24 7869          Pain Assessment    Pretreatment Pain Rating 4/10  -JJ (r) KG (t) JJ (c)     Posttreatment Pain Rating 4/10  -JJ (r) KG (t) JJ (c)     Pain Location incisional  -JJ (r) KG (t) JJ (c)     Pain Location - back;buttock;abdomen  -JJ (r) KG (t) JJ (c)     Pain Intervention(s) Repositioned  -JJ (r) KG (t) JJ (c)       Row Name 02/29/24 0708          Plan of Care Review    Outcome Evaluation OT eval complete. Pt presented in fowlers A&Ox4 with c/o pain near recent placement of chemo port as well as in sacral region d/t pt reported wounds. Additionally she also c/o pain her ambdomen area. Previous deficits are pt reported from stroke approx 5 weeks ago.  She was noted to have increase edema in BLE. She came to sitting EOB and participated in ROM, MMT, and sensation screens. Deficits were noted in ROM, strength, and sensation. Pt stood and amb to BR where she voided and performed hand washing tasks of grooming. She req CGA and vcs for safety as she attempted to support herself with wall/towel rack despite HHA availability. She then returned to sitting EOB and then tf to [facility] for positioning to decrease pain in abdominal area. She was noted to be very fearful of medical intervention and is eager to cont to participate in therapies to maintain independence. Pt would benefit from skilled therapy during admit to increase independence and safety with ADLs, ax sanjuana, and strength as wel as to provide education on AE, DME that pt may req upon dc. Pt would benefit from home health therapy upon dc to home.  -JJ (r) KG (t) JJ (c)       Row Name 02/29/24 2805          Therapy Assessment/Plan (OT)    Rehab Potential (OT) good, to achieve stated therapy goals  -JJ (r) KG (t) JJ (c)     Criteria for Skilled Therapeutic Interventions Met (OT) yes;skilled treatment is necessary  -JJ (r) KG (t) JJ (c)     Therapy Frequency (OT) 3 times/wk  -JJ (r) KG (t) JJ (c)     Predicted Duration of  Therapy Intervention (OT) unti dc  -JJ (r) KG (t) JJ (c)       Row Name 02/29/24 1349          Therapy Plan Review/Discharge Plan (OT)    Anticipated Discharge Disposition (OT) home with home health  -JJ (r) KG (t) JJ (c)       Row Name 02/29/24 1349          Positioning and Restraints    Pre-Treatment Position in bed  -JJ (r) KG (t) JJ (c)     Post Treatment Position bed  -JJ (r) KG (t) JJ (c)     In Bed fowlers;call light within reach;encouraged to call for assist;side rails up x2  -JJ (r) KG (t) JJ (c)               User Key  (r) = Recorded By, (t) = Taken By, (c) = Cosigned By      Initials Name Provider Type    Columba Hernandez, OTR/L, CSRS Occupational Therapist    Pasquale Leonardo, OT Student OT Student                   Outcome Measures       Row Name 02/29/24 1349          How much help from another is currently needed...    Putting on and taking off regular lower body clothing? 2  -JJ (r) KG (t) JJ (c)     Bathing (including washing, rinsing, and drying) 2  -JJ (r) KG (t) JJ (c)     Toileting (which includes using toilet bed pan or urinal) 3  -JJ (r) KG (t) JJ (c)     Putting on and taking off regular upper body clothing 3  -JJ (r) KG (t) JJ (c)     Taking care of personal grooming (such as brushing teeth) 3  -JJ (r) KG (t) JJ (c)     Eating meals 4  -JJ (r) KG (t) JJ (c)     AM-PAC 6 Clicks Score (OT) 17  -JJ (r) KG (t)       Row Name 02/29/24 1118 02/29/24 0800       How much help from another person do you currently need...    Turning from your back to your side while in flat bed without using bedrails? 3  -EAGLE (r) JT (t) EAGLE (c) 3  -AG    Moving from lying on back to sitting on the side of a flat bed without bedrails? 3  -EAGLE (r) JT (t) EAGLE (c) 3  -AG    Moving to and from a bed to a chair (including a wheelchair)? 3  -EAGLE (r) JT (t) EAGLE (c) 3  -AG    Standing up from a chair using your arms (e.g., wheelchair, bedside chair)? 3  -EAGLE (r) JT (t) EAGLE (c) 3  -AG    Climbing 3-5 steps with a railing? 3  -EAGLE  (r) JT (t) EAGLE (c) 2  -AG    To walk in hospital room? 3  -EAGLE (r) JT (t) EAGLE (c) 3  -AG    AM-PAC 6 Clicks Score (PT) 18  -EAGLE (r) JT (t) 17  -AG    Highest Level of Mobility Goal 6 --> Walk 10 steps or more  -EAGLE (r) JT (t) 5 --> Static standing  -AG      Row Name 02/29/24 1349 02/29/24 1118       Functional Assessment    Outcome Measure Options AM-PAC 6 Clicks Daily Activity (OT)  -JJ (r) KG (t) JJ (c) AM-PAC 6 Clicks Basic Mobility (PT)  -EAGLE (r) JT (t) EAGLE (c)              User Key  (r) = Recorded By, (t) = Taken By, (c) = Cosigned By      Initials Name Provider Type    EAGLE Darrell Rosario, PT DPT Physical Therapist    Tarci Thomason, RN Registered Nurse    Columba Hernandez, OTR/L, CSRS Occupational Therapist    Loulou Cervantes, PT Student PT Student    Pasquale Leonardo, OT Student OT Student                    Occupational Therapy Education       Title: PT OT SLP Therapies (In Progress)       Topic: Occupational Therapy (In Progress)       Point: ADL training (Done)       Description:   Instruct learner(s) on proper safety adaptation and remediation techniques during self care or transfers.   Instruct in proper use of assistive devices.                  Learning Progress Summary             Patient Acceptance, E, VU by KG at 2/29/2024 1349                         Point: Home exercise program (Not Started)       Description:   Instruct learner(s) on appropriate technique for monitoring, assisting and/or progressing therapeutic exercises/activities.                  Learner Progress:  Not documented in this visit.              Point: Precautions (Done)       Description:   Instruct learner(s) on prescribed precautions during self-care and functional transfers.                  Learning Progress Summary             Patient Acceptance, E, VU by KG at 2/29/2024 1349                         Point: Body mechanics (Done)       Description:   Instruct learner(s) on proper positioning and spine alignment during  self-care, functional mobility activities and/or exercises.                  Learning Progress Summary             Patient Acceptance, E, VU by SHERRI at 2/29/2024 1940                                         User Key       Initials Effective Dates Name Provider Type Discipline    SHERRI 01/05/24 -  Pasquale Neri OT Student OT Student OT                  OT Recommendation and Plan  Planned Therapy Interventions (OT): activity tolerance training, adaptive equipment training, BADL retraining, edema control/reduction, functional balance retraining, IADL retraining, passive ROM/stretching, occupation/activity based interventions, neuromuscular control/coordination retraining, patient/caregiver education/training, ROM/therapeutic exercise, strengthening exercise, transfer/mobility retraining  Therapy Frequency (OT): 3 times/wk  Plan of Care Review  Outcome Evaluation: OT eval complete. Pt presented in [place] A&Ox4 with c/o pain near recent placement of chemo port as well as in sacral region d/t pt reported wounds. Additionally she also c/o pain her ambdomen area. Previous deficits are pt reported from stroke approx 5 weeks ago.  She was noted to have increase edema in BLE. She came to sitting EOB and participated in ROM, MMT, and sensation screens. Deficits were noted in ROM, strength, and sensation. Pt stood and amb to BR where she voided and performed hand washing tasks of grooming. She req CGA and vcs for safety as she attempted to support herself with wall/towel rack despite HHA availability. She then returned to sitting EOB and then tf to fowlers for positioning to decrease pain in abdominal area. She was noted to be very fearful of medical intervention and is eager to cont to participate in therapies to maintain independence. Pt would benefit from skilled therapy during admit to increase independence and safety with ADLs, ax sanjuana, and strength as wel as to provide education on AE, DME that pt may req upon dc. Pt would  benefit from home health therapy upon dc to home.     Time Calculation:         Time Calculation- OT       Row Name 02/29/24 1349             Time Calculation- OT    OT Start Time 1116  -JJ (r) KG (t) JJ (c)      OT Stop Time 1202  -JJ (r) KG (t) JJ (c)      OT Time Calculation (min) 46 min  -JJ (r) KG (t)      OT Received On 02/29/24  -JJ (r) KG (t) JJ (c)      OT Goal Re-Cert Due Date 03/10/24  -JJ (r) KG (t) JJ (c)         Untimed Charges    OT Eval/Re-eval Minutes 46  -JJ (r) KG (t) JJ (c)         Total Minutes    Untimed Charges Total Minutes 46  -JJ (r) KG (t)       Total Minutes 46  -JJ (r) KG (t)                User Key  (r) = Recorded By, (t) = Taken By, (c) = Cosigned By      Initials Name Provider Type    Columba Hernandez, OTR/L, CSRS Occupational Therapist    Pasquale Leonardo, OT Student OT Student                           Pasquale Neri, OT Student  2/29/2024

## 2024-03-01 ENCOUNTER — READMISSION MANAGEMENT (OUTPATIENT)
Dept: CALL CENTER | Facility: HOSPITAL | Age: 64
End: 2024-03-01
Payer: COMMERCIAL

## 2024-03-01 VITALS
BODY MASS INDEX: 30.12 KG/M2 | WEIGHT: 149.4 LBS | DIASTOLIC BLOOD PRESSURE: 54 MMHG | OXYGEN SATURATION: 96 % | HEIGHT: 59 IN | SYSTOLIC BLOOD PRESSURE: 116 MMHG | RESPIRATION RATE: 18 BRPM | HEART RATE: 98 BPM | TEMPERATURE: 97.4 F

## 2024-03-01 PROBLEM — I26.99 PULMONARY EMBOLI: Status: ACTIVE | Noted: 2024-03-01

## 2024-03-01 LAB
ANION GAP SERPL CALCULATED.3IONS-SCNC: 11 MMOL/L (ref 5–15)
BASOPHILS # BLD AUTO: 0.07 10*3/MM3 (ref 0–0.2)
BASOPHILS NFR BLD AUTO: 0.5 % (ref 0–1.5)
BUN SERPL-MCNC: 11 MG/DL (ref 8–23)
BUN/CREAT SERPL: 24.4 (ref 7–25)
CALCIUM SPEC-SCNC: 8.6 MG/DL (ref 8.6–10.5)
CHLORIDE SERPL-SCNC: 95 MMOL/L (ref 98–107)
CO2 SERPL-SCNC: 26 MMOL/L (ref 22–29)
CREAT SERPL-MCNC: 0.45 MG/DL (ref 0.57–1)
DEPRECATED RDW RBC AUTO: 52.1 FL (ref 37–54)
EGFRCR SERPLBLD CKD-EPI 2021: 108.3 ML/MIN/1.73
EOSINOPHIL # BLD AUTO: 0.12 10*3/MM3 (ref 0–0.4)
EOSINOPHIL NFR BLD AUTO: 0.9 % (ref 0.3–6.2)
ERYTHROCYTE [DISTWIDTH] IN BLOOD BY AUTOMATED COUNT: 20 % (ref 12.3–15.4)
GLUCOSE SERPL-MCNC: 132 MG/DL (ref 65–99)
HCT VFR BLD AUTO: 32.2 % (ref 34–46.6)
HGB BLD-MCNC: 9 G/DL (ref 12–15.9)
LYMPHOCYTES # BLD AUTO: 1.63 10*3/MM3 (ref 0.7–3.1)
LYMPHOCYTES NFR BLD AUTO: 12.7 % (ref 19.6–45.3)
MCH RBC QN AUTO: 20.4 PG (ref 26.6–33)
MCHC RBC AUTO-ENTMCNC: 28 G/DL (ref 31.5–35.7)
MCV RBC AUTO: 72.9 FL (ref 79–97)
MONOCYTES # BLD AUTO: 0.96 10*3/MM3 (ref 0.1–0.9)
MONOCYTES NFR BLD AUTO: 7.5 % (ref 5–12)
NEUTROPHILS NFR BLD AUTO: 76.9 % (ref 42.7–76)
NEUTROPHILS NFR BLD AUTO: 9.83 10*3/MM3 (ref 1.7–7)
PLATELET # BLD AUTO: 635 10*3/MM3 (ref 140–450)
PMV BLD AUTO: 8.5 FL (ref 6–12)
POTASSIUM SERPL-SCNC: 4.4 MMOL/L (ref 3.5–5.2)
RBC # BLD AUTO: 4.42 10*6/MM3 (ref 3.77–5.28)
SODIUM SERPL-SCNC: 132 MMOL/L (ref 136–145)
WBC NRBC COR # BLD AUTO: 12.8 10*3/MM3 (ref 3.4–10.8)

## 2024-03-01 PROCEDURE — 97535 SELF CARE MNGMENT TRAINING: CPT | Performed by: OCCUPATIONAL THERAPIST

## 2024-03-01 PROCEDURE — 85025 COMPLETE CBC W/AUTO DIFF WBC: CPT | Performed by: FAMILY MEDICINE

## 2024-03-01 PROCEDURE — G0378 HOSPITAL OBSERVATION PER HR: HCPCS

## 2024-03-01 PROCEDURE — 97110 THERAPEUTIC EXERCISES: CPT | Performed by: OCCUPATIONAL THERAPIST

## 2024-03-01 PROCEDURE — 80048 BASIC METABOLIC PNL TOTAL CA: CPT | Performed by: FAMILY MEDICINE

## 2024-03-01 PROCEDURE — 25010000002 ONDANSETRON PER 1 MG: Performed by: FAMILY MEDICINE

## 2024-03-01 RX ADMIN — OXYCODONE HYDROCHLORIDE 5 MG: 5 TABLET ORAL at 11:44

## 2024-03-01 RX ADMIN — Medication 10 ML: at 09:25

## 2024-03-01 RX ADMIN — POLYETHYLENE GLYCOL 3350 17 G: 17 POWDER, FOR SOLUTION ORAL at 09:22

## 2024-03-01 RX ADMIN — BISACODYL 10 MG: 10 SUPPOSITORY RECTAL at 00:35

## 2024-03-01 RX ADMIN — METFORMIN HCL 500 MG: 500 TABLET ORAL at 09:22

## 2024-03-01 RX ADMIN — ONDANSETRON 4 MG: 2 INJECTION INTRAMUSCULAR; INTRAVENOUS at 02:09

## 2024-03-01 RX ADMIN — APIXABAN 10 MG: 5 TABLET, FILM COATED ORAL at 09:22

## 2024-03-01 RX ADMIN — PANTOPRAZOLE SODIUM 40 MG: 40 TABLET, DELAYED RELEASE ORAL at 06:24

## 2024-03-01 RX ADMIN — OXYCODONE HYDROCHLORIDE 5 MG: 5 TABLET ORAL at 02:31

## 2024-03-01 NOTE — CASE MANAGEMENT/SOCIAL WORK
Discharge Planning Assessment   [place]     Patient Name: Radha Wade  MRN: [id number]  Today's Date: 3/1/2024    Admit Date: 2/28/2024    Plan: Home   Discharge Needs Assessment       Row Name 03/01/24 1055       Living Environment    People in Home spouse    Name(s) of People in Home Wife spouse- Romeo dtr and grandson    Current Living Arrangements home    Potentially Unsafe Housing Conditions none    In the past 12 months has the electric, gas, oil, or water company threatened to shut off services in your home? No    Primary Care Provided by self    Provides Primary Care For no one    Family Caregiver if Needed spouse;child(carly), adult;grandchild(carly), adult    Quality of Family Relationships supportive;helpful;involved    Able to Return to Prior Arrangements yes       Resource/Environmental Concerns    Resource/Environmental Concerns none       Transportation Needs    In the past 12 months, has lack of transportation kept you from medical appointments or from getting medications? no    In the past 12 months, has lack of transportation kept you from meetings, work, or from getting things needed for daily living? No       Food Insecurity    Within the past 12 months, you worried that your food would run out before you got the money to buy more. Never true    Within the past 12 months, the food you bought just didn't last and you didn't have money to get more. Never true       Transition Planning    Patient/Family Anticipates Transition to home with family;home with help/services    Patient/Family Anticipated Services at Transition home health care    Transportation Anticipated family or friend will provide       Discharge Needs Assessment    Readmission Within the Last 30 Days no previous admission in last 30 days    Equipment Currently Used at Home walker, rolling    Concerns to be Addressed denies needs/concerns at this time    Equipment Needed After Discharge none    Outpatient/Agency/Support Group  Needs homecare agency    Discharge Facility/Level of Care Needs home with home health    Current Discharge Risk chronically ill                   Discharge Plan       Row Name 03/01/24 1056       Plan    Plan Home    Patient/Family in Agreement with Plan yes    Plan Comments Spoke with pt to assess for d/c planning. Pt will return home with family as before. She does not feel HH needed at present, open to this if needed per medical team.  Pt has RX coverage/PCP.  Will follow.    Final Discharge Disposition Code 01 - home or self-care                  Continued Care and Services - Admitted Since 2/28/2024    Coordination has not been started for this encounter.       Selected Continued Care - Prior Encounters Includes continued care and service providers with selected services from prior encounters from 11/30/2023 to 3/1/2024      Discharged on 1/18/2024 Admission date: 1/12/2024 - Discharge disposition: Rehab Facility or Unit (DC - External)      Destination       Service Provider Selected Services Address Phone Fax Patient Preferred    [facility] [address] [place] [id number] [phone] --                          Expected Discharge Date and Time       Expected Discharge Date Expected Discharge Time    Mar 1, 2024            Demographic Summary    No documentation.                  Functional Status    No documentation.                  Psychosocial    No documentation.                  Abuse/Neglect    No documentation.                  Legal    No documentation.                  Substance Abuse    No documentation.                  Patient Forms    No documentation.                     ROSA Sapp

## 2024-03-01 NOTE — PROGRESS NOTES
[facility] Medicine Services  INPATIENT PROGRESS NOTE    Patient Name: Radha Wade  Date of Admission: 2/28/2024  Today's Date: 02/29/24  Length of Stay: 1  Primary Care Physician: Myron Vines MD    Subjective   Chief Complaint: No complaint  HPI     Today, the patient has no complaint of dyspnea.  Echocardiogram has been performed and a partial report is available.  There is no evidence of RV strain.  The patient will continue to remain in the hospital until a full echo report is available.  Probable discharge tomorrow morning.  CBC today shows hemoglobin 9.4 and platelet count 643,000 but is otherwise unremarkable BMP shows calcium 7.8, sodium 133.    Review of Systems   All pertinent negatives and positives are as above. All other systems have been reviewed and are negative unless otherwise stated.     Objective    Temp:  [97.3 °F (36.3 °C)-98.2 °F (36.8 °C)] 97.3 °F (36.3 °C)  Heart Rate:  [] 101  Resp:  [16-18] 16  BP: (105-131)/(45-55) 111/51  Physical Exam  Constitutional:       Appearance: Normal appearance. She is normal weight.   HENT:      Head: Normocephalic and atraumatic.      Right Ear: External ear normal.      Left Ear: External ear normal.      Nose: Nose normal.      Mouth: Mucous membranes are moist.      Pharynx: Oropharynx is clear.   Eyes:      General: No scleral icterus.  Cardiovascular:      Rate and Rhythm: Normal rate and regular rhythm.      Pulses: Normal pulses.      Heart sounds: Normal heart sounds. No murmur heard.  Pulmonary:      Effort: Pulmonary effort is normal. No respiratory distress.      Breath sounds: Normal breath sounds.   Abdominal:      General: Abdomen is flat. Bowel sounds are normal.      Palpations: Abdomen is soft. There is no mass.      Tenderness: There is no abdominal tenderness.   Musculoskeletal:         General: Swelling present. Normal range of motion.      Right lower leg: Edema (2/4) present.       "Left lower leg: Edema (1/4) present.   Skin:     General: Skin is warm and dry.      Coloration: Skin is not pale.      Comments: The patient has mild peripheral cyanosis of all 10 toes.   Neurological:      General: No focal deficit present.      Mental Status: She is alert and oriented to person, place, and time. Mental status is at baseline.   Psychiatric:         Mood and Affect: Mood normal.         Judgment: Judgment normal.     Results Review:  I have reviewed the labs, radiology results, and diagnostic studies.    Laboratory Data:   Results from last 7 days   Lab Units 02/29/24  0552   WBC 10*3/mm3 9.63   HEMOGLOBIN g/dL 9.4*   HEMATOCRIT % 32.6*   PLATELETS 10*3/mm3 643*        Results from last 7 days   Lab Units 02/29/24  0552   SODIUM mmol/L 133*   POTASSIUM mmol/L 4.1   CHLORIDE mmol/L 96*   CO2 mmol/L 26.0   BUN mg/dL 11   CREATININE mg/dL 0.43*   CALCIUM mg/dL 7.8*   GLUCOSE mg/dL 114*       Culture Data:   No results found for: \"BLOODCX\", \"URINECX\", \"WOUNDCX\", \"MRSACX\", \"RESPCX\", \"STOOLCX\"    Radiology Data:   Imaging Results (Last 24 Hours)       Procedure Component Value Units Date/Time    US Venous Doppler Lower Extremity Right (duplex) [[id number]] Collected: 02/29/24 1412     Updated: 02/29/24 1415    Narrative:      History: Pain and swelling       Impression:      Impression:  1. There is no evidence of deep venous thrombosis of the right lower  extremity.  2. There is evidence of superficial thrombus in the greater saphenous  vein in the calf.     Comments: Right lower extremity venous duplex exam was performed using  color Doppler flow, Doppler wave form analysis, and grayscale imaging,  with and without compression. There is no evidence of deep venous  thrombosis of the common femoral, superficial femoral, popliteal,  posterior tibial, and peroneal veins. There is evidence of superficial  thrombus in the greater saphenous vein in the calf.        This report was signed and finalized on " 2/29/2024 2:12 PM by Dr. Joseph Mari MD.               I have reviewed the patient's current medications.     Assessment/Plan   Assessment  Active Hospital Problems    Diagnosis     **Pulmonary embolus     PE (pulmonary thromboembolism)     Endometrial adenocarcinoma     Type 2 diabetes mellitus, without long-term current use of insulin        Treatment Plan  Probable discharge in a.m. pending final echo report  Continue Eliquis    Medical Decision Making  Number and Complexity of problems:   Acute bilateral pulmonary emboli acute, high complexity  Probable DVT RLE acute, moderate complexity  Type 2 diabetes, chronic, moderate complexity     Differential Diagnosis: None others entertained     Conditions and Status        Condition is unchanged.     [facility] Data  External documents reviewed: Care Everywhere documentation  Cardiac tracing (EKG, telemetry) interpretation: See HPI  Radiology interpretation: See HPI  Labs reviewed: See HPI  Any tests that were considered but not ordered: None     Decision rules/scores evaluated (example [id number], [place], etc): None     Discussed with: The patient     Care Planning  Shared decision making: The patient and general surgery  Code status and discussions: Full code     Disposition  Social Determinants of Health that impact treatment or disposition: None noted  I expect the patient to be discharged to home in 1 days.     Electronically signed by Mansoor Galvan DO, 02/29/24, 18:34 CST.

## 2024-03-01 NOTE — PLAN OF CARE
Goal Outcome Evaluation:  Plan of Care Reviewed With: patient           Outcome Evaluation: OT tx complete. Pt presented sitting up in bed though poorly positioned. She was agreeable therapy noting that she had previously had a rough morning but was optimistic that she would be dc to home today. She came to sitting EOB with sba of HHAx1 prn. She noted that she had some pain in her R shoulder area that was incisional from prior procedure. She mainted both static and dynamic seated bal throughout session. Pt participated in intro to BUE HEP with consideration to additional weakness and pain in RUE d/t recent chemo port placement as well as pt's decreased ax sanjuana overall. She was receptive to education and was agreeable to participate in HEP outside of OT sessions. Pt was then assisted to  for sinkside grooming tasks per pt request. Pt completed oral care and face washing while supporting herself on the sink for approx 5-6 min. Pt then amb back to EOB where she completed hair care/combing. She then returned to [facility] where she was presented with breakfast tray. Pt was independent for all self-feeding task components and req no assist for set up. Pt showed decreased BLE edema from yesterday as well as improved safety awareness and increased ax sanjuana. Pt cont to benefit from OT POC to further strengthen these areas. Pt would benefit from cont rehab following dc from home health therapy.      Anticipated Discharge Disposition (OT): home with home health

## 2024-03-01 NOTE — THERAPY DISCHARGE NOTE
Acute Care - Physical Therapy Discharge Summary  [facility]       Patient Name: Radha Wade  : 1960  MRN: [id number]    Today's Date: 3/1/2024                 Admit Date: 2024      PT Recommendation and Plan    Visit Dx:    ICD-10-CM ICD-9-CM   1. Pressure injury of right buttock, stage 3  L89.313 707.05     707.23   2. Endometrial adenocarcinoma  C54.1 182.0   3. Impaired mobility [Z74.09]  Z74.09 799.89                PT Rehab Goals       Row Name 24 1500             Bed Mobility Goal 1 (PT)    Activity/Assistive Device (Bed Mobility Goal 1, PT) sit to supine;supine to sit  -AB      [place] Level/Cues Needed (Bed Mobility Goal 1, PT) independent  -AB      Time Frame (Bed Mobility Goal 1, PT) long term goal (LTG);10 days  -AB      Progress/Outcomes (Bed Mobility Goal 1, PT) goal not met  -AB         Transfer Goal 1 (PT)    Activity/Assistive Device (Transfer Goal 1, PT) sit-to-stand/stand-to-sit;bed-to-chair/chair-to-bed  -AB      [place] Level/Cues Needed (Transfer Goal 1, PT) supervision required  -AB      Time Frame (Transfer Goal 1, PT) long term goal (LTG);10 days  -AB      Progress/Outcome (Transfer Goal 1, PT) goal not met  -AB         Gait Training Goal 1 (PT)    Activity/Assistive Device (Gait Training Goal 1, PT) gait (walking locomotion);decrease fall risk;improve balance and speed;increase endurance/gait distance;other (see comments)  AD use if continue to furniture walk  -AB      [place] Level (Gait Training Goal 1, PT) supervision required  -AB      Distance (Gait Training Goal 1, PT) 100ft  -AB      Time Frame (Gait Training Goal 1, PT) long term goal (LTG);10 days  -AB      Progress/Outcome (Gait Training Goal 1, PT) goal not met  -AB         Stairs Goal 1 (PT)    Activity/Assistive Device (Stairs Goal 1, PT) ascending stairs;descending stairs;decrease fall risk;improve balance and speed;using handrail, left;using handrail, right  -AB      [place]  Level/Cues Needed (Stairs Goal 1, PT) standby assist  -AB      Number of Stairs (Stairs Goal 1, PT) 4  -AB      Time Frame (Stairs Goal 1, PT) long term goal (LTG);10 days  -AB      Progress/Outcome (Stairs Goal 1, PT) goal not met  -AB                User Key  (r) = Recorded By, (t) = Taken By, (c) = Cosigned By      Initials Name Provider Type Discipline    Krystal Agee, PTA Physical Therapist Assistant PT                        PT Discharge Summary  Anticipated Discharge Disposition (PT): home with assist, home with home health  Reason for Discharge: Discharge from facility  Outcomes Achieved: Refer to plan of care for updates on goals achieved  Discharge Destination: Home with assist      Krystal Diaz PTA   3/1/2024

## 2024-03-01 NOTE — THERAPY DISCHARGE NOTE
Acute Care - Occupational Therapy Discharge  [facility]    Patient Name: Radha Wade  : 1960    MRN: [id number]                              Today's Date: 3/1/2024       Admit Date: 2024    Visit Dx:     ICD-10-CM ICD-9-CM   1. Pressure injury of right buttock, stage 3  L89.313 707.05     707.23   2. Endometrial adenocarcinoma  C54.1 182.0   3. Impaired mobility [Z74.09]  Z74.09 799.89     Patient Active Problem List   Diagnosis    DVT (deep venous thrombosis)    Vaginal bleeding    History of asthma    Abnormal CT of the abdomen    Type 2 diabetes mellitus, without long-term current use of insulin    Metastatic disease    Acute blood loss anemia    CVA (cerebral vascular accident)    Severe malnutrition    Acute cystitis without hematuria    Endometrial adenocarcinoma    Pulmonary embolus    PE (pulmonary thromboembolism)    Pulmonary emboli     Past Medical History:   Diagnosis Date    Anemia     Asthma     Bronchitis     Depression     Diabetes mellitus     DVT (deep venous thrombosis)     right arm    Endometrial cancer     mets to lung    Heart murmur     Lyme disease     Mitral valve prolapse     Stroke     --right side weakness     Past Surgical History:   Procedure Laterality Date     SECTION      D & C HYSTEROSCOPY ENDOMETRIAL ABLATION N/A 2024    Procedure: DILATATION AND CURETTAGE;  Surgeon: Radu De Los Santos MD;  Location: [facility];  Service: Obstetrics/Gynecology;  Laterality: N/A;    VENOUS ACCESS DEVICE (PORT) INSERTION N/A 2024    Procedure: Single Lumen Port-a-cath insertion with flouroscopy;  Surgeon: Lizy Wade MD;  Location: [facility] OR;  Service: General;  Laterality: N/A;      General Information       Row Name 24 0926          OT Time and Intention    Document Type therapy note (daily note)  -JJ (r) KG (t) JJ (c)     Mode of Treatment occupational therapy  -JJ (r) KG (t) JJ (c)       Row Name 24 0926          General Information     Patient Profile Reviewed yes  -JJ (r) KG (t) JJ (c)     Existing Precautions/Restrictions fall  -JJ (r) KG (t) JJ (c)     Barriers to Rehab medically complex;previous functional deficit  -JJ (r) KG (t) JJ (c)               User Key  (r) = Recorded By, (t) = Taken By, (c) = Cosigned By      Initials Name Provider Type    Columba Hernandez, OTR/L, CSRS Occupational Therapist    KG Pasquale Neri, OT Student OT Student                   Mobility/ADL's       Row Name 03/01/24 0926          Bed Mobility    Bed Mobility supine-sit;sit-supine;scooting/bridging  -JJ (r) KG (t) JJ (c)     Scooting/Bridging [place] (Bed Mobility) standby assist  -JJ (r) KG (t) JJ (c)     Supine-Sit [place] (Bed Mobility) standby assist  -JJ (r) KG (t) JJ (c)     Sit-Supine [place] (Bed Mobility) standby assist  -JJ (r) KG (t) JJ (c)     Assistive Device (Bed Mobility) bed rails;head of bed elevated  -JJ (r) KG (t) JJ (c)       Row Name 03/01/24 0926          Transfers    Transfers sit-stand transfer;stand-sit transfer  -JJ (r) KG (t) JJ (c)       Row Name 03/01/24 0926          Sit-Stand Transfer    Sit-Stand [place] (Transfers) standby assist  -JJ (r) KG (t) JJ (c)     Comment, (Sit-Stand Transfer) HHAx1  -JJ (r) KG (t) JJ (c)       Row Name 03/01/24 0926          Stand-Sit Transfer    Stand-Sit [place] (Transfers) standby assist  -JJ (r) KG (t) JJ (c)     Comment, (Stand-Sit Transfer) HHAx1  -JJ (r) KG (t) JJ (c)       Row Name 03/01/24 0926          Functional Mobility    Functional Mobility- Comment pt amb to BR and back to bed  -JJ (r) KG (t) JJ (c)       Row Name 03/01/24 0926          Activities of Daily Living    BADL Assessment/Intervention grooming;feeding  -JJ (r) KG (t) LAURIE (karen)       Row Name 03/01/24 0926          Grooming Assessment/Training    [place] Level (Grooming) hair care, combing/brushing;oral care regimen;wash face, hands;standby assist  -LAURIE bingham) SHERRI (jenny) LAURIE (karen)     Position (Grooming)  sink side;supported standing  -JJ (r) KG (t) JJ (c)       Row Name 03/01/24 0926          Self-Feeding Assessment/Training    [place] Level (Feeding) finger foods;prepare tray/open items;scoop food and bring to mouth;independent  -JJ (r) KG (t) JJ (c)     Position (Self-Feeding) sitting up in bed  -JJ (r) KG (t) JJ (c)               User Key  (r) = Recorded By, (t) = Taken By, (c) = Cosigned By      Initials Name Provider Type    Columba Hernandez OTR/L, SORINS Occupational Therapist    Pasquale Leonardo, OT Student OT Student                   Obj/Interventions       Row Name 03/01/24 0926          Motor Skills    Motor Skills --  Implementation of BUE HEP with consideration to weakness and pain d/t recent chemo port placement  -JJ (r) KG (t) JJ (c)       Row Name 03/01/24 0926          Balance    Balance Assessment sitting static balance;sitting dynamic balance;standing static balance;standing dynamic balance  -JJ (r) KG (t) JJ (c)     Static Sitting Balance independent  -JJ (r) KG (t) JJ (c)     Dynamic Sitting Balance independent  -JJ (r) KG (t) JJ (c)     Position, Sitting Balance unsupported;sitting edge of bed  -JJ (r) KG (t) JJ (c)     Static Standing Balance standby assist  -JJ (r) KG (t) JJ (c)     Dynamic Standing Balance standby assist  -JJ (r) KG (t) JJ (c)     Position/Device Used, Standing Balance supported;unsupported  -JJ (r) KG (t) JJ (c)               User Key  (r) = Recorded By, (t) = Taken By, (c) = Cosigned By      Initials Name Provider Type    Columba Hernandez OTR/L, SORINS Occupational Therapist    Pasquale Leonardo, OT Student OT Student                   Goals/Plan    No documentation.                  Clinical Impression       Row Name 03/01/24 0926          Pain Assessment    Pretreatment Pain Rating 6/10  -JJ (r) KG (t) JJ (c)     Posttreatment Pain Rating 6/10  -JJ (r) KG (t) JJ (c)     Pain Location - abdomen  -JJ (r) KG (t) JJ (c)     Pain Intervention(s)  Repositioned;Ambulation/increased activity  -LAURIE (r) SHERRI (t) LAURIE (c)       Row Name 03/01/24 0926          Plan of Care Review    Plan of Care Reviewed With patient  -LAURIE (r) SHERRI (jenny) LAURIE (c)     Outcome Evaluation OT tx complete. Pt presented sitting up in bed though poorly positioned. She was agreeable therapy noting that she had previously had a rough morning but was optimistic that she would be dc to home today. She came to sitting EOB with sba of HHAx1 prn. She noted that she had some pain in her R shoulder area that was incisional from prior procedure. She mainted both static and dynamic seated bal throughout session. Pt participated in intro to BUE HEP with consideration to additional weakness and pain in RUE d/t recent chemo port placement as well as pt's decreased ax sanjuana overall. She was receptive to education and was agreeable to participate in HEP outside of OT sessions. Pt was then assisted to  for sinkside grooming tasks per pt request. Pt completed oral care and face washing while supporting herself on the sink for approx 5-6 min. Pt then amb back to EOB where she completed hair care/combing. She then returned to [facility] where she was presented with breakfast tray. Pt was independent for all self-feeding task components and req no assist for set up. Pt showed decreased BLE edema from yesterday as well as improved safety awareness and increased ax sanjuana. Pt cont to benefit from OT POC to further strengthen these areas. Pt would benefit from cont rehab following dc from home health therapy.  -LAURIE (r) SHERRI (t) LAURIE (c)       Row Name 03/01/24 0926          Therapy Assessment/Plan (OT)    Rehab Potential (OT) good, to achieve stated therapy goals  -LAURIE (r) SHERRI (t) LAURIE (c)     Criteria for Skilled Therapeutic Interventions Met (OT) yes;skilled treatment is necessary  -LAURIE (r) SHERRI (t) LAURIE (c)       Row Name 03/01/24 0926          Therapy Plan Review/Discharge Plan (OT)    Anticipated Discharge Disposition (OT) home with home  health  -JJ (r) KG (t) JJ (c)       Row Name 03/01/24 0926          Positioning and Restraints    Pre-Treatment Position in bed  -JJ (r) KG (t) JJ (c)     Post Treatment Position bed  -JJ (r) KG (t) JJ (c)     In Bed fowlers;call light within reach;encouraged to call for assist;side rails up x2;with family/caregiver  -JJ (r) KG (t) JJ (c)               User Key  (r) = Recorded By, (t) = Taken By, (c) = Cosigned By      Initials Name Provider Type    Columba Hernandez OTR/L, CSRS Occupational Therapist    Pasquale Leonardo, OT Student OT Student                   Outcome Measures       Row Name 03/01/24 0926          How much help from another is currently needed...    Putting on and taking off regular lower body clothing? 2  -JJ (r) KG (t) JJ (c)     Bathing (including washing, rinsing, and drying) 2  -JJ (r) KG (t) JJ (c)     Toileting (which includes using toilet bed pan or urinal) 3  -JJ (r) KG (t) JJ (c)     Putting on and taking off regular upper body clothing 3  -JJ (r) KG (t) JJ (c)     Taking care of personal grooming (such as brushing teeth) 4  -JJ (r) KG (t) JJ (c)     Eating meals 4  -JJ (r) KG (t) JJ (c)     AM-PAC 6 Clicks Score (OT) 18  -JJ (r) KG (t)       Row Name 03/01/24 0926          Functional Assessment    Outcome Measure Options AM-PAC 6 Clicks Daily Activity (OT)  -JJ (r) KG (t) JJ (c)               User Key  (r) = Recorded By, (t) = Taken By, (c) = Cosigned By      Initials Name Provider Type    Columba Hernandez OTR/L, CSRS Occupational Therapist    Pasquale Leonardo, OT Student OT Student                  Occupational Therapy Education       Title: PT OT SLP Therapies (In Progress)       Topic: Occupational Therapy (Done)       Point: ADL training (Done)       Description:   Instruct learner(s) on proper safety adaptation and remediation techniques during self care or transfers.   Instruct in proper use of assistive devices.                  Learning Progress Summary              Patient Acceptance, E, VU by KG at 3/1/2024 0926    Acceptance, E, VU by KG at 2/29/2024 1349                         Point: Home exercise program (Done)       Description:   Instruct learner(s) on appropriate technique for monitoring, assisting and/or progressing therapeutic exercises/activities.                  Learning Progress Summary             Patient Acceptance, E, VU by KG at 3/1/2024 0926                         Point: Precautions (Done)       Description:   Instruct learner(s) on prescribed precautions during self-care and functional transfers.                  Learning Progress Summary             Patient Acceptance, E, VU by KG at 3/1/2024 0926    Acceptance, E, VU by KG at 2/29/2024 1349                         Point: Body mechanics (Done)       Description:   Instruct learner(s) on proper positioning and spine alignment during self-care, functional mobility activities and/or exercises.                  Learning Progress Summary             Patient Acceptance, E, VU by KG at 2/29/2024 1349                                         User Key       Initials Effective Dates Name Provider Type Discipline    KG 01/05/24 -  Pasquale Neri OT Student OT Student OT                  OT Recommendation and Plan  Planned Therapy Interventions (OT): activity tolerance training, adaptive equipment training, BADL retraining, edema control/reduction, functional balance retraining, IADL retraining, passive ROM/stretching, occupation/activity based interventions, neuromuscular control/coordination retraining, patient/caregiver education/training, ROM/therapeutic exercise, strengthening exercise, transfer/mobility retraining  Therapy Frequency (OT): 3 times/wk  Plan of Care Review  Plan of Care Reviewed With: patient  Outcome Evaluation: OT tx complete. Pt presented sitting up in bed though poorly positioned. She was agreeable therapy noting that she had previously had a rough morning but was optimistic that she would  be dc to home today. She came to sitting EOB with sba of HHAx1 prn. She noted that she had some pain in her R shoulder area that was incisional from prior procedure. She mainted both static and dynamic seated bal throughout session. Pt participated in intro to BUE HEP with consideration to additional weakness and pain in RUE d/t recent chemo port placement as well as pt's decreased ax sanjuana overall. She was receptive to education and was agreeable to participate in HEP outside of OT sessions. Pt was then assisted to BR for sinkside grooming tasks per pt request. Pt completed oral care and face washing while supporting herself on the sink for approx 5-6 min. Pt then amb back to EOB where she completed hair care/combing. She then returned to [facility] where she was presented with breakfast tray. Pt was independent for all self-feeding task components and req no assist for set up. Pt showed decreased BLE edema from yesterday as well as improved safety awareness and increased ax sanjuana. Pt cont to benefit from OT POC to further strengthen these areas. Pt would benefit from cont rehab following dc from home health therapy.  Plan of Care Reviewed With: patient  Outcome Evaluation: OT tx complete. Pt presented sitting up in bed though poorly positioned. She was agreeable therapy noting that she had previously had a rough morning but was optimistic that she would be dc to home today. She came to sitting EOB with sba of HHAx1 prn. She noted that she had some pain in her R shoulder area that was incisional from prior procedure. She mainted both static and dynamic seated bal throughout session. Pt participated in intro to BUE HEP with consideration to additional weakness and pain in RUE d/t recent chemo port placement as well as pt's decreased ax sanjuana overall. She was receptive to education and was agreeable to participate in HEP outside of OT sessions. Pt was then assisted to BR for sinkside grooming tasks per pt request. Pt  completed oral care and face washing while supporting herself on the sink for approx 5-6 min. Pt then amb back to EOB where she completed hair care/combing. She then returned to [facility] where she was presented with breakfast tray. Pt was independent for all self-feeding task components and req no assist for set up. Pt showed decreased BLE edema from yesterday as well as improved safety awareness and increased ax sanjuana. Pt cont to benefit from OT POC to further strengthen these areas. Pt would benefit from cont rehab following dc from home health therapy.     Time Calculation:         Time Calculation- OT       Row Name 03/01/24 0926             Time Calculation- OT    OT Start Time 0756  -JJ (r) KG (t) JJ (c)      OT Stop Time 0841  -JJ (r) KG (t) JJ (c)      OT Time Calculation (min) 45 min  -JJ (r) KG (t)      OT Received On 03/01/24  -JJ (r) KG (t) JJ (c)         Timed Charges    30635 - OT Therapeutic Exercise Minutes 15  -JJ (r) KG (t) JJ (c)      15035 - OT Self Care/Mgmt Minutes 30  -JJ (r) KG (t) JJ (c)         Total Minutes    Timed Charges Total Minutes 45  -JJ (r) KG (t)       Total Minutes 45  -JJ (r) KG (t)                User Key  (r) = Recorded By, (t) = Taken By, (c) = Cosigned By      Initials Name Provider Type    Columba Hernandez, REUBEN/L, CSRS Occupational Therapist    Pasquale Leonardo, OT Student OT Student                         OT Discharge Summary  Anticipated Discharge Disposition (OT): home with home health  Reason for Discharge: Discharge from facility  Outcomes Achieved: Patient able to partially acheive established goals  Discharge Destination: Home with home health    Pasquale Neri OT Student  3/1/2024

## 2024-03-01 NOTE — PLAN OF CARE
Problem: Adult Inpatient Plan of Care  Goal: Plan of Care Review  Outcome: Ongoing, Progressing  Flowsheets (Taken 3/1/2024 0358)  Outcome Evaluation:   Patient A&OX4   Patient bladder scan 656 ML   complains of abdominal pain   attempted in and out cath 0ml output        Problem: Adult Inpatient Plan of Care  Goal: Patient-Specific Goal (Individualized)  Outcome: Ongoing, Progressing     Problem: VTE (Venous Thromboembolism)  Goal: VTE (Venous Thromboembolism) Symptom Resolution  Outcome: Ongoing, Progressing  Intervention: Prevent or Manage VTE (Venous Thromboembolism)  Recent Flowsheet Documentation  Taken 2/29/2024 2040 by Clarita Davis RN  VTE Prevention/Management: (eliquis) other (see comments)     Problem: Malnutrition  Goal: Improved Nutritional Intake  Outcome: Ongoing, Progressing     Problem: Fall Injury Risk  Goal: Absence of Fall and Fall-Related Injury  Outcome: Ongoing, Progressing  Intervention: Promote Injury-Free Environment  Recent Flowsheet Documentation  Taken 2/29/2024 2040 by Clarita Davis, RN  Safety Promotion/Fall Prevention: safety round/check completed   Goal Outcome Evaluation:              Outcome Evaluation: Patient A&OX4; Patient bladder scan 656 ML; complains of abdominal pain; attempted in and out cath 0ml output;

## 2024-03-01 NOTE — DISCHARGE SUMMARY
[facility] Medicine Services  DISCHARGE SUMMARY       Date of Admission: 2/28/2024  Date of Discharge:  3/1/2024  Primary Care Physician: Myron Vines MD    Discharge Diagnoses:  Active Hospital Problems    Diagnosis     **Pulmonary embolus     PE (pulmonary thromboembolism)     Endometrial adenocarcinoma     Type 2 diabetes mellitus, without long-term current use of insulin          Presenting Problem/History of Present Illness:  Endometrial adenocarcinoma [C54.1]  Pulmonary embolus [I26.99]  PE (pulmonary thromboembolism) [I26.99]     Chief Complaint on Day of Discharge:   No complaint    History of Present Illness on Day of Discharge:   Patient is doing well today.  Oxygen saturations are well-maintained.  Echocardiogram shows no evidence of intraventricular thrombi and no evidence of vegetation or valvular disease.  Venous Doppler of the lower extremities shows no evidence of DVT.  The patient is stable for discharge today.    Hospital Course  This 63-year-old female presented this morning for insertion of a chemotherapy port which was performed by Dr. Wade.  Post procedurally, the patient experienced some shortness of breath.  She has a history of stroke and previous DVTs and is chronically anticoagulated with Eliquis.  Prior to surgery she held her Eliquis as instructed and was given bridging Lovenox which was supposed to be discontinued the evening prior to surgery.  Apparently, the patient became confused about Lovenox administration and held Eliquis for several days but did not use Lovenox.  She did not start Lovenox until the day before surgery and received only 2 doses.  She noted 2 days prior to the procedure that her right lower extremity was more swollen than typical.  The patient has chronic swelling of both lower extremity secondary to previous DVTs however swelling of the right lower extremity was far beyond what she had previously experienced.    Mauro evaluated the patient post procedurally and ordered a VQ scan which was probability for bilateral pulmonary emboli.  Currently, the patient has no oxygen requirement.  A venous Doppler study has been ordered and has been performed and results are pending.  A chest x-ray post-CVA port placement shows no acute abnormality.  Laboratory evaluation reveals normal troponins on 2 separate studies.  Preoperative MRSA nasal swab was negative.  Preoperative CBC shows a normal white blood cell count with a hemoglobin of 9.5.  Preoperative CMP shows glucose 179, sodium 134 and albumin 2.8.  The patient is admitted for initiation of anticoagulation.  She has received 1 dose of therapeutic Lovenox while in the outpatient surgical holding area.  She will be started on an Eliquis run and dose of 10 mg p.o. twice daily x 1 week to be reduced to 5 mg twice daily thereafter.     The patient has recently been diagnosed with metastatic endometrial carcinoma.  About 1 month ago she suffered a CVA and was fully anticoagulated at that time.  Treatment Plan  Admit to the medical surgical floor  Eliquis 10 mg p.o. twice daily x 7 days then decrease to 5 mg p.o. twice daily  Echocardiogram  Resume the bulk of home medications  Lower extremity Doppler studies, pending    The day after admission, the patient had no complaint of dyspnea.  Echocardiogram remains pending at that time.  Preliminary report showed no evidence of RV strain.  The patient remained in the hospital until a full echocardiogram report was available.  CBC showed a stable hemoglobin 9.4 with platelet count of 643,000 but was otherwise unremarkable.  Echocardiogram is not visible today and shows no significant abnormality that would contribute to pulmonary emboli.  The patient is appropriate for discharge home.  She will be placed on an Eliquis starter pack with Eliquis dosing of 10 mg p.o. twice daily x 7 days followed by 5 mg p.o. twice daily thereafter.  Pulmonary  "emboli were felt to be secondary to the patient's confusion regarding bridging Lovenox and Eliquis discontinuation prior to the procedure.  She went for a full 2-3 days without any anticoagulation of any sort and this was likely the origin of her pulmonary emboli.    Procedures Performed:   Insertion of chemotherapy port    Consults:   General surgery    Result Review    Result Review:  I have personally reviewed the results from the time of this admission to 3/1/2024 10:09 CST and agree with these findings:  []  Laboratory  []  Microbiology  []  Radiology  []  EKG/Telemetry   []  Cardiology/Vascular   []  Pathology  []  Old records  []  Other:    Condition on Discharge:    Stable and at baseline    Physical Exam on Discharge:  /48 (BP Location: Left arm, Patient Position: Lying)   Pulse 101   Temp 98.2 °F (36.8 °C) (Oral)   Resp 18   Ht 151 cm (59.45\")   Wt 67.8 kg (149 lb 6.4 oz)   SpO2 95%   Breastfeeding No   BMI 29.72 kg/m²   Physical Exam       Constitutional:       Appearance: Normal appearance. She is normal weight.   HENT:      Head: Normocephalic and atraumatic.      Right Ear: External ear normal.      Left Ear: External ear normal.      Nose: Nose normal.      Mouth: Mucous membranes are moist.      Pharynx: Oropharynx is clear.   Eyes:      General: No scleral icterus.  Cardiovascular:      Rate and Rhythm: Normal rate and regular rhythm.      Pulses: Normal pulses.      Heart sounds: Normal heart sounds. No murmur heard.  Pulmonary:      Effort: Pulmonary effort is normal. No respiratory distress.      Breath sounds: Normal breath sounds.   Abdominal:      General: Abdomen is flat. Bowel sounds are normal.      Palpations: Abdomen is soft. There is no mass.      Tenderness: There is no abdominal tenderness.   Musculoskeletal:         General: Swelling present. Normal range of motion.      Right lower leg: Edema (2/4) present.      Left lower leg: Edema (1/4) present.   Skin:     General: " Skin is warm and dry.      Coloration: Skin is not pale.      Comments: The patient has mild peripheral cyanosis of all 10 toes.   Neurological:      General: No focal deficit present.      Mental Status: She is alert and oriented to person, place, and time. Mental status is at baseline.   Psychiatric:         Mood and Affect: Mood normal.         Judgment: Judgment normal.     Discharge Disposition:  Home or Self Care    Discharge Medications:     Discharge Medications        New Medications        Instructions Start Date   Apixaban Starter Pack tablet therapy pack  Replaces: apixaban 5 MG tablet tablet   5 mg, Oral, Take As Directed      ondansetron 4 MG tablet  Commonly known as: Zofran   4 mg, Oral, Every 8 Hours PRN      traMADol 50 MG tablet  Commonly known as: Ultram   50 mg, Oral, Every 8 Hours PRN             Changes to Medications        Instructions Start Date   acetaminophen 325 MG tablet  Commonly known as: Tylenol  What changed: You were already taking a medication with the same name, and this prescription was added. Make sure you understand how and when to take each.   975 mg, Oral, Every 8 Hours, Take every 8 hours for 3 days then take prn as needed.      acetaminophen 500 MG tablet  Commonly known as: TYLENOL  What changed: Another medication with the same name was added. Make sure you understand how and when to take each.   500 mg, Oral, Every 6 Hours PRN             Continue These Medications        Instructions Start Date   albuterol sulfate  (90 Base) MCG/ACT inhaler  Commonly known as: PROVENTIL HFA;VENTOLIN HFA;PROAIR HFA   2 puffs, Inhalation, Every 4 Hours PRN      HYDROmorphone 2 MG tablet  Commonly known as: DILAUDID   2 mg, Oral, Every 6 Hours PRN      metFORMIN 500 MG tablet  Commonly known as: GLUCOPHAGE   500 mg, Oral, 2 Times Daily With Meals      ondansetron ODT 4 MG disintegrating tablet  Commonly known as: ZOFRAN-ODT   4 mg, Oral, Every 8 Hours PRN       oxyCODONE-acetaminophen 5-325 MG per tablet  Commonly known as: PERCOCET   1 tablet, Oral, Every 6 Hours PRN             Stop These Medications      apixaban 5 MG tablet tablet  Commonly known as: ELIQUIS  Replaced by: Apixaban Starter Pack tablet therapy pack     freestyle lancets              Discharge Diet:   Diet Instructions       Diet: Diabetic Diets; Consistent Carbohydrate; Thin (IDDSI 0)      Discharge Diet: Diabetic Diets    Diabetic Diet: Consistent Carbohydrate    Fluid Consistency: Thin (IDDSI 0)            Discharge Care Plan / Instructions:   Discharge home    Activity at Discharge:   Activity Instructions       Activity as Tolerated              Follow-up Appointments:  Follow-up with oncology as scheduled    Electronically signed by Mansoor Galvan DO, 03/01/24, 10:09 CST.    Time: Discharge than 30 min    Part of this note may be an electronic transcription/translation of spoken language to printed text using the Dragon Dictation system.

## 2024-03-02 NOTE — OUTREACH NOTE
Prep Survey      Flowsheet Row Responses   [facility]? [place]   Is LACE score < 7 ? No   Eligibility Readm Mgmt   Discharge diagnosis Pulmonary embolus, Single Lumen Port-a-cath insertion with flouroscopy   Does the patient have one of the following disease processes/diagnoses(primary or secondary)? Other   Does the patient have Home health ordered? No   Is there a DME ordered? No   Prep survey completed? Yes            Dahlia Bui Registered Nurse

## 2024-03-04 ENCOUNTER — HOSPITAL ENCOUNTER (OUTPATIENT)
Dept: INFUSION THERAPY | Age: 64
Discharge: HOME | End: 2024-03-04

## 2024-03-05 ENCOUNTER — READMISSION MANAGEMENT (OUTPATIENT)
Dept: CALL CENTER | Facility: HOSPITAL | Age: 64
End: 2024-03-05
Payer: COMMERCIAL

## 2024-03-05 ENCOUNTER — TELEPHONE (OUTPATIENT)
Dept: INFUSION THERAPY | Age: 64
End: 2024-03-05

## 2024-03-05 NOTE — TELEPHONE ENCOUNTER
PT originally scheduled to begin taxol/carbo with cold cap on Monday 3/4.  Pt called and canceled this appt on 3/1 d/t being in hospital.  PT stated she will call our office to reschedule.  This RN attempted to contact PT on Monday 3/4 and today 3/5.  Messages left for PT to call back and ask for this RN to schedule Cold Cap TX.  This RN also called husbands phone at this time, no answer.  Dr. Yeboah's nurse Laura notified at this time.  Will attempt to contact PT at later date.

## 2024-03-05 NOTE — OUTREACH NOTE
Medical Week 1 Survey      Flowsheet Row Responses   [facility] patient discharged from? [place]   Does the patient have one of the following disease processes/diagnoses(primary or secondary)? Other   Week 1 attempt successful? No   Unsuccessful attempts Attempt 1            Ivon Bui Registered Nurse

## 2024-03-07 NOTE — TELEPHONE ENCOUNTER
Requested Prescriptions     Pending Prescriptions Disp Refills    ELIQUIS 5 MG TABS tablet [Pharmacy Med Name: ELIQUIS 5 MG TABLET 5 Tablet] 60 tablet 0     Sig: TAKE ONE TABLET BY MOUTH TWO TIMES A DAY       Last Office Visit: Visit date not found  Next Office Visit: Visit date not found  Last Medication Refill: 1/27/2024 with 0 [facility] patient

## 2024-03-13 NOTE — OUTREACH NOTE
Medical Week 2 Survey      Flowsheet Row Responses   [facility] patient discharged from? [place]   Does the patient have one of the following disease processes/diagnoses(primary or secondary)? Other   Week 2 attempt successful? No   Unsuccessful attempts Attempt 1            JOLYNN ANDREWS - Registered Nurse

## 2024-03-16 NOTE — ED PROVIDER NOTES
Subjective   History of Present Illness  Patient presents in cardiac arrest.  Had a cardiac arrest at home.  Bystander CPR not initiated.  Fire arrived and started CPR.  By the time of arrival to the emergency department she has had about 35 minutes of CPR, 5 rounds of epinephrine, and she was administered 1 shock at 200 J for V. tach according to EMS.  She has an Igel in place and 1 point of intraosseous access.  Her reported history of stage IV cancer.  During the arrest her  advised me that she has a history of pulmonary emboli.    Review of Systems   Unable to perform ROS: Acuity of condition       Past Medical History:   Diagnosis Date    Anemia     Asthma     Bronchitis     Depression     Diabetes mellitus     DVT (deep venous thrombosis)     right arm    Endometrial cancer     mets to lung    Heart murmur     Lyme disease     Mitral valve prolapse     Stroke     --right side weakness       Allergies   Allergen Reactions    Iodinated Contrast Media Shortness Of Breath     Previous reaction, was premedicated with solu-medrol and benadryl       Past Surgical History:   Procedure Laterality Date     SECTION      D & C HYSTEROSCOPY ENDOMETRIAL ABLATION N/A 2024    Procedure: DILATATION AND CURETTAGE;  Surgeon: Radu De Los Santos MD;  Location:  [facility] OR;  Service: Obstetrics/Gynecology;  Laterality: N/A;    VENOUS ACCESS DEVICE (PORT) INSERTION N/A 2024    Procedure: Single Lumen Port-a-cath insertion with flouroscopy;  Surgeon: Lizy Wade MD;  Location:  [facility] OR;  Service: General;  Laterality: N/A;       Family History   Problem Relation Age of Onset    Heart disease Father     Diabetes Father     Diabetes Brother     Heart disease Brother     Cancer Maternal Grandmother     Cancer Maternal Grandfather     Heart disease Paternal Grandfather        Social History     Socioeconomic History    Marital status:    Tobacco Use    Smoking status: Never    Smokeless  tobacco: Never   Vaping Use    Vaping status: Never Used   Substance and Sexual Activity    Alcohol use: No    Drug use: No    Sexual activity: Defer     Partners: Male           Objective   Physical Exam  Vitals and nursing note reviewed.   HENT:      Head: Normocephalic and atraumatic.   Eyes:      Comments: Fixed and dilated bl no doll's eye reflex   Abdominal:      General: Abdomen is flat. There is distension.   Musculoskeletal:         General: No deformity or signs of injury.      Cervical back: Normal range of motion. No rigidity.         Critical Care    Performed by: Elias Shankar MD  Authorized by: Elias Shankar MD    Critical care provider statement:     Critical care time (minutes):  20    Critical care start time:  3/16/2024 4:30 AM    Critical care end time:  3/16/2024 4:50 AM    Critical care was necessary to treat or prevent imminent or life-threatening deterioration of the following conditions:  Cardiac failure and circulatory failure    Critical care was time spent personally by me on the following activities:  Examination of patient and obtaining history from patient or surrogate  Intubation    Date/Time: 3/16/2024 5:43 AM    Performed by: Elias Shankar MD  Authorized by: Elias Shankar MD    Consent:     Consent obtained:  Emergent situation  Pre-procedure details:     Indications: cardio/pulmonary arrest      Patient status:  Unresponsive    Obstruction: none      Neck mobility: normal      Pharmacologic strategy: none      Induction agents:  None    Paralytics:  None  Procedure details:     Preoxygenation:  Bag valve mask    CPR in progress: yes      Number of attempts:  1  Successful intubation attempt details:     Intubation technique: video assisted      Laryngoscope blade:  Mac 3    Bougie used: no      Grade view: I      Tube size (mm):  7.5    Tube type:  Cuffed    Tube visualized through cords: yes               ED Course                                              Medical Decision Making  Risk  Prescription drug management.      Radha Wade is a 63 y.o. female with PMH above who presents to the Emergency Department with cardiac arrest.  Likely pulmonary embolus or possibly MI.  Her prognosis is abysmal on arrival given that she did not receive bystander CPR and has already had 35 minutes of CPR.  She received formal rounds of epinephrine in the emergency department with continuous CPR for 5 rounds.  She did receive a 300 mg of amiodarone bolus because after the third round of CPR I shocked her 200 J for V. tach and she went into PEA.  I have updated her  on her condition and her time of death was 442 given that she had no response to her extensive treatments.      Final diagnosis: cardiac arrest    All questions answered. Patient/family was understanding and in agreement with today's assessment and plan. The patient was monitored during their stay in the ED and dispositioned without acute event.    Electronically signed by:  Elias Shankar MD 3/16/2024 05:44 CDT      Note: Dragon medical dictation software was used in the creation of this note.        Final diagnoses:   Cardiac arrest       ED Disposition  ED Disposition       ED Disposition       Condition   --    Comment   --               No follow-up provider specified.       Medication List      No changes were made to your prescriptions during this visit.            Elias Shankar MD  [phone]

## 2024-03-18 ENCOUNTER — READMISSION MANAGEMENT (OUTPATIENT)
Dept: CALL CENTER | Facility: HOSPITAL | Age: 64
End: 2024-03-18
Payer: COMMERCIAL

## 2024-03-18 NOTE — OUTREACH NOTE
Medical Week 2 Survey      Flowsheet Row Responses   [facility] patient discharged from? [place]   Does the patient have one of the following disease processes/diagnoses(primary or secondary)? Other   Week 2 attempt successful? No   Unsuccessful attempts Attempt 1   Revoke Patient             Martha THAO - Registered Nurse

## (undated) DEVICE — GLV SURG SENSICARE W/ALOE PF LF SZ6 STRL

## (undated) DEVICE — DRP IOBAN ANTIMICRO 23X17IN

## (undated) DEVICE — BLD SCLPL BP SS SZ11

## (undated) DEVICE — APPL CHLORAPREP HI/LITE 26ML ORNG

## (undated) DEVICE — PK MINOR UNIV 30

## (undated) DEVICE — NDL HYPO PRECISIONGLIDE REG 25G 1 1/2

## (undated) DEVICE — Device

## (undated) DEVICE — MANIFLD WAST MGMT SYS NEPTUNE2 4PT

## (undated) DEVICE — TRAP FLD MINIVAC MEGADYNE 100ML

## (undated) DEVICE — ADHS SKIN PREMIERPRO EXOFIN TOPICAL HI/VISC .5ML

## (undated) DEVICE — DECANT BG

## (undated) DEVICE — CVR UNIV C/ARM

## (undated) DEVICE — SUT SILK 2/0 SUTUPAK TIES 24IN SA75H

## (undated) DEVICE — SUT MNCRYL 4/0 PS2 27IN UD MCP426H

## (undated) DEVICE — GLV SURG BIOGEL M LTX PF 7

## (undated) DEVICE — GLV SURG SENSICARE W/ALOE PF LF 6.5 STRL

## (undated) DEVICE — SYR LL TP 10ML STRL

## (undated) DEVICE — SUT VIC 3/0 SH 27IN UD VCP416H

## (undated) DEVICE — ELECTRD BLD EZ CLN MOD XLNG 2.75IN

## (undated) DEVICE — PAD D&C: Brand: MEDLINE INDUSTRIES, INC.